# Patient Record
Sex: MALE | Race: WHITE | Employment: OTHER | URBAN - NONMETROPOLITAN AREA
[De-identification: names, ages, dates, MRNs, and addresses within clinical notes are randomized per-mention and may not be internally consistent; named-entity substitution may affect disease eponyms.]

---

## 2017-02-05 PROBLEM — K92.2 LOWER GI BLEED: Status: ACTIVE | Noted: 2017-02-05

## 2017-02-05 PROBLEM — D63.8 ANEMIA, CHRONIC DISEASE: Status: ACTIVE | Noted: 2017-02-05

## 2017-02-05 PROBLEM — N50.89 SCROTAL EDEMA: Status: ACTIVE | Noted: 2017-02-05

## 2017-02-05 PROBLEM — R60.0 LOWER EXTREMITY EDEMA: Status: ACTIVE | Noted: 2017-02-05

## 2017-02-07 PROBLEM — N43.3 BILATERAL HYDROCELE: Status: ACTIVE | Noted: 2017-02-05

## 2017-02-07 PROBLEM — J96.11 CHRONIC RESPIRATORY FAILURE WITH HYPOXIA (HCC): Status: ACTIVE | Noted: 2017-02-07

## 2017-02-21 ENCOUNTER — OFFICE VISIT (OUTPATIENT)
Dept: UROLOGY | Age: 57
End: 2017-02-21

## 2017-02-21 VITALS
HEIGHT: 73 IN | BODY MASS INDEX: 35.78 KG/M2 | SYSTOLIC BLOOD PRESSURE: 130 MMHG | DIASTOLIC BLOOD PRESSURE: 62 MMHG | WEIGHT: 270 LBS

## 2017-02-21 DIAGNOSIS — N50.89 SCROTAL SWELLING: Primary | ICD-10-CM

## 2017-02-21 DIAGNOSIS — Z12.5 SPECIAL SCREENING FOR MALIGNANT NEOPLASM OF PROSTATE: ICD-10-CM

## 2017-02-21 DIAGNOSIS — Z80.42 FAMILY HISTORY OF PROSTATE CANCER: ICD-10-CM

## 2017-02-21 LAB
BILIRUBIN URINE: NEGATIVE
BLOOD URINE, POC: ABNORMAL
CHARACTER, URINE: CLEAR
COLOR, URINE: YELLOW
GLUCOSE URINE: NEGATIVE MG/DL
KETONES, URINE: NEGATIVE
LEUKOCYTE CLUMPS, URINE: ABNORMAL
NITRITE, URINE: NEGATIVE
PH, URINE: 5
PROTEIN, URINE: 100 MG/DL
SPECIFIC GRAVITY, URINE: 1.02 (ref 1–1.03)
UROBILINOGEN, URINE: 0.2 EU/DL

## 2017-02-21 PROCEDURE — G8417 CALC BMI ABV UP PARAM F/U: HCPCS | Performed by: UROLOGY

## 2017-02-21 PROCEDURE — 1111F DSCHRG MED/CURRENT MED MERGE: CPT | Performed by: UROLOGY

## 2017-02-21 PROCEDURE — 1036F TOBACCO NON-USER: CPT | Performed by: UROLOGY

## 2017-02-21 PROCEDURE — G8427 DOCREV CUR MEDS BY ELIG CLIN: HCPCS | Performed by: UROLOGY

## 2017-02-21 PROCEDURE — 81003 URINALYSIS AUTO W/O SCOPE: CPT | Performed by: UROLOGY

## 2017-02-21 PROCEDURE — G8484 FLU IMMUNIZE NO ADMIN: HCPCS | Performed by: UROLOGY

## 2017-02-21 PROCEDURE — 99214 OFFICE O/P EST MOD 30 MIN: CPT | Performed by: UROLOGY

## 2017-02-21 PROCEDURE — 3017F COLORECTAL CA SCREEN DOC REV: CPT | Performed by: UROLOGY

## 2017-02-21 PROCEDURE — G8598 ASA/ANTIPLAT THER USED: HCPCS | Performed by: UROLOGY

## 2017-02-21 ASSESSMENT — ENCOUNTER SYMPTOMS
BACK PAIN: 0
EYE PAIN: 0
CHEST TIGHTNESS: 0
EYE REDNESS: 0
COLOR CHANGE: 0
CONSTIPATION: 0
VOMITING: 0
SHORTNESS OF BREATH: 1
FACIAL SWELLING: 0

## 2017-03-03 PROBLEM — R07.89 ATYPICAL CHEST PAIN: Status: ACTIVE | Noted: 2017-03-03

## 2017-03-04 PROBLEM — I50.21 ACUTE SYSTOLIC CHF (CONGESTIVE HEART FAILURE) (HCC): Status: ACTIVE | Noted: 2017-03-04

## 2017-03-06 PROBLEM — I95.1 ORTHOSTATIC SYNCOPE: Status: ACTIVE | Noted: 2017-03-06

## 2017-03-06 PROBLEM — I95.9 HYPOTENSION (ARTERIAL): Status: ACTIVE | Noted: 2017-03-06

## 2017-03-31 PROBLEM — E83.39 HYPERPHOSPHATEMIA: Chronic | Status: ACTIVE | Noted: 2017-03-31

## 2017-04-11 ENCOUNTER — TELEPHONE (OUTPATIENT)
Dept: CARDIOLOGY | Age: 57
End: 2017-04-11

## 2017-05-22 ENCOUNTER — TELEPHONE (OUTPATIENT)
Dept: CARDIOLOGY | Age: 57
End: 2017-05-22

## 2017-06-05 ENCOUNTER — OFFICE VISIT (OUTPATIENT)
Dept: CARDIOLOGY | Age: 57
End: 2017-06-05

## 2017-06-05 VITALS
SYSTOLIC BLOOD PRESSURE: 148 MMHG | HEART RATE: 70 BPM | HEIGHT: 73 IN | WEIGHT: 225 LBS | BODY MASS INDEX: 29.82 KG/M2 | DIASTOLIC BLOOD PRESSURE: 71 MMHG

## 2017-06-05 DIAGNOSIS — I25.5 ISCHEMIC CARDIOMYOPATHY: ICD-10-CM

## 2017-06-05 DIAGNOSIS — I25.110 CORONARY ARTERY DISEASE INVOLVING NATIVE CORONARY ARTERY OF NATIVE HEART WITH UNSTABLE ANGINA PECTORIS (HCC): Primary | ICD-10-CM

## 2017-06-05 DIAGNOSIS — N18.6 ESRD (END STAGE RENAL DISEASE) ON DIALYSIS (HCC): ICD-10-CM

## 2017-06-05 DIAGNOSIS — I48.92 PAROXYSMAL ATRIAL FLUTTER (HCC): ICD-10-CM

## 2017-06-05 DIAGNOSIS — Z99.2 ESRD (END STAGE RENAL DISEASE) ON DIALYSIS (HCC): ICD-10-CM

## 2017-06-05 DIAGNOSIS — Z95.2 S/P AVR: ICD-10-CM

## 2017-06-05 DIAGNOSIS — Z95.1 S/P CABG (CORONARY ARTERY BYPASS GRAFT): ICD-10-CM

## 2017-06-05 PROCEDURE — 93000 ELECTROCARDIOGRAM COMPLETE: CPT | Performed by: PHYSICIAN ASSISTANT

## 2017-06-05 PROCEDURE — G8417 CALC BMI ABV UP PARAM F/U: HCPCS | Performed by: PHYSICIAN ASSISTANT

## 2017-06-05 PROCEDURE — 3017F COLORECTAL CA SCREEN DOC REV: CPT | Performed by: PHYSICIAN ASSISTANT

## 2017-06-05 PROCEDURE — 99213 OFFICE O/P EST LOW 20 MIN: CPT | Performed by: PHYSICIAN ASSISTANT

## 2017-06-05 PROCEDURE — G8427 DOCREV CUR MEDS BY ELIG CLIN: HCPCS | Performed by: PHYSICIAN ASSISTANT

## 2017-06-05 PROCEDURE — 1036F TOBACCO NON-USER: CPT | Performed by: PHYSICIAN ASSISTANT

## 2017-06-05 PROCEDURE — G8598 ASA/ANTIPLAT THER USED: HCPCS | Performed by: PHYSICIAN ASSISTANT

## 2017-07-11 ENCOUNTER — OFFICE VISIT (OUTPATIENT)
Dept: CARDIOLOGY | Age: 57
End: 2017-07-11

## 2017-07-11 VITALS
HEART RATE: 121 BPM | BODY MASS INDEX: 30.75 KG/M2 | SYSTOLIC BLOOD PRESSURE: 110 MMHG | DIASTOLIC BLOOD PRESSURE: 50 MMHG | HEIGHT: 73 IN | WEIGHT: 232 LBS

## 2017-07-11 DIAGNOSIS — R00.2 HEART PALPITATIONS: ICD-10-CM

## 2017-07-11 DIAGNOSIS — I48.3 TYPICAL ATRIAL FLUTTER (HCC): Primary | ICD-10-CM

## 2017-07-11 PROCEDURE — G8598 ASA/ANTIPLAT THER USED: HCPCS | Performed by: INTERNAL MEDICINE

## 2017-07-11 PROCEDURE — 99205 OFFICE O/P NEW HI 60 MIN: CPT | Performed by: INTERNAL MEDICINE

## 2017-07-11 PROCEDURE — 3017F COLORECTAL CA SCREEN DOC REV: CPT | Performed by: INTERNAL MEDICINE

## 2017-07-11 PROCEDURE — G8427 DOCREV CUR MEDS BY ELIG CLIN: HCPCS | Performed by: INTERNAL MEDICINE

## 2017-07-11 PROCEDURE — 1036F TOBACCO NON-USER: CPT | Performed by: INTERNAL MEDICINE

## 2017-07-11 PROCEDURE — 93000 ELECTROCARDIOGRAM COMPLETE: CPT | Performed by: INTERNAL MEDICINE

## 2017-07-11 PROCEDURE — G8417 CALC BMI ABV UP PARAM F/U: HCPCS | Performed by: INTERNAL MEDICINE

## 2017-07-11 RX ORDER — GLYCERIN/MALTODEXTRIN
1 LIQUID (ML) ORAL
Status: ON HOLD | COMMUNITY
Start: 2017-06-05 | End: 2018-07-07 | Stop reason: CLARIF

## 2017-07-11 ASSESSMENT — ENCOUNTER SYMPTOMS
NAUSEA: 0
WHEEZING: 0
LEFT EYE: 0
SORE THROAT: 0
BLURRED VISION: 0
CONSTIPATION: 0
DOUBLE VISION: 0
DIARRHEA: 0
SHORTNESS OF BREATH: 0
RIGHT EYE: 0
VOMITING: 0
ABDOMINAL PAIN: 0
COUGH: 0
BACK PAIN: 0

## 2017-07-12 ENCOUNTER — TELEPHONE (OUTPATIENT)
Dept: CARDIOLOGY | Age: 57
End: 2017-07-12

## 2017-07-18 ENCOUNTER — TELEPHONE (OUTPATIENT)
Dept: CARDIOLOGY CLINIC | Age: 57
End: 2017-07-18

## 2017-07-18 ENCOUNTER — HOSPITAL ENCOUNTER (OUTPATIENT)
Dept: INPATIENT UNIT | Age: 57
Discharge: HOME | End: 2017-07-18
Payer: MEDICARE

## 2017-07-18 VITALS
HEIGHT: 73 IN | OXYGEN SATURATION: 97 % | BODY MASS INDEX: 31.28 KG/M2 | SYSTOLIC BLOOD PRESSURE: 160 MMHG | TEMPERATURE: 97.9 F | WEIGHT: 236 LBS | DIASTOLIC BLOOD PRESSURE: 70 MMHG | HEART RATE: 88 BPM | RESPIRATION RATE: 16 BRPM

## 2017-07-18 LAB
ANION GAP SERPL CALCULATED.3IONS-SCNC: 20 MEQ/L (ref 8–16)
BUN BLDV-MCNC: 49 MG/DL (ref 7–22)
CALCIUM SERPL-MCNC: 9 MG/DL (ref 8.5–10.5)
CHLORIDE BLD-SCNC: 94 MEQ/L (ref 98–111)
CO2: 26 MEQ/L (ref 23–33)
CREAT SERPL-MCNC: 7.7 MG/DL (ref 0.4–1.2)
GFR SERPL CREATININE-BSD FRML MDRD: 7 ML/MIN/1.73M2
GLUCOSE BLD-MCNC: 294 MG/DL (ref 70–108)
HCT VFR BLD CALC: 33.8 % (ref 42–52)
HEMOGLOBIN: 10.9 GM/DL (ref 14–18)
INR BLD: 1.32 (ref 0.85–1.13)
LV EF: 55 %
LVEF MODALITY: NORMAL
MCH RBC QN AUTO: 30.3 PG (ref 27–31)
MCHC RBC AUTO-ENTMCNC: 32.4 GM/DL (ref 33–37)
MCV RBC AUTO: 93.6 FL (ref 80–94)
PDW BLD-RTO: 17.1 % (ref 11.5–14.5)
PLATELET # BLD: 205 THOU/MM3 (ref 130–400)
PMV BLD AUTO: 8 MCM (ref 7.4–10.4)
POTASSIUM SERPL-SCNC: 4.1 MEQ/L (ref 3.5–5.2)
RBC # BLD: 3.61 MILL/MM3 (ref 4.7–6.1)
SODIUM BLD-SCNC: 140 MEQ/L (ref 135–145)
WBC # BLD: 11.8 THOU/MM3 (ref 4.8–10.8)

## 2017-07-18 PROCEDURE — 85027 COMPLETE CBC AUTOMATED: CPT

## 2017-07-18 PROCEDURE — 85610 PROTHROMBIN TIME: CPT

## 2017-07-18 PROCEDURE — 93312 ECHO TRANSESOPHAGEAL: CPT

## 2017-07-18 PROCEDURE — 6370000000 HC RX 637 (ALT 250 FOR IP)

## 2017-07-18 PROCEDURE — 6360000002 HC RX W HCPCS: Performed by: NUCLEAR MEDICINE

## 2017-07-18 PROCEDURE — 36415 COLL VENOUS BLD VENIPUNCTURE: CPT

## 2017-07-18 PROCEDURE — 80048 BASIC METABOLIC PNL TOTAL CA: CPT

## 2017-07-18 PROCEDURE — 93325 DOPPLER ECHO COLOR FLOW MAPG: CPT

## 2017-07-18 PROCEDURE — 93320 DOPPLER ECHO COMPLETE: CPT

## 2017-07-18 PROCEDURE — 2580000003 HC RX 258: Performed by: NUCLEAR MEDICINE

## 2017-07-18 RX ORDER — NALOXONE HYDROCHLORIDE 0.4 MG/ML
INJECTION, SOLUTION INTRAMUSCULAR; INTRAVENOUS; SUBCUTANEOUS
Status: DISCONTINUED
Start: 2017-07-18 | End: 2017-07-18 | Stop reason: WASHOUT

## 2017-07-18 RX ORDER — FLUMAZENIL 0.1 MG/ML
INJECTION, SOLUTION INTRAVENOUS
Status: DISCONTINUED
Start: 2017-07-18 | End: 2017-07-18 | Stop reason: WASHOUT

## 2017-07-18 RX ORDER — MIDAZOLAM HYDROCHLORIDE 1 MG/ML
10 INJECTION INTRAMUSCULAR; INTRAVENOUS ONCE
Status: COMPLETED | OUTPATIENT
Start: 2017-07-18 | End: 2017-07-18

## 2017-07-18 RX ORDER — SODIUM CHLORIDE 0.9 % (FLUSH) 0.9 %
10 SYRINGE (ML) INJECTION EVERY 12 HOURS SCHEDULED
Status: DISCONTINUED | OUTPATIENT
Start: 2017-07-18 | End: 2017-07-19 | Stop reason: HOSPADM

## 2017-07-18 RX ORDER — AMOXICILLIN 500 MG/1
500 CAPSULE ORAL 3 TIMES DAILY
Status: ON HOLD | COMMUNITY
End: 2017-08-16 | Stop reason: ALTCHOICE

## 2017-07-18 RX ORDER — SODIUM CHLORIDE 0.9 % (FLUSH) 0.9 %
10 SYRINGE (ML) INJECTION PRN
Status: DISCONTINUED | OUTPATIENT
Start: 2017-07-18 | End: 2017-07-19 | Stop reason: HOSPADM

## 2017-07-18 RX ORDER — SODIUM CHLORIDE 9 MG/ML
INJECTION, SOLUTION INTRAVENOUS CONTINUOUS
Status: DISCONTINUED | OUTPATIENT
Start: 2017-07-18 | End: 2017-07-19 | Stop reason: HOSPADM

## 2017-07-18 RX ORDER — FENTANYL CITRATE 50 UG/ML
100 INJECTION, SOLUTION INTRAMUSCULAR; INTRAVENOUS ONCE
Status: COMPLETED | OUTPATIENT
Start: 2017-07-18 | End: 2017-07-18

## 2017-07-18 RX ADMIN — FENTANYL CITRATE 50 MCG: 50 INJECTION INTRAMUSCULAR; INTRAVENOUS at 08:21

## 2017-07-18 RX ADMIN — SODIUM CHLORIDE: 9 INJECTION, SOLUTION INTRAVENOUS at 07:22

## 2017-07-18 RX ADMIN — MIDAZOLAM 3 MG: 1 INJECTION INTRAMUSCULAR; INTRAVENOUS at 08:20

## 2017-07-21 ENCOUNTER — HOSPITAL ENCOUNTER (OUTPATIENT)
Dept: INPATIENT UNIT | Age: 57
Discharge: HOME | End: 2017-07-22
Attending: INTERNAL MEDICINE | Admitting: INTERNAL MEDICINE
Payer: MEDICARE

## 2017-07-21 ENCOUNTER — ANESTHESIA EVENT (OUTPATIENT)
Dept: CARDIAC CATH/INVASIVE PROCEDURES | Age: 57
End: 2017-07-21
Payer: MEDICARE

## 2017-07-21 ENCOUNTER — ANESTHESIA (OUTPATIENT)
Dept: CARDIAC CATH/INVASIVE PROCEDURES | Age: 57
End: 2017-07-21
Payer: MEDICARE

## 2017-07-21 ENCOUNTER — APPOINTMENT (OUTPATIENT)
Dept: CARDIAC CATH/INVASIVE PROCEDURES | Age: 57
End: 2017-07-21
Attending: INTERNAL MEDICINE
Payer: MEDICARE

## 2017-07-21 VITALS
DIASTOLIC BLOOD PRESSURE: 59 MMHG | OXYGEN SATURATION: 96 % | SYSTOLIC BLOOD PRESSURE: 133 MMHG | RESPIRATION RATE: 2 BRPM | TEMPERATURE: 98.4 F

## 2017-07-21 PROBLEM — I48.92 ATRIAL FLUTTER (HCC): Status: ACTIVE | Noted: 2017-07-21

## 2017-07-21 PROBLEM — I48.3 TYPICAL ATRIAL FLUTTER (HCC): Status: ACTIVE | Noted: 2017-07-21

## 2017-07-21 LAB
GLUCOSE BLD-MCNC: 182 MG/DL (ref 70–108)
GLUCOSE BLD-MCNC: 207 MG/DL (ref 70–108)

## 2017-07-21 PROCEDURE — 7100000000 HC PACU RECOVERY - FIRST 15 MIN

## 2017-07-21 PROCEDURE — 3700000000 HC ANESTHESIA ATTENDED CARE

## 2017-07-21 PROCEDURE — A6258 TRANSPARENT FILM >16<=48 IN: HCPCS

## 2017-07-21 PROCEDURE — 93613 INTRACARDIAC EPHYS 3D MAPG: CPT

## 2017-07-21 PROCEDURE — 93653 COMPRE EP EVAL TX SVT: CPT | Performed by: INTERNAL MEDICINE

## 2017-07-21 PROCEDURE — 2580000003 HC RX 258: Performed by: NURSE ANESTHETIST, CERTIFIED REGISTERED

## 2017-07-21 PROCEDURE — 6370000000 HC RX 637 (ALT 250 FOR IP): Performed by: INTERNAL MEDICINE

## 2017-07-21 PROCEDURE — C1731 CATH, EP, 20 OR MORE ELEC: HCPCS

## 2017-07-21 PROCEDURE — 2580000003 HC RX 258: Performed by: INTERNAL MEDICINE

## 2017-07-21 PROCEDURE — 6360000002 HC RX W HCPCS

## 2017-07-21 PROCEDURE — 2500000003 HC RX 250 WO HCPCS

## 2017-07-21 PROCEDURE — 93621 COMP EP EVL L PAC&REC C SINS: CPT

## 2017-07-21 PROCEDURE — C1730 CATH, EP, 19 OR FEW ELECT: HCPCS

## 2017-07-21 PROCEDURE — C2630 CATH, EP, COOL-TIP: HCPCS

## 2017-07-21 PROCEDURE — 6360000002 HC RX W HCPCS: Performed by: NURSE ANESTHETIST, CERTIFIED REGISTERED

## 2017-07-21 PROCEDURE — 93613 INTRACARDIAC EPHYS 3D MAPG: CPT | Performed by: INTERNAL MEDICINE

## 2017-07-21 PROCEDURE — 2500000003 HC RX 250 WO HCPCS: Performed by: INTERNAL MEDICINE

## 2017-07-21 PROCEDURE — 93621 COMP EP EVL L PAC&REC C SINS: CPT | Performed by: INTERNAL MEDICINE

## 2017-07-21 PROCEDURE — 93653 COMPRE EP EVAL TX SVT: CPT

## 2017-07-21 PROCEDURE — 7100000001 HC PACU RECOVERY - ADDTL 15 MIN

## 2017-07-21 PROCEDURE — C1894 INTRO/SHEATH, NON-LASER: HCPCS

## 2017-07-21 PROCEDURE — 82948 REAGENT STRIP/BLOOD GLUCOSE: CPT

## 2017-07-21 PROCEDURE — 2720000010 HC SURG SUPPLY STERILE

## 2017-07-21 PROCEDURE — 3700000001 HC ADD 15 MINUTES (ANESTHESIA)

## 2017-07-21 PROCEDURE — 2500000003 HC RX 250 WO HCPCS: Performed by: NURSE ANESTHETIST, CERTIFIED REGISTERED

## 2017-07-21 RX ORDER — SODIUM CHLORIDE 9 MG/ML
INJECTION, SOLUTION INTRAVENOUS CONTINUOUS PRN
Status: DISCONTINUED | OUTPATIENT
Start: 2017-07-21 | End: 2017-07-23 | Stop reason: SDUPTHER

## 2017-07-21 RX ORDER — SODIUM CHLORIDE, SODIUM LACTATE, CALCIUM CHLORIDE, MAGNESIUM CHLORIDE AND DEXTROSE 2.5; 538; 448; 18.3; 5.08 G/100ML; MG/100ML; MG/100ML; MG/100ML; MG/100ML
2000 INJECTION, SOLUTION INTRAPERITONEAL EVERY 6 HOURS
Status: DISCONTINUED | OUTPATIENT
Start: 2017-07-21 | End: 2017-07-22 | Stop reason: HOSPADM

## 2017-07-21 RX ORDER — GLYCOPYRROLATE 0.2 MG/ML
INJECTION INTRAMUSCULAR; INTRAVENOUS PRN
Status: DISCONTINUED | OUTPATIENT
Start: 2017-07-21 | End: 2017-07-23 | Stop reason: SDUPTHER

## 2017-07-21 RX ORDER — PANTOPRAZOLE SODIUM 40 MG/1
40 TABLET, DELAYED RELEASE ORAL 2 TIMES DAILY
Status: DISCONTINUED | OUTPATIENT
Start: 2017-07-21 | End: 2017-07-22 | Stop reason: HOSPADM

## 2017-07-21 RX ORDER — LIDOCAINE HYDROCHLORIDE 20 MG/ML
INJECTION, SOLUTION INFILTRATION; PERINEURAL PRN
Status: DISCONTINUED | OUTPATIENT
Start: 2017-07-21 | End: 2017-07-23 | Stop reason: SDUPTHER

## 2017-07-21 RX ORDER — PREGABALIN 50 MG/1
50 CAPSULE ORAL 2 TIMES DAILY
Status: DISCONTINUED | OUTPATIENT
Start: 2017-07-21 | End: 2017-07-22 | Stop reason: HOSPADM

## 2017-07-21 RX ORDER — SODIUM CHLORIDE 0.9 % (FLUSH) 0.9 %
10 SYRINGE (ML) INJECTION EVERY 12 HOURS SCHEDULED
Status: DISCONTINUED | OUTPATIENT
Start: 2017-07-21 | End: 2017-07-22 | Stop reason: HOSPADM

## 2017-07-21 RX ORDER — ASPIRIN 81 MG/1
81 TABLET, CHEWABLE ORAL DAILY
Status: DISCONTINUED | OUTPATIENT
Start: 2017-07-22 | End: 2017-07-22 | Stop reason: HOSPADM

## 2017-07-21 RX ORDER — ACETAMINOPHEN 325 MG/1
650 TABLET ORAL EVERY 4 HOURS PRN
Status: DISCONTINUED | OUTPATIENT
Start: 2017-07-21 | End: 2017-07-22 | Stop reason: HOSPADM

## 2017-07-21 RX ORDER — MEPERIDINE HYDROCHLORIDE 25 MG/ML
25 INJECTION INTRAMUSCULAR; INTRAVENOUS; SUBCUTANEOUS
Status: DISCONTINUED | OUTPATIENT
Start: 2017-07-21 | End: 2017-07-21

## 2017-07-21 RX ORDER — DIPHENHYDRAMINE HYDROCHLORIDE 50 MG/ML
12.5 INJECTION INTRAMUSCULAR; INTRAVENOUS
Status: DISCONTINUED | OUTPATIENT
Start: 2017-07-21 | End: 2017-07-21

## 2017-07-21 RX ORDER — LABETALOL HYDROCHLORIDE 5 MG/ML
5 INJECTION, SOLUTION INTRAVENOUS EVERY 10 MIN PRN
Status: DISCONTINUED | OUTPATIENT
Start: 2017-07-21 | End: 2017-07-21

## 2017-07-21 RX ORDER — NICOTINE POLACRILEX 4 MG
15 LOZENGE BUCCAL PRN
Status: DISCONTINUED | OUTPATIENT
Start: 2017-07-21 | End: 2017-07-22 | Stop reason: HOSPADM

## 2017-07-21 RX ORDER — DEXAMETHASONE SODIUM PHOSPHATE 4 MG/ML
INJECTION, SOLUTION INTRA-ARTICULAR; INTRALESIONAL; INTRAMUSCULAR; INTRAVENOUS; SOFT TISSUE PRN
Status: DISCONTINUED | OUTPATIENT
Start: 2017-07-21 | End: 2017-07-23 | Stop reason: SDUPTHER

## 2017-07-21 RX ORDER — FENTANYL CITRATE 50 UG/ML
50 INJECTION, SOLUTION INTRAMUSCULAR; INTRAVENOUS EVERY 5 MIN PRN
Status: DISCONTINUED | OUTPATIENT
Start: 2017-07-21 | End: 2017-07-21

## 2017-07-21 RX ORDER — ROCURONIUM BROMIDE 10 MG/ML
INJECTION, SOLUTION INTRAVENOUS PRN
Status: DISCONTINUED | OUTPATIENT
Start: 2017-07-21 | End: 2017-07-23 | Stop reason: SDUPTHER

## 2017-07-21 RX ORDER — NEOSTIGMINE METHYLSULFATE 1 MG/ML
INJECTION, SOLUTION INTRAVENOUS PRN
Status: DISCONTINUED | OUTPATIENT
Start: 2017-07-21 | End: 2017-07-23 | Stop reason: SDUPTHER

## 2017-07-21 RX ORDER — PROPOFOL 10 MG/ML
INJECTION, EMULSION INTRAVENOUS PRN
Status: DISCONTINUED | OUTPATIENT
Start: 2017-07-21 | End: 2017-07-23 | Stop reason: SDUPTHER

## 2017-07-21 RX ORDER — DEXTROSE MONOHYDRATE 25 G/50ML
12.5 INJECTION, SOLUTION INTRAVENOUS PRN
Status: DISCONTINUED | OUTPATIENT
Start: 2017-07-21 | End: 2017-07-22 | Stop reason: HOSPADM

## 2017-07-21 RX ORDER — ONDANSETRON 2 MG/ML
INJECTION INTRAMUSCULAR; INTRAVENOUS PRN
Status: DISCONTINUED | OUTPATIENT
Start: 2017-07-21 | End: 2017-07-23 | Stop reason: SDUPTHER

## 2017-07-21 RX ORDER — FENTANYL CITRATE 50 UG/ML
INJECTION, SOLUTION INTRAMUSCULAR; INTRAVENOUS PRN
Status: DISCONTINUED | OUTPATIENT
Start: 2017-07-21 | End: 2017-07-23 | Stop reason: SDUPTHER

## 2017-07-21 RX ORDER — SODIUM CHLORIDE 0.9 % (FLUSH) 0.9 %
10 SYRINGE (ML) INJECTION PRN
Status: DISCONTINUED | OUTPATIENT
Start: 2017-07-21 | End: 2017-07-22 | Stop reason: HOSPADM

## 2017-07-21 RX ORDER — PROMETHAZINE HYDROCHLORIDE 25 MG/ML
12.5 INJECTION, SOLUTION INTRAMUSCULAR; INTRAVENOUS
Status: DISCONTINUED | OUTPATIENT
Start: 2017-07-21 | End: 2017-07-21

## 2017-07-21 RX ORDER — SODIUM CHLORIDE 9 MG/ML
INJECTION, SOLUTION INTRAVENOUS CONTINUOUS
Status: DISCONTINUED | OUTPATIENT
Start: 2017-07-21 | End: 2017-07-21

## 2017-07-21 RX ORDER — FENTANYL CITRATE 50 UG/ML
25 INJECTION, SOLUTION INTRAMUSCULAR; INTRAVENOUS EVERY 5 MIN PRN
Status: DISCONTINUED | OUTPATIENT
Start: 2017-07-21 | End: 2017-07-21

## 2017-07-21 RX ORDER — MIDAZOLAM HYDROCHLORIDE 1 MG/ML
INJECTION INTRAMUSCULAR; INTRAVENOUS PRN
Status: DISCONTINUED | OUTPATIENT
Start: 2017-07-21 | End: 2017-07-23 | Stop reason: SDUPTHER

## 2017-07-21 RX ORDER — SODIUM CHLORIDE, SODIUM LACTATE, CALCIUM CHLORIDE, MAGNESIUM CHLORIDE AND DEXTROSE 2.5; 538; 448; 18.3; 5.08 G/100ML; MG/100ML; MG/100ML; MG/100ML; MG/100ML
INJECTION, SOLUTION INTRAPERITONEAL EVERY 4 HOURS
Status: DISCONTINUED | OUTPATIENT
Start: 2017-07-21 | End: 2017-07-21

## 2017-07-21 RX ORDER — FLUOXETINE HYDROCHLORIDE 20 MG/1
20 CAPSULE ORAL DAILY
Status: DISCONTINUED | OUTPATIENT
Start: 2017-07-22 | End: 2017-07-22 | Stop reason: HOSPADM

## 2017-07-21 RX ORDER — ALBUTEROL SULFATE 90 UG/1
2 AEROSOL, METERED RESPIRATORY (INHALATION) EVERY 6 HOURS PRN
Status: DISCONTINUED | OUTPATIENT
Start: 2017-07-21 | End: 2017-07-22 | Stop reason: HOSPADM

## 2017-07-21 RX ORDER — CARVEDILOL 3.12 MG/1
3.12 TABLET ORAL 2 TIMES DAILY
Status: DISCONTINUED | OUTPATIENT
Start: 2017-07-21 | End: 2017-07-22 | Stop reason: HOSPADM

## 2017-07-21 RX ORDER — DEXTROSE MONOHYDRATE 50 MG/ML
100 INJECTION, SOLUTION INTRAVENOUS PRN
Status: DISCONTINUED | OUTPATIENT
Start: 2017-07-21 | End: 2017-07-22 | Stop reason: HOSPADM

## 2017-07-21 RX ADMIN — SODIUM CHLORIDE: 9 INJECTION, SOLUTION INTRAVENOUS at 13:11

## 2017-07-21 RX ADMIN — PROPOFOL 140 MG: 10 INJECTION, EMULSION INTRAVENOUS at 13:20

## 2017-07-21 RX ADMIN — INSULIN LISPRO 2 UNITS: 100 INJECTION, SOLUTION INTRAVENOUS; SUBCUTANEOUS at 21:26

## 2017-07-21 RX ADMIN — PHENYLEPHRINE HYDROCHLORIDE 100 MCG: 10 INJECTION INTRAMUSCULAR; INTRAVENOUS; SUBCUTANEOUS at 14:00

## 2017-07-21 RX ADMIN — FENTANYL CITRATE 50 MCG: 50 INJECTION INTRAMUSCULAR; INTRAVENOUS at 13:20

## 2017-07-21 RX ADMIN — FENTANYL CITRATE 25 MCG: 50 INJECTION INTRAMUSCULAR; INTRAVENOUS at 15:04

## 2017-07-21 RX ADMIN — Medication 40 MG: at 13:20

## 2017-07-21 RX ADMIN — Medication 10 ML: at 21:26

## 2017-07-21 RX ADMIN — SODIUM CHLORIDE: 9 INJECTION, SOLUTION INTRAVENOUS at 12:08

## 2017-07-21 RX ADMIN — MIDAZOLAM HYDROCHLORIDE 2 MG: 1 INJECTION INTRAMUSCULAR; INTRAVENOUS at 13:17

## 2017-07-21 RX ADMIN — GLYCOPYRROLATE 0.4 MG: 0.2 INJECTION, SOLUTION INTRAMUSCULAR; INTRAVENOUS at 14:57

## 2017-07-21 RX ADMIN — SODIUM CHLORIDE, SODIUM LACTATE, CALCIUM CHLORIDE, MAGNESIUM CHLORIDE AND DEXTROSE 2000 ML: 2.5; 538; 448; 18.3; 5.08 INJECTION, SOLUTION INTRAPERITONEAL at 21:59

## 2017-07-21 RX ADMIN — PHENYLEPHRINE HYDROCHLORIDE 100 MCG: 10 INJECTION INTRAMUSCULAR; INTRAVENOUS; SUBCUTANEOUS at 14:10

## 2017-07-21 RX ADMIN — PHENYLEPHRINE HYDROCHLORIDE 100 MCG: 10 INJECTION INTRAMUSCULAR; INTRAVENOUS; SUBCUTANEOUS at 13:40

## 2017-07-21 RX ADMIN — MICONAZOLE NITRATE: 2 POWDER TOPICAL at 21:26

## 2017-07-21 RX ADMIN — APIXABAN 5 MG: 5 TABLET, FILM COATED ORAL at 21:26

## 2017-07-21 RX ADMIN — SODIUM CHLORIDE: 9 INJECTION, SOLUTION INTRAVENOUS at 18:19

## 2017-07-21 RX ADMIN — ONDANSETRON 4 MG: 2 INJECTION INTRAMUSCULAR; INTRAVENOUS at 14:58

## 2017-07-21 RX ADMIN — FENTANYL CITRATE 25 MCG: 50 INJECTION INTRAMUSCULAR; INTRAVENOUS at 15:20

## 2017-07-21 RX ADMIN — PHENYLEPHRINE HYDROCHLORIDE 100 MCG: 10 INJECTION INTRAMUSCULAR; INTRAVENOUS; SUBCUTANEOUS at 14:35

## 2017-07-21 RX ADMIN — NEOSTIGMINE METHYLSULFATE 3 MG: 1 INJECTION, SOLUTION INTRAVENOUS at 14:57

## 2017-07-21 RX ADMIN — CARVEDILOL 3.12 MG: 3.12 TABLET, FILM COATED ORAL at 21:26

## 2017-07-21 RX ADMIN — PANTOPRAZOLE SODIUM 40 MG: 40 TABLET, DELAYED RELEASE ORAL at 21:26

## 2017-07-21 RX ADMIN — LIDOCAINE HYDROCHLORIDE 100 MG: 20 INJECTION, SOLUTION INFILTRATION; PERINEURAL at 13:20

## 2017-07-21 RX ADMIN — DEXAMETHASONE SODIUM PHOSPHATE 4 MG: 4 INJECTION, SOLUTION INTRAMUSCULAR; INTRAVENOUS at 13:36

## 2017-07-21 RX ADMIN — PREGABALIN 50 MG: 50 CAPSULE ORAL at 21:26

## 2017-07-21 RX ADMIN — Medication 2 UNITS: at 19:13

## 2017-07-21 ASSESSMENT — PULMONARY FUNCTION TESTS
PIF_VALUE: 16
PIF_VALUE: 1
PIF_VALUE: 22
PIF_VALUE: 16
PIF_VALUE: 19
PIF_VALUE: 19
PIF_VALUE: 20
PIF_VALUE: 19
PIF_VALUE: 0
PIF_VALUE: 19
PIF_VALUE: 19
PIF_VALUE: 13
PIF_VALUE: 19
PIF_VALUE: 19
PIF_VALUE: 13
PIF_VALUE: 19
PIF_VALUE: 5
PIF_VALUE: 19
PIF_VALUE: 19
PIF_VALUE: 20
PIF_VALUE: 19
PIF_VALUE: 0
PIF_VALUE: 20
PIF_VALUE: 19
PIF_VALUE: 19
PIF_VALUE: 20
PIF_VALUE: 19
PIF_VALUE: 18
PIF_VALUE: 13
PIF_VALUE: 19
PIF_VALUE: 19
PIF_VALUE: 21
PIF_VALUE: 19
PIF_VALUE: 19
PIF_VALUE: 18
PIF_VALUE: 20
PIF_VALUE: 13
PIF_VALUE: 19
PIF_VALUE: 19
PIF_VALUE: 20
PIF_VALUE: 0
PIF_VALUE: 19
PIF_VALUE: 20
PIF_VALUE: 3
PIF_VALUE: 19
PIF_VALUE: 21
PIF_VALUE: 22
PIF_VALUE: 19
PIF_VALUE: 15
PIF_VALUE: 13
PIF_VALUE: 20
PIF_VALUE: 0
PIF_VALUE: 24
PIF_VALUE: 20
PIF_VALUE: 19
PIF_VALUE: 2
PIF_VALUE: 2
PIF_VALUE: 19
PIF_VALUE: 23
PIF_VALUE: 19
PIF_VALUE: 18
PIF_VALUE: 19
PIF_VALUE: 17
PIF_VALUE: 19
PIF_VALUE: 20
PIF_VALUE: 19
PIF_VALUE: 17
PIF_VALUE: 19
PIF_VALUE: 1
PIF_VALUE: 1
PIF_VALUE: 13
PIF_VALUE: 20
PIF_VALUE: 19
PIF_VALUE: 20
PIF_VALUE: 19
PIF_VALUE: 19
PIF_VALUE: 20
PIF_VALUE: 0
PIF_VALUE: 1
PIF_VALUE: 20
PIF_VALUE: 20
PIF_VALUE: 19
PIF_VALUE: 19
PIF_VALUE: 20
PIF_VALUE: 20
PIF_VALUE: 21
PIF_VALUE: 17
PIF_VALUE: 20
PIF_VALUE: 19
PIF_VALUE: 14
PIF_VALUE: 21
PIF_VALUE: 19
PIF_VALUE: 17
PIF_VALUE: 18
PIF_VALUE: 19
PIF_VALUE: 15
PIF_VALUE: 19
PIF_VALUE: 19
PIF_VALUE: 1
PIF_VALUE: 19
PIF_VALUE: 20
PIF_VALUE: 19
PIF_VALUE: 19
PIF_VALUE: 20

## 2017-07-21 ASSESSMENT — PAIN SCALES - GENERAL: PAINLEVEL_OUTOF10: 0

## 2017-07-22 VITALS
HEART RATE: 73 BPM | WEIGHT: 236 LBS | HEIGHT: 73 IN | TEMPERATURE: 98 F | SYSTOLIC BLOOD PRESSURE: 169 MMHG | DIASTOLIC BLOOD PRESSURE: 78 MMHG | BODY MASS INDEX: 31.28 KG/M2 | OXYGEN SATURATION: 95 % | RESPIRATION RATE: 18 BRPM

## 2017-07-22 LAB — GLUCOSE BLD-MCNC: 203 MG/DL (ref 70–108)

## 2017-07-22 PROCEDURE — 6370000000 HC RX 637 (ALT 250 FOR IP): Performed by: INTERNAL MEDICINE

## 2017-07-22 PROCEDURE — 82948 REAGENT STRIP/BLOOD GLUCOSE: CPT

## 2017-07-22 PROCEDURE — 2580000003 HC RX 258: Performed by: INTERNAL MEDICINE

## 2017-07-22 PROCEDURE — 93005 ELECTROCARDIOGRAM TRACING: CPT

## 2017-07-22 RX ADMIN — APIXABAN 5 MG: 5 TABLET, FILM COATED ORAL at 09:06

## 2017-07-22 RX ADMIN — Medication 4 UNITS: at 09:06

## 2017-07-22 RX ADMIN — PANTOPRAZOLE SODIUM 40 MG: 40 TABLET, DELAYED RELEASE ORAL at 09:06

## 2017-07-22 RX ADMIN — ASPIRIN 81 MG: 81 TABLET, CHEWABLE ORAL at 09:06

## 2017-07-22 RX ADMIN — PREGABALIN 50 MG: 50 CAPSULE ORAL at 09:06

## 2017-07-22 RX ADMIN — FLUOXETINE 20 MG: 20 CAPSULE ORAL at 09:06

## 2017-07-22 RX ADMIN — CARVEDILOL 3.12 MG: 3.12 TABLET, FILM COATED ORAL at 09:06

## 2017-07-22 RX ADMIN — SODIUM CHLORIDE, SODIUM LACTATE, CALCIUM CHLORIDE, MAGNESIUM CHLORIDE AND DEXTROSE 2000 ML: 2.5; 538; 448; 18.3; 5.08 INJECTION, SOLUTION INTRAPERITONEAL at 04:30

## 2017-07-22 ASSESSMENT — PAIN SCALES - GENERAL: PAINLEVEL_OUTOF10: 0

## 2017-07-28 LAB
EKG ATRIAL RATE: 73 BPM
EKG P AXIS: 58 DEGREES
EKG P-R INTERVAL: 188 MS
EKG Q-T INTERVAL: 480 MS
EKG QRS DURATION: 144 MS
EKG QTC CALCULATION (BAZETT): 528 MS
EKG R AXIS: 92 DEGREES
EKG T AXIS: -10 DEGREES
EKG VENTRICULAR RATE: 73 BPM

## 2017-08-10 ENCOUNTER — TELEPHONE (OUTPATIENT)
Dept: CARDIOLOGY CLINIC | Age: 57
End: 2017-08-10

## 2017-08-16 ENCOUNTER — HOSPITAL ENCOUNTER (INPATIENT)
Age: 57
LOS: 7 days | Discharge: HOME | DRG: 813 | End: 2017-08-23
Attending: FAMILY MEDICINE | Admitting: INTERNAL MEDICINE
Payer: MEDICARE

## 2017-08-16 ENCOUNTER — APPOINTMENT (OUTPATIENT)
Dept: GENERAL RADIOLOGY | Age: 57
DRG: 813 | End: 2017-08-16
Payer: MEDICARE

## 2017-08-16 DIAGNOSIS — K92.1 GASTROINTESTINAL HEMORRHAGE WITH MELENA: ICD-10-CM

## 2017-08-16 DIAGNOSIS — D62 ACUTE BLOOD LOSS ANEMIA: Primary | ICD-10-CM

## 2017-08-16 PROBLEM — Z95.2 S/P AVR (AORTIC VALVE REPLACEMENT): Status: ACTIVE | Noted: 2017-08-16

## 2017-08-16 PROBLEM — I25.810 CORONARY ARTERY DISEASE INVOLVING CORONARY BYPASS GRAFT OF NATIVE HEART WITHOUT ANGINA PECTORIS: Chronic | Status: ACTIVE | Noted: 2017-08-16

## 2017-08-16 LAB
ABO CHECK: NORMAL
ANION GAP SERPL CALCULATED.3IONS-SCNC: 18 MEQ/L (ref 8–16)
ANISOCYTOSIS: ABNORMAL
BANDED NEUTROPHILS ABSOLUTE COUNT: 0.1 THOU/MM3
BANDS PRESENT: 1 %
BASOPHILIA: ABNORMAL
BASOPHILS # BLD: 0 %
BASOPHILS ABSOLUTE: 0 THOU/MM3 (ref 0–0.1)
BUN BLDV-MCNC: 63 MG/DL (ref 7–22)
CALCIUM SERPL-MCNC: 8.7 MG/DL (ref 8.5–10.5)
CHLORIDE BLD-SCNC: 94 MEQ/L (ref 98–111)
CO2: 26 MEQ/L (ref 23–33)
CREAT SERPL-MCNC: 8.3 MG/DL (ref 0.4–1.2)
DIFFERENTIAL TYPE: ABNORMAL
DIFFERENTIAL, MANUAL: NORMAL
EOSINOPHIL # BLD: 2 %
EOSINOPHILS ABSOLUTE: 0.2 THOU/MM3 (ref 0–0.4)
FERRITIN: 1025 NG/ML (ref 22–322)
GEL INDIRECT COOMBS: NORMAL
GFR SERPL CREATININE-BSD FRML MDRD: 7 ML/MIN/1.73M2
GLUCOSE BLD-MCNC: 164 MG/DL (ref 70–108)
GLUCOSE BLD-MCNC: 176 MG/DL (ref 70–108)
GLUCOSE BLD-MCNC: 237 MG/DL (ref 70–108)
HCT VFR BLD CALC: 16 % (ref 42–52)
HCT VFR BLD CALC: 16.8 % (ref 42–52)
HEMOCCULT STL QL: POSITIVE
HEMOGLOBIN: 5.7 GM/DL (ref 14–18)
HEMOGLOBIN: 5.8 GM/DL (ref 14–18)
IRON: 76 UG/DL (ref 65–195)
LACTIC ACID: 1.8 MMOL/L (ref 0.5–2.2)
LYMPHOCYTES # BLD: 13 %
LYMPHOCYTES ABSOLUTE: 1.2 THOU/MM3 (ref 1–4.8)
MCH RBC QN AUTO: 32.9 PG (ref 27–31)
MCHC RBC AUTO-ENTMCNC: 34.7 GM/DL (ref 33–37)
MCV RBC AUTO: 94.9 FL (ref 80–94)
METAMYELOCYTES: 1 %
MONOCYTES # BLD: 5 %
MONOCYTES ABSOLUTE: 0.5 THOU/MM3 (ref 0.4–1.3)
MYELOCYTES: 1 %
NUCLEATED RED BLOOD CELLS: 0 /100 WBC
OSMOLALITY CALCULATION: 301.3 MOSMOL/KG (ref 275–300)
PATHOLOGIST REVIEW: ABNORMAL
PDW BLD-RTO: 18.5 % (ref 11.5–14.5)
PLATELET # BLD: 224 THOU/MM3 (ref 130–400)
PLATELET ESTIMATE: ADEQUATE
PMV BLD AUTO: 7.3 MCM (ref 7.4–10.4)
POIKILOCYTES: ABNORMAL
POTASSIUM SERPL-SCNC: 4.1 MEQ/L (ref 3.5–5.2)
PRO-BNP: ABNORMAL PG/ML (ref 0–900)
RBC # BLD: 1.77 MILL/MM3 (ref 4.7–6.1)
RBC # BLD: ABNORMAL 10*6/UL
RH FACTOR: NORMAL
SEG NEUTROPHILS: 77 %
SEGMENTED NEUTROPHILS ABSOLUTE COUNT: 7 THOU/MM3 (ref 1.8–7.7)
SODIUM BLD-SCNC: 138 MEQ/L (ref 135–145)
TROPONIN T: 0.16 NG/ML
WBC # BLD: 9.1 THOU/MM3 (ref 4.8–10.8)

## 2017-08-16 PROCEDURE — 6360000002 HC RX W HCPCS: Performed by: INTERNAL MEDICINE

## 2017-08-16 PROCEDURE — 36430 TRANSFUSION BLD/BLD COMPNT: CPT

## 2017-08-16 PROCEDURE — 93005 ELECTROCARDIOGRAM TRACING: CPT

## 2017-08-16 PROCEDURE — 80048 BASIC METABOLIC PNL TOTAL CA: CPT

## 2017-08-16 PROCEDURE — 0HBQXZZ EXCISION OF FINGER NAIL, EXTERNAL APPROACH: ICD-10-PCS | Performed by: ORTHOPAEDIC SURGERY

## 2017-08-16 PROCEDURE — C9113 INJ PANTOPRAZOLE SODIUM, VIA: HCPCS | Performed by: NURSE PRACTITIONER

## 2017-08-16 PROCEDURE — 6370000000 HC RX 637 (ALT 250 FOR IP): Performed by: NURSE PRACTITIONER

## 2017-08-16 PROCEDURE — 36415 COLL VENOUS BLD VENIPUNCTURE: CPT

## 2017-08-16 PROCEDURE — 85018 HEMOGLOBIN: CPT

## 2017-08-16 PROCEDURE — 86922 COMPATIBILITY TEST ANTIGLOB: CPT

## 2017-08-16 PROCEDURE — 82728 ASSAY OF FERRITIN: CPT

## 2017-08-16 PROCEDURE — 99222 1ST HOSP IP/OBS MODERATE 55: CPT | Performed by: INTERNAL MEDICINE

## 2017-08-16 PROCEDURE — 86900 BLOOD TYPING SEROLOGIC ABO: CPT

## 2017-08-16 PROCEDURE — 83540 ASSAY OF IRON: CPT

## 2017-08-16 PROCEDURE — 2580000003 HC RX 258: Performed by: NURSE PRACTITIONER

## 2017-08-16 PROCEDURE — 71010 XR CHEST PORTABLE: CPT

## 2017-08-16 PROCEDURE — 86901 BLOOD TYPING SEROLOGIC RH(D): CPT

## 2017-08-16 PROCEDURE — 83605 ASSAY OF LACTIC ACID: CPT

## 2017-08-16 PROCEDURE — 99223 1ST HOSP IP/OBS HIGH 75: CPT | Performed by: NURSE PRACTITIONER

## 2017-08-16 PROCEDURE — 82948 REAGENT STRIP/BLOOD GLUCOSE: CPT

## 2017-08-16 PROCEDURE — 86885 COOMBS TEST INDIRECT QUAL: CPT

## 2017-08-16 PROCEDURE — 84484 ASSAY OF TROPONIN QUANT: CPT

## 2017-08-16 PROCEDURE — 87081 CULTURE SCREEN ONLY: CPT

## 2017-08-16 PROCEDURE — P9016 RBC LEUKOCYTES REDUCED: HCPCS

## 2017-08-16 PROCEDURE — 82272 OCCULT BLD FECES 1-3 TESTS: CPT

## 2017-08-16 PROCEDURE — 2580000003 HC RX 258: Performed by: INTERNAL MEDICINE

## 2017-08-16 PROCEDURE — 6360000002 HC RX W HCPCS: Performed by: NURSE PRACTITIONER

## 2017-08-16 PROCEDURE — 2580000003 HC RX 258: Performed by: FAMILY MEDICINE

## 2017-08-16 PROCEDURE — 73130 X-RAY EXAM OF HAND: CPT

## 2017-08-16 PROCEDURE — 83880 ASSAY OF NATRIURETIC PEPTIDE: CPT

## 2017-08-16 PROCEDURE — 85014 HEMATOCRIT: CPT

## 2017-08-16 PROCEDURE — 85025 COMPLETE CBC W/AUTO DIFF WBC: CPT

## 2017-08-16 PROCEDURE — 99285 EMERGENCY DEPT VISIT HI MDM: CPT

## 2017-08-16 PROCEDURE — 2140000000 HC CCU INTERMEDIATE R&B

## 2017-08-16 RX ORDER — INSULIN GLARGINE 100 [IU]/ML
45 INJECTION, SOLUTION SUBCUTANEOUS
Status: DISCONTINUED | OUTPATIENT
Start: 2017-08-17 | End: 2017-08-18

## 2017-08-16 RX ORDER — FLUOXETINE HYDROCHLORIDE 20 MG/1
20 CAPSULE ORAL DAILY
Status: DISCONTINUED | OUTPATIENT
Start: 2017-08-17 | End: 2017-08-23 | Stop reason: HOSPADM

## 2017-08-16 RX ORDER — NICOTINE POLACRILEX 4 MG
15 LOZENGE BUCCAL PRN
Status: DISCONTINUED | OUTPATIENT
Start: 2017-08-16 | End: 2017-08-23 | Stop reason: HOSPADM

## 2017-08-16 RX ORDER — CIPROFLOXACIN 500 MG/1
500 TABLET, FILM COATED ORAL 2 TIMES DAILY
Status: ON HOLD | COMMUNITY
Start: 2017-08-11 | End: 2017-08-22 | Stop reason: HOSPADM

## 2017-08-16 RX ORDER — PREGABALIN 50 MG/1
50 CAPSULE ORAL 2 TIMES DAILY
Status: DISCONTINUED | OUTPATIENT
Start: 2017-08-16 | End: 2017-08-23 | Stop reason: HOSPADM

## 2017-08-16 RX ORDER — SODIUM CHLORIDE 9 MG/ML
INJECTION, SOLUTION INTRAVENOUS CONTINUOUS
Status: DISCONTINUED | OUTPATIENT
Start: 2017-08-16 | End: 2017-08-17

## 2017-08-16 RX ORDER — CARVEDILOL 3.12 MG/1
3.12 TABLET ORAL 2 TIMES DAILY
Status: DISCONTINUED | OUTPATIENT
Start: 2017-08-16 | End: 2017-08-23 | Stop reason: HOSPADM

## 2017-08-16 RX ORDER — SODIUM CHLORIDE, SODIUM LACTATE, CALCIUM CHLORIDE, MAGNESIUM CHLORIDE AND DEXTROSE 2.5; 538; 448; 18.3; 5.08 G/100ML; MG/100ML; MG/100ML; MG/100ML; MG/100ML
INJECTION, SOLUTION INTRAPERITONEAL EVERY 6 HOURS
Status: DISCONTINUED | OUTPATIENT
Start: 2017-08-17 | End: 2017-08-23 | Stop reason: HOSPADM

## 2017-08-16 RX ORDER — SODIUM CHLORIDE 0.9 % (FLUSH) 0.9 %
10 SYRINGE (ML) INJECTION EVERY 12 HOURS SCHEDULED
Status: DISCONTINUED | OUTPATIENT
Start: 2017-08-16 | End: 2017-08-23 | Stop reason: HOSPADM

## 2017-08-16 RX ORDER — ONDANSETRON 2 MG/ML
4 INJECTION INTRAMUSCULAR; INTRAVENOUS EVERY 6 HOURS PRN
Status: DISCONTINUED | OUTPATIENT
Start: 2017-08-16 | End: 2017-08-23 | Stop reason: HOSPADM

## 2017-08-16 RX ORDER — OXYCODONE HYDROCHLORIDE AND ACETAMINOPHEN 5; 325 MG/1; MG/1
1-2 TABLET ORAL EVERY 4 HOURS PRN
Status: ON HOLD | COMMUNITY
End: 2017-08-18 | Stop reason: HOSPADM

## 2017-08-16 RX ORDER — 0.9 % SODIUM CHLORIDE 0.9 %
250 INTRAVENOUS SOLUTION INTRAVENOUS ONCE
Status: COMPLETED | OUTPATIENT
Start: 2017-08-16 | End: 2017-08-17

## 2017-08-16 RX ORDER — CIPROFLOXACIN 500 MG/1
500 TABLET, FILM COATED ORAL 2 TIMES DAILY
Status: DISCONTINUED | OUTPATIENT
Start: 2017-08-16 | End: 2017-08-16

## 2017-08-16 RX ORDER — DEXTROSE MONOHYDRATE 50 MG/ML
100 INJECTION, SOLUTION INTRAVENOUS PRN
Status: DISCONTINUED | OUTPATIENT
Start: 2017-08-16 | End: 2017-08-23 | Stop reason: HOSPADM

## 2017-08-16 RX ORDER — SODIUM CHLORIDE 0.9 % (FLUSH) 0.9 %
10 SYRINGE (ML) INJECTION PRN
Status: DISCONTINUED | OUTPATIENT
Start: 2017-08-16 | End: 2017-08-23 | Stop reason: HOSPADM

## 2017-08-16 RX ORDER — DEXTROSE MONOHYDRATE 25 G/50ML
12.5 INJECTION, SOLUTION INTRAVENOUS PRN
Status: DISCONTINUED | OUTPATIENT
Start: 2017-08-16 | End: 2017-08-23 | Stop reason: HOSPADM

## 2017-08-16 RX ORDER — ACETAMINOPHEN 325 MG/1
650 TABLET ORAL EVERY 4 HOURS PRN
Status: DISCONTINUED | OUTPATIENT
Start: 2017-08-16 | End: 2017-08-23 | Stop reason: HOSPADM

## 2017-08-16 RX ORDER — OXYCODONE HYDROCHLORIDE AND ACETAMINOPHEN 5; 325 MG/1; MG/1
1 TABLET ORAL EVERY 4 HOURS PRN
Status: DISCONTINUED | OUTPATIENT
Start: 2017-08-16 | End: 2017-08-23 | Stop reason: HOSPADM

## 2017-08-16 RX ORDER — ALBUTEROL SULFATE 90 UG/1
2 AEROSOL, METERED RESPIRATORY (INHALATION) EVERY 6 HOURS PRN
Status: DISCONTINUED | OUTPATIENT
Start: 2017-08-16 | End: 2017-08-23 | Stop reason: HOSPADM

## 2017-08-16 RX ADMIN — SODIUM CHLORIDE: 9 INJECTION, SOLUTION INTRAVENOUS at 17:23

## 2017-08-16 RX ADMIN — PREGABALIN 50 MG: 50 CAPSULE ORAL at 21:47

## 2017-08-16 RX ADMIN — SODIUM CHLORIDE, SODIUM LACTATE, CALCIUM CHLORIDE, MAGNESIUM CHLORIDE AND DEXTROSE: 2.5; 538; 448; 18.3; 5.08 INJECTION, SOLUTION INTRAPERITONEAL at 23:11

## 2017-08-16 RX ADMIN — SODIUM CHLORIDE 8 MG/HR: 9 INJECTION, SOLUTION INTRAVENOUS at 17:55

## 2017-08-16 RX ADMIN — SODIUM CHLORIDE 250 ML: 9 INJECTION, SOLUTION INTRAVENOUS at 14:59

## 2017-08-16 RX ADMIN — OXYCODONE HYDROCHLORIDE AND ACETAMINOPHEN 1 TABLET: 5; 325 TABLET ORAL at 21:47

## 2017-08-16 RX ADMIN — CARVEDILOL 3.12 MG: 3.12 TABLET, FILM COATED ORAL at 21:47

## 2017-08-16 RX ADMIN — DARBEPOETIN ALFA 60 MCG: 60 INJECTION, SOLUTION INTRAVENOUS; SUBCUTANEOUS at 23:39

## 2017-08-16 RX ADMIN — SODIUM CHLORIDE 80 MG: 9 INJECTION, SOLUTION INTRAVENOUS at 17:24

## 2017-08-16 ASSESSMENT — PAIN SCALES - GENERAL
PAINLEVEL_OUTOF10: 0
PAINLEVEL_OUTOF10: 6

## 2017-08-16 ASSESSMENT — ENCOUNTER SYMPTOMS
NAUSEA: 0
BACK PAIN: 0
SORE THROAT: 0
RHINORRHEA: 0
WHEEZING: 0
ABDOMINAL PAIN: 0
EYE REDNESS: 0
BLOOD IN STOOL: 0
EYE DISCHARGE: 0
COUGH: 0
BLOOD IN STOOL: 1
DIARRHEA: 0
SHORTNESS OF BREATH: 1
VOMITING: 0

## 2017-08-16 ASSESSMENT — PAIN DESCRIPTION - PAIN TYPE: TYPE: ACUTE PAIN

## 2017-08-16 ASSESSMENT — PAIN DESCRIPTION - ORIENTATION: ORIENTATION: RIGHT

## 2017-08-16 ASSESSMENT — PAIN DESCRIPTION - LOCATION: LOCATION: FINGER (COMMENT WHICH ONE)

## 2017-08-17 ENCOUNTER — ANESTHESIA EVENT (OUTPATIENT)
Dept: ENDOSCOPY | Age: 57
DRG: 813 | End: 2017-08-17
Payer: MEDICARE

## 2017-08-17 ENCOUNTER — ANESTHESIA (OUTPATIENT)
Dept: ENDOSCOPY | Age: 57
DRG: 813 | End: 2017-08-17
Payer: MEDICARE

## 2017-08-17 VITALS — DIASTOLIC BLOOD PRESSURE: 62 MMHG | SYSTOLIC BLOOD PRESSURE: 130 MMHG | OXYGEN SATURATION: 100 %

## 2017-08-17 LAB
ANION GAP SERPL CALCULATED.3IONS-SCNC: 17 MEQ/L (ref 8–16)
BUN BLDV-MCNC: 66 MG/DL (ref 7–22)
CALCIUM SERPL-MCNC: 8.7 MG/DL (ref 8.5–10.5)
CHLORIDE BLD-SCNC: 97 MEQ/L (ref 98–111)
CO2: 24 MEQ/L (ref 23–33)
CREAT SERPL-MCNC: 8.2 MG/DL (ref 0.4–1.2)
EKG ATRIAL RATE: 79 BPM
EKG P AXIS: 29 DEGREES
EKG P-R INTERVAL: 200 MS
EKG Q-T INTERVAL: 448 MS
EKG QRS DURATION: 146 MS
EKG QTC CALCULATION (BAZETT): 513 MS
EKG R AXIS: 94 DEGREES
EKG T AXIS: 19 DEGREES
EKG VENTRICULAR RATE: 79 BPM
GFR SERPL CREATININE-BSD FRML MDRD: 7 ML/MIN/1.73M2
GLUCOSE BLD-MCNC: 157 MG/DL (ref 70–108)
GLUCOSE BLD-MCNC: 174 MG/DL (ref 70–108)
GLUCOSE BLD-MCNC: 187 MG/DL (ref 70–108)
GLUCOSE BLD-MCNC: 192 MG/DL (ref 70–108)
GLUCOSE BLD-MCNC: 207 MG/DL (ref 70–108)
HCT VFR BLD CALC: 20.8 % (ref 42–52)
HCT VFR BLD CALC: 20.9 % (ref 42–52)
HEMOGLOBIN: 7 GM/DL (ref 14–18)
HEMOGLOBIN: 7.3 GM/DL (ref 14–18)
IRON SATURATION: 31 % (ref 20–50)
IRON: 55 UG/DL (ref 65–195)
POTASSIUM SERPL-SCNC: 4.2 MEQ/L (ref 3.5–5.2)
SODIUM BLD-SCNC: 138 MEQ/L (ref 135–145)
TOTAL IRON BINDING CAPACITY: 176 UG/DL (ref 171–450)

## 2017-08-17 PROCEDURE — 83550 IRON BINDING TEST: CPT

## 2017-08-17 PROCEDURE — 6370000000 HC RX 637 (ALT 250 FOR IP): Performed by: INTERNAL MEDICINE

## 2017-08-17 PROCEDURE — 6360000002 HC RX W HCPCS: Performed by: INTERNAL MEDICINE

## 2017-08-17 PROCEDURE — 99233 SBSQ HOSP IP/OBS HIGH 50: CPT | Performed by: INTERNAL MEDICINE

## 2017-08-17 PROCEDURE — 2580000003 HC RX 258: Performed by: INTERNAL MEDICINE

## 2017-08-17 PROCEDURE — 36415 COLL VENOUS BLD VENIPUNCTURE: CPT

## 2017-08-17 PROCEDURE — 3609012400 HC EGD TRANSORAL BIOPSY SINGLE/MULTIPLE: Performed by: INTERNAL MEDICINE

## 2017-08-17 PROCEDURE — 85014 HEMATOCRIT: CPT

## 2017-08-17 PROCEDURE — 0DB68ZX EXCISION OF STOMACH, VIA NATURAL OR ARTIFICIAL OPENING ENDOSCOPIC, DIAGNOSTIC: ICD-10-PCS | Performed by: INTERNAL MEDICINE

## 2017-08-17 PROCEDURE — C9113 INJ PANTOPRAZOLE SODIUM, VIA: HCPCS | Performed by: NURSE PRACTITIONER

## 2017-08-17 PROCEDURE — 2500000003 HC RX 250 WO HCPCS: Performed by: SPECIALIST

## 2017-08-17 PROCEDURE — 6360000002 HC RX W HCPCS: Performed by: NURSE PRACTITIONER

## 2017-08-17 PROCEDURE — 3700000000 HC ANESTHESIA ATTENDED CARE: Performed by: INTERNAL MEDICINE

## 2017-08-17 PROCEDURE — 90945 DIALYSIS ONE EVALUATION: CPT

## 2017-08-17 PROCEDURE — 2580000003 HC RX 258: Performed by: NURSE PRACTITIONER

## 2017-08-17 PROCEDURE — 6360000002 HC RX W HCPCS: Performed by: SPECIALIST

## 2017-08-17 PROCEDURE — 90945 DIALYSIS ONE EVALUATION: CPT | Performed by: INTERNAL MEDICINE

## 2017-08-17 PROCEDURE — 82948 REAGENT STRIP/BLOOD GLUCOSE: CPT

## 2017-08-17 PROCEDURE — 6360000002 HC RX W HCPCS

## 2017-08-17 PROCEDURE — 6370000000 HC RX 637 (ALT 250 FOR IP): Performed by: NURSE PRACTITIONER

## 2017-08-17 PROCEDURE — 88305 TISSUE EXAM BY PATHOLOGIST: CPT

## 2017-08-17 PROCEDURE — 3700000001 HC ADD 15 MINUTES (ANESTHESIA): Performed by: INTERNAL MEDICINE

## 2017-08-17 PROCEDURE — 2580000003 HC RX 258: Performed by: SPECIALIST

## 2017-08-17 PROCEDURE — 83540 ASSAY OF IRON: CPT

## 2017-08-17 PROCEDURE — 7100000000 HC PACU RECOVERY - FIRST 15 MIN: Performed by: INTERNAL MEDICINE

## 2017-08-17 PROCEDURE — 85018 HEMOGLOBIN: CPT

## 2017-08-17 PROCEDURE — 6370000000 HC RX 637 (ALT 250 FOR IP)

## 2017-08-17 PROCEDURE — 2140000000 HC CCU INTERMEDIATE R&B

## 2017-08-17 PROCEDURE — A6446 CONFORM BAND S W>=3" <5"/YD: HCPCS

## 2017-08-17 PROCEDURE — 2500000003 HC RX 250 WO HCPCS

## 2017-08-17 PROCEDURE — 80048 BASIC METABOLIC PNL TOTAL CA: CPT

## 2017-08-17 PROCEDURE — 6370000000 HC RX 637 (ALT 250 FOR IP): Performed by: SPECIALIST

## 2017-08-17 RX ORDER — FLUCONAZOLE 200 MG/1
200 TABLET ORAL ONCE
Status: COMPLETED | OUTPATIENT
Start: 2017-08-17 | End: 2017-08-17

## 2017-08-17 RX ORDER — PANTOPRAZOLE SODIUM 40 MG/1
40 TABLET, DELAYED RELEASE ORAL
Status: DISCONTINUED | OUTPATIENT
Start: 2017-08-18 | End: 2017-08-23 | Stop reason: HOSPADM

## 2017-08-17 RX ORDER — LIDOCAINE HYDROCHLORIDE 20 MG/ML
INJECTION, SOLUTION EPIDURAL; INFILTRATION; INTRACAUDAL; PERINEURAL PRN
Status: DISCONTINUED | OUTPATIENT
Start: 2017-08-17 | End: 2017-08-17 | Stop reason: SDUPTHER

## 2017-08-17 RX ORDER — CALCIUM CARBONATE 200(500)MG
5 TABLET,CHEWABLE ORAL
Status: DISCONTINUED | OUTPATIENT
Start: 2017-08-17 | End: 2017-08-23 | Stop reason: HOSPADM

## 2017-08-17 RX ORDER — GLYCOPYRROLATE 0.2 MG/ML
INJECTION INTRAMUSCULAR; INTRAVENOUS PRN
Status: DISCONTINUED | OUTPATIENT
Start: 2017-08-17 | End: 2017-08-17 | Stop reason: SDUPTHER

## 2017-08-17 RX ORDER — SODIUM CHLORIDE 9 MG/ML
INJECTION, SOLUTION INTRAVENOUS CONTINUOUS PRN
Status: DISCONTINUED | OUTPATIENT
Start: 2017-08-17 | End: 2017-08-17 | Stop reason: SDUPTHER

## 2017-08-17 RX ORDER — PROPOFOL 10 MG/ML
INJECTION, EMULSION INTRAVENOUS PRN
Status: DISCONTINUED | OUTPATIENT
Start: 2017-08-17 | End: 2017-08-17 | Stop reason: SDUPTHER

## 2017-08-17 RX ADMIN — FLUOXETINE 20 MG: 20 CAPSULE ORAL at 10:39

## 2017-08-17 RX ADMIN — SODIUM CHLORIDE 8 MG/HR: 9 INJECTION, SOLUTION INTRAVENOUS at 01:46

## 2017-08-17 RX ADMIN — INSULIN LISPRO 3 UNITS: 100 INJECTION, SOLUTION INTRAVENOUS; SUBCUTANEOUS at 12:42

## 2017-08-17 RX ADMIN — SODIUM CHLORIDE, SODIUM LACTATE, CALCIUM CHLORIDE, MAGNESIUM CHLORIDE AND DEXTROSE: 2.5; 538; 448; 18.3; 5.08 INJECTION, SOLUTION INTRAPERITONEAL at 18:11

## 2017-08-17 RX ADMIN — SODIUM CHLORIDE: 9 INJECTION, SOLUTION INTRAVENOUS at 09:27

## 2017-08-17 RX ADMIN — Medication 10 ML: at 23:48

## 2017-08-17 RX ADMIN — PREGABALIN 50 MG: 50 CAPSULE ORAL at 10:48

## 2017-08-17 RX ADMIN — PREGABALIN 50 MG: 50 CAPSULE ORAL at 20:28

## 2017-08-17 RX ADMIN — SODIUM CHLORIDE, SODIUM LACTATE, CALCIUM CHLORIDE, MAGNESIUM CHLORIDE AND DEXTROSE: 2.5; 538; 448; 18.3; 5.08 INJECTION, SOLUTION INTRAPERITONEAL at 12:02

## 2017-08-17 RX ADMIN — Medication 4 UNITS: at 17:41

## 2017-08-17 RX ADMIN — GLYCOPYRROLATE 0.1 MG: 0.2 INJECTION, SOLUTION INTRAMUSCULAR; INTRAVENOUS at 09:29

## 2017-08-17 RX ADMIN — Medication 2 UNITS: at 12:36

## 2017-08-17 RX ADMIN — CARVEDILOL 3.12 MG: 3.12 TABLET, FILM COATED ORAL at 10:39

## 2017-08-17 RX ADMIN — SODIUM CHLORIDE, SODIUM LACTATE, CALCIUM CHLORIDE, MAGNESIUM CHLORIDE AND DEXTROSE: 2.5; 538; 448; 18.3; 5.08 INJECTION, SOLUTION INTRAPERITONEAL at 23:49

## 2017-08-17 RX ADMIN — OXYCODONE HYDROCHLORIDE AND ACETAMINOPHEN 1 TABLET: 5; 325 TABLET ORAL at 23:47

## 2017-08-17 RX ADMIN — PIPERACILLIN SODIUM,TAZOBACTAM SODIUM 2.25 G: 2; .25 INJECTION, POWDER, FOR SOLUTION INTRAVENOUS at 15:26

## 2017-08-17 RX ADMIN — LIDOCAINE HYDROCHLORIDE 35 ML: 20 SOLUTION ORAL; TOPICAL at 09:28

## 2017-08-17 RX ADMIN — OXYCODONE HYDROCHLORIDE AND ACETAMINOPHEN 1 TABLET: 5; 325 TABLET ORAL at 18:10

## 2017-08-17 RX ADMIN — PROPOFOL 60 MG: 10 INJECTION, EMULSION INTRAVENOUS at 09:30

## 2017-08-17 RX ADMIN — OXYCODONE HYDROCHLORIDE AND ACETAMINOPHEN 1 TABLET: 5; 325 TABLET ORAL at 05:16

## 2017-08-17 RX ADMIN — CARVEDILOL 3.12 MG: 3.12 TABLET, FILM COATED ORAL at 20:26

## 2017-08-17 RX ADMIN — FLUCONAZOLE 200 MG: 200 TABLET ORAL at 12:02

## 2017-08-17 RX ADMIN — PROPOFOL 40 MG: 10 INJECTION, EMULSION INTRAVENOUS at 09:35

## 2017-08-17 RX ADMIN — LIDOCAINE HYDROCHLORIDE 100 MG: 20 INJECTION, SOLUTION EPIDURAL; INFILTRATION; INTRACAUDAL; PERINEURAL at 09:30

## 2017-08-17 RX ADMIN — SODIUM CHLORIDE: 9 INJECTION, SOLUTION INTRAVENOUS at 15:26

## 2017-08-17 RX ADMIN — Medication 10 ML: at 10:39

## 2017-08-17 RX ADMIN — ANTACID TABLETS 2500 MG: 500 TABLET, CHEWABLE ORAL at 17:41

## 2017-08-17 RX ADMIN — INSULIN LISPRO 1 UNITS: 100 INJECTION, SOLUTION INTRAVENOUS; SUBCUTANEOUS at 20:26

## 2017-08-17 ASSESSMENT — ENCOUNTER SYMPTOMS
BLOOD IN STOOL: 1
SHORTNESS OF BREATH: 1
BACK PAIN: 0
VOMITING: 0
ABDOMINAL PAIN: 0
SHORTNESS OF BREATH: 1
NAUSEA: 0
COUGH: 0

## 2017-08-17 ASSESSMENT — PAIN DESCRIPTION - LOCATION
LOCATION: FINGER (COMMENT WHICH ONE)
LOCATION: FINGER (COMMENT WHICH ONE)

## 2017-08-17 ASSESSMENT — PAIN SCALES - GENERAL
PAINLEVEL_OUTOF10: 0
PAINLEVEL_OUTOF10: 0
PAINLEVEL_OUTOF10: 7
PAINLEVEL_OUTOF10: 7
PAINLEVEL_OUTOF10: 0
PAINLEVEL_OUTOF10: 7
PAINLEVEL_OUTOF10: 4

## 2017-08-17 ASSESSMENT — PAIN DESCRIPTION - PAIN TYPE: TYPE: ACUTE PAIN

## 2017-08-18 ENCOUNTER — ANESTHESIA (OUTPATIENT)
Dept: OPERATING ROOM | Age: 57
DRG: 813 | End: 2017-08-18
Payer: MEDICARE

## 2017-08-18 ENCOUNTER — ANESTHESIA EVENT (OUTPATIENT)
Dept: OPERATING ROOM | Age: 57
DRG: 813 | End: 2017-08-18
Payer: MEDICARE

## 2017-08-18 VITALS — DIASTOLIC BLOOD PRESSURE: 68 MMHG | SYSTOLIC BLOOD PRESSURE: 140 MMHG | OXYGEN SATURATION: 99 %

## 2017-08-18 LAB
ALBUMIN SERPL-MCNC: 2.5 G/DL (ref 3.5–5.1)
ALP BLD-CCNC: 91 U/L (ref 38–126)
ALT SERPL-CCNC: 8 U/L (ref 11–66)
ANION GAP SERPL CALCULATED.3IONS-SCNC: 17 MEQ/L (ref 8–16)
AST SERPL-CCNC: 15 U/L (ref 5–40)
BILIRUB SERPL-MCNC: 0.2 MG/DL (ref 0.3–1.2)
BUN BLDV-MCNC: 71 MG/DL (ref 7–22)
CALCIUM SERPL-MCNC: 8.7 MG/DL (ref 8.5–10.5)
CHLORIDE BLD-SCNC: 96 MEQ/L (ref 98–111)
CO2: 24 MEQ/L (ref 23–33)
CREAT SERPL-MCNC: 8.7 MG/DL (ref 0.4–1.2)
GFR SERPL CREATININE-BSD FRML MDRD: 6 ML/MIN/1.73M2
GLUCOSE BLD-MCNC: 198 MG/DL (ref 70–108)
GLUCOSE BLD-MCNC: 198 MG/DL (ref 70–108)
GLUCOSE BLD-MCNC: 209 MG/DL (ref 70–108)
GLUCOSE BLD-MCNC: 222 MG/DL (ref 70–108)
GLUCOSE BLD-MCNC: 240 MG/DL (ref 70–108)
HCT VFR BLD CALC: 19.5 % (ref 42–52)
HEMOGLOBIN: 6.4 GM/DL (ref 14–18)
MCH RBC QN AUTO: 31.6 PG (ref 27–31)
MCHC RBC AUTO-ENTMCNC: 32.7 GM/DL (ref 33–37)
MCV RBC AUTO: 96.8 FL (ref 80–94)
MRSA SCREEN: NORMAL
MRSA SCREEN: NORMAL
PDW BLD-RTO: 16.8 % (ref 11.5–14.5)
PLATELET # BLD: 206 THOU/MM3 (ref 130–400)
PMV BLD AUTO: 7.7 MCM (ref 7.4–10.4)
POTASSIUM SERPL-SCNC: 4.1 MEQ/L (ref 3.5–5.2)
RBC # BLD: 2.02 MILL/MM3 (ref 4.7–6.1)
SODIUM BLD-SCNC: 137 MEQ/L (ref 135–145)
TOTAL PROTEIN: 5.9 G/DL (ref 6.1–8)
WBC # BLD: 8.2 THOU/MM3 (ref 4.8–10.8)

## 2017-08-18 PROCEDURE — 6360000002 HC RX W HCPCS: Performed by: INTERNAL MEDICINE

## 2017-08-18 PROCEDURE — 2580000003 HC RX 258: Performed by: NURSE PRACTITIONER

## 2017-08-18 PROCEDURE — 87075 CULTR BACTERIA EXCEPT BLOOD: CPT

## 2017-08-18 PROCEDURE — 80053 COMPREHEN METABOLIC PANEL: CPT

## 2017-08-18 PROCEDURE — 87077 CULTURE AEROBIC IDENTIFY: CPT

## 2017-08-18 PROCEDURE — 90945 DIALYSIS ONE EVALUATION: CPT

## 2017-08-18 PROCEDURE — 6360000002 HC RX W HCPCS: Performed by: NURSE ANESTHETIST, CERTIFIED REGISTERED

## 2017-08-18 PROCEDURE — 1200000003 HC TELEMETRY R&B

## 2017-08-18 PROCEDURE — 6370000000 HC RX 637 (ALT 250 FOR IP): Performed by: NURSE PRACTITIONER

## 2017-08-18 PROCEDURE — 82948 REAGENT STRIP/BLOOD GLUCOSE: CPT

## 2017-08-18 PROCEDURE — 6370000000 HC RX 637 (ALT 250 FOR IP): Performed by: INTERNAL MEDICINE

## 2017-08-18 PROCEDURE — A6446 CONFORM BAND S W>=3" <5"/YD: HCPCS | Performed by: ORTHOPAEDIC SURGERY

## 2017-08-18 PROCEDURE — 87106 FUNGI IDENTIFICATION YEAST: CPT

## 2017-08-18 PROCEDURE — 87070 CULTURE OTHR SPECIMN AEROBIC: CPT

## 2017-08-18 PROCEDURE — 99232 SBSQ HOSP IP/OBS MODERATE 35: CPT | Performed by: NURSE PRACTITIONER

## 2017-08-18 PROCEDURE — 2500000003 HC RX 250 WO HCPCS: Performed by: ORTHOPAEDIC SURGERY

## 2017-08-18 PROCEDURE — A6223 GAUZE >16<=48 NO W/SAL W/O B: HCPCS | Performed by: ORTHOPAEDIC SURGERY

## 2017-08-18 PROCEDURE — 3600000012 HC SURGERY LEVEL 2 ADDTL 15MIN: Performed by: ORTHOPAEDIC SURGERY

## 2017-08-18 PROCEDURE — A6447 CONFORM BAND S W >=5"/YD: HCPCS | Performed by: ORTHOPAEDIC SURGERY

## 2017-08-18 PROCEDURE — 2580000003 HC RX 258: Performed by: INTERNAL MEDICINE

## 2017-08-18 PROCEDURE — 85027 COMPLETE CBC AUTOMATED: CPT

## 2017-08-18 PROCEDURE — 87186 SC STD MICRODIL/AGAR DIL: CPT

## 2017-08-18 PROCEDURE — 2500000003 HC RX 250 WO HCPCS: Performed by: NURSE ANESTHETIST, CERTIFIED REGISTERED

## 2017-08-18 PROCEDURE — 36415 COLL VENOUS BLD VENIPUNCTURE: CPT

## 2017-08-18 PROCEDURE — 87102 FUNGUS ISOLATION CULTURE: CPT

## 2017-08-18 PROCEDURE — 2580000003 HC RX 258: Performed by: NURSE ANESTHETIST, CERTIFIED REGISTERED

## 2017-08-18 PROCEDURE — 3600000002 HC SURGERY LEVEL 2 BASE: Performed by: ORTHOPAEDIC SURGERY

## 2017-08-18 PROCEDURE — 36430 TRANSFUSION BLD/BLD COMPNT: CPT

## 2017-08-18 PROCEDURE — 3700000001 HC ADD 15 MINUTES (ANESTHESIA): Performed by: ORTHOPAEDIC SURGERY

## 2017-08-18 PROCEDURE — 87147 CULTURE TYPE IMMUNOLOGIC: CPT

## 2017-08-18 PROCEDURE — 99233 SBSQ HOSP IP/OBS HIGH 50: CPT | Performed by: INTERNAL MEDICINE

## 2017-08-18 PROCEDURE — 3700000000 HC ANESTHESIA ATTENDED CARE: Performed by: ORTHOPAEDIC SURGERY

## 2017-08-18 PROCEDURE — 87205 SMEAR GRAM STAIN: CPT

## 2017-08-18 RX ORDER — BUPIVACAINE HYDROCHLORIDE AND EPINEPHRINE 5; 5 MG/ML; UG/ML
INJECTION, SOLUTION EPIDURAL; INTRACAUDAL; PERINEURAL PRN
Status: DISCONTINUED | OUTPATIENT
Start: 2017-08-18 | End: 2017-08-18 | Stop reason: HOSPADM

## 2017-08-18 RX ORDER — KETAMINE HYDROCHLORIDE 50 MG/ML
INJECTION, SOLUTION, CONCENTRATE INTRAMUSCULAR; INTRAVENOUS PRN
Status: DISCONTINUED | OUTPATIENT
Start: 2017-08-18 | End: 2017-08-18 | Stop reason: SDUPTHER

## 2017-08-18 RX ORDER — SODIUM CHLORIDE 9 MG/ML
INJECTION, SOLUTION INTRAVENOUS CONTINUOUS PRN
Status: DISCONTINUED | OUTPATIENT
Start: 2017-08-18 | End: 2017-08-18 | Stop reason: SDUPTHER

## 2017-08-18 RX ORDER — GENTAMICIN SULFATE 1 MG/G
CREAM TOPICAL 3 TIMES DAILY
Status: DISCONTINUED | OUTPATIENT
Start: 2017-08-18 | End: 2017-08-23 | Stop reason: HOSPADM

## 2017-08-18 RX ORDER — PROPOFOL 10 MG/ML
INJECTION, EMULSION INTRAVENOUS PRN
Status: DISCONTINUED | OUTPATIENT
Start: 2017-08-18 | End: 2017-08-18 | Stop reason: SDUPTHER

## 2017-08-18 RX ORDER — OXYCODONE HYDROCHLORIDE AND ACETAMINOPHEN 5; 325 MG/1; MG/1
1-2 TABLET ORAL EVERY 4 HOURS PRN
Qty: 50 TABLET | Refills: 0 | Status: SHIPPED | OUTPATIENT
Start: 2017-08-18 | End: 2017-08-25

## 2017-08-18 RX ORDER — HYDROCODONE BITARTRATE AND ACETAMINOPHEN 5; 325 MG/1; MG/1
1-2 TABLET ORAL EVERY 4 HOURS PRN
Qty: 50 TABLET | Refills: 0 | Status: SHIPPED | OUTPATIENT
Start: 2017-08-18 | End: 2017-08-18 | Stop reason: HOSPADM

## 2017-08-18 RX ORDER — MIDAZOLAM HYDROCHLORIDE 1 MG/ML
INJECTION INTRAMUSCULAR; INTRAVENOUS PRN
Status: DISCONTINUED | OUTPATIENT
Start: 2017-08-18 | End: 2017-08-18 | Stop reason: SDUPTHER

## 2017-08-18 RX ORDER — DEXTROSE AND SODIUM CHLORIDE 5; .45 G/100ML; G/100ML
INJECTION, SOLUTION INTRAVENOUS CONTINUOUS
Status: DISCONTINUED | OUTPATIENT
Start: 2017-08-18 | End: 2017-08-19

## 2017-08-18 RX ORDER — INSULIN GLARGINE 100 [IU]/ML
20 INJECTION, SOLUTION SUBCUTANEOUS ONCE
Status: COMPLETED | OUTPATIENT
Start: 2017-08-18 | End: 2017-08-18

## 2017-08-18 RX ORDER — CEFAZOLIN SODIUM 1 G/3ML
INJECTION, POWDER, FOR SOLUTION INTRAMUSCULAR; INTRAVENOUS PRN
Status: DISCONTINUED | OUTPATIENT
Start: 2017-08-18 | End: 2017-08-18 | Stop reason: SDUPTHER

## 2017-08-18 RX ORDER — 0.9 % SODIUM CHLORIDE 0.9 %
250 INTRAVENOUS SOLUTION INTRAVENOUS ONCE
Status: COMPLETED | OUTPATIENT
Start: 2017-08-18 | End: 2017-08-18

## 2017-08-18 RX ORDER — INSULIN GLARGINE 100 [IU]/ML
45 INJECTION, SOLUTION SUBCUTANEOUS
Status: DISCONTINUED | OUTPATIENT
Start: 2017-08-19 | End: 2017-08-23 | Stop reason: HOSPADM

## 2017-08-18 RX ADMIN — ANTACID TABLETS 2500 MG: 500 TABLET, CHEWABLE ORAL at 14:19

## 2017-08-18 RX ADMIN — Medication 2 UNITS: at 14:49

## 2017-08-18 RX ADMIN — ERYTHROPOIETIN 10000 UNITS: 10000 INJECTION, SOLUTION INTRAVENOUS; SUBCUTANEOUS at 08:55

## 2017-08-18 RX ADMIN — KETAMINE HYDROCHLORIDE 10 MG: 50 INJECTION, SOLUTION INTRAMUSCULAR; INTRAVENOUS at 12:39

## 2017-08-18 RX ADMIN — Medication 4 UNITS: at 16:48

## 2017-08-18 RX ADMIN — PREGABALIN 50 MG: 50 CAPSULE ORAL at 08:53

## 2017-08-18 RX ADMIN — CEFAZOLIN SODIUM 2000 MG: 1 INJECTION, POWDER, FOR SOLUTION INTRAMUSCULAR; INTRAVENOUS at 12:48

## 2017-08-18 RX ADMIN — INSULIN GLARGINE 20 UNITS: 100 INJECTION, SOLUTION SUBCUTANEOUS at 09:49

## 2017-08-18 RX ADMIN — Medication 10 ML: at 21:03

## 2017-08-18 RX ADMIN — MIDAZOLAM HYDROCHLORIDE 1 MG: 1 INJECTION INTRAMUSCULAR; INTRAVENOUS at 12:34

## 2017-08-18 RX ADMIN — PREGABALIN 50 MG: 50 CAPSULE ORAL at 20:59

## 2017-08-18 RX ADMIN — Medication 10 ML: at 08:57

## 2017-08-18 RX ADMIN — SODIUM CHLORIDE 250 ML: 9 INJECTION, SOLUTION INTRAVENOUS at 08:58

## 2017-08-18 RX ADMIN — PROPOFOL 20 MG: 10 INJECTION, EMULSION INTRAVENOUS at 12:39

## 2017-08-18 RX ADMIN — DEXTROSE AND SODIUM CHLORIDE: 5; 450 INJECTION, SOLUTION INTRAVENOUS at 09:02

## 2017-08-18 RX ADMIN — INSULIN LISPRO 2 UNITS: 100 INJECTION, SOLUTION INTRAVENOUS; SUBCUTANEOUS at 21:00

## 2017-08-18 RX ADMIN — PIPERACILLIN SODIUM,TAZOBACTAM SODIUM 2.25 G: 2; .25 INJECTION, POWDER, FOR SOLUTION INTRAVENOUS at 01:18

## 2017-08-18 RX ADMIN — SODIUM CHLORIDE: 9 INJECTION, SOLUTION INTRAVENOUS at 12:32

## 2017-08-18 RX ADMIN — GENTAMICIN SULFATE: 1 CREAM TOPICAL at 11:31

## 2017-08-18 RX ADMIN — SODIUM CHLORIDE, SODIUM LACTATE, CALCIUM CHLORIDE, MAGNESIUM CHLORIDE AND DEXTROSE: 2.5; 538; 448; 18.3; 5.08 INJECTION, SOLUTION INTRAPERITONEAL at 05:44

## 2017-08-18 RX ADMIN — OXYCODONE HYDROCHLORIDE AND ACETAMINOPHEN 1 TABLET: 5; 325 TABLET ORAL at 08:52

## 2017-08-18 RX ADMIN — OXYCODONE HYDROCHLORIDE AND ACETAMINOPHEN 1 TABLET: 5; 325 TABLET ORAL at 21:00

## 2017-08-18 RX ADMIN — ANTACID TABLETS 2500 MG: 500 TABLET, CHEWABLE ORAL at 16:47

## 2017-08-18 RX ADMIN — INSULIN LISPRO 3 UNITS: 100 INJECTION, SOLUTION INTRAVENOUS; SUBCUTANEOUS at 16:49

## 2017-08-18 RX ADMIN — PROPOFOL 20 MG: 10 INJECTION, EMULSION INTRAVENOUS at 12:40

## 2017-08-18 RX ADMIN — CARVEDILOL 3.12 MG: 3.12 TABLET, FILM COATED ORAL at 21:00

## 2017-08-18 RX ADMIN — PIPERACILLIN SODIUM,TAZOBACTAM SODIUM 2.25 G: 2; .25 INJECTION, POWDER, FOR SOLUTION INTRAVENOUS at 14:49

## 2017-08-18 RX ADMIN — SODIUM CHLORIDE, SODIUM LACTATE, CALCIUM CHLORIDE, MAGNESIUM CHLORIDE AND DEXTROSE: 2.5; 538; 448; 18.3; 5.08 INJECTION, SOLUTION INTRAPERITONEAL at 23:55

## 2017-08-18 RX ADMIN — Medication 10 ML: at 09:02

## 2017-08-18 RX ADMIN — PANTOPRAZOLE SODIUM 40 MG: 40 TABLET, DELAYED RELEASE ORAL at 05:46

## 2017-08-18 RX ADMIN — FLUOXETINE 20 MG: 20 CAPSULE ORAL at 08:53

## 2017-08-18 RX ADMIN — SODIUM CHLORIDE, SODIUM LACTATE, CALCIUM CHLORIDE, MAGNESIUM CHLORIDE AND DEXTROSE: 2.5; 538; 448; 18.3; 5.08 INJECTION, SOLUTION INTRAPERITONEAL at 13:30

## 2017-08-18 RX ADMIN — SODIUM CHLORIDE, SODIUM LACTATE, CALCIUM CHLORIDE, MAGNESIUM CHLORIDE AND DEXTROSE: 2.5; 538; 448; 18.3; 5.08 INJECTION, SOLUTION INTRAPERITONEAL at 18:31

## 2017-08-18 RX ADMIN — CARVEDILOL 3.12 MG: 3.12 TABLET, FILM COATED ORAL at 08:53

## 2017-08-18 ASSESSMENT — PULMONARY FUNCTION TESTS
PIF_VALUE: 0
PIF_VALUE: 1
PIF_VALUE: 0
PIF_VALUE: 0
PIF_VALUE: 1
PIF_VALUE: 0
PIF_VALUE: 1

## 2017-08-18 ASSESSMENT — PAIN SCALES - GENERAL
PAINLEVEL_OUTOF10: 8
PAINLEVEL_OUTOF10: 2
PAINLEVEL_OUTOF10: 8

## 2017-08-18 ASSESSMENT — ENCOUNTER SYMPTOMS: SHORTNESS OF BREATH: 1

## 2017-08-19 LAB
ABO CHECK: NORMAL
ANION GAP SERPL CALCULATED.3IONS-SCNC: 17 MEQ/L (ref 8–16)
BUN BLDV-MCNC: 66 MG/DL (ref 7–22)
CALCIUM SERPL-MCNC: 8.7 MG/DL (ref 8.5–10.5)
CHLORIDE BLD-SCNC: 95 MEQ/L (ref 98–111)
CO2: 24 MEQ/L (ref 23–33)
CREAT SERPL-MCNC: 8.9 MG/DL (ref 0.4–1.2)
GFR SERPL CREATININE-BSD FRML MDRD: 6 ML/MIN/1.73M2
GLUCOSE BLD-MCNC: 157 MG/DL (ref 70–108)
GLUCOSE BLD-MCNC: 171 MG/DL (ref 70–108)
GLUCOSE BLD-MCNC: 186 MG/DL (ref 70–108)
GLUCOSE BLD-MCNC: 277 MG/DL (ref 70–108)
HCT VFR BLD CALC: 20.4 % (ref 42–52)
HEMOGLOBIN: 6.8 GM/DL (ref 14–18)
POTASSIUM SERPL-SCNC: 4.2 MEQ/L (ref 3.5–5.2)
RH FACTOR: NORMAL
SELECTED GEL ANTIBODY SCREEN: NORMAL
SODIUM BLD-SCNC: 136 MEQ/L (ref 135–145)

## 2017-08-19 PROCEDURE — 86885 COOMBS TEST INDIRECT QUAL: CPT

## 2017-08-19 PROCEDURE — 2580000003 HC RX 258: Performed by: INTERNAL MEDICINE

## 2017-08-19 PROCEDURE — 6370000000 HC RX 637 (ALT 250 FOR IP): Performed by: NURSE PRACTITIONER

## 2017-08-19 PROCEDURE — 36415 COLL VENOUS BLD VENIPUNCTURE: CPT

## 2017-08-19 PROCEDURE — 6370000000 HC RX 637 (ALT 250 FOR IP): Performed by: INTERNAL MEDICINE

## 2017-08-19 PROCEDURE — 1200000003 HC TELEMETRY R&B

## 2017-08-19 PROCEDURE — 90945 DIALYSIS ONE EVALUATION: CPT | Performed by: INTERNAL MEDICINE

## 2017-08-19 PROCEDURE — 85018 HEMOGLOBIN: CPT

## 2017-08-19 PROCEDURE — 86922 COMPATIBILITY TEST ANTIGLOB: CPT

## 2017-08-19 PROCEDURE — 85014 HEMATOCRIT: CPT

## 2017-08-19 PROCEDURE — 36430 TRANSFUSION BLD/BLD COMPNT: CPT

## 2017-08-19 PROCEDURE — 86901 BLOOD TYPING SEROLOGIC RH(D): CPT

## 2017-08-19 PROCEDURE — 80048 BASIC METABOLIC PNL TOTAL CA: CPT

## 2017-08-19 PROCEDURE — 86900 BLOOD TYPING SEROLOGIC ABO: CPT

## 2017-08-19 PROCEDURE — 99232 SBSQ HOSP IP/OBS MODERATE 35: CPT | Performed by: INTERNAL MEDICINE

## 2017-08-19 PROCEDURE — 6360000002 HC RX W HCPCS: Performed by: INTERNAL MEDICINE

## 2017-08-19 PROCEDURE — 2580000003 HC RX 258: Performed by: NURSE PRACTITIONER

## 2017-08-19 PROCEDURE — P9016 RBC LEUKOCYTES REDUCED: HCPCS

## 2017-08-19 PROCEDURE — 86905 BLOOD TYPING RBC ANTIGENS: CPT

## 2017-08-19 PROCEDURE — 82948 REAGENT STRIP/BLOOD GLUCOSE: CPT

## 2017-08-19 RX ORDER — 0.9 % SODIUM CHLORIDE 0.9 %
250 INTRAVENOUS SOLUTION INTRAVENOUS ONCE
Status: COMPLETED | OUTPATIENT
Start: 2017-08-19 | End: 2017-08-20

## 2017-08-19 RX ADMIN — SODIUM CHLORIDE, SODIUM LACTATE, CALCIUM CHLORIDE, MAGNESIUM CHLORIDE AND DEXTROSE: 2.5; 538; 448; 18.3; 5.08 INJECTION, SOLUTION INTRAPERITONEAL at 23:51

## 2017-08-19 RX ADMIN — OXYCODONE HYDROCHLORIDE AND ACETAMINOPHEN 1 TABLET: 5; 325 TABLET ORAL at 10:04

## 2017-08-19 RX ADMIN — Medication 6 UNITS: at 17:25

## 2017-08-19 RX ADMIN — PIPERACILLIN SODIUM,TAZOBACTAM SODIUM 2.25 G: 2; .25 INJECTION, POWDER, FOR SOLUTION INTRAVENOUS at 14:56

## 2017-08-19 RX ADMIN — INSULIN GLARGINE 45 UNITS: 100 INJECTION, SOLUTION SUBCUTANEOUS at 08:30

## 2017-08-19 RX ADMIN — PREGABALIN 50 MG: 50 CAPSULE ORAL at 08:27

## 2017-08-19 RX ADMIN — OXYCODONE HYDROCHLORIDE AND ACETAMINOPHEN 1 TABLET: 5; 325 TABLET ORAL at 18:02

## 2017-08-19 RX ADMIN — CARVEDILOL 3.12 MG: 3.12 TABLET, FILM COATED ORAL at 08:23

## 2017-08-19 RX ADMIN — VANCOMYCIN HYDROCHLORIDE 1750 MG: 1 INJECTION, POWDER, LYOPHILIZED, FOR SOLUTION INTRAVENOUS at 15:38

## 2017-08-19 RX ADMIN — Medication 10 ML: at 21:28

## 2017-08-19 RX ADMIN — PREGABALIN 50 MG: 50 CAPSULE ORAL at 21:28

## 2017-08-19 RX ADMIN — PANTOPRAZOLE SODIUM 40 MG: 40 TABLET, DELAYED RELEASE ORAL at 08:24

## 2017-08-19 RX ADMIN — SODIUM CHLORIDE, SODIUM LACTATE, CALCIUM CHLORIDE, MAGNESIUM CHLORIDE AND DEXTROSE: 2.5; 538; 448; 18.3; 5.08 INJECTION, SOLUTION INTRAPERITONEAL at 12:55

## 2017-08-19 RX ADMIN — PIPERACILLIN SODIUM,TAZOBACTAM SODIUM 2.25 G: 2; .25 INJECTION, POWDER, FOR SOLUTION INTRAVENOUS at 02:24

## 2017-08-19 RX ADMIN — Medication 10 ML: at 08:24

## 2017-08-19 RX ADMIN — ANTACID TABLETS 2500 MG: 500 TABLET, CHEWABLE ORAL at 17:19

## 2017-08-19 RX ADMIN — SODIUM CHLORIDE, SODIUM LACTATE, CALCIUM CHLORIDE, MAGNESIUM CHLORIDE AND DEXTROSE: 2.5; 538; 448; 18.3; 5.08 INJECTION, SOLUTION INTRAPERITONEAL at 17:51

## 2017-08-19 RX ADMIN — SODIUM CHLORIDE, SODIUM LACTATE, CALCIUM CHLORIDE, MAGNESIUM CHLORIDE AND DEXTROSE: 2.5; 538; 448; 18.3; 5.08 INJECTION, SOLUTION INTRAPERITONEAL at 05:43

## 2017-08-19 RX ADMIN — CARVEDILOL 3.12 MG: 3.12 TABLET, FILM COATED ORAL at 21:28

## 2017-08-19 RX ADMIN — SODIUM CHLORIDE 250 ML: 9 INJECTION, SOLUTION INTRAVENOUS at 17:19

## 2017-08-19 RX ADMIN — INSULIN LISPRO 3 UNITS: 100 INJECTION, SOLUTION INTRAVENOUS; SUBCUTANEOUS at 17:27

## 2017-08-19 RX ADMIN — Medication 2 UNITS: at 12:59

## 2017-08-19 RX ADMIN — FLUOXETINE 20 MG: 20 CAPSULE ORAL at 08:24

## 2017-08-19 ASSESSMENT — PAIN DESCRIPTION - PAIN TYPE: TYPE: ACUTE PAIN

## 2017-08-19 ASSESSMENT — PAIN SCALES - GENERAL
PAINLEVEL_OUTOF10: 5
PAINLEVEL_OUTOF10: 2
PAINLEVEL_OUTOF10: 4
PAINLEVEL_OUTOF10: 7

## 2017-08-19 ASSESSMENT — PAIN DESCRIPTION - LOCATION: LOCATION: FINGER (COMMENT WHICH ONE);BACK

## 2017-08-20 LAB
GLUCOSE BLD-MCNC: 188 MG/DL (ref 70–108)
GLUCOSE BLD-MCNC: 197 MG/DL (ref 70–108)
GLUCOSE BLD-MCNC: 229 MG/DL (ref 70–108)
HCT VFR BLD CALC: 22.5 % (ref 42–52)
HCT VFR BLD CALC: 23 % (ref 42–52)
HEMOGLOBIN: 7.6 GM/DL (ref 14–18)
HEMOGLOBIN: 7.7 GM/DL (ref 14–18)
ORGANISM: ABNORMAL
VRE CULTURE: ABNORMAL

## 2017-08-20 PROCEDURE — 2580000003 HC RX 258: Performed by: INTERNAL MEDICINE

## 2017-08-20 PROCEDURE — 1200000003 HC TELEMETRY R&B

## 2017-08-20 PROCEDURE — 85018 HEMOGLOBIN: CPT

## 2017-08-20 PROCEDURE — 36415 COLL VENOUS BLD VENIPUNCTURE: CPT

## 2017-08-20 PROCEDURE — 85014 HEMATOCRIT: CPT

## 2017-08-20 PROCEDURE — 6370000000 HC RX 637 (ALT 250 FOR IP): Performed by: NURSE PRACTITIONER

## 2017-08-20 PROCEDURE — 82948 REAGENT STRIP/BLOOD GLUCOSE: CPT

## 2017-08-20 PROCEDURE — 6370000000 HC RX 637 (ALT 250 FOR IP): Performed by: INTERNAL MEDICINE

## 2017-08-20 PROCEDURE — 90945 DIALYSIS ONE EVALUATION: CPT | Performed by: INTERNAL MEDICINE

## 2017-08-20 PROCEDURE — 6360000002 HC RX W HCPCS: Performed by: INTERNAL MEDICINE

## 2017-08-20 PROCEDURE — 99232 SBSQ HOSP IP/OBS MODERATE 35: CPT | Performed by: INTERNAL MEDICINE

## 2017-08-20 PROCEDURE — 2580000003 HC RX 258: Performed by: NURSE PRACTITIONER

## 2017-08-20 RX ADMIN — INSULIN GLARGINE 45 UNITS: 100 INJECTION, SOLUTION SUBCUTANEOUS at 08:30

## 2017-08-20 RX ADMIN — ANTACID TABLETS 2500 MG: 500 TABLET, CHEWABLE ORAL at 08:30

## 2017-08-20 RX ADMIN — SODIUM CHLORIDE, SODIUM LACTATE, CALCIUM CHLORIDE, MAGNESIUM CHLORIDE AND DEXTROSE: 2.5; 538; 448; 18.3; 5.08 INJECTION, SOLUTION INTRAPERITONEAL at 17:38

## 2017-08-20 RX ADMIN — OXYCODONE HYDROCHLORIDE AND ACETAMINOPHEN 1 TABLET: 5; 325 TABLET ORAL at 08:30

## 2017-08-20 RX ADMIN — Medication 10 ML: at 08:30

## 2017-08-20 RX ADMIN — Medication 2 UNITS: at 12:30

## 2017-08-20 RX ADMIN — PREGABALIN 50 MG: 50 CAPSULE ORAL at 20:18

## 2017-08-20 RX ADMIN — ANTACID TABLETS 2500 MG: 500 TABLET, CHEWABLE ORAL at 12:29

## 2017-08-20 RX ADMIN — OXYCODONE HYDROCHLORIDE AND ACETAMINOPHEN 1 TABLET: 5; 325 TABLET ORAL at 13:05

## 2017-08-20 RX ADMIN — SODIUM CHLORIDE, SODIUM LACTATE, CALCIUM CHLORIDE, MAGNESIUM CHLORIDE AND DEXTROSE: 2.5; 538; 448; 18.3; 5.08 INJECTION, SOLUTION INTRAPERITONEAL at 12:31

## 2017-08-20 RX ADMIN — OXYCODONE HYDROCHLORIDE AND ACETAMINOPHEN 1 TABLET: 5; 325 TABLET ORAL at 20:17

## 2017-08-20 RX ADMIN — SODIUM CHLORIDE, SODIUM LACTATE, CALCIUM CHLORIDE, MAGNESIUM CHLORIDE AND DEXTROSE: 2.5; 538; 448; 18.3; 5.08 INJECTION, SOLUTION INTRAPERITONEAL at 06:21

## 2017-08-20 RX ADMIN — Medication 10 ML: at 20:18

## 2017-08-20 RX ADMIN — INSULIN LISPRO 2 UNITS: 100 INJECTION, SOLUTION INTRAVENOUS; SUBCUTANEOUS at 20:18

## 2017-08-20 RX ADMIN — FLUOXETINE 20 MG: 20 CAPSULE ORAL at 08:29

## 2017-08-20 RX ADMIN — Medication 2 UNITS: at 08:31

## 2017-08-20 RX ADMIN — OXYCODONE HYDROCHLORIDE AND ACETAMINOPHEN 1 TABLET: 5; 325 TABLET ORAL at 03:00

## 2017-08-20 RX ADMIN — CARVEDILOL 3.12 MG: 3.12 TABLET, FILM COATED ORAL at 08:29

## 2017-08-20 RX ADMIN — ANTACID TABLETS 2500 MG: 500 TABLET, CHEWABLE ORAL at 17:12

## 2017-08-20 RX ADMIN — PANTOPRAZOLE SODIUM 40 MG: 40 TABLET, DELAYED RELEASE ORAL at 03:01

## 2017-08-20 RX ADMIN — INSULIN LISPRO 3 UNITS: 100 INJECTION, SOLUTION INTRAVENOUS; SUBCUTANEOUS at 17:44

## 2017-08-20 RX ADMIN — PIPERACILLIN SODIUM,TAZOBACTAM SODIUM 2.25 G: 2; .25 INJECTION, POWDER, FOR SOLUTION INTRAVENOUS at 02:06

## 2017-08-20 RX ADMIN — INSULIN LISPRO 3 UNITS: 100 INJECTION, SOLUTION INTRAVENOUS; SUBCUTANEOUS at 08:32

## 2017-08-20 RX ADMIN — CARVEDILOL 3.12 MG: 3.12 TABLET, FILM COATED ORAL at 20:17

## 2017-08-20 RX ADMIN — PREGABALIN 50 MG: 50 CAPSULE ORAL at 08:30

## 2017-08-20 ASSESSMENT — PAIN SCALES - GENERAL
PAINLEVEL_OUTOF10: 5
PAINLEVEL_OUTOF10: 8
PAINLEVEL_OUTOF10: 8
PAINLEVEL_OUTOF10: 2
PAINLEVEL_OUTOF10: 4
PAINLEVEL_OUTOF10: 5
PAINLEVEL_OUTOF10: 4
PAINLEVEL_OUTOF10: 7

## 2017-08-20 ASSESSMENT — PAIN DESCRIPTION - LOCATION: LOCATION: FINGER (COMMENT WHICH ONE)

## 2017-08-20 ASSESSMENT — PAIN DESCRIPTION - PAIN TYPE: TYPE: ACUTE PAIN

## 2017-08-21 LAB
ANION GAP SERPL CALCULATED.3IONS-SCNC: 15 MEQ/L (ref 8–16)
BUN BLDV-MCNC: 68 MG/DL (ref 7–22)
CALCIUM SERPL-MCNC: 8.9 MG/DL (ref 8.5–10.5)
CHLORIDE BLD-SCNC: 95 MEQ/L (ref 98–111)
CO2: 24 MEQ/L (ref 23–33)
CREAT SERPL-MCNC: 8.8 MG/DL (ref 0.4–1.2)
GFR SERPL CREATININE-BSD FRML MDRD: 6 ML/MIN/1.73M2
GLUCOSE BLD-MCNC: 124 MG/DL (ref 70–108)
GLUCOSE BLD-MCNC: 175 MG/DL (ref 70–108)
GLUCOSE BLD-MCNC: 179 MG/DL (ref 70–108)
GLUCOSE BLD-MCNC: 183 MG/DL (ref 70–108)
GLUCOSE BLD-MCNC: 204 MG/DL (ref 70–108)
HCT VFR BLD CALC: 22 % (ref 42–52)
HEMOGLOBIN: 7.2 GM/DL (ref 14–18)
MCH RBC QN AUTO: 31.8 PG (ref 27–31)
MCHC RBC AUTO-ENTMCNC: 32.6 GM/DL (ref 33–37)
MCV RBC AUTO: 97.4 FL (ref 80–94)
PDW BLD-RTO: 18.7 % (ref 11.5–14.5)
PLATELET # BLD: 204 THOU/MM3 (ref 130–400)
PMV BLD AUTO: 7.6 MCM (ref 7.4–10.4)
POTASSIUM SERPL-SCNC: 4.2 MEQ/L (ref 3.5–5.2)
RBC # BLD: 2.26 MILL/MM3 (ref 4.7–6.1)
SODIUM BLD-SCNC: 134 MEQ/L (ref 135–145)
VANCOMYCIN RANDOM: 11.7 UG/ML (ref 0.1–39.9)
WBC # BLD: 11.3 THOU/MM3 (ref 4.8–10.8)

## 2017-08-21 PROCEDURE — 99232 SBSQ HOSP IP/OBS MODERATE 35: CPT | Performed by: INTERNAL MEDICINE

## 2017-08-21 PROCEDURE — 90945 DIALYSIS ONE EVALUATION: CPT

## 2017-08-21 PROCEDURE — 6370000000 HC RX 637 (ALT 250 FOR IP): Performed by: INTERNAL MEDICINE

## 2017-08-21 PROCEDURE — 2580000003 HC RX 258: Performed by: INTERNAL MEDICINE

## 2017-08-21 PROCEDURE — 2580000003 HC RX 258: Performed by: NURSE PRACTITIONER

## 2017-08-21 PROCEDURE — 85027 COMPLETE CBC AUTOMATED: CPT

## 2017-08-21 PROCEDURE — 80202 ASSAY OF VANCOMYCIN: CPT

## 2017-08-21 PROCEDURE — 6360000002 HC RX W HCPCS: Performed by: INTERNAL MEDICINE

## 2017-08-21 PROCEDURE — 1200000003 HC TELEMETRY R&B

## 2017-08-21 PROCEDURE — 90945 DIALYSIS ONE EVALUATION: CPT | Performed by: INTERNAL MEDICINE

## 2017-08-21 PROCEDURE — 6370000000 HC RX 637 (ALT 250 FOR IP): Performed by: NURSE PRACTITIONER

## 2017-08-21 PROCEDURE — 2580000003 HC RX 258

## 2017-08-21 PROCEDURE — 6360000002 HC RX W HCPCS

## 2017-08-21 PROCEDURE — 82948 REAGENT STRIP/BLOOD GLUCOSE: CPT

## 2017-08-21 PROCEDURE — 80048 BASIC METABOLIC PNL TOTAL CA: CPT

## 2017-08-21 PROCEDURE — 36415 COLL VENOUS BLD VENIPUNCTURE: CPT

## 2017-08-21 RX ADMIN — PREGABALIN 50 MG: 50 CAPSULE ORAL at 20:14

## 2017-08-21 RX ADMIN — INSULIN LISPRO 3 UNITS: 100 INJECTION, SOLUTION INTRAVENOUS; SUBCUTANEOUS at 09:12

## 2017-08-21 RX ADMIN — Medication 2 UNITS: at 09:06

## 2017-08-21 RX ADMIN — OXYCODONE HYDROCHLORIDE AND ACETAMINOPHEN 1 TABLET: 5; 325 TABLET ORAL at 09:07

## 2017-08-21 RX ADMIN — OXYCODONE HYDROCHLORIDE AND ACETAMINOPHEN 1 TABLET: 5; 325 TABLET ORAL at 19:31

## 2017-08-21 RX ADMIN — PANTOPRAZOLE SODIUM 40 MG: 40 TABLET, DELAYED RELEASE ORAL at 05:00

## 2017-08-21 RX ADMIN — OXYCODONE HYDROCHLORIDE AND ACETAMINOPHEN 1 TABLET: 5; 325 TABLET ORAL at 14:54

## 2017-08-21 RX ADMIN — CARVEDILOL 3.12 MG: 3.12 TABLET, FILM COATED ORAL at 09:07

## 2017-08-21 RX ADMIN — ANTACID TABLETS 2500 MG: 500 TABLET, CHEWABLE ORAL at 11:45

## 2017-08-21 RX ADMIN — INSULIN LISPRO 3 UNITS: 100 INJECTION, SOLUTION INTRAVENOUS; SUBCUTANEOUS at 16:51

## 2017-08-21 RX ADMIN — FLUOXETINE 20 MG: 20 CAPSULE ORAL at 09:07

## 2017-08-21 RX ADMIN — SODIUM CHLORIDE, SODIUM LACTATE, CALCIUM CHLORIDE, MAGNESIUM CHLORIDE AND DEXTROSE: 2.5; 538; 448; 18.3; 5.08 INJECTION, SOLUTION INTRAPERITONEAL at 18:40

## 2017-08-21 RX ADMIN — VANCOMYCIN HYDROCHLORIDE 1000 MG: 1 INJECTION, POWDER, LYOPHILIZED, FOR SOLUTION INTRAVENOUS at 19:31

## 2017-08-21 RX ADMIN — ANTACID TABLETS 2500 MG: 500 TABLET, CHEWABLE ORAL at 16:49

## 2017-08-21 RX ADMIN — INSULIN LISPRO 1 UNITS: 100 INJECTION, SOLUTION INTRAVENOUS; SUBCUTANEOUS at 20:13

## 2017-08-21 RX ADMIN — PREGABALIN 50 MG: 50 CAPSULE ORAL at 09:07

## 2017-08-21 RX ADMIN — INSULIN GLARGINE 45 UNITS: 100 INJECTION, SOLUTION SUBCUTANEOUS at 09:06

## 2017-08-21 RX ADMIN — OXYCODONE HYDROCHLORIDE AND ACETAMINOPHEN 1 TABLET: 5; 325 TABLET ORAL at 04:38

## 2017-08-21 RX ADMIN — Medication 2 UNITS: at 16:51

## 2017-08-21 RX ADMIN — SODIUM CHLORIDE, SODIUM LACTATE, CALCIUM CHLORIDE, MAGNESIUM CHLORIDE AND DEXTROSE: 2.5; 538; 448; 18.3; 5.08 INJECTION, SOLUTION INTRAPERITONEAL at 11:46

## 2017-08-21 RX ADMIN — ERYTHROPOIETIN 10000 UNITS: 10000 INJECTION, SOLUTION INTRAVENOUS; SUBCUTANEOUS at 09:07

## 2017-08-21 RX ADMIN — Medication 10 ML: at 20:13

## 2017-08-21 RX ADMIN — SODIUM CHLORIDE, SODIUM LACTATE, CALCIUM CHLORIDE, MAGNESIUM CHLORIDE AND DEXTROSE: 2.5; 538; 448; 18.3; 5.08 INJECTION, SOLUTION INTRAPERITONEAL at 00:32

## 2017-08-21 RX ADMIN — SODIUM CHLORIDE, SODIUM LACTATE, CALCIUM CHLORIDE, MAGNESIUM CHLORIDE AND DEXTROSE: 2.5; 538; 448; 18.3; 5.08 INJECTION, SOLUTION INTRAPERITONEAL at 06:00

## 2017-08-21 RX ADMIN — OXYCODONE HYDROCHLORIDE AND ACETAMINOPHEN 1 TABLET: 5; 325 TABLET ORAL at 00:31

## 2017-08-21 ASSESSMENT — PAIN SCALES - GENERAL
PAINLEVEL_OUTOF10: 6
PAINLEVEL_OUTOF10: 6
PAINLEVEL_OUTOF10: 2
PAINLEVEL_OUTOF10: 2
PAINLEVEL_OUTOF10: 5
PAINLEVEL_OUTOF10: 4
PAINLEVEL_OUTOF10: 9
PAINLEVEL_OUTOF10: 8
PAINLEVEL_OUTOF10: 5
PAINLEVEL_OUTOF10: 4
PAINLEVEL_OUTOF10: 6

## 2017-08-21 ASSESSMENT — PAIN DESCRIPTION - LOCATION
LOCATION: FINGER (COMMENT WHICH ONE)
LOCATION: FINGER (COMMENT WHICH ONE)

## 2017-08-21 ASSESSMENT — PAIN DESCRIPTION - PAIN TYPE
TYPE: ACUTE PAIN
TYPE: ACUTE PAIN

## 2017-08-22 LAB
ABO CHECK: NORMAL
ANION GAP SERPL CALCULATED.3IONS-SCNC: 15 MEQ/L (ref 8–16)
BUN BLDV-MCNC: 67 MG/DL (ref 7–22)
CALCIUM SERPL-MCNC: 8.8 MG/DL (ref 8.5–10.5)
CHLORIDE BLD-SCNC: 95 MEQ/L (ref 98–111)
CO2: 24 MEQ/L (ref 23–33)
CREAT SERPL-MCNC: 9.1 MG/DL (ref 0.4–1.2)
FOLATE: 11.7 NG/ML (ref 4.8–24.2)
GEL INDIRECT COOMBS: NORMAL
GFR SERPL CREATININE-BSD FRML MDRD: 6 ML/MIN/1.73M2
GLUCOSE BLD-MCNC: 157 MG/DL (ref 70–108)
GLUCOSE BLD-MCNC: 160 MG/DL (ref 70–108)
GLUCOSE BLD-MCNC: 161 MG/DL (ref 70–108)
GLUCOSE BLD-MCNC: 173 MG/DL (ref 70–108)
GLUCOSE BLD-MCNC: 185 MG/DL (ref 70–108)
HCT VFR BLD CALC: 21.8 % (ref 42–52)
HEMOGLOBIN: 7.1 GM/DL (ref 14–18)
MCH RBC QN AUTO: 31.9 PG (ref 27–31)
MCHC RBC AUTO-ENTMCNC: 32.8 GM/DL (ref 33–37)
MCV RBC AUTO: 97.4 FL (ref 80–94)
PDW BLD-RTO: 19.8 % (ref 11.5–14.5)
PLATELET # BLD: 181 THOU/MM3 (ref 130–400)
PMV BLD AUTO: 7.9 MCM (ref 7.4–10.4)
POTASSIUM SERPL-SCNC: 4.2 MEQ/L (ref 3.5–5.2)
RBC # BLD: 2.23 MILL/MM3 (ref 4.7–6.1)
RETICULOCYTE ABSOLUTE COUNT: 10.1 % (ref 0.5–2)
RH FACTOR: NORMAL
SODIUM BLD-SCNC: 134 MEQ/L (ref 135–145)
VITAMIN B-12: 525 PG/ML (ref 211–911)
WBC # BLD: 8.8 THOU/MM3 (ref 4.8–10.8)

## 2017-08-22 PROCEDURE — 82746 ASSAY OF FOLIC ACID SERUM: CPT

## 2017-08-22 PROCEDURE — 86900 BLOOD TYPING SEROLOGIC ABO: CPT

## 2017-08-22 PROCEDURE — 86885 COOMBS TEST INDIRECT QUAL: CPT

## 2017-08-22 PROCEDURE — 6370000000 HC RX 637 (ALT 250 FOR IP): Performed by: NURSE PRACTITIONER

## 2017-08-22 PROCEDURE — 86922 COMPATIBILITY TEST ANTIGLOB: CPT

## 2017-08-22 PROCEDURE — P9016 RBC LEUKOCYTES REDUCED: HCPCS

## 2017-08-22 PROCEDURE — 6370000000 HC RX 637 (ALT 250 FOR IP): Performed by: INTERNAL MEDICINE

## 2017-08-22 PROCEDURE — 82948 REAGENT STRIP/BLOOD GLUCOSE: CPT

## 2017-08-22 PROCEDURE — 82607 VITAMIN B-12: CPT

## 2017-08-22 PROCEDURE — 86901 BLOOD TYPING SEROLOGIC RH(D): CPT

## 2017-08-22 PROCEDURE — 80048 BASIC METABOLIC PNL TOTAL CA: CPT

## 2017-08-22 PROCEDURE — 85027 COMPLETE CBC AUTOMATED: CPT

## 2017-08-22 PROCEDURE — 85045 AUTOMATED RETICULOCYTE COUNT: CPT

## 2017-08-22 PROCEDURE — 2580000003 HC RX 258: Performed by: NURSE PRACTITIONER

## 2017-08-22 PROCEDURE — 1200000003 HC TELEMETRY R&B

## 2017-08-22 PROCEDURE — 99232 SBSQ HOSP IP/OBS MODERATE 35: CPT | Performed by: INTERNAL MEDICINE

## 2017-08-22 PROCEDURE — 90945 DIALYSIS ONE EVALUATION: CPT | Performed by: INTERNAL MEDICINE

## 2017-08-22 PROCEDURE — 2580000003 HC RX 258: Performed by: INTERNAL MEDICINE

## 2017-08-22 PROCEDURE — 36430 TRANSFUSION BLD/BLD COMPNT: CPT

## 2017-08-22 PROCEDURE — 36415 COLL VENOUS BLD VENIPUNCTURE: CPT

## 2017-08-22 RX ORDER — DOXYCYCLINE HYCLATE 100 MG
100 TABLET ORAL 2 TIMES DAILY
Qty: 20 TABLET | Refills: 0 | Status: SHIPPED | OUTPATIENT
Start: 2017-08-22 | End: 2017-09-01

## 2017-08-22 RX ORDER — FERROUS SULFATE 325(65) MG
325 TABLET ORAL 2 TIMES DAILY WITH MEALS
Status: DISCONTINUED | OUTPATIENT
Start: 2017-08-22 | End: 2017-08-23 | Stop reason: HOSPADM

## 2017-08-22 RX ORDER — 0.9 % SODIUM CHLORIDE 0.9 %
250 INTRAVENOUS SOLUTION INTRAVENOUS ONCE
Status: COMPLETED | OUTPATIENT
Start: 2017-08-22 | End: 2017-08-23

## 2017-08-22 RX ADMIN — INSULIN LISPRO 1 UNITS: 100 INJECTION, SOLUTION INTRAVENOUS; SUBCUTANEOUS at 20:14

## 2017-08-22 RX ADMIN — FLUOXETINE 20 MG: 20 CAPSULE ORAL at 09:48

## 2017-08-22 RX ADMIN — GENTAMICIN SULFATE: 1 CREAM TOPICAL at 09:59

## 2017-08-22 RX ADMIN — OXYCODONE HYDROCHLORIDE AND ACETAMINOPHEN 1 TABLET: 5; 325 TABLET ORAL at 05:56

## 2017-08-22 RX ADMIN — INSULIN LISPRO 3 UNITS: 100 INJECTION, SOLUTION INTRAVENOUS; SUBCUTANEOUS at 18:43

## 2017-08-22 RX ADMIN — OXYCODONE HYDROCHLORIDE AND ACETAMINOPHEN 1 TABLET: 5; 325 TABLET ORAL at 13:24

## 2017-08-22 RX ADMIN — SODIUM CHLORIDE, SODIUM LACTATE, CALCIUM CHLORIDE, MAGNESIUM CHLORIDE AND DEXTROSE: 2.5; 538; 448; 18.3; 5.08 INJECTION, SOLUTION INTRAPERITONEAL at 12:43

## 2017-08-22 RX ADMIN — CARVEDILOL 3.12 MG: 3.12 TABLET, FILM COATED ORAL at 00:06

## 2017-08-22 RX ADMIN — CARVEDILOL 3.12 MG: 3.12 TABLET, FILM COATED ORAL at 09:48

## 2017-08-22 RX ADMIN — Medication 325 MG: at 18:40

## 2017-08-22 RX ADMIN — INSULIN LISPRO 3 UNITS: 100 INJECTION, SOLUTION INTRAVENOUS; SUBCUTANEOUS at 09:52

## 2017-08-22 RX ADMIN — GENTAMICIN SULFATE: 1 CREAM TOPICAL at 18:45

## 2017-08-22 RX ADMIN — Medication 2 UNITS: at 12:47

## 2017-08-22 RX ADMIN — Medication 2 UNITS: at 09:49

## 2017-08-22 RX ADMIN — Medication 10 ML: at 10:01

## 2017-08-22 RX ADMIN — PREGABALIN 50 MG: 50 CAPSULE ORAL at 20:08

## 2017-08-22 RX ADMIN — ANTACID TABLETS 2500 MG: 500 TABLET, CHEWABLE ORAL at 12:46

## 2017-08-22 RX ADMIN — SODIUM CHLORIDE, SODIUM LACTATE, CALCIUM CHLORIDE, MAGNESIUM CHLORIDE AND DEXTROSE: 2.5; 538; 448; 18.3; 5.08 INJECTION, SOLUTION INTRAPERITONEAL at 05:48

## 2017-08-22 RX ADMIN — CARVEDILOL 3.12 MG: 3.12 TABLET, FILM COATED ORAL at 20:10

## 2017-08-22 RX ADMIN — INSULIN GLARGINE 45 UNITS: 100 INJECTION, SOLUTION SUBCUTANEOUS at 09:49

## 2017-08-22 RX ADMIN — PREGABALIN 50 MG: 50 CAPSULE ORAL at 09:59

## 2017-08-22 RX ADMIN — SODIUM CHLORIDE 250 ML: 9 INJECTION, SOLUTION INTRAVENOUS at 18:30

## 2017-08-22 RX ADMIN — Medication 2 UNITS: at 18:42

## 2017-08-22 RX ADMIN — PANTOPRAZOLE SODIUM 40 MG: 40 TABLET, DELAYED RELEASE ORAL at 05:56

## 2017-08-22 RX ADMIN — OXYCODONE HYDROCHLORIDE AND ACETAMINOPHEN 1 TABLET: 5; 325 TABLET ORAL at 20:09

## 2017-08-22 RX ADMIN — SODIUM CHLORIDE, SODIUM LACTATE, CALCIUM CHLORIDE, MAGNESIUM CHLORIDE AND DEXTROSE: 2.5; 538; 448; 18.3; 5.08 INJECTION, SOLUTION INTRAPERITONEAL at 00:06

## 2017-08-22 RX ADMIN — SODIUM CHLORIDE, SODIUM LACTATE, CALCIUM CHLORIDE, MAGNESIUM CHLORIDE AND DEXTROSE: 2.5; 538; 448; 18.3; 5.08 INJECTION, SOLUTION INTRAPERITONEAL at 18:39

## 2017-08-22 ASSESSMENT — PAIN DESCRIPTION - LOCATION
LOCATION: FINGER (COMMENT WHICH ONE)
LOCATION: FINGER (COMMENT WHICH ONE)

## 2017-08-22 ASSESSMENT — PAIN SCALES - GENERAL
PAINLEVEL_OUTOF10: 7
PAINLEVEL_OUTOF10: 6
PAINLEVEL_OUTOF10: 5
PAINLEVEL_OUTOF10: 7
PAINLEVEL_OUTOF10: 6

## 2017-08-22 ASSESSMENT — PAIN DESCRIPTION - PAIN TYPE
TYPE: ACUTE PAIN
TYPE: ACUTE PAIN

## 2017-08-23 VITALS
DIASTOLIC BLOOD PRESSURE: 72 MMHG | BODY MASS INDEX: 31.68 KG/M2 | HEIGHT: 73 IN | OXYGEN SATURATION: 96 % | TEMPERATURE: 97.9 F | WEIGHT: 239 LBS | SYSTOLIC BLOOD PRESSURE: 138 MMHG | HEART RATE: 71 BPM | RESPIRATION RATE: 18 BRPM

## 2017-08-23 LAB
AEROBIC CULTURE: ABNORMAL
ANAEROBIC CULTURE: ABNORMAL
ANION GAP SERPL CALCULATED.3IONS-SCNC: 14 MEQ/L (ref 8–16)
BUN BLDV-MCNC: 68 MG/DL (ref 7–22)
CALCIUM SERPL-MCNC: 8.9 MG/DL (ref 8.5–10.5)
CHLORIDE BLD-SCNC: 95 MEQ/L (ref 98–111)
CO2: 24 MEQ/L (ref 23–33)
CREAT SERPL-MCNC: 9 MG/DL (ref 0.4–1.2)
GFR SERPL CREATININE-BSD FRML MDRD: 6 ML/MIN/1.73M2
GLUCOSE BLD-MCNC: 154 MG/DL (ref 70–108)
GLUCOSE BLD-MCNC: 177 MG/DL (ref 70–108)
GLUCOSE BLD-MCNC: 184 MG/DL (ref 70–108)
GRAM STAIN RESULT: ABNORMAL
HCT VFR BLD CALC: 22 % (ref 42–52)
HCT VFR BLD CALC: 23.2 % (ref 42–52)
HEMOGLOBIN: 7.4 GM/DL (ref 14–18)
HEMOGLOBIN: 7.7 GM/DL (ref 14–18)
MCH RBC QN AUTO: 31.8 PG (ref 27–31)
MCHC RBC AUTO-ENTMCNC: 33.5 GM/DL (ref 33–37)
MCV RBC AUTO: 95 FL (ref 80–94)
ORGANISM: ABNORMAL
PDW BLD-RTO: 19.3 % (ref 11.5–14.5)
PLATELET # BLD: 173 THOU/MM3 (ref 130–400)
PMV BLD AUTO: 7.3 MCM (ref 7.4–10.4)
POTASSIUM SERPL-SCNC: 4.2 MEQ/L (ref 3.5–5.2)
RBC # BLD: 2.32 MILL/MM3 (ref 4.7–6.1)
SODIUM BLD-SCNC: 133 MEQ/L (ref 135–145)
WBC # BLD: 7.6 THOU/MM3 (ref 4.8–10.8)

## 2017-08-23 PROCEDURE — 2580000003 HC RX 258: Performed by: INTERNAL MEDICINE

## 2017-08-23 PROCEDURE — 36415 COLL VENOUS BLD VENIPUNCTURE: CPT

## 2017-08-23 PROCEDURE — 85027 COMPLETE CBC AUTOMATED: CPT

## 2017-08-23 PROCEDURE — 80048 BASIC METABOLIC PNL TOTAL CA: CPT

## 2017-08-23 PROCEDURE — 6370000000 HC RX 637 (ALT 250 FOR IP): Performed by: INTERNAL MEDICINE

## 2017-08-23 PROCEDURE — 99232 SBSQ HOSP IP/OBS MODERATE 35: CPT | Performed by: INTERNAL MEDICINE

## 2017-08-23 PROCEDURE — 6370000000 HC RX 637 (ALT 250 FOR IP): Performed by: NURSE PRACTITIONER

## 2017-08-23 PROCEDURE — 99239 HOSP IP/OBS DSCHRG MGMT >30: CPT | Performed by: INTERNAL MEDICINE

## 2017-08-23 PROCEDURE — 2580000003 HC RX 258: Performed by: NURSE PRACTITIONER

## 2017-08-23 PROCEDURE — 6360000002 HC RX W HCPCS: Performed by: INTERNAL MEDICINE

## 2017-08-23 PROCEDURE — 82948 REAGENT STRIP/BLOOD GLUCOSE: CPT

## 2017-08-23 PROCEDURE — 85018 HEMOGLOBIN: CPT

## 2017-08-23 PROCEDURE — 85014 HEMATOCRIT: CPT

## 2017-08-23 RX ORDER — FERROUS SULFATE 325(65) MG
325 TABLET ORAL 2 TIMES DAILY WITH MEALS
Qty: 60 TABLET | Refills: 0 | Status: ON HOLD | OUTPATIENT
Start: 2017-08-23 | End: 2017-10-30 | Stop reason: ALTCHOICE

## 2017-08-23 RX ADMIN — GENTAMICIN SULFATE: 1 CREAM TOPICAL at 10:26

## 2017-08-23 RX ADMIN — Medication 10 ML: at 10:24

## 2017-08-23 RX ADMIN — ANTACID TABLETS 2500 MG: 500 TABLET, CHEWABLE ORAL at 11:57

## 2017-08-23 RX ADMIN — OXYCODONE HYDROCHLORIDE AND ACETAMINOPHEN 1 TABLET: 5; 325 TABLET ORAL at 04:21

## 2017-08-23 RX ADMIN — Medication 2 UNITS: at 11:57

## 2017-08-23 RX ADMIN — INSULIN GLARGINE 45 UNITS: 100 INJECTION, SOLUTION SUBCUTANEOUS at 10:24

## 2017-08-23 RX ADMIN — Medication 325 MG: at 10:25

## 2017-08-23 RX ADMIN — SODIUM CHLORIDE, SODIUM LACTATE, CALCIUM CHLORIDE, MAGNESIUM CHLORIDE AND DEXTROSE: 2.5; 538; 448; 18.3; 5.08 INJECTION, SOLUTION INTRAPERITONEAL at 11:53

## 2017-08-23 RX ADMIN — PANTOPRAZOLE SODIUM 40 MG: 40 TABLET, DELAYED RELEASE ORAL at 06:14

## 2017-08-23 RX ADMIN — DARBEPOETIN ALFA 100 MCG: 100 INJECTION, SOLUTION INTRAVENOUS; SUBCUTANEOUS at 10:35

## 2017-08-23 RX ADMIN — PREGABALIN 50 MG: 50 CAPSULE ORAL at 10:25

## 2017-08-23 RX ADMIN — INSULIN LISPRO 3 UNITS: 100 INJECTION, SOLUTION INTRAVENOUS; SUBCUTANEOUS at 10:28

## 2017-08-23 RX ADMIN — SODIUM CHLORIDE, SODIUM LACTATE, CALCIUM CHLORIDE, MAGNESIUM CHLORIDE AND DEXTROSE: 2.5; 538; 448; 18.3; 5.08 INJECTION, SOLUTION INTRAPERITONEAL at 00:27

## 2017-08-23 RX ADMIN — SODIUM CHLORIDE, SODIUM LACTATE, CALCIUM CHLORIDE, MAGNESIUM CHLORIDE AND DEXTROSE: 2.5; 538; 448; 18.3; 5.08 INJECTION, SOLUTION INTRAPERITONEAL at 05:49

## 2017-08-23 RX ADMIN — ANTACID TABLETS 2500 MG: 500 TABLET, CHEWABLE ORAL at 10:25

## 2017-08-23 RX ADMIN — OXYCODONE HYDROCHLORIDE AND ACETAMINOPHEN 1 TABLET: 5; 325 TABLET ORAL at 10:35

## 2017-08-23 RX ADMIN — FLUOXETINE 20 MG: 20 CAPSULE ORAL at 10:25

## 2017-08-23 RX ADMIN — CARVEDILOL 3.12 MG: 3.12 TABLET, FILM COATED ORAL at 10:25

## 2017-08-23 RX ADMIN — Medication 2 UNITS: at 10:24

## 2017-08-23 ASSESSMENT — PAIN DESCRIPTION - PAIN TYPE: TYPE: ACUTE PAIN

## 2017-08-23 ASSESSMENT — PAIN SCALES - GENERAL
PAINLEVEL_OUTOF10: 4
PAINLEVEL_OUTOF10: 4
PAINLEVEL_OUTOF10: 0
PAINLEVEL_OUTOF10: 7
PAINLEVEL_OUTOF10: 4

## 2017-08-23 ASSESSMENT — PAIN DESCRIPTION - LOCATION: LOCATION: FINGER (COMMENT WHICH ONE)

## 2017-08-24 ENCOUNTER — OFFICE VISIT (OUTPATIENT)
Dept: CARDIOLOGY CLINIC | Age: 57
End: 2017-08-24
Payer: MEDICARE

## 2017-08-24 VITALS
WEIGHT: 239 LBS | HEIGHT: 73 IN | DIASTOLIC BLOOD PRESSURE: 48 MMHG | SYSTOLIC BLOOD PRESSURE: 144 MMHG | BODY MASS INDEX: 31.68 KG/M2

## 2017-08-24 DIAGNOSIS — I48.3 TYPICAL ATRIAL FLUTTER (HCC): Primary | ICD-10-CM

## 2017-08-24 DIAGNOSIS — Z98.890 S/P ABLATION OF ATRIAL FLUTTER: ICD-10-CM

## 2017-08-24 DIAGNOSIS — Z86.79 S/P ABLATION OF ATRIAL FLUTTER: ICD-10-CM

## 2017-08-24 PROCEDURE — G8598 ASA/ANTIPLAT THER USED: HCPCS | Performed by: INTERNAL MEDICINE

## 2017-08-24 PROCEDURE — 1036F TOBACCO NON-USER: CPT | Performed by: INTERNAL MEDICINE

## 2017-08-24 PROCEDURE — G8427 DOCREV CUR MEDS BY ELIG CLIN: HCPCS | Performed by: INTERNAL MEDICINE

## 2017-08-24 PROCEDURE — 99213 OFFICE O/P EST LOW 20 MIN: CPT | Performed by: INTERNAL MEDICINE

## 2017-08-24 PROCEDURE — G8417 CALC BMI ABV UP PARAM F/U: HCPCS | Performed by: INTERNAL MEDICINE

## 2017-08-24 PROCEDURE — 1111F DSCHRG MED/CURRENT MED MERGE: CPT | Performed by: INTERNAL MEDICINE

## 2017-08-24 PROCEDURE — 3017F COLORECTAL CA SCREEN DOC REV: CPT | Performed by: INTERNAL MEDICINE

## 2017-08-24 PROCEDURE — 93000 ELECTROCARDIOGRAM COMPLETE: CPT | Performed by: INTERNAL MEDICINE

## 2017-08-24 RX ORDER — ASPIRIN 81 MG/1
81 TABLET, CHEWABLE ORAL DAILY
Status: ON HOLD | COMMUNITY
End: 2017-11-29

## 2017-08-24 ASSESSMENT — ENCOUNTER SYMPTOMS
LEFT EYE: 0
SORE THROAT: 0
DIARRHEA: 0
VOMITING: 0
BACK PAIN: 0
WHEEZING: 0
SHORTNESS OF BREATH: 0
ABDOMINAL PAIN: 0
DOUBLE VISION: 0
CONSTIPATION: 0
RIGHT EYE: 0
BLURRED VISION: 0
NAUSEA: 0
COUGH: 0

## 2017-08-25 ENCOUNTER — OFFICE VISIT (OUTPATIENT)
Dept: CARDIOLOGY CLINIC | Age: 57
End: 2017-08-25
Payer: MEDICARE

## 2017-08-25 VITALS
HEART RATE: 80 BPM | HEIGHT: 73 IN | SYSTOLIC BLOOD PRESSURE: 122 MMHG | WEIGHT: 239 LBS | BODY MASS INDEX: 31.68 KG/M2 | DIASTOLIC BLOOD PRESSURE: 66 MMHG

## 2017-08-25 DIAGNOSIS — I10 ESSENTIAL HYPERTENSION: Primary | ICD-10-CM

## 2017-08-25 DIAGNOSIS — I48.4 ATYPICAL ATRIAL FLUTTER (HCC): ICD-10-CM

## 2017-08-25 DIAGNOSIS — Z95.2 S/P AVR (AORTIC VALVE REPLACEMENT): ICD-10-CM

## 2017-08-25 DIAGNOSIS — I73.9 CLAUDICATION (HCC): ICD-10-CM

## 2017-08-25 DIAGNOSIS — M79.605 PAIN IN BOTH LOWER EXTREMITIES: ICD-10-CM

## 2017-08-25 DIAGNOSIS — M79.604 PAIN IN BOTH LOWER EXTREMITIES: ICD-10-CM

## 2017-08-25 PROCEDURE — 1111F DSCHRG MED/CURRENT MED MERGE: CPT | Performed by: NUCLEAR MEDICINE

## 2017-08-25 PROCEDURE — 1036F TOBACCO NON-USER: CPT | Performed by: NUCLEAR MEDICINE

## 2017-08-25 PROCEDURE — G8598 ASA/ANTIPLAT THER USED: HCPCS | Performed by: NUCLEAR MEDICINE

## 2017-08-25 PROCEDURE — 3017F COLORECTAL CA SCREEN DOC REV: CPT | Performed by: NUCLEAR MEDICINE

## 2017-08-25 PROCEDURE — 99213 OFFICE O/P EST LOW 20 MIN: CPT | Performed by: NUCLEAR MEDICINE

## 2017-08-25 PROCEDURE — G8427 DOCREV CUR MEDS BY ELIG CLIN: HCPCS | Performed by: NUCLEAR MEDICINE

## 2017-08-25 PROCEDURE — G8417 CALC BMI ABV UP PARAM F/U: HCPCS | Performed by: NUCLEAR MEDICINE

## 2017-08-28 LAB
FUNGUS SMEAR: ABNORMAL
ORGANISM: ABNORMAL

## 2017-08-30 ENCOUNTER — TELEPHONE (OUTPATIENT)
Dept: CARDIOLOGY CLINIC | Age: 57
End: 2017-08-30

## 2017-09-05 ENCOUNTER — HOSPITAL ENCOUNTER (OUTPATIENT)
Dept: INTERVENTIONAL RADIOLOGY/VASCULAR | Age: 57
Discharge: HOME | DRG: 377 | End: 2017-09-05
Payer: MEDICARE

## 2017-09-05 DIAGNOSIS — M79.605 PAIN IN BOTH LOWER EXTREMITIES: ICD-10-CM

## 2017-09-05 DIAGNOSIS — I73.9 CLAUDICATION (HCC): ICD-10-CM

## 2017-09-05 DIAGNOSIS — M79.604 PAIN IN BOTH LOWER EXTREMITIES: ICD-10-CM

## 2017-09-05 PROCEDURE — 93922 UPR/L XTREMITY ART 2 LEVELS: CPT

## 2017-09-06 ENCOUNTER — APPOINTMENT (OUTPATIENT)
Dept: GENERAL RADIOLOGY | Age: 57
DRG: 377 | End: 2017-09-06
Payer: MEDICARE

## 2017-09-06 ENCOUNTER — TELEPHONE (OUTPATIENT)
Dept: CARDIOLOGY CLINIC | Age: 57
End: 2017-09-06

## 2017-09-06 ENCOUNTER — HOSPITAL ENCOUNTER (INPATIENT)
Age: 57
LOS: 5 days | Discharge: HOME | DRG: 377 | End: 2017-09-11
Attending: FAMILY MEDICINE | Admitting: INTERNAL MEDICINE
Payer: MEDICARE

## 2017-09-06 DIAGNOSIS — Z99.2 ESRD (END STAGE RENAL DISEASE) ON DIALYSIS (HCC): ICD-10-CM

## 2017-09-06 DIAGNOSIS — K92.1 GASTROINTESTINAL HEMORRHAGE WITH MELENA: Primary | ICD-10-CM

## 2017-09-06 DIAGNOSIS — N18.6 ESRD (END STAGE RENAL DISEASE) ON DIALYSIS (HCC): ICD-10-CM

## 2017-09-06 DIAGNOSIS — D50.0 IRON DEFICIENCY ANEMIA DUE TO CHRONIC BLOOD LOSS: ICD-10-CM

## 2017-09-06 DIAGNOSIS — L08.9 INFECTED FINGER: ICD-10-CM

## 2017-09-06 PROBLEM — K92.2 LGI BLEED: Status: ACTIVE | Noted: 2017-09-06

## 2017-09-06 PROBLEM — K92.2 LOWER GI BLEED: Status: RESOLVED | Noted: 2017-02-05 | Resolved: 2017-09-06

## 2017-09-06 PROBLEM — I50.21 ACUTE SYSTOLIC CHF (CONGESTIVE HEART FAILURE) (HCC): Status: RESOLVED | Noted: 2017-03-04 | Resolved: 2017-09-06

## 2017-09-06 PROBLEM — J96.11 CHRONIC RESPIRATORY FAILURE WITH HYPOXIA (HCC): Status: RESOLVED | Noted: 2017-02-07 | Resolved: 2017-09-06

## 2017-09-06 PROBLEM — Z95.2 S/P AVR (AORTIC VALVE REPLACEMENT): Status: RESOLVED | Noted: 2017-08-16 | Resolved: 2017-09-06

## 2017-09-06 PROBLEM — I95.1 ORTHOSTATIC SYNCOPE: Status: RESOLVED | Noted: 2017-03-06 | Resolved: 2017-09-06

## 2017-09-06 PROBLEM — D62 ACUTE BLOOD LOSS ANEMIA: Status: RESOLVED | Noted: 2017-08-16 | Resolved: 2017-09-06

## 2017-09-06 PROBLEM — I95.9 HYPOTENSION (ARTERIAL): Status: RESOLVED | Noted: 2017-03-06 | Resolved: 2017-09-06

## 2017-09-06 PROBLEM — R07.89 ATYPICAL CHEST PAIN: Status: RESOLVED | Noted: 2017-03-03 | Resolved: 2017-09-06

## 2017-09-06 LAB
ABO CHECK: NORMAL
ALBUMIN SERPL-MCNC: 2.9 G/DL (ref 3.5–5.1)
ALP BLD-CCNC: 86 U/L (ref 38–126)
ALT SERPL-CCNC: 6 U/L (ref 11–66)
ANION GAP SERPL CALCULATED.3IONS-SCNC: 16 MEQ/L (ref 8–16)
ANISOCYTOSIS: ABNORMAL
APTT: 29.3 SECONDS (ref 22–38)
AST SERPL-CCNC: 17 U/L (ref 5–40)
BASOPHILIA: ABNORMAL
BASOPHILIC STIPPLING: ABNORMAL
BASOPHILS # BLD: 0.2 %
BASOPHILS ABSOLUTE: 0 THOU/MM3 (ref 0–0.1)
BILIRUB SERPL-MCNC: < 0.2 MG/DL (ref 0.3–1.2)
BILIRUBIN DIRECT: < 0.2 MG/DL (ref 0–0.3)
BUN BLDV-MCNC: 77 MG/DL (ref 7–22)
CALCIUM SERPL-MCNC: 8.7 MG/DL (ref 8.5–10.5)
CHLORIDE BLD-SCNC: 96 MEQ/L (ref 98–111)
CO2: 26 MEQ/L (ref 23–33)
CREAT SERPL-MCNC: 8.6 MG/DL (ref 0.4–1.2)
EOSINOPHIL # BLD: 2.5 %
EOSINOPHILS ABSOLUTE: 0.2 THOU/MM3 (ref 0–0.4)
GEL INDIRECT COOMBS: NORMAL
GFR SERPL CREATININE-BSD FRML MDRD: 6 ML/MIN/1.73M2
GLUCOSE BLD-MCNC: 167 MG/DL (ref 70–108)
HCT VFR BLD CALC: 17.1 % (ref 42–52)
HEMOCCULT STL QL: POSITIVE
HEMOGLOBIN: 5.6 GM/DL (ref 14–18)
INR BLD: 1.07 (ref 0.85–1.13)
LACTIC ACID: 1.7 MMOL/L (ref 0.5–2.2)
LIPASE: 79.6 U/L (ref 5.6–51.3)
LYMPHOCYTES # BLD: 9.9 %
LYMPHOCYTES ABSOLUTE: 1 THOU/MM3 (ref 1–4.8)
MCH RBC QN AUTO: 32.3 PG (ref 27–31)
MCHC RBC AUTO-ENTMCNC: 32.7 GM/DL (ref 33–37)
MCV RBC AUTO: 98.9 FL (ref 80–94)
MONOCYTES # BLD: 8.8 %
MONOCYTES ABSOLUTE: 0.9 THOU/MM3 (ref 0.4–1.3)
NUCLEATED RED BLOOD CELLS: 0 /100 WBC
OSMOLALITY CALCULATION: 302.5 MOSMOL/KG (ref 275–300)
PDW BLD-RTO: 19.6 % (ref 11.5–14.5)
PLATELET # BLD: 231 THOU/MM3 (ref 130–400)
PLATELET ESTIMATE: ADEQUATE
PMV BLD AUTO: 7.2 MCM (ref 7.4–10.4)
POTASSIUM SERPL-SCNC: 4.7 MEQ/L (ref 3.5–5.2)
PRO-BNP: ABNORMAL PG/ML (ref 0–900)
PROCALCITONIN: 0.99 NG/ML (ref 0.01–0.09)
RBC # BLD: 1.73 MILL/MM3 (ref 4.7–6.1)
RBC # BLD: NORMAL 10*6/UL
RH FACTOR: NORMAL
SCAN OF BLOOD SMEAR: NORMAL
SEG NEUTROPHILS: 78.6 %
SEGMENTED NEUTROPHILS ABSOLUTE COUNT: 7.7 THOU/MM3 (ref 1.8–7.7)
SODIUM BLD-SCNC: 138 MEQ/L (ref 135–145)
TOTAL PROTEIN: 6.1 G/DL (ref 6.1–8)
TROPONIN T: 0.14 NG/ML
WBC # BLD: 9.8 THOU/MM3 (ref 4.8–10.8)

## 2017-09-06 PROCEDURE — 93005 ELECTROCARDIOGRAM TRACING: CPT

## 2017-09-06 PROCEDURE — 84484 ASSAY OF TROPONIN QUANT: CPT

## 2017-09-06 PROCEDURE — 82272 OCCULT BLD FECES 1-3 TESTS: CPT

## 2017-09-06 PROCEDURE — 2060000000 HC ICU INTERMEDIATE R&B

## 2017-09-06 PROCEDURE — 36430 TRANSFUSION BLD/BLD COMPNT: CPT

## 2017-09-06 PROCEDURE — 86922 COMPATIBILITY TEST ANTIGLOB: CPT

## 2017-09-06 PROCEDURE — 86885 COOMBS TEST INDIRECT QUAL: CPT

## 2017-09-06 PROCEDURE — 85730 THROMBOPLASTIN TIME PARTIAL: CPT

## 2017-09-06 PROCEDURE — 84145 PROCALCITONIN (PCT): CPT

## 2017-09-06 PROCEDURE — 86905 BLOOD TYPING RBC ANTIGENS: CPT

## 2017-09-06 PROCEDURE — 85025 COMPLETE CBC W/AUTO DIFF WBC: CPT

## 2017-09-06 PROCEDURE — 82248 BILIRUBIN DIRECT: CPT

## 2017-09-06 PROCEDURE — 83605 ASSAY OF LACTIC ACID: CPT

## 2017-09-06 PROCEDURE — 71020 XR CHEST STANDARD TWO VW: CPT

## 2017-09-06 PROCEDURE — 83690 ASSAY OF LIPASE: CPT

## 2017-09-06 PROCEDURE — 36415 COLL VENOUS BLD VENIPUNCTURE: CPT

## 2017-09-06 PROCEDURE — 80053 COMPREHEN METABOLIC PANEL: CPT

## 2017-09-06 PROCEDURE — 99285 EMERGENCY DEPT VISIT HI MDM: CPT

## 2017-09-06 PROCEDURE — 83880 ASSAY OF NATRIURETIC PEPTIDE: CPT

## 2017-09-06 PROCEDURE — 86901 BLOOD TYPING SEROLOGIC RH(D): CPT

## 2017-09-06 PROCEDURE — P9016 RBC LEUKOCYTES REDUCED: HCPCS

## 2017-09-06 PROCEDURE — 85610 PROTHROMBIN TIME: CPT

## 2017-09-06 PROCEDURE — 86900 BLOOD TYPING SEROLOGIC ABO: CPT

## 2017-09-06 RX ORDER — 0.9 % SODIUM CHLORIDE 0.9 %
250 INTRAVENOUS SOLUTION INTRAVENOUS ONCE
Status: DISCONTINUED | OUTPATIENT
Start: 2017-09-06 | End: 2017-09-11 | Stop reason: HOSPADM

## 2017-09-06 ASSESSMENT — ENCOUNTER SYMPTOMS
NAUSEA: 0
SORE THROAT: 0
ABDOMINAL PAIN: 0
SHORTNESS OF BREATH: 1
COLOR CHANGE: 0
WHEEZING: 0
BACK PAIN: 0
VOMITING: 0
COUGH: 0
DIARRHEA: 1
RHINORRHEA: 0

## 2017-09-07 PROBLEM — D50.0 CHRONIC BLOOD LOSS ANEMIA: Status: ACTIVE | Noted: 2017-09-07

## 2017-09-07 LAB
CLOSTRIDIUM DIFFICILE, PCR: POSITIVE
EKG ATRIAL RATE: 85 BPM
EKG P AXIS: 32 DEGREES
EKG P-R INTERVAL: 192 MS
EKG Q-T INTERVAL: 436 MS
EKG QRS DURATION: 140 MS
EKG QTC CALCULATION (BAZETT): 518 MS
EKG R AXIS: 145 DEGREES
EKG T AXIS: 44 DEGREES
EKG VENTRICULAR RATE: 85 BPM
GLUCOSE BLD-MCNC: 110 MG/DL (ref 70–108)
GLUCOSE BLD-MCNC: 114 MG/DL (ref 70–108)
GLUCOSE BLD-MCNC: 118 MG/DL (ref 70–108)
GLUCOSE BLD-MCNC: 170 MG/DL (ref 70–108)
HCT VFR BLD CALC: 18.8 % (ref 42–52)
HCT VFR BLD CALC: 19 % (ref 42–52)
HCT VFR BLD CALC: 22.5 % (ref 42–52)
HEMOGLOBIN: 6.1 GM/DL (ref 14–18)
HEMOGLOBIN: 6.1 GM/DL (ref 14–18)
HEMOGLOBIN: 7.3 GM/DL (ref 14–18)

## 2017-09-07 PROCEDURE — 87081 CULTURE SCREEN ONLY: CPT

## 2017-09-07 PROCEDURE — 99223 1ST HOSP IP/OBS HIGH 75: CPT | Performed by: NURSE PRACTITIONER

## 2017-09-07 PROCEDURE — 2580000003 HC RX 258: Performed by: INTERNAL MEDICINE

## 2017-09-07 PROCEDURE — 99233 SBSQ HOSP IP/OBS HIGH 50: CPT | Performed by: INTERNAL MEDICINE

## 2017-09-07 PROCEDURE — 36415 COLL VENOUS BLD VENIPUNCTURE: CPT

## 2017-09-07 PROCEDURE — 87493 C DIFF AMPLIFIED PROBE: CPT

## 2017-09-07 PROCEDURE — 6360000002 HC RX W HCPCS: Performed by: INTERNAL MEDICINE

## 2017-09-07 PROCEDURE — C9113 INJ PANTOPRAZOLE SODIUM, VIA: HCPCS | Performed by: NURSE PRACTITIONER

## 2017-09-07 PROCEDURE — 82948 REAGENT STRIP/BLOOD GLUCOSE: CPT

## 2017-09-07 PROCEDURE — 6360000002 HC RX W HCPCS: Performed by: NURSE PRACTITIONER

## 2017-09-07 PROCEDURE — 85018 HEMOGLOBIN: CPT

## 2017-09-07 PROCEDURE — 90945 DIALYSIS ONE EVALUATION: CPT | Performed by: INTERNAL MEDICINE

## 2017-09-07 PROCEDURE — 2500000003 HC RX 250 WO HCPCS: Performed by: INTERNAL MEDICINE

## 2017-09-07 PROCEDURE — 6370000000 HC RX 637 (ALT 250 FOR IP): Performed by: NURSE PRACTITIONER

## 2017-09-07 PROCEDURE — 85014 HEMATOCRIT: CPT

## 2017-09-07 PROCEDURE — 2060000000 HC ICU INTERMEDIATE R&B

## 2017-09-07 PROCEDURE — 99221 1ST HOSP IP/OBS SF/LOW 40: CPT | Performed by: INTERNAL MEDICINE

## 2017-09-07 PROCEDURE — 2580000003 HC RX 258: Performed by: NURSE PRACTITIONER

## 2017-09-07 PROCEDURE — 6370000000 HC RX 637 (ALT 250 FOR IP): Performed by: INTERNAL MEDICINE

## 2017-09-07 RX ORDER — FERROUS SULFATE 325(65) MG
325 TABLET ORAL 2 TIMES DAILY WITH MEALS
Status: DISCONTINUED | OUTPATIENT
Start: 2017-09-07 | End: 2017-09-07

## 2017-09-07 RX ORDER — PREGABALIN 50 MG/1
50 CAPSULE ORAL 2 TIMES DAILY
Status: DISCONTINUED | OUTPATIENT
Start: 2017-09-07 | End: 2017-09-11 | Stop reason: HOSPADM

## 2017-09-07 RX ORDER — CARVEDILOL 3.12 MG/1
3.12 TABLET ORAL 2 TIMES DAILY
Status: DISCONTINUED | OUTPATIENT
Start: 2017-09-07 | End: 2017-09-11 | Stop reason: HOSPADM

## 2017-09-07 RX ORDER — NICOTINE POLACRILEX 4 MG
15 LOZENGE BUCCAL PRN
Status: DISCONTINUED | OUTPATIENT
Start: 2017-09-07 | End: 2017-09-11 | Stop reason: HOSPADM

## 2017-09-07 RX ORDER — ACETAMINOPHEN 325 MG/1
650 TABLET ORAL EVERY 4 HOURS PRN
Status: DISCONTINUED | OUTPATIENT
Start: 2017-09-07 | End: 2017-09-11 | Stop reason: HOSPADM

## 2017-09-07 RX ORDER — DEXTROSE MONOHYDRATE 50 MG/ML
100 INJECTION, SOLUTION INTRAVENOUS PRN
Status: DISCONTINUED | OUTPATIENT
Start: 2017-09-07 | End: 2017-09-11 | Stop reason: HOSPADM

## 2017-09-07 RX ORDER — EZETIMIBE AND SIMVASTATIN 10; 40 MG/1; MG/1
1 TABLET ORAL NIGHTLY
Status: DISCONTINUED | OUTPATIENT
Start: 2017-09-07 | End: 2017-09-07 | Stop reason: SDUPTHER

## 2017-09-07 RX ORDER — OXYCODONE HYDROCHLORIDE AND ACETAMINOPHEN 5; 325 MG/1; MG/1
1 TABLET ORAL EVERY 8 HOURS PRN
Status: DISPENSED | OUTPATIENT
Start: 2017-09-07 | End: 2017-09-10

## 2017-09-07 RX ORDER — MULTIVITAMIN WITH FOLIC ACID 400 MCG
1 TABLET ORAL DAILY
Status: DISCONTINUED | OUTPATIENT
Start: 2017-09-07 | End: 2017-09-11 | Stop reason: HOSPADM

## 2017-09-07 RX ORDER — ALBUTEROL SULFATE 90 UG/1
2 AEROSOL, METERED RESPIRATORY (INHALATION) EVERY 6 HOURS PRN
Status: DISCONTINUED | OUTPATIENT
Start: 2017-09-07 | End: 2017-09-11 | Stop reason: HOSPADM

## 2017-09-07 RX ORDER — CALCITRIOL 0.25 UG/1
0.25 CAPSULE, LIQUID FILLED ORAL
Status: DISCONTINUED | OUTPATIENT
Start: 2017-09-08 | End: 2017-09-11 | Stop reason: HOSPADM

## 2017-09-07 RX ORDER — CALCIUM CARBONATE 200(500)MG
5 TABLET,CHEWABLE ORAL
Status: DISCONTINUED | OUTPATIENT
Start: 2017-09-07 | End: 2017-09-11 | Stop reason: HOSPADM

## 2017-09-07 RX ORDER — 0.9 % SODIUM CHLORIDE 0.9 %
250 INTRAVENOUS SOLUTION INTRAVENOUS ONCE
Status: COMPLETED | OUTPATIENT
Start: 2017-09-07 | End: 2017-09-07

## 2017-09-07 RX ORDER — 0.9 % SODIUM CHLORIDE 0.9 %
250 INTRAVENOUS SOLUTION INTRAVENOUS ONCE
Status: COMPLETED | OUTPATIENT
Start: 2017-09-07 | End: 2017-09-08

## 2017-09-07 RX ORDER — SODIUM CHLORIDE 0.9 % (FLUSH) 0.9 %
10 SYRINGE (ML) INJECTION EVERY 12 HOURS SCHEDULED
Status: DISCONTINUED | OUTPATIENT
Start: 2017-09-07 | End: 2017-09-11 | Stop reason: HOSPADM

## 2017-09-07 RX ORDER — BUMETANIDE 0.25 MG/ML
2 INJECTION, SOLUTION INTRAMUSCULAR; INTRAVENOUS ONCE
Status: COMPLETED | OUTPATIENT
Start: 2017-09-07 | End: 2017-09-07

## 2017-09-07 RX ORDER — INSULIN GLARGINE 100 [IU]/ML
45 INJECTION, SOLUTION SUBCUTANEOUS
Status: DISCONTINUED | OUTPATIENT
Start: 2017-09-07 | End: 2017-09-11 | Stop reason: HOSPADM

## 2017-09-07 RX ORDER — FLUOXETINE HYDROCHLORIDE 20 MG/1
20 CAPSULE ORAL DAILY
Status: DISCONTINUED | OUTPATIENT
Start: 2017-09-07 | End: 2017-09-11 | Stop reason: HOSPADM

## 2017-09-07 RX ORDER — EZETIMIBE 10 MG/1
10 TABLET ORAL NIGHTLY
Status: DISCONTINUED | OUTPATIENT
Start: 2017-09-07 | End: 2017-09-07 | Stop reason: SDUPTHER

## 2017-09-07 RX ORDER — ONDANSETRON 2 MG/ML
4 INJECTION INTRAMUSCULAR; INTRAVENOUS EVERY 6 HOURS PRN
Status: DISCONTINUED | OUTPATIENT
Start: 2017-09-07 | End: 2017-09-11 | Stop reason: HOSPADM

## 2017-09-07 RX ORDER — PANTOPRAZOLE SODIUM 40 MG/10ML
40 INJECTION, POWDER, LYOPHILIZED, FOR SOLUTION INTRAVENOUS 2 TIMES DAILY
Status: DISCONTINUED | OUTPATIENT
Start: 2017-09-07 | End: 2017-09-08

## 2017-09-07 RX ORDER — GLYCERIN/MALTODEXTRIN
1 LIQUID (ML) ORAL
Status: DISCONTINUED | OUTPATIENT
Start: 2017-09-07 | End: 2017-09-07 | Stop reason: RX

## 2017-09-07 RX ORDER — POLYETHYLENE GLYCOL 3350 17 G/17G
17 POWDER, FOR SOLUTION ORAL
Status: DISCONTINUED | OUTPATIENT
Start: 2017-09-07 | End: 2017-09-08

## 2017-09-07 RX ORDER — OXYCODONE HYDROCHLORIDE AND ACETAMINOPHEN 5; 325 MG/1; MG/1
2 TABLET ORAL EVERY 8 HOURS PRN
Status: DISPENSED | OUTPATIENT
Start: 2017-09-07 | End: 2017-09-10

## 2017-09-07 RX ORDER — SODIUM CHLORIDE 0.9 % (FLUSH) 0.9 %
10 SYRINGE (ML) INJECTION PRN
Status: DISCONTINUED | OUTPATIENT
Start: 2017-09-07 | End: 2017-09-11 | Stop reason: HOSPADM

## 2017-09-07 RX ORDER — LACTOBACILLUS RHAMNOSUS GG 10B CELL
1 CAPSULE ORAL 2 TIMES DAILY WITH MEALS
Status: DISCONTINUED | OUTPATIENT
Start: 2017-09-07 | End: 2017-09-11 | Stop reason: HOSPADM

## 2017-09-07 RX ORDER — DEXTROSE MONOHYDRATE 25 G/50ML
12.5 INJECTION, SOLUTION INTRAVENOUS PRN
Status: DISCONTINUED | OUTPATIENT
Start: 2017-09-07 | End: 2017-09-11 | Stop reason: HOSPADM

## 2017-09-07 RX ORDER — SODIUM CHLORIDE, SODIUM LACTATE, CALCIUM CHLORIDE, MAGNESIUM CHLORIDE AND DEXTROSE 2.5; 538; 448; 18.3; 5.08 G/100ML; MG/100ML; MG/100ML; MG/100ML; MG/100ML
INJECTION, SOLUTION INTRAPERITONEAL EVERY 6 HOURS
Status: DISCONTINUED | OUTPATIENT
Start: 2017-09-07 | End: 2017-09-11 | Stop reason: HOSPADM

## 2017-09-07 RX ORDER — SIMVASTATIN 40 MG
40 TABLET ORAL NIGHTLY
Status: DISCONTINUED | OUTPATIENT
Start: 2017-09-07 | End: 2017-09-11 | Stop reason: HOSPADM

## 2017-09-07 RX ADMIN — VANCOMYCIN HYDROCHLORIDE 1750 MG: 1 INJECTION, POWDER, LYOPHILIZED, FOR SOLUTION INTRAVENOUS at 05:34

## 2017-09-07 RX ADMIN — SODIUM CHLORIDE 250 ML: 9 INJECTION, SOLUTION INTRAVENOUS at 12:47

## 2017-09-07 RX ADMIN — SODIUM CHLORIDE, SODIUM LACTATE, CALCIUM CHLORIDE, MAGNESIUM CHLORIDE AND DEXTROSE 2000 ML: 2.5; 538; 448; 18.3; 5.08 INJECTION, SOLUTION INTRAPERITONEAL at 12:15

## 2017-09-07 RX ADMIN — ANTACID TABLETS 2500 MG: 500 TABLET, CHEWABLE ORAL at 08:21

## 2017-09-07 RX ADMIN — SODIUM CHLORIDE, SODIUM LACTATE, CALCIUM CHLORIDE, MAGNESIUM CHLORIDE AND DEXTROSE 2000 ML: 2.5; 538; 448; 18.3; 5.08 INJECTION, SOLUTION INTRAPERITONEAL at 01:08

## 2017-09-07 RX ADMIN — BISACODYL 10 MG: 5 TABLET, DELAYED RELEASE ORAL at 22:02

## 2017-09-07 RX ADMIN — INSULIN LISPRO 3 UNITS: 100 INJECTION, SOLUTION INTRAVENOUS; SUBCUTANEOUS at 08:22

## 2017-09-07 RX ADMIN — POLYETHYLENE GLYCOL 3350 17 G: 17 POWDER, FOR SOLUTION ORAL at 21:57

## 2017-09-07 RX ADMIN — ANTACID TABLETS 2500 MG: 500 TABLET, CHEWABLE ORAL at 12:11

## 2017-09-07 RX ADMIN — SODIUM CHLORIDE, SODIUM LACTATE, CALCIUM CHLORIDE, MAGNESIUM CHLORIDE AND DEXTROSE 2000 ML: 2.5; 538; 448; 18.3; 5.08 INJECTION, SOLUTION INTRAPERITONEAL at 18:06

## 2017-09-07 RX ADMIN — Medication 325 MG: at 08:22

## 2017-09-07 RX ADMIN — FLUOXETINE 20 MG: 20 CAPSULE ORAL at 09:55

## 2017-09-07 RX ADMIN — CARVEDILOL 3.12 MG: 3.12 TABLET, FILM COATED ORAL at 22:02

## 2017-09-07 RX ADMIN — POLYETHYLENE GLYCOL 3350 17 G: 17 POWDER, FOR SOLUTION ORAL at 21:56

## 2017-09-07 RX ADMIN — SIMVASTATIN 40 MG: 40 TABLET, FILM COATED ORAL at 21:47

## 2017-09-07 RX ADMIN — PANTOPRAZOLE SODIUM 40 MG: 40 INJECTION, POWDER, FOR SOLUTION INTRAVENOUS at 06:38

## 2017-09-07 RX ADMIN — SODIUM CHLORIDE 250 ML: 9 INJECTION, SOLUTION INTRAVENOUS at 02:02

## 2017-09-07 RX ADMIN — BUMETANIDE 2 MG: 0.25 INJECTION, SOLUTION INTRAMUSCULAR; INTRAVENOUS at 13:08

## 2017-09-07 RX ADMIN — THERA TABS 1 TABLET: TAB at 09:55

## 2017-09-07 RX ADMIN — OXYCODONE HYDROCHLORIDE AND ACETAMINOPHEN 2 TABLET: 5; 325 TABLET ORAL at 05:00

## 2017-09-07 RX ADMIN — ANTACID TABLETS 2500 MG: 500 TABLET, CHEWABLE ORAL at 16:49

## 2017-09-07 RX ADMIN — ACETAMINOPHEN 650 MG: 325 TABLET ORAL at 02:02

## 2017-09-07 RX ADMIN — OXYCODONE HYDROCHLORIDE AND ACETAMINOPHEN 2 TABLET: 5; 325 TABLET ORAL at 13:36

## 2017-09-07 RX ADMIN — Medication 10 ML: at 22:03

## 2017-09-07 RX ADMIN — PANTOPRAZOLE SODIUM 40 MG: 40 INJECTION, POWDER, FOR SOLUTION INTRAVENOUS at 16:49

## 2017-09-07 RX ADMIN — INSULIN GLARGINE 45 UNITS: 100 INJECTION, SOLUTION SUBCUTANEOUS at 08:22

## 2017-09-07 RX ADMIN — OXYCODONE HYDROCHLORIDE AND ACETAMINOPHEN 1 TABLET: 5; 325 TABLET ORAL at 23:23

## 2017-09-07 RX ADMIN — Medication 1 CAPSULE: at 21:47

## 2017-09-07 RX ADMIN — SODIUM CHLORIDE, SODIUM LACTATE, CALCIUM CHLORIDE, MAGNESIUM CHLORIDE AND DEXTROSE 2000 ML: 2.5; 538; 448; 18.3; 5.08 INJECTION, SOLUTION INTRAPERITONEAL at 06:34

## 2017-09-07 RX ADMIN — Medication 10 ML: at 09:55

## 2017-09-07 RX ADMIN — PREGABALIN 50 MG: 50 CAPSULE ORAL at 09:55

## 2017-09-07 RX ADMIN — POLYETHYLENE GLYCOL 3350 17 G: 17 POWDER, FOR SOLUTION ORAL at 23:19

## 2017-09-07 RX ADMIN — POLYETHYLENE GLYCOL 3350 17 G: 17 POWDER, FOR SOLUTION ORAL at 23:18

## 2017-09-07 RX ADMIN — PREGABALIN 50 MG: 50 CAPSULE ORAL at 21:55

## 2017-09-07 RX ADMIN — CARVEDILOL 3.12 MG: 3.12 TABLET, FILM COATED ORAL at 09:55

## 2017-09-07 ASSESSMENT — PAIN DESCRIPTION - LOCATION
LOCATION: BACK;FINGER (COMMENT WHICH ONE)
LOCATION: BACK
LOCATION: BACK
LOCATION: BACK;FINGER (COMMENT WHICH ONE)
LOCATION: BACK

## 2017-09-07 ASSESSMENT — PAIN DESCRIPTION - FREQUENCY: FREQUENCY: INTERMITTENT

## 2017-09-07 ASSESSMENT — PAIN SCALES - GENERAL
PAINLEVEL_OUTOF10: 7
PAINLEVEL_OUTOF10: 6
PAINLEVEL_OUTOF10: 7
PAINLEVEL_OUTOF10: 5
PAINLEVEL_OUTOF10: 5
PAINLEVEL_OUTOF10: 3
PAINLEVEL_OUTOF10: 6
PAINLEVEL_OUTOF10: 6
PAINLEVEL_OUTOF10: 0
PAINLEVEL_OUTOF10: 6

## 2017-09-07 ASSESSMENT — ENCOUNTER SYMPTOMS
COUGH: 0
BACK PAIN: 0
NAUSEA: 0
VOMITING: 0
SHORTNESS OF BREATH: 1
HEARTBURN: 0

## 2017-09-07 ASSESSMENT — PAIN DESCRIPTION - PAIN TYPE
TYPE: CHRONIC PAIN

## 2017-09-07 ASSESSMENT — PAIN DESCRIPTION - ORIENTATION
ORIENTATION: LOWER

## 2017-09-07 ASSESSMENT — PAIN DESCRIPTION - ONSET: ONSET: PROGRESSIVE

## 2017-09-08 ENCOUNTER — ANESTHESIA (OUTPATIENT)
Dept: ENDOSCOPY | Age: 57
DRG: 377 | End: 2017-09-08
Payer: MEDICARE

## 2017-09-08 ENCOUNTER — ANESTHESIA EVENT (OUTPATIENT)
Dept: ENDOSCOPY | Age: 57
DRG: 377 | End: 2017-09-08
Payer: MEDICARE

## 2017-09-08 VITALS — SYSTOLIC BLOOD PRESSURE: 101 MMHG | DIASTOLIC BLOOD PRESSURE: 48 MMHG | OXYGEN SATURATION: 100 %

## 2017-09-08 LAB
GLUCOSE BLD-MCNC: 108 MG/DL (ref 70–108)
GLUCOSE BLD-MCNC: 127 MG/DL (ref 70–108)
GLUCOSE BLD-MCNC: 145 MG/DL (ref 70–108)
GLUCOSE BLD-MCNC: 184 MG/DL (ref 70–108)
HCT VFR BLD CALC: 23.1 % (ref 42–52)
HEMOGLOBIN: 8 GM/DL (ref 14–18)

## 2017-09-08 PROCEDURE — 85014 HEMATOCRIT: CPT

## 2017-09-08 PROCEDURE — 3609014000 HC ENTEROSCOPY BIOPSY: Performed by: INTERNAL MEDICINE

## 2017-09-08 PROCEDURE — 6360000002 HC RX W HCPCS: Performed by: NURSE PRACTITIONER

## 2017-09-08 PROCEDURE — 0DJD8ZZ INSPECTION OF LOWER INTESTINAL TRACT, VIA NATURAL OR ARTIFICIAL OPENING ENDOSCOPIC: ICD-10-PCS | Performed by: INTERNAL MEDICINE

## 2017-09-08 PROCEDURE — 82948 REAGENT STRIP/BLOOD GLUCOSE: CPT

## 2017-09-08 PROCEDURE — 99232 SBSQ HOSP IP/OBS MODERATE 35: CPT | Performed by: INTERNAL MEDICINE

## 2017-09-08 PROCEDURE — 0DB68ZX EXCISION OF STOMACH, VIA NATURAL OR ARTIFICIAL OPENING ENDOSCOPIC, DIAGNOSTIC: ICD-10-PCS | Performed by: INTERNAL MEDICINE

## 2017-09-08 PROCEDURE — C9113 INJ PANTOPRAZOLE SODIUM, VIA: HCPCS | Performed by: NURSE PRACTITIONER

## 2017-09-08 PROCEDURE — 7100000000 HC PACU RECOVERY - FIRST 15 MIN: Performed by: INTERNAL MEDICINE

## 2017-09-08 PROCEDURE — 88305 TISSUE EXAM BY PATHOLOGIST: CPT

## 2017-09-08 PROCEDURE — 3700000000 HC ANESTHESIA ATTENDED CARE: Performed by: INTERNAL MEDICINE

## 2017-09-08 PROCEDURE — 3700000001 HC ADD 15 MINUTES (ANESTHESIA): Performed by: INTERNAL MEDICINE

## 2017-09-08 PROCEDURE — A4421 OSTOMY SUPPLY MISC: HCPCS

## 2017-09-08 PROCEDURE — 3609027000 HC COLONOSCOPY: Performed by: INTERNAL MEDICINE

## 2017-09-08 PROCEDURE — 2060000000 HC ICU INTERMEDIATE R&B

## 2017-09-08 PROCEDURE — 6360000002 HC RX W HCPCS: Performed by: NURSE ANESTHETIST, CERTIFIED REGISTERED

## 2017-09-08 PROCEDURE — 6370000000 HC RX 637 (ALT 250 FOR IP): Performed by: INTERNAL MEDICINE

## 2017-09-08 PROCEDURE — 2580000003 HC RX 258: Performed by: INTERNAL MEDICINE

## 2017-09-08 PROCEDURE — 6370000000 HC RX 637 (ALT 250 FOR IP): Performed by: NURSE PRACTITIONER

## 2017-09-08 PROCEDURE — 36415 COLL VENOUS BLD VENIPUNCTURE: CPT

## 2017-09-08 PROCEDURE — 85018 HEMOGLOBIN: CPT

## 2017-09-08 PROCEDURE — 2500000003 HC RX 250 WO HCPCS: Performed by: NURSE ANESTHETIST, CERTIFIED REGISTERED

## 2017-09-08 PROCEDURE — 2580000003 HC RX 258: Performed by: NURSE PRACTITIONER

## 2017-09-08 RX ORDER — LIDOCAINE HYDROCHLORIDE 20 MG/ML
INJECTION, SOLUTION INFILTRATION; PERINEURAL PRN
Status: DISCONTINUED | OUTPATIENT
Start: 2017-09-08 | End: 2017-09-08 | Stop reason: SDUPTHER

## 2017-09-08 RX ORDER — PANTOPRAZOLE SODIUM 40 MG/1
40 TABLET, DELAYED RELEASE ORAL
Status: DISCONTINUED | OUTPATIENT
Start: 2017-09-09 | End: 2017-09-11 | Stop reason: HOSPADM

## 2017-09-08 RX ORDER — PROPOFOL 10 MG/ML
INJECTION, EMULSION INTRAVENOUS PRN
Status: DISCONTINUED | OUTPATIENT
Start: 2017-09-08 | End: 2017-09-08 | Stop reason: SDUPTHER

## 2017-09-08 RX ADMIN — INSULIN GLARGINE 45 UNITS: 100 INJECTION, SOLUTION SUBCUTANEOUS at 09:57

## 2017-09-08 RX ADMIN — LIDOCAINE HYDROCHLORIDE 100 MG: 20 INJECTION, SOLUTION INFILTRATION; PERINEURAL at 14:25

## 2017-09-08 RX ADMIN — POLYETHYLENE GLYCOL 3350 17 G: 17 POWDER, FOR SOLUTION ORAL at 08:59

## 2017-09-08 RX ADMIN — OXYCODONE HYDROCHLORIDE AND ACETAMINOPHEN 1 TABLET: 5; 325 TABLET ORAL at 16:29

## 2017-09-08 RX ADMIN — CARVEDILOL 3.12 MG: 3.12 TABLET, FILM COATED ORAL at 20:01

## 2017-09-08 RX ADMIN — Medication 2 UNITS: at 20:02

## 2017-09-08 RX ADMIN — SODIUM CHLORIDE, SODIUM LACTATE, CALCIUM CHLORIDE, MAGNESIUM CHLORIDE AND DEXTROSE 2000 ML: 2.5; 538; 448; 18.3; 5.08 INJECTION, SOLUTION INTRAPERITONEAL at 13:06

## 2017-09-08 RX ADMIN — Medication 250 MG: at 12:11

## 2017-09-08 RX ADMIN — Medication 250 MG: at 00:58

## 2017-09-08 RX ADMIN — FLUOXETINE 20 MG: 20 CAPSULE ORAL at 09:56

## 2017-09-08 RX ADMIN — SODIUM CHLORIDE, SODIUM LACTATE, CALCIUM CHLORIDE, MAGNESIUM CHLORIDE AND DEXTROSE 2000 ML: 2.5; 538; 448; 18.3; 5.08 INJECTION, SOLUTION INTRAPERITONEAL at 17:24

## 2017-09-08 RX ADMIN — CALCITRIOL 0.25 MCG: 0.25 CAPSULE, LIQUID FILLED ORAL at 09:56

## 2017-09-08 RX ADMIN — POLYETHYLENE GLYCOL 3350 17 G: 17 POWDER, FOR SOLUTION ORAL at 09:59

## 2017-09-08 RX ADMIN — POLYETHYLENE GLYCOL 3350 17 G: 17 POWDER, FOR SOLUTION ORAL at 03:05

## 2017-09-08 RX ADMIN — PREGABALIN 50 MG: 50 CAPSULE ORAL at 09:58

## 2017-09-08 RX ADMIN — Medication 250 MG: at 17:24

## 2017-09-08 RX ADMIN — Medication 10 ML: at 09:57

## 2017-09-08 RX ADMIN — ANTACID TABLETS 2500 MG: 500 TABLET, CHEWABLE ORAL at 16:28

## 2017-09-08 RX ADMIN — POLYETHYLENE GLYCOL 3350 17 G: 17 POWDER, FOR SOLUTION ORAL at 04:07

## 2017-09-08 RX ADMIN — POLYETHYLENE GLYCOL 3350 17 G: 17 POWDER, FOR SOLUTION ORAL at 05:13

## 2017-09-08 RX ADMIN — CARVEDILOL 3.12 MG: 3.12 TABLET, FILM COATED ORAL at 09:56

## 2017-09-08 RX ADMIN — Medication 1 CAPSULE: at 09:56

## 2017-09-08 RX ADMIN — POLYETHYLENE GLYCOL 3350 17 G: 17 POWDER, FOR SOLUTION ORAL at 05:14

## 2017-09-08 RX ADMIN — PREGABALIN 50 MG: 50 CAPSULE ORAL at 20:06

## 2017-09-08 RX ADMIN — POLYETHYLENE GLYCOL 3350 17 G: 17 POWDER, FOR SOLUTION ORAL at 09:15

## 2017-09-08 RX ADMIN — POLYETHYLENE GLYCOL 3350 17 G: 17 POWDER, FOR SOLUTION ORAL at 00:47

## 2017-09-08 RX ADMIN — Medication 250 MG: at 07:33

## 2017-09-08 RX ADMIN — PANTOPRAZOLE SODIUM 40 MG: 40 INJECTION, POWDER, FOR SOLUTION INTRAVENOUS at 05:52

## 2017-09-08 RX ADMIN — OXYCODONE HYDROCHLORIDE AND ACETAMINOPHEN 2 TABLET: 5; 325 TABLET ORAL at 08:59

## 2017-09-08 RX ADMIN — SODIUM CHLORIDE, SODIUM LACTATE, CALCIUM CHLORIDE, MAGNESIUM CHLORIDE AND DEXTROSE: 2.5; 538; 448; 18.3; 5.08 INJECTION, SOLUTION INTRAPERITONEAL at 06:49

## 2017-09-08 RX ADMIN — SIMVASTATIN 40 MG: 40 TABLET, FILM COATED ORAL at 20:01

## 2017-09-08 RX ADMIN — POLYETHYLENE GLYCOL 3350 17 G: 17 POWDER, FOR SOLUTION ORAL at 00:48

## 2017-09-08 RX ADMIN — Medication 1 CAPSULE: at 17:24

## 2017-09-08 RX ADMIN — PROPOFOL 270 MG: 10 INJECTION, EMULSION INTRAVENOUS at 14:25

## 2017-09-08 RX ADMIN — Medication 10 ML: at 20:01

## 2017-09-08 RX ADMIN — THERA TABS 1 TABLET: TAB at 09:56

## 2017-09-08 RX ADMIN — SODIUM CHLORIDE, SODIUM LACTATE, CALCIUM CHLORIDE, MAGNESIUM CHLORIDE AND DEXTROSE 2000 ML: 2.5; 538; 448; 18.3; 5.08 INJECTION, SOLUTION INTRAPERITONEAL at 00:13

## 2017-09-08 RX ADMIN — POLYETHYLENE GLYCOL 3350 17 G: 17 POWDER, FOR SOLUTION ORAL at 03:06

## 2017-09-08 RX ADMIN — POLYETHYLENE GLYCOL 3350 17 G: 17 POWDER, FOR SOLUTION ORAL at 11:10

## 2017-09-08 RX ADMIN — POLYETHYLENE GLYCOL 3350 17 G: 17 POWDER, FOR SOLUTION ORAL at 04:08

## 2017-09-08 ASSESSMENT — PAIN SCALES - GENERAL
PAINLEVEL_OUTOF10: 4
PAINLEVEL_OUTOF10: 6
PAINLEVEL_OUTOF10: 7
PAINLEVEL_OUTOF10: 5
PAINLEVEL_OUTOF10: 6
PAINLEVEL_OUTOF10: 6

## 2017-09-08 ASSESSMENT — PAIN DESCRIPTION - LOCATION
LOCATION: BACK
LOCATION: BACK

## 2017-09-08 ASSESSMENT — PAIN DESCRIPTION - ORIENTATION: ORIENTATION: LOWER

## 2017-09-08 ASSESSMENT — PAIN DESCRIPTION - PAIN TYPE
TYPE: CHRONIC PAIN
TYPE: CHRONIC PAIN

## 2017-09-08 ASSESSMENT — ENCOUNTER SYMPTOMS: SHORTNESS OF BREATH: 1

## 2017-09-09 LAB
ANION GAP SERPL CALCULATED.3IONS-SCNC: 16 MEQ/L (ref 8–16)
BILIRUB SERPL-MCNC: 0.3 MG/DL (ref 0.3–1.2)
BUN BLDV-MCNC: 67 MG/DL (ref 7–22)
CALCIUM SERPL-MCNC: 8.7 MG/DL (ref 8.5–10.5)
CHLORIDE BLD-SCNC: 92 MEQ/L (ref 98–111)
CO2: 26 MEQ/L (ref 23–33)
CREAT SERPL-MCNC: 7.9 MG/DL (ref 0.4–1.2)
GFR SERPL CREATININE-BSD FRML MDRD: 7 ML/MIN/1.73M2
GLUCOSE BLD-MCNC: 106 MG/DL (ref 70–108)
GLUCOSE BLD-MCNC: 118 MG/DL (ref 70–108)
GLUCOSE BLD-MCNC: 123 MG/DL (ref 70–108)
GLUCOSE BLD-MCNC: 141 MG/DL (ref 70–108)
GLUCOSE BLD-MCNC: 152 MG/DL (ref 70–108)
GLUCOSE BLD-MCNC: 85 MG/DL (ref 70–108)
HCT VFR BLD CALC: 23 % (ref 42–52)
HEMOGLOBIN: 7.6 GM/DL (ref 14–18)
LD: 275 U/L (ref 100–190)
POTASSIUM SERPL-SCNC: 4.7 MEQ/L (ref 3.5–5.2)
SODIUM BLD-SCNC: 134 MEQ/L (ref 135–145)

## 2017-09-09 PROCEDURE — 85014 HEMATOCRIT: CPT

## 2017-09-09 PROCEDURE — 6370000000 HC RX 637 (ALT 250 FOR IP): Performed by: INTERNAL MEDICINE

## 2017-09-09 PROCEDURE — 83615 LACTATE (LD) (LDH) ENZYME: CPT

## 2017-09-09 PROCEDURE — 82948 REAGENT STRIP/BLOOD GLUCOSE: CPT

## 2017-09-09 PROCEDURE — 2580000003 HC RX 258: Performed by: INTERNAL MEDICINE

## 2017-09-09 PROCEDURE — 80048 BASIC METABOLIC PNL TOTAL CA: CPT

## 2017-09-09 PROCEDURE — 99232 SBSQ HOSP IP/OBS MODERATE 35: CPT | Performed by: INTERNAL MEDICINE

## 2017-09-09 PROCEDURE — 85018 HEMOGLOBIN: CPT

## 2017-09-09 PROCEDURE — 6370000000 HC RX 637 (ALT 250 FOR IP): Performed by: NURSE PRACTITIONER

## 2017-09-09 PROCEDURE — 83051 HEMOGLOBIN PLASMA: CPT

## 2017-09-09 PROCEDURE — 36415 COLL VENOUS BLD VENIPUNCTURE: CPT

## 2017-09-09 PROCEDURE — 2060000000 HC ICU INTERMEDIATE R&B

## 2017-09-09 PROCEDURE — 2580000003 HC RX 258: Performed by: NURSE PRACTITIONER

## 2017-09-09 PROCEDURE — 83010 ASSAY OF HAPTOGLOBIN QUANT: CPT

## 2017-09-09 PROCEDURE — 82247 BILIRUBIN TOTAL: CPT

## 2017-09-09 RX ORDER — PENTOXIFYLLINE 400 MG/1
400 TABLET, EXTENDED RELEASE ORAL 2 TIMES DAILY WITH MEALS
Status: DISCONTINUED | OUTPATIENT
Start: 2017-09-09 | End: 2017-09-11 | Stop reason: HOSPADM

## 2017-09-09 RX ADMIN — FLUOXETINE 20 MG: 20 CAPSULE ORAL at 09:40

## 2017-09-09 RX ADMIN — PENTOXIFYLLINE 400 MG: 400 TABLET, FILM COATED, EXTENDED RELEASE ORAL at 17:34

## 2017-09-09 RX ADMIN — Medication 250 MG: at 12:26

## 2017-09-09 RX ADMIN — INSULIN GLARGINE 45 UNITS: 100 INJECTION, SOLUTION SUBCUTANEOUS at 09:39

## 2017-09-09 RX ADMIN — ANTACID TABLETS 2500 MG: 500 TABLET, CHEWABLE ORAL at 12:25

## 2017-09-09 RX ADMIN — CARVEDILOL 3.12 MG: 3.12 TABLET, FILM COATED ORAL at 09:40

## 2017-09-09 RX ADMIN — Medication 1 CAPSULE: at 17:34

## 2017-09-09 RX ADMIN — Medication 10 ML: at 21:44

## 2017-09-09 RX ADMIN — PENTOXIFYLLINE 400 MG: 400 TABLET, FILM COATED, EXTENDED RELEASE ORAL at 11:13

## 2017-09-09 RX ADMIN — Medication 2 UNITS: at 21:42

## 2017-09-09 RX ADMIN — SODIUM CHLORIDE, SODIUM LACTATE, CALCIUM CHLORIDE, MAGNESIUM CHLORIDE AND DEXTROSE 2000 ML: 2.5; 538; 448; 18.3; 5.08 INJECTION, SOLUTION INTRAPERITONEAL at 00:20

## 2017-09-09 RX ADMIN — OXYCODONE HYDROCHLORIDE AND ACETAMINOPHEN 2 TABLET: 5; 325 TABLET ORAL at 09:42

## 2017-09-09 RX ADMIN — THERA TABS 1 TABLET: TAB at 09:40

## 2017-09-09 RX ADMIN — PREGABALIN 50 MG: 50 CAPSULE ORAL at 21:40

## 2017-09-09 RX ADMIN — Medication 250 MG: at 06:15

## 2017-09-09 RX ADMIN — INSULIN LISPRO 3 UNITS: 100 INJECTION, SOLUTION INTRAVENOUS; SUBCUTANEOUS at 09:38

## 2017-09-09 RX ADMIN — SODIUM CHLORIDE, SODIUM LACTATE, CALCIUM CHLORIDE, MAGNESIUM CHLORIDE AND DEXTROSE 2000 ML: 2.5; 538; 448; 18.3; 5.08 INJECTION, SOLUTION INTRAPERITONEAL at 12:26

## 2017-09-09 RX ADMIN — PANTOPRAZOLE SODIUM 40 MG: 40 TABLET, DELAYED RELEASE ORAL at 06:15

## 2017-09-09 RX ADMIN — Medication 10 ML: at 09:39

## 2017-09-09 RX ADMIN — OXYCODONE HYDROCHLORIDE AND ACETAMINOPHEN 1 TABLET: 5; 325 TABLET ORAL at 02:34

## 2017-09-09 RX ADMIN — Medication 1 CAPSULE: at 09:40

## 2017-09-09 RX ADMIN — SIMVASTATIN 40 MG: 40 TABLET, FILM COATED ORAL at 21:40

## 2017-09-09 RX ADMIN — SODIUM CHLORIDE, SODIUM LACTATE, CALCIUM CHLORIDE, MAGNESIUM CHLORIDE AND DEXTROSE 2000 ML: 2.5; 538; 448; 18.3; 5.08 INJECTION, SOLUTION INTRAPERITONEAL at 18:14

## 2017-09-09 RX ADMIN — ANTACID TABLETS 2500 MG: 500 TABLET, CHEWABLE ORAL at 17:34

## 2017-09-09 RX ADMIN — OXYCODONE HYDROCHLORIDE AND ACETAMINOPHEN 1 TABLET: 5; 325 TABLET ORAL at 21:40

## 2017-09-09 RX ADMIN — SODIUM CHLORIDE, SODIUM LACTATE, CALCIUM CHLORIDE, MAGNESIUM CHLORIDE AND DEXTROSE 2000 ML: 2.5; 538; 448; 18.3; 5.08 INJECTION, SOLUTION INTRAPERITONEAL at 06:09

## 2017-09-09 RX ADMIN — Medication 250 MG: at 01:12

## 2017-09-09 RX ADMIN — CARVEDILOL 3.12 MG: 3.12 TABLET, FILM COATED ORAL at 21:40

## 2017-09-09 RX ADMIN — Medication 250 MG: at 17:34

## 2017-09-09 RX ADMIN — PREGABALIN 50 MG: 50 CAPSULE ORAL at 09:39

## 2017-09-09 ASSESSMENT — PAIN DESCRIPTION - PAIN TYPE: TYPE: CHRONIC PAIN

## 2017-09-09 ASSESSMENT — PAIN SCALES - GENERAL
PAINLEVEL_OUTOF10: 5
PAINLEVEL_OUTOF10: 6
PAINLEVEL_OUTOF10: 7
PAINLEVEL_OUTOF10: 5
PAINLEVEL_OUTOF10: 7
PAINLEVEL_OUTOF10: 5
PAINLEVEL_OUTOF10: 5

## 2017-09-09 ASSESSMENT — PAIN DESCRIPTION - ONSET: ONSET: PROGRESSIVE

## 2017-09-09 ASSESSMENT — PAIN DESCRIPTION - LOCATION: LOCATION: BACK

## 2017-09-09 ASSESSMENT — PAIN DESCRIPTION - ORIENTATION: ORIENTATION: LOWER

## 2017-09-09 ASSESSMENT — PAIN DESCRIPTION - FREQUENCY: FREQUENCY: INTERMITTENT

## 2017-09-10 LAB
GLUCOSE BLD-MCNC: 107 MG/DL (ref 70–108)
GLUCOSE BLD-MCNC: 137 MG/DL (ref 70–108)
GLUCOSE BLD-MCNC: 142 MG/DL (ref 70–108)
GLUCOSE BLD-MCNC: 152 MG/DL (ref 70–108)
RETICULOCYTE ABSOLUTE COUNT: 7.5 % (ref 0.5–2)
VRE CULTURE: NORMAL

## 2017-09-10 PROCEDURE — 99232 SBSQ HOSP IP/OBS MODERATE 35: CPT | Performed by: INTERNAL MEDICINE

## 2017-09-10 PROCEDURE — 6370000000 HC RX 637 (ALT 250 FOR IP): Performed by: NURSE PRACTITIONER

## 2017-09-10 PROCEDURE — A6198 ALGINATE DRESSING > 48 SQ IN: HCPCS

## 2017-09-10 PROCEDURE — 2580000003 HC RX 258: Performed by: INTERNAL MEDICINE

## 2017-09-10 PROCEDURE — 6370000000 HC RX 637 (ALT 250 FOR IP): Performed by: INTERNAL MEDICINE

## 2017-09-10 PROCEDURE — 2580000003 HC RX 258: Performed by: NURSE PRACTITIONER

## 2017-09-10 PROCEDURE — 85045 AUTOMATED RETICULOCYTE COUNT: CPT

## 2017-09-10 PROCEDURE — 2060000000 HC ICU INTERMEDIATE R&B

## 2017-09-10 PROCEDURE — 82948 REAGENT STRIP/BLOOD GLUCOSE: CPT

## 2017-09-10 PROCEDURE — 36415 COLL VENOUS BLD VENIPUNCTURE: CPT

## 2017-09-10 RX ADMIN — Medication 1 CAPSULE: at 09:05

## 2017-09-10 RX ADMIN — SODIUM CHLORIDE, SODIUM LACTATE, CALCIUM CHLORIDE, MAGNESIUM CHLORIDE AND DEXTROSE 2000 ML: 2.5; 538; 448; 18.3; 5.08 INJECTION, SOLUTION INTRAPERITONEAL at 00:06

## 2017-09-10 RX ADMIN — SODIUM CHLORIDE, SODIUM LACTATE, CALCIUM CHLORIDE, MAGNESIUM CHLORIDE AND DEXTROSE 2000 ML: 2.5; 538; 448; 18.3; 5.08 INJECTION, SOLUTION INTRAPERITONEAL at 12:57

## 2017-09-10 RX ADMIN — FLUOXETINE 20 MG: 20 CAPSULE ORAL at 09:04

## 2017-09-10 RX ADMIN — Medication 10 ML: at 20:20

## 2017-09-10 RX ADMIN — Medication 250 MG: at 06:21

## 2017-09-10 RX ADMIN — PENTOXIFYLLINE 400 MG: 400 TABLET, FILM COATED, EXTENDED RELEASE ORAL at 09:04

## 2017-09-10 RX ADMIN — SODIUM CHLORIDE, SODIUM LACTATE, CALCIUM CHLORIDE, MAGNESIUM CHLORIDE AND DEXTROSE 2000 ML: 2.5; 538; 448; 18.3; 5.08 INJECTION, SOLUTION INTRAPERITONEAL at 18:20

## 2017-09-10 RX ADMIN — ANTACID TABLETS 2500 MG: 500 TABLET, CHEWABLE ORAL at 09:03

## 2017-09-10 RX ADMIN — Medication 250 MG: at 23:45

## 2017-09-10 RX ADMIN — Medication 1 CAPSULE: at 18:19

## 2017-09-10 RX ADMIN — Medication 250 MG: at 18:18

## 2017-09-10 RX ADMIN — INSULIN GLARGINE 45 UNITS: 100 INJECTION, SOLUTION SUBCUTANEOUS at 09:11

## 2017-09-10 RX ADMIN — Medication 250 MG: at 00:11

## 2017-09-10 RX ADMIN — CARVEDILOL 3.12 MG: 3.12 TABLET, FILM COATED ORAL at 20:19

## 2017-09-10 RX ADMIN — SODIUM CHLORIDE, SODIUM LACTATE, CALCIUM CHLORIDE, MAGNESIUM CHLORIDE AND DEXTROSE 2000 ML: 2.5; 538; 448; 18.3; 5.08 INJECTION, SOLUTION INTRAPERITONEAL at 07:05

## 2017-09-10 RX ADMIN — ANTACID TABLETS 2500 MG: 500 TABLET, CHEWABLE ORAL at 12:57

## 2017-09-10 RX ADMIN — Medication 250 MG: at 13:02

## 2017-09-10 RX ADMIN — ACETAMINOPHEN 650 MG: 325 TABLET ORAL at 09:05

## 2017-09-10 RX ADMIN — ACETAMINOPHEN 650 MG: 325 TABLET ORAL at 20:19

## 2017-09-10 RX ADMIN — THERA TABS 1 TABLET: TAB at 09:05

## 2017-09-10 RX ADMIN — PANTOPRAZOLE SODIUM 40 MG: 40 TABLET, DELAYED RELEASE ORAL at 06:20

## 2017-09-10 RX ADMIN — SODIUM CHLORIDE, SODIUM LACTATE, CALCIUM CHLORIDE, MAGNESIUM CHLORIDE AND DEXTROSE 2000 ML: 2.5; 538; 448; 18.3; 5.08 INJECTION, SOLUTION INTRAPERITONEAL at 23:46

## 2017-09-10 RX ADMIN — PREGABALIN 50 MG: 50 CAPSULE ORAL at 09:05

## 2017-09-10 RX ADMIN — PREGABALIN 50 MG: 50 CAPSULE ORAL at 20:19

## 2017-09-10 RX ADMIN — INSULIN LISPRO 3 UNITS: 100 INJECTION, SOLUTION INTRAVENOUS; SUBCUTANEOUS at 12:57

## 2017-09-10 RX ADMIN — ANTACID TABLETS 2500 MG: 500 TABLET, CHEWABLE ORAL at 18:18

## 2017-09-10 RX ADMIN — PENTOXIFYLLINE 400 MG: 400 TABLET, FILM COATED, EXTENDED RELEASE ORAL at 18:18

## 2017-09-10 RX ADMIN — SIMVASTATIN 40 MG: 40 TABLET, FILM COATED ORAL at 20:19

## 2017-09-10 RX ADMIN — Medication 10 ML: at 09:07

## 2017-09-10 RX ADMIN — CARVEDILOL 3.12 MG: 3.12 TABLET, FILM COATED ORAL at 09:04

## 2017-09-10 ASSESSMENT — PAIN SCALES - GENERAL
PAINLEVEL_OUTOF10: 7
PAINLEVEL_OUTOF10: 6
PAINLEVEL_OUTOF10: 5

## 2017-09-10 ASSESSMENT — PAIN DESCRIPTION - DESCRIPTORS: DESCRIPTORS: DULL;ACHING

## 2017-09-10 ASSESSMENT — PAIN DESCRIPTION - FREQUENCY: FREQUENCY: CONTINUOUS

## 2017-09-10 ASSESSMENT — PAIN DESCRIPTION - ONSET: ONSET: ON-GOING

## 2017-09-10 ASSESSMENT — PAIN DESCRIPTION - PAIN TYPE: TYPE: CHRONIC PAIN

## 2017-09-10 ASSESSMENT — PAIN DESCRIPTION - ORIENTATION: ORIENTATION: LOWER

## 2017-09-10 ASSESSMENT — PAIN DESCRIPTION - LOCATION: LOCATION: BACK

## 2017-09-11 VITALS
HEIGHT: 73 IN | HEART RATE: 74 BPM | OXYGEN SATURATION: 98 % | SYSTOLIC BLOOD PRESSURE: 104 MMHG | BODY MASS INDEX: 30.68 KG/M2 | WEIGHT: 231.5 LBS | DIASTOLIC BLOOD PRESSURE: 52 MMHG | RESPIRATION RATE: 17 BRPM | TEMPERATURE: 98.2 F

## 2017-09-11 LAB
ALBUMIN SERPL-MCNC: 2.5 G/DL (ref 3.5–5.1)
ALP BLD-CCNC: 76 U/L (ref 38–126)
ALT SERPL-CCNC: 6 U/L (ref 11–66)
ANION GAP SERPL CALCULATED.3IONS-SCNC: 19 MEQ/L (ref 8–16)
AST SERPL-CCNC: 16 U/L (ref 5–40)
BILIRUB SERPL-MCNC: 0.2 MG/DL (ref 0.3–1.2)
BUN BLDV-MCNC: 72 MG/DL (ref 7–22)
CALCIUM SERPL-MCNC: 8.8 MG/DL (ref 8.5–10.5)
CHLORIDE BLD-SCNC: 93 MEQ/L (ref 98–111)
CO2: 24 MEQ/L (ref 23–33)
CREAT SERPL-MCNC: 9.1 MG/DL (ref 0.4–1.2)
GFR SERPL CREATININE-BSD FRML MDRD: 6 ML/MIN/1.73M2
GLUCOSE BLD-MCNC: 115 MG/DL (ref 70–108)
GLUCOSE BLD-MCNC: 117 MG/DL (ref 70–108)
GLUCOSE BLD-MCNC: 162 MG/DL (ref 70–108)
HCT VFR BLD CALC: 22.7 % (ref 42–52)
HEMOGLOBIN: 7.5 GM/DL (ref 14–18)
MCH RBC QN AUTO: 31.2 PG (ref 27–31)
MCHC RBC AUTO-ENTMCNC: 33 GM/DL (ref 33–37)
MCV RBC AUTO: 94.7 FL (ref 80–94)
PDW BLD-RTO: 17.5 % (ref 11.5–14.5)
PLATELET # BLD: 201 THOU/MM3 (ref 130–400)
PMV BLD AUTO: 7.9 MCM (ref 7.4–10.4)
POTASSIUM SERPL-SCNC: 4.1 MEQ/L (ref 3.5–5.2)
RBC # BLD: 2.4 MILL/MM3 (ref 4.7–6.1)
SODIUM BLD-SCNC: 136 MEQ/L (ref 135–145)
TOTAL PROTEIN: 5.9 G/DL (ref 6.1–8)
WBC # BLD: 8.4 THOU/MM3 (ref 4.8–10.8)

## 2017-09-11 PROCEDURE — 85027 COMPLETE CBC AUTOMATED: CPT

## 2017-09-11 PROCEDURE — 6370000000 HC RX 637 (ALT 250 FOR IP): Performed by: INTERNAL MEDICINE

## 2017-09-11 PROCEDURE — 80053 COMPREHEN METABOLIC PANEL: CPT

## 2017-09-11 PROCEDURE — 99238 HOSP IP/OBS DSCHRG MGMT 30/<: CPT | Performed by: INTERNAL MEDICINE

## 2017-09-11 PROCEDURE — 2580000003 HC RX 258: Performed by: NURSE PRACTITIONER

## 2017-09-11 PROCEDURE — 2580000003 HC RX 258: Performed by: INTERNAL MEDICINE

## 2017-09-11 PROCEDURE — 90945 DIALYSIS ONE EVALUATION: CPT | Performed by: NURSE PRACTITIONER

## 2017-09-11 PROCEDURE — 6370000000 HC RX 637 (ALT 250 FOR IP): Performed by: NURSE PRACTITIONER

## 2017-09-11 PROCEDURE — 36415 COLL VENOUS BLD VENIPUNCTURE: CPT

## 2017-09-11 PROCEDURE — 82948 REAGENT STRIP/BLOOD GLUCOSE: CPT

## 2017-09-11 RX ORDER — ACETAMINOPHEN 325 MG/1
650 TABLET ORAL EVERY 6 HOURS PRN
Qty: 120 TABLET | Refills: 3 | Status: ON HOLD | OUTPATIENT
Start: 2017-09-11 | End: 2017-10-30 | Stop reason: HOSPADM

## 2017-09-11 RX ORDER — LACTOBACILLUS RHAMNOSUS GG 10B CELL
1 CAPSULE ORAL 2 TIMES DAILY WITH MEALS
Qty: 28 CAPSULE | Refills: 0 | Status: SHIPPED | OUTPATIENT
Start: 2017-09-11 | End: 2017-09-25

## 2017-09-11 RX ADMIN — Medication 250 MG: at 13:46

## 2017-09-11 RX ADMIN — Medication 250 MG: at 06:05

## 2017-09-11 RX ADMIN — CALCITRIOL 0.25 MCG: 0.25 CAPSULE, LIQUID FILLED ORAL at 08:51

## 2017-09-11 RX ADMIN — PENTOXIFYLLINE 400 MG: 400 TABLET, FILM COATED, EXTENDED RELEASE ORAL at 08:51

## 2017-09-11 RX ADMIN — SODIUM CHLORIDE, SODIUM LACTATE, CALCIUM CHLORIDE, MAGNESIUM CHLORIDE AND DEXTROSE 2000 ML: 2.5; 538; 448; 18.3; 5.08 INJECTION, SOLUTION INTRAPERITONEAL at 12:42

## 2017-09-11 RX ADMIN — INSULIN LISPRO 3 UNITS: 100 INJECTION, SOLUTION INTRAVENOUS; SUBCUTANEOUS at 08:51

## 2017-09-11 RX ADMIN — CARVEDILOL 3.12 MG: 3.12 TABLET, FILM COATED ORAL at 08:51

## 2017-09-11 RX ADMIN — Medication 10 ML: at 08:50

## 2017-09-11 RX ADMIN — ACETAMINOPHEN 650 MG: 325 TABLET ORAL at 09:38

## 2017-09-11 RX ADMIN — THERA TABS 1 TABLET: TAB at 08:51

## 2017-09-11 RX ADMIN — INSULIN GLARGINE 45 UNITS: 100 INJECTION, SOLUTION SUBCUTANEOUS at 07:38

## 2017-09-11 RX ADMIN — FLUOXETINE 20 MG: 20 CAPSULE ORAL at 08:51

## 2017-09-11 RX ADMIN — ACETAMINOPHEN 650 MG: 325 TABLET ORAL at 03:43

## 2017-09-11 RX ADMIN — ANTACID TABLETS 2500 MG: 500 TABLET, CHEWABLE ORAL at 08:51

## 2017-09-11 RX ADMIN — Medication 1 CAPSULE: at 08:51

## 2017-09-11 RX ADMIN — PANTOPRAZOLE SODIUM 40 MG: 40 TABLET, DELAYED RELEASE ORAL at 06:05

## 2017-09-11 RX ADMIN — ANTACID TABLETS 2500 MG: 500 TABLET, CHEWABLE ORAL at 13:46

## 2017-09-11 RX ADMIN — SODIUM CHLORIDE, SODIUM LACTATE, CALCIUM CHLORIDE, MAGNESIUM CHLORIDE AND DEXTROSE 2000 ML: 2.5; 538; 448; 18.3; 5.08 INJECTION, SOLUTION INTRAPERITONEAL at 06:02

## 2017-09-11 RX ADMIN — PREGABALIN 50 MG: 50 CAPSULE ORAL at 08:51

## 2017-09-11 ASSESSMENT — PAIN DESCRIPTION - ORIENTATION
ORIENTATION: LOWER

## 2017-09-11 ASSESSMENT — PAIN DESCRIPTION - PAIN TYPE
TYPE: CHRONIC PAIN

## 2017-09-11 ASSESSMENT — PAIN DESCRIPTION - LOCATION
LOCATION: BACK

## 2017-09-11 ASSESSMENT — PAIN SCALES - GENERAL
PAINLEVEL_OUTOF10: 6
PAINLEVEL_OUTOF10: 8
PAINLEVEL_OUTOF10: 7

## 2017-09-11 ASSESSMENT — PAIN DESCRIPTION - ONSET
ONSET: ON-GOING

## 2017-09-11 ASSESSMENT — PAIN DESCRIPTION - FREQUENCY
FREQUENCY: CONTINUOUS

## 2017-09-11 ASSESSMENT — PAIN DESCRIPTION - DESCRIPTORS
DESCRIPTORS: ACHING;DULL

## 2017-09-13 LAB
HAPTOGLOBIN: 148 MG/DL (ref 30–200)
HEMOGLOBIN PLASMA: NORMAL

## 2017-09-28 ENCOUNTER — ANESTHESIA EVENT (OUTPATIENT)
Dept: OPERATING ROOM | Age: 57
End: 2017-09-28
Payer: MEDICARE

## 2017-09-28 ENCOUNTER — ANESTHESIA (OUTPATIENT)
Dept: OPERATING ROOM | Age: 57
End: 2017-09-28
Payer: MEDICARE

## 2017-09-28 ENCOUNTER — HOSPITAL ENCOUNTER (OUTPATIENT)
Age: 57
Setting detail: OUTPATIENT SURGERY
Discharge: HOME | End: 2017-09-28
Attending: ORTHOPAEDIC SURGERY | Admitting: ORTHOPAEDIC SURGERY
Payer: MEDICARE

## 2017-09-28 VITALS
RESPIRATION RATE: 16 BRPM | HEIGHT: 73 IN | SYSTOLIC BLOOD PRESSURE: 159 MMHG | OXYGEN SATURATION: 97 % | DIASTOLIC BLOOD PRESSURE: 88 MMHG | BODY MASS INDEX: 31.09 KG/M2 | WEIGHT: 234.58 LBS | HEART RATE: 77 BPM | TEMPERATURE: 98.9 F

## 2017-09-28 VITALS
OXYGEN SATURATION: 99 % | DIASTOLIC BLOOD PRESSURE: 49 MMHG | RESPIRATION RATE: 12 BRPM | SYSTOLIC BLOOD PRESSURE: 104 MMHG | TEMPERATURE: 98.6 F

## 2017-09-28 PROBLEM — M86.9 OSTEOMYELITIS OF FINGER OF RIGHT HAND (HCC): Status: ACTIVE | Noted: 2017-09-28

## 2017-09-28 LAB
ANION GAP SERPL CALCULATED.3IONS-SCNC: 15 MEQ/L (ref 8–16)
BUN BLDV-MCNC: 41 MG/DL (ref 7–22)
CALCIUM SERPL-MCNC: 9.2 MG/DL (ref 8.5–10.5)
CHLORIDE BLD-SCNC: 101 MEQ/L (ref 98–111)
CO2: 24 MEQ/L (ref 23–33)
CREAT SERPL-MCNC: 7 MG/DL (ref 0.4–1.2)
GFR SERPL CREATININE-BSD FRML MDRD: 8 ML/MIN/1.73M2
GLUCOSE BLD-MCNC: 207 MG/DL (ref 70–108)
POTASSIUM SERPL-SCNC: 4.3 MEQ/L (ref 3.5–5.2)
SODIUM BLD-SCNC: 140 MEQ/L (ref 135–145)

## 2017-09-28 PROCEDURE — A6223 GAUZE >16<=48 NO W/SAL W/O B: HCPCS | Performed by: ORTHOPAEDIC SURGERY

## 2017-09-28 PROCEDURE — 7100000000 HC PACU RECOVERY - FIRST 15 MIN: Performed by: ORTHOPAEDIC SURGERY

## 2017-09-28 PROCEDURE — 3600000002 HC SURGERY LEVEL 2 BASE: Performed by: ORTHOPAEDIC SURGERY

## 2017-09-28 PROCEDURE — 3600000012 HC SURGERY LEVEL 2 ADDTL 15MIN: Performed by: ORTHOPAEDIC SURGERY

## 2017-09-28 PROCEDURE — 3700000001 HC ADD 15 MINUTES (ANESTHESIA): Performed by: ORTHOPAEDIC SURGERY

## 2017-09-28 PROCEDURE — 6360000002 HC RX W HCPCS: Performed by: NURSE ANESTHETIST, CERTIFIED REGISTERED

## 2017-09-28 PROCEDURE — 88305 TISSUE EXAM BY PATHOLOGIST: CPT

## 2017-09-28 PROCEDURE — 80048 BASIC METABOLIC PNL TOTAL CA: CPT

## 2017-09-28 PROCEDURE — 2580000003 HC RX 258: Performed by: ORTHOPAEDIC SURGERY

## 2017-09-28 PROCEDURE — A6447 CONFORM BAND S W >=5"/YD: HCPCS | Performed by: ORTHOPAEDIC SURGERY

## 2017-09-28 PROCEDURE — 3700000000 HC ANESTHESIA ATTENDED CARE: Performed by: ORTHOPAEDIC SURGERY

## 2017-09-28 PROCEDURE — 7100000011 HC PHASE II RECOVERY - ADDTL 15 MIN: Performed by: ORTHOPAEDIC SURGERY

## 2017-09-28 PROCEDURE — 7100000010 HC PHASE II RECOVERY - FIRST 15 MIN: Performed by: ORTHOPAEDIC SURGERY

## 2017-09-28 PROCEDURE — 88311 DECALCIFY TISSUE: CPT

## 2017-09-28 PROCEDURE — 36415 COLL VENOUS BLD VENIPUNCTURE: CPT

## 2017-09-28 PROCEDURE — 2500000003 HC RX 250 WO HCPCS: Performed by: ORTHOPAEDIC SURGERY

## 2017-09-28 PROCEDURE — 7100000001 HC PACU RECOVERY - ADDTL 15 MIN: Performed by: ORTHOPAEDIC SURGERY

## 2017-09-28 PROCEDURE — 2580000003 HC RX 258: Performed by: NURSE ANESTHETIST, CERTIFIED REGISTERED

## 2017-09-28 RX ORDER — CEFAZOLIN SODIUM 1 G/3ML
INJECTION, POWDER, FOR SOLUTION INTRAMUSCULAR; INTRAVENOUS PRN
Status: DISCONTINUED | OUTPATIENT
Start: 2017-09-28 | End: 2017-09-28 | Stop reason: SDUPTHER

## 2017-09-28 RX ORDER — HYDROCODONE BITARTRATE AND ACETAMINOPHEN 5; 325 MG/1; MG/1
1 TABLET ORAL EVERY 4 HOURS PRN
Status: DISCONTINUED | OUTPATIENT
Start: 2017-09-28 | End: 2017-09-28 | Stop reason: HOSPADM

## 2017-09-28 RX ORDER — HYDROCODONE BITARTRATE AND ACETAMINOPHEN 5; 325 MG/1; MG/1
2 TABLET ORAL EVERY 4 HOURS PRN
Status: DISCONTINUED | OUTPATIENT
Start: 2017-09-28 | End: 2017-09-28 | Stop reason: HOSPADM

## 2017-09-28 RX ORDER — MORPHINE SULFATE 2 MG/ML
2 INJECTION, SOLUTION INTRAMUSCULAR; INTRAVENOUS
Status: DISCONTINUED | OUTPATIENT
Start: 2017-09-28 | End: 2017-09-28 | Stop reason: HOSPADM

## 2017-09-28 RX ORDER — ONDANSETRON 2 MG/ML
4 INJECTION INTRAMUSCULAR; INTRAVENOUS EVERY 6 HOURS PRN
Status: DISCONTINUED | OUTPATIENT
Start: 2017-09-28 | End: 2017-09-28 | Stop reason: HOSPADM

## 2017-09-28 RX ORDER — BUPIVACAINE HYDROCHLORIDE 5 MG/ML
INJECTION, SOLUTION PERINEURAL PRN
Status: DISCONTINUED | OUTPATIENT
Start: 2017-09-28 | End: 2017-09-28 | Stop reason: HOSPADM

## 2017-09-28 RX ORDER — SODIUM CHLORIDE 9 MG/ML
INJECTION, SOLUTION INTRAVENOUS CONTINUOUS
Status: DISCONTINUED | OUTPATIENT
Start: 2017-09-28 | End: 2017-09-28 | Stop reason: HOSPADM

## 2017-09-28 RX ORDER — HYDROCODONE BITARTRATE AND ACETAMINOPHEN 5; 325 MG/1; MG/1
1-2 TABLET ORAL EVERY 4 HOURS PRN
Qty: 50 TABLET | Refills: 0 | Status: SHIPPED | OUTPATIENT
Start: 2017-09-28 | End: 2017-10-05

## 2017-09-28 RX ORDER — SODIUM CHLORIDE 9 MG/ML
INJECTION, SOLUTION INTRAVENOUS CONTINUOUS PRN
Status: DISCONTINUED | OUTPATIENT
Start: 2017-09-28 | End: 2017-09-28 | Stop reason: SDUPTHER

## 2017-09-28 RX ADMIN — SODIUM CHLORIDE: 9 INJECTION, SOLUTION INTRAVENOUS at 11:26

## 2017-09-28 RX ADMIN — PROPOFOL 100 MG: 10 INJECTION, EMULSION INTRAVENOUS at 11:30

## 2017-09-28 RX ADMIN — CEFAZOLIN 1000 MG: 1 INJECTION, POWDER, FOR SOLUTION INTRAMUSCULAR; INTRAVENOUS; PARENTERAL at 11:36

## 2017-09-28 RX ADMIN — SODIUM CHLORIDE: 9 INJECTION, SOLUTION INTRAVENOUS at 09:58

## 2017-09-28 RX ADMIN — PROPOFOL 50 MG: 10 INJECTION, EMULSION INTRAVENOUS at 11:38

## 2017-09-28 RX ADMIN — PROPOFOL 50 MG: 10 INJECTION, EMULSION INTRAVENOUS at 11:31

## 2017-09-28 RX ADMIN — PROPOFOL 50 MG: 10 INJECTION, EMULSION INTRAVENOUS at 11:35

## 2017-09-28 RX ADMIN — PROPOFOL 50 MG: 10 INJECTION, EMULSION INTRAVENOUS at 11:43

## 2017-09-28 ASSESSMENT — PULMONARY FUNCTION TESTS
PIF_VALUE: 1
PIF_VALUE: 2
PIF_VALUE: 1
PIF_VALUE: 2
PIF_VALUE: 1
PIF_VALUE: 2
PIF_VALUE: 2
PIF_VALUE: 1
PIF_VALUE: 2
PIF_VALUE: 2
PIF_VALUE: 0
PIF_VALUE: 1
PIF_VALUE: 2
PIF_VALUE: 0

## 2017-09-28 ASSESSMENT — PAIN DESCRIPTION - LOCATION: LOCATION: BACK

## 2017-09-28 ASSESSMENT — PAIN SCALES - GENERAL
PAINLEVEL_OUTOF10: 0
PAINLEVEL_OUTOF10: 6
PAINLEVEL_OUTOF10: 0
PAINLEVEL_OUTOF10: 0

## 2017-09-28 ASSESSMENT — PAIN DESCRIPTION - PAIN TYPE: TYPE: CHRONIC PAIN

## 2017-09-28 ASSESSMENT — ENCOUNTER SYMPTOMS: SHORTNESS OF BREATH: 1

## 2017-09-28 ASSESSMENT — PAIN DESCRIPTION - ORIENTATION: ORIENTATION: LOWER

## 2017-10-04 ENCOUNTER — OFFICE VISIT (OUTPATIENT)
Dept: CARDIOLOGY CLINIC | Age: 57
End: 2017-10-04
Payer: MEDICARE

## 2017-10-04 VITALS
HEART RATE: 90 BPM | BODY MASS INDEX: 31.51 KG/M2 | SYSTOLIC BLOOD PRESSURE: 120 MMHG | HEIGHT: 73 IN | DIASTOLIC BLOOD PRESSURE: 70 MMHG | WEIGHT: 237.8 LBS

## 2017-10-04 DIAGNOSIS — I48.3 TYPICAL ATRIAL FLUTTER (HCC): ICD-10-CM

## 2017-10-04 DIAGNOSIS — I10 ESSENTIAL HYPERTENSION: Primary | ICD-10-CM

## 2017-10-04 DIAGNOSIS — I25.810 CORONARY ARTERY DISEASE INVOLVING CORONARY BYPASS GRAFT OF NATIVE HEART WITHOUT ANGINA PECTORIS: Chronic | ICD-10-CM

## 2017-10-04 DIAGNOSIS — I70.213 ATHEROSCLEROSIS OF NATIVE ARTERY OF BOTH LOWER EXTREMITIES WITH INTERMITTENT CLAUDICATION (HCC): ICD-10-CM

## 2017-10-04 DIAGNOSIS — I73.9 CLAUDICATION (HCC): ICD-10-CM

## 2017-10-04 DIAGNOSIS — Z95.1 S/P CABG X 1: ICD-10-CM

## 2017-10-04 PROCEDURE — 1111F DSCHRG MED/CURRENT MED MERGE: CPT | Performed by: INTERNAL MEDICINE

## 2017-10-04 PROCEDURE — G8598 ASA/ANTIPLAT THER USED: HCPCS | Performed by: INTERNAL MEDICINE

## 2017-10-04 PROCEDURE — G8417 CALC BMI ABV UP PARAM F/U: HCPCS | Performed by: INTERNAL MEDICINE

## 2017-10-04 PROCEDURE — 1036F TOBACCO NON-USER: CPT | Performed by: INTERNAL MEDICINE

## 2017-10-04 PROCEDURE — 99213 OFFICE O/P EST LOW 20 MIN: CPT | Performed by: INTERNAL MEDICINE

## 2017-10-04 PROCEDURE — 3017F COLORECTAL CA SCREEN DOC REV: CPT | Performed by: INTERNAL MEDICINE

## 2017-10-04 PROCEDURE — G8427 DOCREV CUR MEDS BY ELIG CLIN: HCPCS | Performed by: INTERNAL MEDICINE

## 2017-10-04 PROCEDURE — G8482 FLU IMMUNIZE ORDER/ADMIN: HCPCS | Performed by: INTERNAL MEDICINE

## 2017-10-04 ASSESSMENT — ENCOUNTER SYMPTOMS
SHORTNESS OF BREATH: 1
EYES NEGATIVE: 1
GASTROINTESTINAL NEGATIVE: 1

## 2017-10-04 NOTE — MR AVS SNAPSHOT
type 2 diabetes, stroke, gallstones, arthritis, sleep apnea, and certain cancers. BMI is not perfect. It may overestimate body fat in athletes and people who are more muscular. Even a small weight loss (between 5 and 10 percent of your current weight) by decreasing your calorie intake and becoming more physically active will help lower your risk of developing or worsening diseases associated with obesity. Learn more at: [email or web address]             Medications and Orders      Your Current Medications Are              HYDROcodone-acetaminophen (NORCO) 5-325 MG per tablet Take 1-2 tablets by mouth every 4 hours as needed for Pain . acetaminophen (TYLENOL) 325 MG tablet Take 2 tablets by mouth every 6 hours as needed for Pain or Fever    aspirin 81 MG chewable tablet Take 81 mg by mouth daily    Amino Acids (LIQUACEL) LIQD Take 1 Package by mouth three times a week Monday wednesdsay friday    albuterol sulfate  (90 BASE) MCG/ACT inhaler Inhale 2 puffs into the lungs every 6 hours as needed for Wheezing or Shortness of Breath    carvedilol (COREG) 3.125 MG tablet Take 1 tablet by mouth 2 times daily    OXYGEN Inhale 3 L into the lungs nightly as needed     calcium carbonate (TUMS) 500 MG chewable tablet Take 5 tablets by mouth 3 times daily (with meals) And 3 tabs as needed with snacks    FLUoxetine (PROZAC) 20 MG capsule Take 20 mg by mouth daily    calcitRIOL (ROCALTROL) 0.25 MCG capsule Take 0.25 mcg by mouth three times a week Indications: takes every other day     insulin glargine (LANTUS) 100 UNIT/ML injection Inject 45 Units into the skin every morning (before breakfast)     Multiple Vitamin (MULTI-VITAMIN PO) Take 1 tablet by mouth daily     ezetimibe-simvastatin (VYTORIN) 10-40 MG per tablet Take 1 tablet by mouth nightly.     insulin lispro (HUMALOG) 100 UNIT/ML injection Inject  into the skin 3 times daily (before meals). SLIDING SCALE HAS NOT USED IN LAST 3 MONTHS    pregabalin (LYRICA) 50 MG capsule Take 50 mg by mouth 2 times daily.     ferrous sulfate 325 (65 Fe) MG tablet Take 1 tablet by mouth 2 times daily (with meals)      Allergies              Vicodin [Hydrocodone-acetaminophen] Nausea Only    Darvocet A500 [Propoxyphene N-acetaminophen] Nausea And Vomiting    Demerol Hcl [Meperidine] Nausea And Vomiting         Additional Information        Basic Information     Date Of Birth Sex Race Ethnicity Preferred Language Preferred Written Language    1960 Male White Non-/Non  English English      Problem List as of 10/4/2017  Date Reviewed: 10/4/2017                ESRD (end stage renal disease) on dialysis [facility])    Gastrointestinal hemorrhage with melena    Iron deficiency anemia due to chronic blood loss    Osteomyelitis of finger of right hand (HCC)    Chronic blood loss anemia    LGI bleed    Infected finger    Coronary artery disease involving coronary bypass graft of native heart without angina pectoris (Chronic)    Typical atrial flutter (HCC)    Hyperphosphatemia (Chronic)    Obesity (BMI 30.0-34.9)    S/P AVR (aortic valve replacement)    S/P CABG x 1    Nonrheumatic aortic valve stenosis    Cardiomyopathy ([address]    Severe calcific aortic stenosis    JESSENIA (obstructive sleep apnea)    Gastric AV malformation    AVM (arteriovenous malformation) of colon without hemorrhage    Gastric polyp    Acute upper GI bleed    Rotator cuff impingement syndrome of left shoulder    Calcific tendinitis of left shoulder    Wound of right leg    Metabolic acidosis    Diabetic polyneuropathy associated with type 2 diabetes mellitus ([address]    Charcot's joint of foot in type 2 diabetes mellitus ([address]    Proliferative diabetic retinopathy without macular edema associated with type 2 diabetes mellitus ([address]    Secondary hyperparathyroidism of renal origin ([address]    Bilateral edema of lower extremity results, renew your prescriptions, schedule appointments, view visit notes, and more. How Do I Sign Up? 1. In your Internet browser, go to [email or web address]  2. Click on the Sign Up Now link in the Sign In box. You will see the New Member Sign Up page. 3. Enter your [employer] Access Code exactly as it appears below. You will not need to use this code after youve completed the sign-up process. If you do not sign up before the expiration date, you must request a new code. [employer] Access Code: [email or web address]  Expires: 11/10/2017  2:50 PM    4. Enter your Social Security Number ([id number]) and Date of Birth (mm/dd/yyyy) as indicated and click Submit. You will be taken to the next sign-up page. 5. Create a Connected Data ID. This will be your Connected [employer] login ID and cannot be changed, so think of one that is secure and easy to remember. 6. Create a [employer] password. You can change your password at any time. 7. Enter your Password Reset Question and Answer. This can be used at a later time if you forget your password. 8. Enter your e-mail address. You will receive e-mail notification when new information is available in [address] 9. Click Sign Up. You can now view your medical record. Additional Information  If you have questions, please contact the physician practice where you receive care. Remember, [employer] is NOT to be used for urgent needs. For medical emergencies, dial 911. For questions regarding your [employer] account call [phone]. If you have a clinical question, please call your doctor's office.

## 2017-10-04 NOTE — PROGRESS NOTES
puffs into the lungs every 6 hours as needed for Wheezing or Shortness of Breath 1 Inhaler 3    carvedilol (COREG) 3.125 MG tablet Take 1 tablet by mouth 2 times daily 60 tablet 3    OXYGEN Inhale 3 L into the lungs nightly as needed       calcium carbonate (TUMS) 500 MG chewable tablet Take 5 tablets by mouth 3 times daily (with meals) And 3 tabs as needed with snacks      FLUoxetine (PROZAC) 20 MG capsule Take 20 mg by mouth daily      calcitRIOL (ROCALTROL) 0.25 MCG capsule Take 0.25 mcg by mouth three times a week Indications: takes every other day       insulin glargine (LANTUS) 100 UNIT/ML injection Inject 45 Units into the skin every morning (before breakfast)       Multiple Vitamin (MULTI-VITAMIN PO) Take 1 tablet by mouth daily       ezetimibe-simvastatin (VYTORIN) 10-40 MG per tablet Take 1 tablet by mouth nightly.  insulin lispro (HUMALOG) 100 UNIT/ML injection Inject  into the skin 3 times daily (before meals). SLIDING SCALE HAS NOT USED IN LAST 3 MONTHS      pregabalin (LYRICA) 50 MG capsule Take 50 mg by mouth 2 times daily.  ferrous sulfate 325 (65 Fe) MG tablet Take 1 tablet by mouth 2 times daily (with meals) 60 tablet 0     No current facility-administered medications for this visit. EKG: NSR. LBBB. Subjective:      Review of Systems   Constitutional: Positive for fatigue. HENT: Negative. Eyes: Negative. Respiratory: Positive for shortness of breath. Cardiovascular: Negative. Gastrointestinal: Negative. Genitourinary: Negative. Musculoskeletal: Negative. Skin: Negative. Neurological: Negative. Psychiatric/Behavioral: Negative. Objective:     Physical Exam   Constitutional: He is oriented to person, place, and time. He appears well-developed and well-nourished. No distress. HENT:   Head: Normocephalic and atraumatic.    Mouth/Throat: Oropharynx is clear and moist.   Eyes: Conjunctivae and EOM are normal. Pupils are equal, round, and 09/11/2017    CREATININE 7.9 09/09/2017    LABGLOM 8 09/28/2017    GLUCOSE 207 09/28/2017    PROT 5.9 09/11/2017    LABALBU 2.5 09/11/2017    CALCIUM 9.2 09/28/2017    BILITOT 0.2 09/11/2017    ALKPHOS 76 09/11/2017    AST 16 09/11/2017    ALT 6 09/11/2017     Hepatic Function Panel:    Lab Results   Component Value Date    ALKPHOS 76 09/11/2017    ALT 6 09/11/2017    AST 16 09/11/2017    PROT 5.9 09/11/2017    BILITOT 0.2 09/11/2017    BILIDIR <0.2 09/06/2017    LABALBU 2.5 09/11/2017     Magnesium:    Lab Results   Component Value Date    MG 2.5 03/23/2017     PT/INR:    Lab Results   Component Value Date    PROTIME 13.6 02/25/2016    INR 1.07 09/06/2017    INR 1.32 07/18/2017    INR 1.39 03/17/2017     HgBA1c:    Lab Results   Component Value Date    LABA1C 4.6 03/26/2017     FLP:    Lab Results   Component Value Date    TRIG 160 12/06/2016    HDL 18 12/06/2016    LDLCALC 33 12/06/2016     TSH:  No results found for: TSH  No results for input(s): CKTOTAL, CKMB, CKMBINDEX, TROPONINI in the last 72 hours. Assessment:   Mr. Eber Ceja is a 61 y.o. who is known to us with history of ESRD on hemodialysis and most likely prevents HD and is a candidate for that. He has ulceration on both feet. Left side is bothering him more, although both are the same. No popliteal and distal pulses. Ulcer in the front aspect of the left foot. Tiny scab on the medial aspect of the left second toe. Peripheral angiography and evaluation of both. BOZENA's:  1. Severely diminished right BOZENA 4 1   2. Could not calculate left BOZENA due to noncompressible calcified vessels   3. Doppler:  Moderately dampened monophasic waveforms bilaterally, consistent with significant peripheral vascular disease bilaterally     The following diagnoses were addressed during this visit:  1. Essential hypertension     2. S/P CABG x 1     3. Typical atrial flutter ([address]     4.  Coronary artery disease involving coronary bypass graft of native heart

## 2017-10-30 ENCOUNTER — HOSPITAL ENCOUNTER (OUTPATIENT)
Dept: INPATIENT UNIT | Age: 57
Discharge: HOME | End: 2017-10-30
Attending: INTERNAL MEDICINE | Admitting: INTERNAL MEDICINE
Payer: MEDICARE

## 2017-10-30 VITALS
BODY MASS INDEX: 31.41 KG/M2 | SYSTOLIC BLOOD PRESSURE: 130 MMHG | OXYGEN SATURATION: 94 % | TEMPERATURE: 98.2 F | HEIGHT: 73 IN | RESPIRATION RATE: 16 BRPM | HEART RATE: 82 BPM | WEIGHT: 237 LBS | DIASTOLIC BLOOD PRESSURE: 60 MMHG

## 2017-10-30 PROBLEM — Z98.62 S/P PERIPHERAL ARTERY ANGIOPLASTY: Status: ACTIVE | Noted: 2017-10-30

## 2017-10-30 LAB
ABO CHECK: NORMAL
ACTIVATED CLOTTING TIME: 158 SECONDS (ref 1–150)
ANION GAP SERPL CALCULATED.3IONS-SCNC: 18 MEQ/L (ref 8–16)
BUN BLDV-MCNC: 39 MG/DL (ref 7–22)
CALCIUM SERPL-MCNC: 9.3 MG/DL (ref 8.5–10.5)
CHLORIDE BLD-SCNC: 98 MEQ/L (ref 98–111)
CO2: 25 MEQ/L (ref 23–33)
CREAT SERPL-MCNC: 8.3 MG/DL (ref 0.4–1.2)
GEL INDIRECT COOMBS: NORMAL
GFR SERPL CREATININE-BSD FRML MDRD: 7 ML/MIN/1.73M2
GLUCOSE BLD-MCNC: 163 MG/DL (ref 70–108)
HCT VFR BLD CALC: 35 % (ref 42–52)
HEMOGLOBIN: 11.6 GM/DL (ref 14–18)
INR BLD: 1.11 (ref 0.85–1.13)
MCH RBC QN AUTO: 29.7 PG (ref 27–31)
MCHC RBC AUTO-ENTMCNC: 33.2 GM/DL (ref 33–37)
MCV RBC AUTO: 89.5 FL (ref 80–94)
PDW BLD-RTO: 15.7 % (ref 11.5–14.5)
PLATELET # BLD: 224 THOU/MM3 (ref 130–400)
PMV BLD AUTO: 7.7 MCM (ref 7.4–10.4)
POTASSIUM SERPL-SCNC: 3.6 MEQ/L (ref 3.5–5.2)
RBC # BLD: 3.91 MILL/MM3 (ref 4.7–6.1)
RH FACTOR: NORMAL
SODIUM BLD-SCNC: 141 MEQ/L (ref 135–145)
WBC # BLD: 9.7 THOU/MM3 (ref 4.8–10.8)

## 2017-10-30 PROCEDURE — C1760 CLOSURE DEV, VASC: HCPCS

## 2017-10-30 PROCEDURE — 86901 BLOOD TYPING SEROLOGIC RH(D): CPT

## 2017-10-30 PROCEDURE — 6370000000 HC RX 637 (ALT 250 FOR IP)

## 2017-10-30 PROCEDURE — 75630 X-RAY AORTA LEG ARTERIES: CPT | Performed by: INTERNAL MEDICINE

## 2017-10-30 PROCEDURE — 75625 CONTRAST EXAM ABDOMINL AORTA: CPT | Performed by: INTERNAL MEDICINE

## 2017-10-30 PROCEDURE — 80048 BASIC METABOLIC PNL TOTAL CA: CPT

## 2017-10-30 PROCEDURE — 75774 ARTERY X-RAY EACH VESSEL: CPT | Performed by: INTERNAL MEDICINE

## 2017-10-30 PROCEDURE — 6360000004 HC RX CONTRAST MEDICATION: Performed by: INTERNAL MEDICINE

## 2017-10-30 PROCEDURE — C1725 CATH, TRANSLUMIN NON-LASER: HCPCS

## 2017-10-30 PROCEDURE — 37224 PR REVSC OPN/PRG FEM/POP W/ANGIOPLASTY UNI: CPT | Performed by: INTERNAL MEDICINE

## 2017-10-30 PROCEDURE — A6258 TRANSPARENT FILM >16<=48 IN: HCPCS

## 2017-10-30 PROCEDURE — 85347 COAGULATION TIME ACTIVATED: CPT

## 2017-10-30 PROCEDURE — 85027 COMPLETE CBC AUTOMATED: CPT

## 2017-10-30 PROCEDURE — 2500000003 HC RX 250 WO HCPCS

## 2017-10-30 PROCEDURE — 2580000003 HC RX 258: Performed by: INTERNAL MEDICINE

## 2017-10-30 PROCEDURE — 86885 COOMBS TEST INDIRECT QUAL: CPT

## 2017-10-30 PROCEDURE — 85610 PROTHROMBIN TIME: CPT

## 2017-10-30 PROCEDURE — C1769 GUIDE WIRE: HCPCS

## 2017-10-30 PROCEDURE — 6370000000 HC RX 637 (ALT 250 FOR IP): Performed by: INTERNAL MEDICINE

## 2017-10-30 PROCEDURE — C1894 INTRO/SHEATH, NON-LASER: HCPCS

## 2017-10-30 PROCEDURE — 2780000010 HC IMPLANT OTHER

## 2017-10-30 PROCEDURE — 6360000002 HC RX W HCPCS: Performed by: INTERNAL MEDICINE

## 2017-10-30 PROCEDURE — 36415 COLL VENOUS BLD VENIPUNCTURE: CPT

## 2017-10-30 PROCEDURE — 37224 HC PLASTY UNI FEMPOP: CPT | Performed by: INTERNAL MEDICINE

## 2017-10-30 PROCEDURE — C1887 CATHETER, GUIDING: HCPCS

## 2017-10-30 PROCEDURE — 75716 ARTERY X-RAYS ARMS/LEGS: CPT | Performed by: INTERNAL MEDICINE

## 2017-10-30 PROCEDURE — 86900 BLOOD TYPING SEROLOGIC ABO: CPT

## 2017-10-30 PROCEDURE — 6360000002 HC RX W HCPCS

## 2017-10-30 RX ORDER — ACETAMINOPHEN 325 MG/1
650 TABLET ORAL EVERY 4 HOURS PRN
Status: DISCONTINUED | OUTPATIENT
Start: 2017-10-30 | End: 2017-10-30 | Stop reason: HOSPADM

## 2017-10-30 RX ORDER — SODIUM CHLORIDE 0.9 % (FLUSH) 0.9 %
10 SYRINGE (ML) INJECTION EVERY 12 HOURS SCHEDULED
Status: DISCONTINUED | OUTPATIENT
Start: 2017-10-30 | End: 2017-10-30 | Stop reason: HOSPADM

## 2017-10-30 RX ORDER — SUCRALFATE ORAL 1 G/10ML
1 SUSPENSION ORAL
Qty: 1200 ML | Refills: 3 | Status: ON HOLD | OUTPATIENT
Start: 2017-10-30 | End: 2017-11-28 | Stop reason: ALTCHOICE

## 2017-10-30 RX ORDER — FENTANYL CITRATE 50 UG/ML
50 INJECTION, SOLUTION INTRAMUSCULAR; INTRAVENOUS ONCE
Status: COMPLETED | OUTPATIENT
Start: 2017-10-30 | End: 2017-10-30

## 2017-10-30 RX ORDER — SODIUM CHLORIDE 0.9 % (FLUSH) 0.9 %
10 SYRINGE (ML) INJECTION PRN
Status: DISCONTINUED | OUTPATIENT
Start: 2017-10-30 | End: 2017-10-30 | Stop reason: HOSPADM

## 2017-10-30 RX ORDER — ATORVASTATIN CALCIUM 80 MG/1
80 TABLET, FILM COATED ORAL NIGHTLY
Status: DISCONTINUED | OUTPATIENT
Start: 2017-10-30 | End: 2017-10-30 | Stop reason: HOSPADM

## 2017-10-30 RX ORDER — HEPARIN SODIUM 1000 [USP'U]/ML
3000 INJECTION, SOLUTION INTRAVENOUS; SUBCUTANEOUS ONCE
Status: COMPLETED | OUTPATIENT
Start: 2017-10-30 | End: 2017-10-30

## 2017-10-30 RX ORDER — DIAPER,BRIEF,INFANT-TODD,DISP
EACH MISCELLANEOUS ONCE
Status: COMPLETED | OUTPATIENT
Start: 2017-10-30 | End: 2017-10-30

## 2017-10-30 RX ORDER — SODIUM CHLORIDE 9 MG/ML
INJECTION, SOLUTION INTRAVENOUS CONTINUOUS
Status: DISCONTINUED | OUTPATIENT
Start: 2017-10-30 | End: 2017-10-30 | Stop reason: HOSPADM

## 2017-10-30 RX ORDER — FENTANYL CITRATE 50 UG/ML
25 INJECTION, SOLUTION INTRAMUSCULAR; INTRAVENOUS ONCE
Status: COMPLETED | OUTPATIENT
Start: 2017-10-30 | End: 2017-10-30

## 2017-10-30 RX ORDER — MIDAZOLAM HYDROCHLORIDE 1 MG/ML
1 INJECTION INTRAMUSCULAR; INTRAVENOUS ONCE
Status: COMPLETED | OUTPATIENT
Start: 2017-10-30 | End: 2017-10-30

## 2017-10-30 RX ORDER — HEPARIN SODIUM 1000 [USP'U]/ML
2000 INJECTION, SOLUTION INTRAVENOUS; SUBCUTANEOUS ONCE
Status: COMPLETED | OUTPATIENT
Start: 2017-10-30 | End: 2017-10-30

## 2017-10-30 RX ORDER — PANTOPRAZOLE SODIUM 40 MG/1
40 TABLET, DELAYED RELEASE ORAL 2 TIMES DAILY
COMMUNITY

## 2017-10-30 RX ORDER — CLOPIDOGREL 300 MG/1
300 TABLET, FILM COATED ORAL ONCE
Status: DISCONTINUED | OUTPATIENT
Start: 2017-10-30 | End: 2017-10-30

## 2017-10-30 RX ORDER — ATROPINE SULFATE 0.4 MG/ML
0.5 AMPUL (ML) INJECTION
Status: DISCONTINUED | OUTPATIENT
Start: 2017-10-30 | End: 2017-10-30 | Stop reason: HOSPADM

## 2017-10-30 RX ORDER — ASPIRIN 81 MG/1
81 TABLET, CHEWABLE ORAL DAILY
Status: DISCONTINUED | OUTPATIENT
Start: 2017-10-31 | End: 2017-10-30 | Stop reason: HOSPADM

## 2017-10-30 RX ORDER — CLOPIDOGREL BISULFATE 75 MG/1
75 TABLET ORAL DAILY
Status: DISCONTINUED | OUTPATIENT
Start: 2017-10-31 | End: 2017-10-30 | Stop reason: HOSPADM

## 2017-10-30 RX ORDER — MIDAZOLAM HYDROCHLORIDE 1 MG/ML
2 INJECTION INTRAMUSCULAR; INTRAVENOUS
Status: DISCONTINUED | OUTPATIENT
Start: 2017-10-30 | End: 2017-10-30 | Stop reason: HOSPADM

## 2017-10-30 RX ORDER — FENTANYL CITRATE 50 UG/ML
25 INJECTION, SOLUTION INTRAMUSCULAR; INTRAVENOUS
Status: DISCONTINUED | OUTPATIENT
Start: 2017-10-30 | End: 2017-10-30 | Stop reason: HOSPADM

## 2017-10-30 RX ORDER — MIDAZOLAM HYDROCHLORIDE 1 MG/ML
2 INJECTION INTRAMUSCULAR; INTRAVENOUS ONCE
Status: COMPLETED | OUTPATIENT
Start: 2017-10-30 | End: 2017-10-30

## 2017-10-30 RX ORDER — SUCRALFATE ORAL 1 G/10ML
1 SUSPENSION ORAL
Status: DISCONTINUED | OUTPATIENT
Start: 2017-10-30 | End: 2017-10-30 | Stop reason: HOSPADM

## 2017-10-30 RX ORDER — ONDANSETRON 2 MG/ML
4 INJECTION INTRAMUSCULAR; INTRAVENOUS EVERY 6 HOURS PRN
Status: DISCONTINUED | OUTPATIENT
Start: 2017-10-30 | End: 2017-10-30 | Stop reason: HOSPADM

## 2017-10-30 RX ORDER — CLOPIDOGREL BISULFATE 75 MG/1
75 TABLET ORAL DAILY
Qty: 30 TABLET | Refills: 3 | Status: ON HOLD | OUTPATIENT
Start: 2017-10-31 | End: 2017-11-29 | Stop reason: HOSPADM

## 2017-10-30 RX ORDER — SODIUM CHLORIDE 0.9 % (FLUSH) 0.9 %
10 SYRINGE (ML) INJECTION EVERY 12 HOURS SCHEDULED
Status: DISCONTINUED | OUTPATIENT
Start: 2017-10-30 | End: 2017-10-30 | Stop reason: SDUPTHER

## 2017-10-30 RX ADMIN — MIDAZOLAM 1 MG: 1 INJECTION INTRAMUSCULAR; INTRAVENOUS at 12:58

## 2017-10-30 RX ADMIN — IOVERSOL 165 ML: 678 INJECTION INTRA-ARTERIAL; INTRAVENOUS at 14:09

## 2017-10-30 RX ADMIN — SODIUM CHLORIDE: 9 INJECTION, SOLUTION INTRAVENOUS at 08:23

## 2017-10-30 RX ADMIN — BACITRACIN ZINC 1 G: 500 OINTMENT TOPICAL at 14:10

## 2017-10-30 RX ADMIN — FENTANYL CITRATE 25 MCG: 50 INJECTION INTRAMUSCULAR; INTRAVENOUS at 12:58

## 2017-10-30 RX ADMIN — HEPARIN SODIUM 2000 UNITS: 1000 INJECTION, SOLUTION INTRAVENOUS; SUBCUTANEOUS at 13:40

## 2017-10-30 RX ADMIN — FENTANYL CITRATE 50 MCG: 50 INJECTION INTRAMUSCULAR; INTRAVENOUS at 12:35

## 2017-10-30 RX ADMIN — HEPARIN SODIUM 3000 UNITS: 1000 INJECTION, SOLUTION INTRAVENOUS; SUBCUTANEOUS at 13:48

## 2017-10-30 RX ADMIN — MIDAZOLAM 2 MG: 1 INJECTION INTRAMUSCULAR; INTRAVENOUS at 12:35

## 2017-10-30 RX ADMIN — HEPARIN SODIUM 2000 UNITS: 1000 INJECTION, SOLUTION INTRAVENOUS; SUBCUTANEOUS at 13:11

## 2017-10-30 RX ADMIN — HEPARIN SODIUM 2000 UNITS: 1000 INJECTION, SOLUTION INTRAVENOUS; SUBCUTANEOUS at 13:00

## 2017-10-30 ASSESSMENT — PAIN DESCRIPTION - DESCRIPTORS: DESCRIPTORS: ACHING

## 2017-10-30 ASSESSMENT — PAIN DESCRIPTION - ORIENTATION: ORIENTATION: LOWER

## 2017-10-30 ASSESSMENT — PAIN DESCRIPTION - PAIN TYPE: TYPE: CHRONIC PAIN

## 2017-10-30 ASSESSMENT — PAIN SCALES - GENERAL
PAINLEVEL_OUTOF10: 5
PAINLEVEL_OUTOF10: 0
PAINLEVEL_OUTOF10: 0

## 2017-10-30 ASSESSMENT — PAIN DESCRIPTION - LOCATION: LOCATION: BACK

## 2017-10-30 NOTE — PLAN OF CARE
Problem: Discharge Planning:  Goal: Participates in care planning  Participates in care planning   1930 discharge instructions given to pt and spouse and family members, all \"voice understanding\"      Problem: Tissue Perfusion - Cardiopulmonary, Altered:  Goal: Hemodynamic stability will improve  Hemodynamic stability will improve   1930 right femoral site with no bleeding or hematoma

## 2017-10-30 NOTE — FLOWSHEET NOTE
Care taken over for CECILE Velazco RN  Pt preparing for discharge   Pt and family voicing concern over being sent home on Plavix. Dr Maria G Thurstonfts in and spoke to pt and spouse and family at length, answering all their concerns.

## 2017-10-30 NOTE — H&P
[address] [address]  Sedation/Analgesia History & Physical    Pt Name: Joel Shirley  MRN: [id number]  YOB: 1960  Provider Performing Procedure: oIna Bravo  Primary Care Physician: Rickey Gore MD    PRE-PROCEDURE   DNR-CCA/DNR-CC []Yes [x]No  Brief History/Pre-Procedure Diagnosis: Claudications and leg pain at rest. Skin ulcers. CONSENT  I have discussed with the patient and or the patient representative the indication, alternatives, and the possible risk and/ or complications of the planned procedure and the anesthesia or conscious sedation methods. The patient and or representative appear to understand and agree to proceed. I have discussed with the patient risks, benefits, and alternatives (and relevant risks, benefits, and side effects related to alternatives or not receiving care), and likelihood of the success. MEDICAL HISTORY        has a past medical history of Anemia; Arthritis; CAD (coronary artery disease); Charcot ankle; CHF (congestive heart failure) ([address] Chronic ankle pain; Chronic back pain; Chronic venous hypertension w ulceration (HCC); CKD (chronic kidney disease) stage 3, GFR 30-59 ml/min; DDD (degenerative disc disease); Diabetic retinopathy ([address] DM (dermatomyositis); DM (diabetes mellitus) ([address] Full dentures; GERD (gastroesophageal reflux disease); H/O Bell's palsy; H/O Clostridium difficile infection; Hemodialysis patient [facility]); History of blood transfusion; HTN (hypertension); Hyperkalemia; Hyperlipidemia; MRSA (methicillin resistant Staphylococcus aureus); Murmur, cardiac; Nephrotic syndrome; Obstructive sleep apnea; Osteomyelitis of finger of right hand ([address] Pancreatitis due to biliary obstruction; Peripheral neuropathy ([address] Peripheral neuropathy ([address] Psychiatric problem; Pulmonary edema; Renal failure; SOB (shortness of breath);  Vision blurred; VRE (vancomycin resistant enterococcus) culture positive; and Wears glasses. SURGICAL HISTORY   has a past surgical history that includes fracture surgery (Right); Cholecystectomy; Colonoscopy; Endoscopy, colon, diagnostic; other surgical history; Dialysis fistula creation (Left, 02/2014); Kidney biopsy (Bilateral, 09/2013); eye surgery (Bilateral); eye surgery (Left, 05/17/2013); eye surgery (Bilateral); Cardiac surgery (2/19/2014); Cardiac surgery (2006); Cardiac surgery (2003); vitrectomy (Right, 3/27/14); Appendectomy; ablation of dysrhythmic focus (07/21/2017); vascular surgery; egd transoral biopsy single/multiple (N/A, 8/17/2017); Hand surgery (Right, 8/18/2017); colon ca scrn not hi rsk ind (Left, 9/8/2017); Enteroscopy (9/8/2017); amputate metacarpal+finger (Right, 9/28/2017); and Finger amputation (Right, 09/28/2017). Additional information:       ALLERGIES   Allergies as of 10/30/2017 - Review Complete 10/30/2017   Allergen Reaction Noted    Vicodin [hydrocodone-acetaminophen] Nausea Only 03/25/2014    Darvocet a500 [propoxyphene n-acetaminophen] Nausea And Vomiting 08/08/2013    Demerol hcl [meperidine] Nausea And Vomiting 08/08/2013     Additional information:       MEDICATIONS   Coumadin Use Last 7 Days [x]No []Yes  Antiplatelet drug therapy use last 7 days  []No [x]Yes  Other anticoagulant use last 7 days  [x]No []Yes      Current Facility-Administered Medications:     0.9 % sodium chloride infusion, , Intravenous, Continuous, Geovany Corbin MD, Last Rate: 75 mL/hr at 10/30/17 0823    sodium chloride flush 0.9 % injection 10 mL, 10 mL, Intravenous, 2 times per day, Geovany Corbin MD  Prior to Admission medications    Medication Sig Start Date End Date Taking?  Authorizing Provider   pantoprazole (PROTONIX) 40 MG tablet Take 40 mg by mouth daily   Yes Historical Provider, MD   OXYGEN Inhale 2 L into the lungs nightly as needed   Yes Historical Provider, MD   acetaminophen (TYLENOL) 325 MG tablet Take 2 tablets by mouth every 6 hours as needed for Pain or Fever 9/11/17  Yes Lynda Gordillo MD   aspirin 81 MG chewable tablet Take 81 mg by mouth daily   Yes Historical Provider, MD   carvedilol (COREG) 3.125 MG tablet Take 1 tablet by mouth 2 times daily 4/11/17  Yes Haily Sharma MD   calcium carbonate (TUMS) 500 MG chewable tablet Take 5 tablets by mouth 3 times daily (with meals) And 3 tabs as needed with snacks   Yes Historical Provider, MD   FLUoxetine (PROZAC) 20 MG capsule Take 20 mg by mouth daily   Yes Historical Provider, MD   insulin glargine (LANTUS) 100 UNIT/ML injection Inject 45 Units into the skin every morning (before breakfast)    Yes Historical Provider, MD   Multiple Vitamin (MULTI-VITAMIN PO) Take 1 tablet by mouth daily    Yes Historical Provider, MD   pregabalin (LYRICA) 50 MG capsule Take 50 mg by mouth 2 times daily. Yes Historical Provider, MD   Amino Acids (LIQUACEL) LIQD Take 1 Package by mouth three times a week Monday wednesdsay friday 6/5/17   Historical Provider, MD   calcitRIOL (ROCALTROL) 0.25 MCG capsule Take 0.25 mcg by mouth three times a week Indications: takes every other day     Historical Provider, MD   ezetimibe-simvastatin (VYTORIN) 10-40 MG per tablet Take 1 tablet by mouth nightly. Historical Provider, MD   insulin lispro (HUMALOG) 100 UNIT/ML injection Inject  into the skin 3 times daily (before meals).  SLIDING SCALE HAS NOT USED IN LAST 3 MONTHS    Historical Provider, MD     Additional information:       VITAL SIGNS   Vitals:    10/30/17 0830   BP: (!) 142/72   Pulse: 68   Resp: 13   Temp: 98.3 °F (36.8 °C)   SpO2: 99%       PHYSICAL:   Heart:  [x]Regular rate and rhythm  []Other:    Lungs:  [x]Clear    []Other:    Abdomen: [x]Soft    []Other:    Mental Status: [x]Alert & Oriented  []Other:      PLANNED PROCEDURE   []JAVIER  []Pacemaker/ICD []Cardioversion  []Cath  []PCI   []LOOP RECORDER INSERTION OR REMOVAL  [x]Peripheral angiography      SEDATION  Planned

## 2017-10-31 ENCOUNTER — TELEPHONE (OUTPATIENT)
Dept: CARDIOLOGY CLINIC | Age: 57
End: 2017-10-31

## 2017-10-31 NOTE — TELEPHONE ENCOUNTER
Pt called today said he was told by Dr. Ralph Ames to never go on a blood thinner and if someone tried to put him on one to let Dr. Ralph Ames know? ??  Pt had Angioplasty to left popliteal artery yesterday and was put on plavix   Pt wanted Dr. Ralph Ames to be aware

## 2017-11-28 ENCOUNTER — HOSPITAL ENCOUNTER (INPATIENT)
Age: 57
LOS: 1 days | Discharge: HOME | DRG: 696 | End: 2017-11-29
Attending: INTERNAL MEDICINE | Admitting: INTERNAL MEDICINE
Payer: MEDICARE

## 2017-11-28 PROBLEM — R31.0 GROSS HEMATURIA: Status: ACTIVE | Noted: 2017-11-28

## 2017-11-28 PROBLEM — R10.9 RIGHT FLANK PAIN: Status: ACTIVE | Noted: 2017-11-28

## 2017-11-28 PROBLEM — R10.9 ABDOMINAL PAIN: Status: ACTIVE | Noted: 2017-11-28

## 2017-11-28 PROBLEM — I75.023 ATHEROEMBOLISM OF BOTH LOWER EXTREMITIES (HCC): Status: ACTIVE | Noted: 2017-11-28

## 2017-11-28 PROBLEM — I73.9 PERIPHERAL VASCULAR DISEASE (HCC): Status: ACTIVE | Noted: 2017-11-28

## 2017-11-28 PROBLEM — E13.9 DIABETES 1.5, MANAGED AS TYPE 1 (HCC): Status: ACTIVE | Noted: 2017-11-28

## 2017-11-28 LAB
ANISOCYTOSIS: ABNORMAL
BACTERIA: ABNORMAL /HPF
BASOPHILS # BLD: 0.3 %
BASOPHILS ABSOLUTE: 0 THOU/MM3 (ref 0–0.1)
BILIRUBIN URINE: NEGATIVE
BLOOD, URINE: ABNORMAL
CASTS 2: ABNORMAL /LPF
CASTS UA: ABNORMAL /LPF
CHARACTER, URINE: ABNORMAL
COLOR: ABNORMAL
CRYSTALS, UA: ABNORMAL
EKG ATRIAL RATE: 76 BPM
EKG P AXIS: 29 DEGREES
EKG P-R INTERVAL: 248 MS
EKG Q-T INTERVAL: 494 MS
EKG QRS DURATION: 150 MS
EKG QTC CALCULATION (BAZETT): 555 MS
EKG R AXIS: 161 DEGREES
EKG T AXIS: -4 DEGREES
EKG VENTRICULAR RATE: 76 BPM
EOSINOPHIL # BLD: 1.9 %
EOSINOPHILS ABSOLUTE: 0.2 THOU/MM3 (ref 0–0.4)
EPITHELIAL CELLS, UA: ABNORMAL /HPF
GLUCOSE BLD-MCNC: 142 MG/DL (ref 70–108)
GLUCOSE BLD-MCNC: 159 MG/DL (ref 70–108)
GLUCOSE URINE: 500 MG/DL
HCT VFR BLD CALC: 30.8 % (ref 42–52)
HEMOGLOBIN: 10.1 GM/DL (ref 14–18)
KETONES, URINE: NEGATIVE
LEUKOCYTE ESTERASE, URINE: ABNORMAL
LYMPHOCYTES # BLD: 8.1 %
LYMPHOCYTES ABSOLUTE: 0.8 THOU/MM3 (ref 1–4.8)
MCH RBC QN AUTO: 28.9 PG (ref 27–31)
MCHC RBC AUTO-ENTMCNC: 32.9 GM/DL (ref 33–37)
MCV RBC AUTO: 87.8 FL (ref 80–94)
MISCELLANEOUS 2: ABNORMAL
MONOCYTES # BLD: 9 %
MONOCYTES ABSOLUTE: 0.9 THOU/MM3 (ref 0.4–1.3)
NITRITE, URINE: NEGATIVE
NUCLEATED RED BLOOD CELLS: 0 /100 WBC
PDW BLD-RTO: 17.3 % (ref 11.5–14.5)
PH UA: 6
PLATELET # BLD: 204 THOU/MM3 (ref 130–400)
PMV BLD AUTO: 8.1 MCM (ref 7.4–10.4)
PROTEIN UA: 300
RBC # BLD: 3.5 MILL/MM3 (ref 4.7–6.1)
RBC URINE: ABNORMAL /HPF
RENAL EPITHELIAL, UA: ABNORMAL
SEG NEUTROPHILS: 80.7 %
SEGMENTED NEUTROPHILS ABSOLUTE COUNT: 8.2 THOU/MM3 (ref 1.8–7.7)
SPECIFIC GRAVITY, URINE: 1.02 (ref 1–1.03)
TROPONIN T: 0.2 NG/ML
UROBILINOGEN, URINE: 0.2 EU/DL
WBC # BLD: 10.1 THOU/MM3 (ref 4.8–10.8)
WBC UA: ABNORMAL /HPF
YEAST: ABNORMAL

## 2017-11-28 PROCEDURE — 99220 PR INITIAL OBSERVATION CARE/DAY 70 MINUTES: CPT | Performed by: INTERNAL MEDICINE

## 2017-11-28 PROCEDURE — 36415 COLL VENOUS BLD VENIPUNCTURE: CPT

## 2017-11-28 PROCEDURE — 84484 ASSAY OF TROPONIN QUANT: CPT

## 2017-11-28 PROCEDURE — 1200000003 HC TELEMETRY R&B

## 2017-11-28 PROCEDURE — 82948 REAGENT STRIP/BLOOD GLUCOSE: CPT

## 2017-11-28 PROCEDURE — 6370000000 HC RX 637 (ALT 250 FOR IP): Performed by: INTERNAL MEDICINE

## 2017-11-28 PROCEDURE — 99221 1ST HOSP IP/OBS SF/LOW 40: CPT | Performed by: NURSE PRACTITIONER

## 2017-11-28 PROCEDURE — 81001 URINALYSIS AUTO W/SCOPE: CPT

## 2017-11-28 PROCEDURE — 93005 ELECTROCARDIOGRAM TRACING: CPT

## 2017-11-28 PROCEDURE — 99221 1ST HOSP IP/OBS SF/LOW 40: CPT | Performed by: INTERNAL MEDICINE

## 2017-11-28 PROCEDURE — 2580000003 HC RX 258: Performed by: INTERNAL MEDICINE

## 2017-11-28 PROCEDURE — 87086 URINE CULTURE/COLONY COUNT: CPT

## 2017-11-28 PROCEDURE — 85025 COMPLETE CBC W/AUTO DIFF WBC: CPT

## 2017-11-28 PROCEDURE — 90945 DIALYSIS ONE EVALUATION: CPT

## 2017-11-28 RX ORDER — SODIUM CHLORIDE, SODIUM LACTATE, CALCIUM CHLORIDE, MAGNESIUM CHLORIDE AND DEXTROSE 2.5; 538; 448; 18.3; 5.08 G/100ML; MG/100ML; MG/100ML; MG/100ML; MG/100ML
2000 INJECTION, SOLUTION INTRAPERITONEAL 4 TIMES DAILY
Status: DISCONTINUED | OUTPATIENT
Start: 2017-11-28 | End: 2017-11-29 | Stop reason: HOSPADM

## 2017-11-28 RX ORDER — SODIUM CHLORIDE 0.9 % (FLUSH) 0.9 %
10 SYRINGE (ML) INJECTION EVERY 12 HOURS SCHEDULED
Status: DISCONTINUED | OUTPATIENT
Start: 2017-11-28 | End: 2017-11-29 | Stop reason: HOSPADM

## 2017-11-28 RX ORDER — DEXTROSE MONOHYDRATE 50 MG/ML
100 INJECTION, SOLUTION INTRAVENOUS PRN
Status: DISCONTINUED | OUTPATIENT
Start: 2017-11-28 | End: 2017-11-29 | Stop reason: HOSPADM

## 2017-11-28 RX ORDER — NICOTINE POLACRILEX 4 MG
15 LOZENGE BUCCAL PRN
Status: DISCONTINUED | OUTPATIENT
Start: 2017-11-28 | End: 2017-11-29 | Stop reason: HOSPADM

## 2017-11-28 RX ORDER — SODIUM CHLORIDE 0.9 % (FLUSH) 0.9 %
10 SYRINGE (ML) INJECTION PRN
Status: DISCONTINUED | OUTPATIENT
Start: 2017-11-28 | End: 2017-11-29 | Stop reason: HOSPADM

## 2017-11-28 RX ORDER — DOCUSATE SODIUM 100 MG/1
100 CAPSULE, LIQUID FILLED ORAL 2 TIMES DAILY
Status: DISCONTINUED | OUTPATIENT
Start: 2017-11-28 | End: 2017-11-29 | Stop reason: HOSPADM

## 2017-11-28 RX ORDER — CARVEDILOL 3.12 MG/1
3.12 TABLET ORAL 2 TIMES DAILY
Status: DISCONTINUED | OUTPATIENT
Start: 2017-11-28 | End: 2017-11-29 | Stop reason: HOSPADM

## 2017-11-28 RX ORDER — HYDROCODONE BITARTRATE AND ACETAMINOPHEN 5; 325 MG/1; MG/1
1 TABLET ORAL EVERY 4 HOURS PRN
Status: DISCONTINUED | OUTPATIENT
Start: 2017-11-28 | End: 2017-11-29 | Stop reason: HOSPADM

## 2017-11-28 RX ORDER — HYDROCODONE BITARTRATE AND ACETAMINOPHEN 5; 325 MG/1; MG/1
2 TABLET ORAL EVERY 4 HOURS PRN
Status: DISCONTINUED | OUTPATIENT
Start: 2017-11-28 | End: 2017-11-29 | Stop reason: HOSPADM

## 2017-11-28 RX ORDER — INSULIN GLARGINE 100 [IU]/ML
45 INJECTION, SOLUTION SUBCUTANEOUS
Status: DISCONTINUED | OUTPATIENT
Start: 2017-11-29 | End: 2017-11-29 | Stop reason: HOSPADM

## 2017-11-28 RX ORDER — PANTOPRAZOLE SODIUM 40 MG/1
40 TABLET, DELAYED RELEASE ORAL DAILY
Status: DISCONTINUED | OUTPATIENT
Start: 2017-11-28 | End: 2017-11-29 | Stop reason: HOSPADM

## 2017-11-28 RX ORDER — FLUOXETINE HYDROCHLORIDE 20 MG/1
20 CAPSULE ORAL DAILY
Status: DISCONTINUED | OUTPATIENT
Start: 2017-11-28 | End: 2017-11-29 | Stop reason: HOSPADM

## 2017-11-28 RX ORDER — ONDANSETRON 2 MG/ML
4 INJECTION INTRAMUSCULAR; INTRAVENOUS EVERY 6 HOURS PRN
Status: DISCONTINUED | OUTPATIENT
Start: 2017-11-28 | End: 2017-11-29 | Stop reason: HOSPADM

## 2017-11-28 RX ORDER — SIMVASTATIN 10 MG
10 TABLET ORAL NIGHTLY
Status: DISCONTINUED | OUTPATIENT
Start: 2017-11-28 | End: 2017-11-28 | Stop reason: CLARIF

## 2017-11-28 RX ORDER — ACETAMINOPHEN 325 MG/1
650 TABLET ORAL EVERY 4 HOURS PRN
Status: DISCONTINUED | OUTPATIENT
Start: 2017-11-28 | End: 2017-11-29 | Stop reason: HOSPADM

## 2017-11-28 RX ORDER — MINOXIDIL 10 MG/1
5 TABLET ORAL DAILY
COMMUNITY
End: 2018-07-06

## 2017-11-28 RX ORDER — DEXTROSE MONOHYDRATE 25 G/50ML
12.5 INJECTION, SOLUTION INTRAVENOUS PRN
Status: DISCONTINUED | OUTPATIENT
Start: 2017-11-28 | End: 2017-11-29 | Stop reason: HOSPADM

## 2017-11-28 RX ORDER — CALCIUM CARBONATE 200(500)MG
5 TABLET,CHEWABLE ORAL
Status: DISCONTINUED | OUTPATIENT
Start: 2017-11-28 | End: 2017-11-29 | Stop reason: HOSPADM

## 2017-11-28 RX ORDER — SIMVASTATIN 40 MG
40 TABLET ORAL NIGHTLY
Status: DISCONTINUED | OUTPATIENT
Start: 2017-11-28 | End: 2017-11-29 | Stop reason: HOSPADM

## 2017-11-28 RX ORDER — CALCITRIOL 0.25 UG/1
0.25 CAPSULE, LIQUID FILLED ORAL
Status: DISCONTINUED | OUTPATIENT
Start: 2017-11-29 | End: 2017-11-29 | Stop reason: HOSPADM

## 2017-11-28 RX ADMIN — SODIUM CHLORIDE, SODIUM LACTATE, CALCIUM CHLORIDE, MAGNESIUM CHLORIDE AND DEXTROSE 2000 ML: 2.5; 538; 448; 18.3; 5.08 INJECTION, SOLUTION INTRAPERITONEAL at 22:08

## 2017-11-28 RX ADMIN — INSULIN LISPRO 1 UNITS: 100 INJECTION, SOLUTION INTRAVENOUS; SUBCUTANEOUS at 20:09

## 2017-11-28 RX ADMIN — FLUOXETINE 20 MG: 20 CAPSULE ORAL at 18:17

## 2017-11-28 RX ADMIN — Medication 2 UNITS: at 18:16

## 2017-11-28 RX ADMIN — DOCUSATE SODIUM 100 MG: 100 CAPSULE ORAL at 20:08

## 2017-11-28 RX ADMIN — HYDROCODONE BITARTRATE AND ACETAMINOPHEN 1 TABLET: 5; 325 TABLET ORAL at 15:47

## 2017-11-28 RX ADMIN — Medication 10 ML: at 20:16

## 2017-11-28 RX ADMIN — PANTOPRAZOLE SODIUM 40 MG: 40 TABLET, DELAYED RELEASE ORAL at 18:17

## 2017-11-28 RX ADMIN — ANTACID TABLETS 2500 MG: 500 TABLET, CHEWABLE ORAL at 18:17

## 2017-11-28 RX ADMIN — HYDROCODONE BITARTRATE AND ACETAMINOPHEN 1 TABLET: 5; 325 TABLET ORAL at 20:08

## 2017-11-28 RX ADMIN — CARVEDILOL 3.12 MG: 3.12 TABLET, FILM COATED ORAL at 20:08

## 2017-11-28 ASSESSMENT — PAIN SCALES - GENERAL
PAINLEVEL_OUTOF10: 10
PAINLEVEL_OUTOF10: 6

## 2017-11-28 ASSESSMENT — PAIN DESCRIPTION - PAIN TYPE
TYPE: ACUTE PAIN
TYPE: ACUTE PAIN

## 2017-11-28 ASSESSMENT — PAIN DESCRIPTION - DESCRIPTORS: DESCRIPTORS: DISCOMFORT;SHARP

## 2017-11-28 ASSESSMENT — PAIN DESCRIPTION - LOCATION
LOCATION: FLANK;ABDOMEN
LOCATION: FLANK;ABDOMEN

## 2017-11-28 ASSESSMENT — PAIN DESCRIPTION - DIRECTION: RADIATING_TOWARDS: RIGHT GROIN

## 2017-11-28 ASSESSMENT — PAIN DESCRIPTION - ORIENTATION: ORIENTATION: RIGHT

## 2017-11-28 NOTE — FLOWSHEET NOTE
11/28/17 1532   Provider Notification   Reason for Communication Review case   Provider Name [facility]   Provider Notification Nurse Practitioner   Method of Communication Secure Message   Response Waiting for response   Notification Time    new consult for Hematuria.  Leopoldo Setting has seen in the past.    1535 - Dr Miah Kate will see the patient

## 2017-11-28 NOTE — H&P
History & Physical    Patient:  Caridad Castillo  YOB: 1960  Date of Service: 11/28/2017  MRN: [id number]   Acct:  [de-identified]   Primary Care Physician: Janina Perea MD    Chief Complaint:R flank pain  History of Present Illness:   History obtained from the patient. The patient is a 61 y.o. male who presents with PMHx of ESRD on PD, PVD and diabetes who had Bilateral LE angiogram and balloon angioplasty. Pt notes that two days ago at 4 AM, he woke up with R flank pain and hematuria. That cleared easily. One day ago, flank pain returned. This time was severe, rated as 10/10, achy , constant and radiates across the RLQ. In addition, there was N/V and hematuria. [facility] to [facility] where . his hg was stable at 10, cr 8.7 while on HD and trop elevated. Pt denies any chest pain, no fever, no SOB. He was treated for N/V and this has since resolved. However, pain is still very much an issue.       Past Medical History:        Diagnosis Date    Anemia     Arthritis     CAD (coronary artery disease) LAST ONE 02/19/2014    ANGIOPLASTY X 2-TOTAL 4 STENTS     Charcot ankle     LEFT, SCHEDULED FOR SURGERY 04/03/2014    CHF (congestive heart failure) (HCC)     Chronic ankle pain     Chronic back pain     Chronic venous hypertension w ulceration (HCC)     CKD (chronic kidney disease) stage 3, GFR 30-59 ml/min     ON DIALYSIS    DDD (degenerative disc disease)     Diabetic retinopathy (HCC)     DM (dermatomyositis)     DM (diabetes mellitus) ([address] DX 1978    ON INSULIN    Full dentures     GERD (gastroesophageal reflux disease)     H/O Bell's palsy     H/O Clostridium difficile infection febuary 2017    Hemodialysis patient [facility])     last dialysis, 3/26/2014, Mon Wed Fri at [facility] in [place] History of blood transfusion     HTN (hypertension) DX 1979    Hyperkalemia     Hyperlipidemia     MRSA (methicillin resistant Staphylococcus aureus) 1/24/2014    blood hx from another facility    Murmur, cardiac     Nephrotic syndrome 8/21/2013    Obstructive sleep apnea     has been told he has but has never been tested    Osteomyelitis of finger of right hand ([facility] 9/28/2017    Pancreatitis due to biliary obstruction     Peripheral neuropathy (HCC)     of the feet from the diabetes    Peripheral neuropathy ([facility]     Psychiatric problem     depression    Pulmonary edema     Renal failure     KIDNEY FUNCTION 12%    S/P peripheral artery angioplasty: 10/30/2017: PTA of the left popliteal artery. 10/30/2017    10/30/2017: PTA of the left popliteal artery.  Dr. Guero Villagran    SOB (shortness of breath)     Vision blurred     RIGHT    VRE (vancomycin resistant enterococcus) culture positive 03/2017    Wears glasses        Past Surgical History:        Procedure Laterality Date    ABLATION OF DYSRHYTHMIC FOCUS  07/21/2017    Atrial Flutter Ablation    APPENDECTOMY      CARDIAC SURGERY  2/19/2014    2 stents placed   [address]  2006    stents x 2    CARDIAC SURGERY  2003    stent x 1    CHOLECYSTECTOMY      COLONOSCOPY      DIALYSIS FISTULA CREATION Left 02/2014    ARM    ENDOSCOPY, COLON, DIAGNOSTIC      ENTEROSCOPY  9/8/2017    ENTEROSCOPY PUSH BIOPSY performed by Kelvin Jhaveri MD at [address] Bilateral     CATARACT EXTRACTION WITH IOL    EYE SURGERY Left 05/17/2013    VITRECTOMY    EYE SURGERY Bilateral     MULTIPLE LASER PROCEDURES    FINGER AMPUTATION Right 09/28/2017    FRACTURE SURGERY Right     ankle, surgery x 7    HAND SURGERY Right 8/18/2017    I & D RIGHT INDEX FINGER performed by Sandi Sanchez MD at [facility] Bilateral 09/2013    SUBSEQUENT HEMORRHAGE    OTHER SURGICAL HISTORY      DIALYSIS CHEST PORT INSERTED AND REMOVED    [place] AMPUTATE METACARPAL+FINGER Right 9/28/2017    RIGHT INDEX FINGER AMPUTATION performed by Sandi Sanchez MD at [id number] [place] [address] [address] [place] [address] Left 9/8/2017    COLONOSCOPY n-acetaminophen]; and Demerol hcl [meperidine]    Social History:    reports that he has never smoked. He has never used smokeless tobacco. He reports that he drinks alcohol. He reports that he does not use drugs. Family History:       Problem Relation Age of Onset    Cancer Mother      SKIN    Diabetes Mother      IDDM    Heart Disease Mother     High Blood Pressure Mother     Other Father      COPD, CHRONIC BRONCHITIS    Cancer Father      PROSTATE    Diabetes Father      NIDDM       Review of systems:  All systems reviewed, pertinent as in HPI    10 point review of systems completed, all other than noted above are negative. Vitals:   Vitals:    11/28/17 1330   BP: (!) 159/72   Pulse: 75   Resp: 18   Temp: 98.1 °F (36.7 °C)   SpO2: 93%      BMI: Body mass index is 33.48 kg/m². Exam:  Physical Examination:   General appearance - Pt lying in bed, NAD,  Mental status - A&OX3  Neck - supple, no significant adenopathy, no JVD, or carotid bruits  Chest - Deminished air entry, lower zones  Heart -normal rate, regular rhythm, normal S1, S2,  Abdomen - {soft, tender R flank  Neurological -alert, oriented, normal speech, no focal findings or movement disorder noted  Musculoskeletal -No joint swelling or pain  Extremities - peripheral pulses normal,++ pedal edema,   Skin -Ischemia, bilat LE, toes    Review of Labs and Diagnostic Testing:    See records from OSH  Radiology:     No results found. EKGpending  Assessment:    Principal Problem:    Right flank pain  Active Problems:    Abdominal pain    Gross hematuria    Peripheral vascular disease (HCC)    Atheroembolism of both lower extremities (HCC)    Diabetes 1.5, managed as type 1 ([address]      Plan:  1. Admit  2. Renal US  3. Basic labs, trops, EKG  4. Resume home meds  5. Glucose control  6.  Nephrology consult, Hematuria and PD.   7. Consider Cardiology and vascular consult depending on findings      DVT prophylaxis: [] Lovenox

## 2017-11-28 NOTE — FLOWSHEET NOTE
11/28/17 1355   Provider Notification   Reason for Communication Review case   Provider Name Dr Christine Mccarthy   Provider Notification Physician   Method of Communication Secure Message   Response Waiting for response   Notification Time [id number]   update patient arrived to unit, orders needed

## 2017-11-28 NOTE — PROGRESS NOTES
Pharmacy Medication History Note      List of current medications patient is taking is complete. Source of information: patient, [facility] currently closed. Pt reports wife is bringing list, requested RN review homemed list once list arrives. Pt was able to confirm most medications without difficulty    Changes made to medication list:  Medications removed (include reason, ex. therapy complete or physician discontinued): · Lantus 45 units every AM- dose adjusted   · Sucralfate- pt reports physician stopped this medication    Medications added/doses adjusted:  · Calcitriol   · Lantus 50 units every AM- dose increased 11/27  · Minoxidil 5mg daily- was restarted 11/27 per patient     Other notes (ex. Recent course of antibiotics, Coumadin dosing):  · Plavix 75 mg daily- prescribed 10/30 by Dr Alma Baig after angioplasty. Pt reports that he was told to only take for 14 days and reports completing plavix 11/16/17. I am unable to find documentation that pt was instructed to stop after 2 weeks. There was documentation regarding Dr Alma Baig speaking to family regarding Plavix and Dr Cely Thomason approving pt starting Plavix. [facility] filled full 30 day supply. Plavix was left on homemed list for review upon discharge. · Denies use of other OTC or herbal medications.       Allergies reviewed      Electronically signed by Luisito Alba, [address] [place] [address] on 11/28/2017 at 4:24 PM

## 2017-11-28 NOTE — PROGRESS NOTES
Pt admitted to  [address] [facility].   Complaints: Abdominal pain right side and hematuria x3 . IV site free of s/s of infection or infiltration. Vital signs obtained. Assessment and data collection initiated. Oriented to room. Policies and procedures for 6K explained All questions answered with no further questions at this time. Fall prevention and safety brochure discussed with patient. The best day to schedule a follow up Dr appointment is:  Tuesday or Thursday a.m.

## 2017-11-28 NOTE — CONSULTS
ROM.  Neurological: The patient denies any symptoms of neurological impairment or TIA. Psychiatric: Denies anxiety or depression. Skin: Denies rash or lesions. All remaining ROS negative. PHYSICAL EXAM:  VITALS:  BP (!) 159/72   Pulse 75   Temp 98.1 °F (36.7 °C) (Oral)   Resp 18   Ht 6' 1\" (1.854 m)   Wt 253 lb 12.8 oz (115.1 kg)   SpO2 93%   BMI 33.48 kg/m² . Nursing note and vitals reviewed. Constitutional: Alert and oriented times x3, no acute distress, and cooperative to examination with appropriate mood and affect. HEENT:   Head:         Normocephalic and atraumatic. Mouth/Throat:          Mucous membranes are normal.   Eyes:         EOM are normal. No scleral icterus. Nose:    The external appearance of the nose is normal  Ears: The ears appear normal to external inspection. Cardiovascular:       Normal rate, regular rhythm. Pulmonary/Chest:  Normal respiratory rate and rhthym. No use of accessory muscles. Lungs clear bilaterally. Abdominal:          Soft. No tenderness. Active bowel sounds             Genitalia:    Voiding tea colored slightly cloudy but transparent urine  Musculoskeletal:    Normal range of motion x 4 extremities  Extremities:    Pt is missing a partial digit on right hand. Trace bilateral pretibial edema. Neurological:    Alert and oriented to person place and time.          DATA:    Creatinine 8.7  CBC:   Lab Results   Component Value Date    WBC 10.1 11/28/2017    RBC 3.50 11/28/2017    HGB 10.1 11/28/2017    HCT 30.8 11/28/2017    MCV 87.8 11/28/2017    MCH 28.9 11/28/2017    MCHC 32.9 11/28/2017    RDW 17.3 11/28/2017     11/28/2017    MPV 8.1 11/28/2017     BMP:    Lab Results   Component Value Date     10/30/2017    K 3.6 10/30/2017    CL 98 10/30/2017    CO2 25 10/30/2017    BUN 39 10/30/2017    CREATININE 8.3 10/30/2017    CALCIUM 9.3 10/30/2017    LABGLOM 7 10/30/2017    GLUCOSE 163 10/30/2017     BUN/Creatinine:    Lab Results

## 2017-11-29 ENCOUNTER — APPOINTMENT (OUTPATIENT)
Dept: ULTRASOUND IMAGING | Age: 57
DRG: 696 | End: 2017-11-29
Attending: INTERNAL MEDICINE
Payer: MEDICARE

## 2017-11-29 ENCOUNTER — TELEPHONE (OUTPATIENT)
Dept: CARDIOLOGY CLINIC | Age: 57
End: 2017-11-29

## 2017-11-29 VITALS
TEMPERATURE: 99.5 F | RESPIRATION RATE: 16 BRPM | OXYGEN SATURATION: 97 % | HEIGHT: 73 IN | SYSTOLIC BLOOD PRESSURE: 110 MMHG | HEART RATE: 79 BPM | BODY MASS INDEX: 33.64 KG/M2 | DIASTOLIC BLOOD PRESSURE: 59 MMHG | WEIGHT: 253.8 LBS

## 2017-11-29 LAB
ALBUMIN SERPL-MCNC: 2.6 G/DL (ref 3.5–5.1)
ALP BLD-CCNC: 83 U/L (ref 38–126)
ALT SERPL-CCNC: 9 U/L (ref 11–66)
ANION GAP SERPL CALCULATED.3IONS-SCNC: 20 MEQ/L (ref 8–16)
ANISOCYTOSIS: ABNORMAL
AST SERPL-CCNC: 16 U/L (ref 5–40)
BASOPHILS # BLD: 0.5 %
BASOPHILS ABSOLUTE: 0 THOU/MM3 (ref 0–0.1)
BILIRUB SERPL-MCNC: 0.3 MG/DL (ref 0.3–1.2)
BUN BLDV-MCNC: 50 MG/DL (ref 7–22)
CALCIUM SERPL-MCNC: 8.4 MG/DL (ref 8.5–10.5)
CHLORIDE BLD-SCNC: 94 MEQ/L (ref 98–111)
CO2: 24 MEQ/L (ref 23–33)
CREAT SERPL-MCNC: 8.9 MG/DL (ref 0.4–1.2)
EOSINOPHIL # BLD: 3 %
EOSINOPHILS ABSOLUTE: 0.2 THOU/MM3 (ref 0–0.4)
GFR SERPL CREATININE-BSD FRML MDRD: 6 ML/MIN/1.73M2
GLUCOSE BLD-MCNC: 172 MG/DL (ref 70–108)
GLUCOSE BLD-MCNC: 180 MG/DL (ref 70–108)
GLUCOSE BLD-MCNC: 201 MG/DL (ref 70–108)
HCT VFR BLD CALC: 29.9 % (ref 42–52)
HEMOGLOBIN: 9.9 GM/DL (ref 14–18)
LYMPHOCYTES # BLD: 8.2 %
LYMPHOCYTES ABSOLUTE: 0.7 THOU/MM3 (ref 1–4.8)
MCH RBC QN AUTO: 29.6 PG (ref 27–31)
MCHC RBC AUTO-ENTMCNC: 33.2 GM/DL (ref 33–37)
MCV RBC AUTO: 89.2 FL (ref 80–94)
MONOCYTES # BLD: 9.6 %
MONOCYTES ABSOLUTE: 0.8 THOU/MM3 (ref 0.4–1.3)
NUCLEATED RED BLOOD CELLS: 0 /100 WBC
PDW BLD-RTO: 16.6 % (ref 11.5–14.5)
PHOSPHORUS: 8.4 MG/DL (ref 2.4–4.7)
PLATELET # BLD: 178 THOU/MM3 (ref 130–400)
PLATELET ESTIMATE: ADEQUATE
PMV BLD AUTO: 8.2 MCM (ref 7.4–10.4)
POTASSIUM SERPL-SCNC: 3.1 MEQ/L (ref 3.5–5.2)
RBC # BLD: 3.35 MILL/MM3 (ref 4.7–6.1)
SCAN OF BLOOD SMEAR: NORMAL
SEG NEUTROPHILS: 78.7 %
SEGMENTED NEUTROPHILS ABSOLUTE COUNT: 6.4 THOU/MM3 (ref 1.8–7.7)
SODIUM BLD-SCNC: 138 MEQ/L (ref 135–145)
TOTAL PROTEIN: 6.8 G/DL (ref 6.1–8)
TROPONIN T: 0.22 NG/ML
WBC # BLD: 8.1 THOU/MM3 (ref 4.8–10.8)

## 2017-11-29 PROCEDURE — 80053 COMPREHEN METABOLIC PANEL: CPT

## 2017-11-29 PROCEDURE — 6360000002 HC RX W HCPCS: Performed by: NURSE PRACTITIONER

## 2017-11-29 PROCEDURE — 99232 SBSQ HOSP IP/OBS MODERATE 35: CPT | Performed by: NURSE PRACTITIONER

## 2017-11-29 PROCEDURE — 76770 US EXAM ABDO BACK WALL COMP: CPT

## 2017-11-29 PROCEDURE — 99238 HOSP IP/OBS DSCHRG MGMT 30/<: CPT | Performed by: INTERNAL MEDICINE

## 2017-11-29 PROCEDURE — 90945 DIALYSIS ONE EVALUATION: CPT

## 2017-11-29 PROCEDURE — 85025 COMPLETE CBC W/AUTO DIFF WBC: CPT

## 2017-11-29 PROCEDURE — 88112 CYTOPATH CELL ENHANCE TECH: CPT

## 2017-11-29 PROCEDURE — 36415 COLL VENOUS BLD VENIPUNCTURE: CPT

## 2017-11-29 PROCEDURE — 6370000000 HC RX 637 (ALT 250 FOR IP): Performed by: INTERNAL MEDICINE

## 2017-11-29 PROCEDURE — 82948 REAGENT STRIP/BLOOD GLUCOSE: CPT

## 2017-11-29 PROCEDURE — G0378 HOSPITAL OBSERVATION PER HR: HCPCS

## 2017-11-29 PROCEDURE — 2580000003 HC RX 258: Performed by: INTERNAL MEDICINE

## 2017-11-29 PROCEDURE — 96374 THER/PROPH/DIAG INJ IV PUSH: CPT

## 2017-11-29 PROCEDURE — 84484 ASSAY OF TROPONIN QUANT: CPT

## 2017-11-29 PROCEDURE — 84100 ASSAY OF PHOSPHORUS: CPT

## 2017-11-29 PROCEDURE — 2580000003 HC RX 258: Performed by: NURSE PRACTITIONER

## 2017-11-29 RX ORDER — ASPIRIN 81 MG/1
81 TABLET, CHEWABLE ORAL DAILY
Qty: 30 TABLET | Refills: 3 | Status: ON HOLD | OUTPATIENT
Start: 2017-11-29 | End: 2018-07-12 | Stop reason: ALTCHOICE

## 2017-11-29 RX ORDER — POTASSIUM CHLORIDE 20 MEQ/1
40 TABLET, EXTENDED RELEASE ORAL ONCE
Status: COMPLETED | OUTPATIENT
Start: 2017-11-29 | End: 2017-11-29

## 2017-11-29 RX ADMIN — SODIUM CHLORIDE, SODIUM LACTATE, CALCIUM CHLORIDE, MAGNESIUM CHLORIDE AND DEXTROSE 2000 ML: 2.5; 538; 448; 18.3; 5.08 INJECTION, SOLUTION INTRAPERITONEAL at 09:53

## 2017-11-29 RX ADMIN — ACETAMINOPHEN 650 MG: 325 TABLET ORAL at 10:42

## 2017-11-29 RX ADMIN — Medication 10 ML: at 08:37

## 2017-11-29 RX ADMIN — Medication 2 UNITS: at 08:32

## 2017-11-29 RX ADMIN — CEFTRIAXONE 1 G: 1 INJECTION, POWDER, FOR SOLUTION INTRAMUSCULAR; INTRAVENOUS at 08:30

## 2017-11-29 RX ADMIN — DOCUSATE SODIUM 100 MG: 100 CAPSULE ORAL at 08:28

## 2017-11-29 RX ADMIN — INSULIN GLARGINE 45 UNITS: 100 INJECTION, SOLUTION SUBCUTANEOUS at 08:32

## 2017-11-29 RX ADMIN — CARVEDILOL 3.12 MG: 3.12 TABLET, FILM COATED ORAL at 08:28

## 2017-11-29 RX ADMIN — FLUOXETINE 20 MG: 20 CAPSULE ORAL at 08:28

## 2017-11-29 RX ADMIN — ANTACID TABLETS 2500 MG: 500 TABLET, CHEWABLE ORAL at 11:55

## 2017-11-29 RX ADMIN — CALCITRIOL 0.25 MCG: 0.25 CAPSULE, LIQUID FILLED ORAL at 08:28

## 2017-11-29 RX ADMIN — POTASSIUM CHLORIDE 40 MEQ: 1500 TABLET, EXTENDED RELEASE ORAL at 09:51

## 2017-11-29 RX ADMIN — SODIUM CHLORIDE, SODIUM LACTATE, CALCIUM CHLORIDE, MAGNESIUM CHLORIDE AND DEXTROSE 2000 ML: 2.5; 538; 448; 18.3; 5.08 INJECTION, SOLUTION INTRAPERITONEAL at 03:43

## 2017-11-29 RX ADMIN — PANTOPRAZOLE SODIUM 40 MG: 40 TABLET, DELAYED RELEASE ORAL at 08:28

## 2017-11-29 RX ADMIN — HYDROCODONE BITARTRATE AND ACETAMINOPHEN 1 TABLET: 5; 325 TABLET ORAL at 08:28

## 2017-11-29 RX ADMIN — ANTACID TABLETS 2500 MG: 500 TABLET, CHEWABLE ORAL at 08:28

## 2017-11-29 RX ADMIN — Medication 4 UNITS: at 11:55

## 2017-11-29 ASSESSMENT — PAIN SCALES - GENERAL
PAINLEVEL_OUTOF10: 6
PAINLEVEL_OUTOF10: 5
PAINLEVEL_OUTOF10: 6
PAINLEVEL_OUTOF10: 5

## 2017-11-29 ASSESSMENT — PAIN DESCRIPTION - ORIENTATION
ORIENTATION_2: ANTERIOR
ORIENTATION: RIGHT

## 2017-11-29 ASSESSMENT — PAIN DESCRIPTION - INTENSITY: RATING_2: 6

## 2017-11-29 ASSESSMENT — PAIN DESCRIPTION - PAIN TYPE
TYPE: ACUTE PAIN
TYPE_2: ACUTE PAIN

## 2017-11-29 ASSESSMENT — PAIN DESCRIPTION - LOCATION
LOCATION_2: HEAD
LOCATION: ABDOMEN;FLANK;BACK

## 2017-11-29 NOTE — PLAN OF CARE
Problem: Pain:  Goal: Control of acute pain  Control of acute pain   Outcome: Ongoing  Patient states pain in the abdomen has subsided, denying tenderness with palpation. Patient complaining of a headache of the 6 out of 10 pain headache. Problem: Falls - Risk of  Goal: Absence of falls  Outcome: Ongoing  Falling star protocol in place. Fall sign and band in place. Bed alarm on, call light and belongings all within reach. Problem: Discharge Planning:  Goal: Participates in care planning  Participates in care planning   Outcome: Ongoing  Falling star protocol in place. Fall sign and band in place. Bed alarm on, call light and belongings all within reach. Problem: Skin Integrity - Impaired:  Goal: Absence of new skin breakdown  Absence of new skin breakdown   Outcome: Ongoing  No new signs of skin breakdown noted this shift. Dry dressing placed on right 2nd toe. See LDA    Comments: Care plan reviewed with patient. Patient verbalize understanding of the plan of care and contribute to goal setting.

## 2017-11-29 NOTE — CONSULTS
[facility]    [facility]  [address]  [place], [facility]  [address] [place] Nephrology Consult      11/28/2017 7:02 PM         Pt Name:    Melly Rutledge  MRN:     [id number]   [id number]  YOB: 1960  Admit Date:    11/28/2017  1:37 PM  Primary Care Physician:  Stephanie Hawk MD   Room Number:  9R-23/941-B   Reason for Consult:  Management of peritoneal dialysis  Requesting Providor: Dr. Kelsi Martinez  Primary Nephrologist: Dr. Moustapha Stephen    ### ISOLATION ###:   none     Admit Chief Complaint: colicky right flank pain. History of Chief Complaint:   The patient is a 61y.o. year old white male who receives peritoneal dialysis at the [facility] dialysis unit in [place], [place] under the care of Dr. Moustapha Stephen to treat ESRD from DM. He was seen in clinic 11/27/17 and was well. He then developed a sudden onset of colicky right flank pain. The pain did not improve and he reported to ER where CT scan showed blood in the right kidney collecting system. He was sent to [facility] for further care. His dialysis has been going well. His dialysate remains clear and inflow and outflow of dialysate remains painless. He has no peritoneal signs of peritonitis. 24 hour summary:  The patient is relaxing in bed. His pain has improved. Baseline eGFR Peritoneal dialysis patient   Admit eGFR eGFR < 15 ml/min   Current eGFR      Compliance with treatment plan: 80%     Allergies and Intolerances:   ALLERGIES: Vicodin [hydrocodone-acetaminophen]; Darvocet a500 [propoxyphene n-acetaminophen]; and Demerol hcl [meperidine]  MEDICATION INTOLERANCES: none  FOOD ALLERGIES: none  INSECT ALLERGIES: none  PLANT ALLERGIES: none     Past Medical History:  Past Medical History:   Diagnosis Date    Anemia     Arthritis     CAD (coronary artery disease) LAST ONE 02/19/2014    ANGIOPLASTY X 2-TOTAL 4 STENTS     Charcot ankle     LEFT, SCHEDULED FOR SURGERY 04/03/2014    CHF (congestive heart failure) ([facility] [address] Date Taking? Authorizing Provider   pantoprazole (PROTONIX) 40 MG tablet Take 40 mg by mouth daily   Yes Historical Provider, MD   clopidogrel (PLAVIX) 75 MG tablet Take 1 tablet by mouth daily 10/31/17  Yes Blane Lund MD   aspirin 81 MG chewable tablet Take 81 mg by mouth daily   Yes Historical Provider, MD   carvedilol (COREG) 3.125 MG tablet Take 1 tablet by mouth 2 times daily 4/11/17  Yes Janina Ruiz MD   calcium carbonate (TUMS) 500 MG chewable tablet Take 5 tablets by mouth 3 times daily (with meals) And 3 tabs as needed with snacks   Yes Historical Provider, MD   FLUoxetine (PROZAC) 20 MG capsule Take 20 mg by mouth daily   Yes Historical Provider, MD   calcitRIOL (ROCALTROL) 0.25 MCG capsule Take 0.25 mcg by mouth four times a week Sunday, Monday, Wednesday and Friday   Yes Historical Provider, MD   insulin glargine (LANTUS) 100 UNIT/ML injection Inject 50 Units into the skin every morning (before breakfast)    Yes Historical Provider, MD   Multiple Vitamin (MULTI-VITAMIN PO) Take 1 tablet by mouth daily    Yes Historical Provider, MD   ezetimibe-simvastatin (VYTORIN) 10-40 MG per tablet Take 1 tablet by mouth nightly. Yes Historical Provider, MD   insulin lispro (HUMALOG) 100 UNIT/ML injection Inject 10-15 Units into the skin 3 times daily (before meals) Use scale if chem > 200   Yes Historical Provider, MD   pregabalin (LYRICA) 50 MG capsule Take 50 mg by mouth 2 times daily. Yes Historical Provider, MD   minoxidil (LONITEN) 10 MG tablet Take 5 mg by mouth daily    Historical Provider, MD   Amino Acids (LIQUACEL) LIQD Take 1 Package by mouth three times a week Monday wednesdsay friday 6/5/17   Historical Provider, MD        Lab data:  CBC:    Recent Labs      11/28/17   1523   WBC  10.1   HGB  10.1*   PLT  204     CMP:  No results for input(s): NA, K, CL, CO2, BUN, CREATININE, GLUCOSE, CALCIUM in the last 72 hours.   Urine:  Recent Labs      11/28/17   1700   COLORU  DK YELLOW*   PHUR  6.0 WBCUA  5-10   RBCUA  OBSCURED   YEAST  NONE SEEN   BACTERIA  NONE   LEUKOCYTESUR  SMALL*   UROBILINOGEN  0.2   BILIRUBINUR  NEGATIVE   BLOODU  LARGE*      Sed Rate: No results for input(s): SEDRATE in the last 72 hours. Radiology       Review of Systems:  Source of data: patient    CONSTITUTIONAL  Fever: none, Chills: none, Weight loss: none, Malaise: none, Fatigue: none, Diaphoresis: none,   Weakness: none    SKIN  Rash: none, Itch: none, Open sores: none    HENT:  Headache: none, Hearing loss: none, Tinnitus: none, Ear pain: none, Ear discharge: none,   Nasal bleeding: none, Congestion: none, Stridor: none, Sore throat: none. EYES  Blurred vision: none, Double vision: none, Photophobia: none, Eye pain: none, Eye discharge: none,  Eye redness: none. CARDIOVASCULAR  Chest pain: none, Arm pain: none, Palpitations: none, Orthopnea: none, Claudication: none,   Leg swelling: none, PND: none. RESPIRATORY  Cough: none, Hemoptysis: none, Sputum production: none, Shortness of breath: none, Wheezing: none    GASTROINTESTINAL  Heartburn: none, Nausea: none, Vomiting: none, Abdominal pain: none, Diarrhea: none,   Constipation: none, Blood in the stool: none, Melena: none, Rebound: none, Rovsing's: none. GENITOURINARY  Dysuria: none, Pyuria: none, Gross hematuria: none, Urgency: none, Flank pain: none, STD: none. MUSCULOSKELETAL  Myalgia: none, Neck pain: none, Thoracic pain: none, Lumbar pain: none, Joint pain: none, Falls: none    ENDOCRINE/HEMATOLOGY/ALLERGIC  Easy bruising: none, Easy bleeding: none, Environmental allergies: none, Polydipsia: none.     NEUROLOGICAL  Dizziness: none, Tingling: none, Numbness: none, Tremor: none, Sensory change: none,   Speech change: none, Focal weakness: none, Seizures: none, Loss of consciousness: none,    PSYCHIATRIC  Depression: none, Suicidal ideation: none, Heroin abuse: none, Cocaine abuse: none,   Marijuana abuse: none, Hallucinations: none, Anxiety: none, Memory loss: none       Physical Examination:  BP (!) 159/75   Pulse 77   Temp 97.8 °F (36.6 °C) (Oral)   Resp 20   Ht 6' 1\" (1.854 m)   Wt 253 lb 12.8 oz (115.1 kg)   SpO2 96%   BMI 33.48 kg/m²       General appearance: alert and cooperative with exam  HEENT: Head: Normocephalic, no lesions, without obvious abnormality. Neck: no adenopathy, no carotid bruit and no JVD  Lungs: clear to auscultation bilaterally  Heart: regular rate and rhythm, S1, S2 normal, no murmur, click, rub or gallop  Abdomen: soft, non-tender; bowel sounds normal; no masses,  no organomegaly. No rebouind  Extremities: extremities normal, atraumatic, no cyanosis or edema  Neurologic: Mental status: Alert, oriented, thought content appropriate  PSY: No evidence of depression. Mood is normal for the patient. Skin: Warm and dry. No unusual lesions or rashes noted. Muscles: Hand  is equal and strong bilaterally. Leg strength is equal and strong. Skeletal: No bony or skeletal abnormalities noted. Admission weight: 253 lb 12.8 oz (115.1 kg)  Wt Readings from Last 3 Encounters:   11/28/17 253 lb 12.8 oz (115.1 kg)   10/30/17 237 lb (107.5 kg)   10/04/17 237 lb 12.8 oz (107.9 kg)     Body mass index is 33.48 kg/m². Clinical Impressions:    1. ESRD from Dm and HTN  2. Sudden onset of right flank pain with blood in the right kidney collecting system. I suspect that he may have ruptured a cyst.  3. DM  4. HTN  5. CAD  6. S/P CABG  7. Anemia from kidney disease. Plan of Care    1. Will continue peritoneal dialysis       Total time spent interviewing the patient and/or family, evaluating lab data and X-ray data and processing orders was 55 minutes. I personally spent more than 50% of the visit time face to face with the patient in counseling and coordination of the patient's care. Armin Thornton DO  [facility].

## 2017-11-29 NOTE — PROGRESS NOTES
Patient drained, with little output 200ml. Attempted to move and reposition patient. Patient stated that he believes he unhooked himself this morning from cycler without being instilled.

## 2017-11-29 NOTE — PROGRESS NOTES
[facility]         Renal Inpatient Follow-Up note         11/29/2017 10:41 AM    Pt Name:   Kassy Aguirre  YOB: 1960  Attending:   Jose C Barraza MD    Chief Complaint : Kassy Aguirre is a 61 y.o. male being followed by nephrology for ESRD on PD    Interval History :   Patient seen and examined by me. No distress  Good UF on 2.5 PD, 800 mL UF last night, 150 so far today. Denies SOB, NVD, chest pain. Flank pain is \"all but gone\". Urology has seen and cleared to go home today.    -130     Scheduled Medications :    sodium chloride flush  10 mL Intravenous 2 times per day    docusate sodium  100 mg Oral BID    insulin lispro  0-12 Units Subcutaneous TID WC    insulin lispro  0-6 Units Subcutaneous Nightly    calcitRIOL  0.25 mcg Oral Once per day on Sun Mon Wed Fri    calcium carbonate  5 tablet Oral TID WC    carvedilol  3.125 mg Oral BID    FLUoxetine  20 mg Oral Daily    insulin glargine  45 Units Subcutaneous QAM AC    pantoprazole  40 mg Oral Daily    simvastatin  40 mg Oral Nightly    dianeal lo-alessio 2.5%  2,000 mL Intraperitoneal 4x Daily    cefTRIAXone (ROCEPHIN) IV  1 g Intravenous Q24H      dextrose         Vitals :  /70   Pulse 84   Temp 98.7 °F (37.1 °C) (Oral)   Resp 18   Ht 6' 1\" (1.854 m)   Wt 253 lb 12.8 oz (115.1 kg)   SpO2 97%   BMI 33.48 kg/m²     24HR INTAKE/OUTPUT:      Intake/Output Summary (Last 24 hours) at 11/29/17 1041  Last data filed at 11/29/17 1023   Gross per 24 hour   Intake             6730 ml   Output             5375 ml   Net             1355 ml       Last 3 Weights  Wt Readings from Last 3 Encounters:   11/28/17 253 lb 12.8 oz (115.1 kg)   10/30/17 237 lb (107.5 kg)   10/04/17 237 lb 12.8 oz (107.9 kg)           Physical Exam :  Constitutional: alert and cooperative with exam, appears comfortable, no distress  Oral: moist oral mucus membranes  Neck: No JVD  Lungs: Clear  Heart: regular rate and rhythm, S1, S2

## 2017-11-29 NOTE — FLOWSHEET NOTE
11/29/17 0806   Provider Notification   Reason for Communication Review case   Provider Name Dr Carole Velasco   Provider Notification Physician   Method of Communication Call   Response See orders   Notification Time 0800   update on potassium 3.1, instructed to place order for 40 mEq potassium PO once.  See orders

## 2017-11-29 NOTE — PLAN OF CARE
Problem: Pain:  Goal: Pain level will decrease  Pain level will decrease   Outcome: Ongoing  Patient admitted with abdominal pain. Patient rated pain of 6 on scale 0-10. PRN pain medication given. Problem: Falls - Risk of  Goal: Absence of falls  Outcome: Ongoing  Patient free of falls. Nonskid socks on. Call light in reach. Bed alarm on. Patient up with one assist.     Comments: Care plan reviewed with patient. Patient verbalize understanding of the plan of care and contribute to goal setting.

## 2017-11-29 NOTE — DISCHARGE SUMMARY
Hospital Medicine Discharge Summary      Patient Identification:   Levi Borja   : 1960  MRN: [id number]   Account: [de-identified]      Patient's PCP: Parvez Doss MD    Admit Date: 2017     Discharge Date: 2017     Admitting Physician: Reddy Maldonado MD     Discharge Physician: Vilma Yeh MD     Discharge Diagnoses: Active Hospital Problems    Diagnosis Date Noted    Abdominal pain [R10.9] 2017    Right flank pain [R10.9] 2017    Gross hematuria [R31.0] 2017    Peripheral vascular disease ([facility] [address] [I73.9] 2017    Atheroembolism of both lower extremities ([facility] [address] [E70.161] 2017    Diabetes 1.5, managed as type 1 ([facility] [address] [E10.9] 2017    Encounter for peritoneal dialysis for ESRD ([facility] [address] [N18.6, Z99.2]        The patient was seen and examined on day of discharge and this discharge summary is in conjunction with any daily progress note from day of discharge. Hospital Course:   Levi Borja is a 61 y.o. male admitted to [facility] on 2017 for sudden right flank pain with hematuria. He is on capd. He came to an OSH and was transferred here. A CT of the abdomen showed a small right pleural effusion. He was seen by Dr Jeimy Amaya who felt he had ruptured a renal cyst.  He was feeling much better the next day and was discharged. He will follow up with Dr Jeimy Amaya. .              Exam:     Vitals:  Vitals:    17 0118 17 0338 17 0815 17 1221   BP: 130/69 (!) 110/54 119/70 (!) 110/59   Pulse: 85 82 84 79   Resp: 18 16 18 16   Temp: 98.9 °F (37.2 °C) 98.9 °F (37.2 °C) 98.7 °F (37.1 °C) 99.5 °F (37.5 °C)   TempSrc: Oral Oral Oral Oral   SpO2: 98% 94% 97% 97%   Weight:       Height:         Weight: Weight: 253 lb 12.8 oz (115.1 kg)     24 hour intake/output:  Intake/Output Summary (Last 24 hours) at 17 1830  Last data filed at 17 1023   Gross per 24 hour   Intake             6610 ml   Output voice recognition technology. **       Final report electronically signed by Dr. Arie Starr on 11/29/2017 6:06 AM             Consults:     IP CONSULT TO NEPHROLOGY  IP CONSULT TO UROLOGY    Disposition:    [x] Home       [] TCU       [] Rehab       [] Psych       [] SNF       [] [place]       [] Other-    Condition at Discharge: Stable    Code Status:  Full Code     Patient Instructions:    Discharge lab work: Activity: activity as tolerated  Diet: DIET GENERAL; Low Sodium (2 GM)      Follow-up visits:   Estefanía Melchor MD  [address] [phone]    On 12/20/2017  at 2:15PM    [place] [address]  [phone]    On 12/6/2017  AT 1:45PM         Discharge Medications:      Manjeet Kemp   [place] Medication Instructions [id number]    Printed on:11/29/17 7456   Medication Information                      Amino Acids (LIQUACEL) LIQD  Take 1 Package by mouth three times a week Monday wednesdsay friday             aspirin 81 MG chewable tablet  Take 1 tablet by mouth daily Resume 12/1             calcitRIOL (ROCALTROL) 0.25 MCG capsule  Take 0.25 mcg by mouth four times a week Sunday, Monday, Wednesday and Friday             calcium carbonate (TUMS) 500 MG chewable tablet  Take 5 tablets by mouth 3 times daily (with meals) And 3 tabs as needed with snacks             carvedilol (COREG) 3.125 MG tablet  Take 1 tablet by mouth 2 times daily             ezetimibe-simvastatin (VYTORIN) 10-40 MG per tablet  Take 1 tablet by mouth nightly.              FLUoxetine (PROZAC) 20 MG capsule  Take 20 mg by mouth daily             insulin glargine (LANTUS) 100 UNIT/ML injection  Inject 50 Units into the skin every morning (before breakfast)              insulin lispro (HUMALOG) 100 UNIT/ML injection  Inject 10-15 Units into the skin 3 times daily (before meals) Use scale if chem > 200             minoxidil (LONITEN) 10 MG

## 2017-11-29 NOTE — TELEPHONE ENCOUNTER
Tosin from [facility] called and states that pt is being discharged today and was told by Karley Pak who had done the stent in October that pt would be on the plavix for a short time and pt has not taken it for 2 weeks. Informed her that Karley Pak is out of town, but this pt is a  pt. Please advise.

## 2017-11-29 NOTE — PROGRESS NOTES
Department of Urology  Progress Note      SUBJECTIVE:  No active hematuria at this time, urine today is light brown colored, denies dysuria or difficulty voiding, right flank pain resolved    OBJECTIVE:  Stable, afeb    Physical  VITALS:  /70   Pulse 84   Temp 98.7 °F (37.1 °C) (Oral)   Resp 18   Ht 6' 1\" (1.854 m)   Wt 253 lb 12.8 oz (115.1 kg)   SpO2 97%   BMI 33.48 kg/m²   TEMPERATURE:  Current - Temp: 98.7 °F (37.1 °C); Max - Temp  Av.5 °F (36.9 °C)  Min: 97.8 °F (36.6 °C)  Max: 98.9 °F (37.2 °C)  24HR INTAKE/OUTPUT:    Intake/Output Summary (Last 24 hours) at 17 1046  Last data filed at 17 1023   Gross per 24 hour   Intake             6730 ml   Output             5375 ml   Net             1355 ml     CONSTITUTIONAL:  awake, alert, cooperative, no apparent distress, and appears stated age  ABDOMEN:  normal bowel sounds, soft, non-distended, non-tender, no flank pain  NEUROLOGIC:  Awake, alert, oriented to name, place and time. Cranial nerves II-XII are grossly intact. Motor is 5 out of 5 bilaterally  SKIN:  ernestina toes with blood filled blisters, no open wound    Data  CBC with Differential:    Lab Results   Component Value Date    WBC 8.1 2017    RBC 3.35 2017    HGB 9.9 2017    HCT 29.9 2017     2017    MCV 89.2 2017    MCH 29.6 2017    MCHC 33.2 2017    RDW 16.6 2017    NRBC 0 2017    SEGSPCT 78.7 2017    METASPCT 1 2017    MONOPCT 9.6 2017    MYELOPCT 1 2017    MONOSABS 0.8 2017    LYMPHSABS 0.7 2017    EOSABS 0.2 2017    BASOSABS 0.0 2017    DIFFTYPE see below 2017       Renal US 17  No hydronephrosis. Possible subcentimeter nonobstructing right renal calculi versus arterial calcifications. Nearly empty urinary bladder. Small amount of fluid in Morison's pouch and small amount of fluid in the pelvis.      CT outside facility: reviewed report      ASSESSMENT AND PLAN  60yM with ESRD on PD and oliguria and new onset of gross hematuria (resolving) and mild right flank pain (resolved)  s/p LE angiogram with angioplasty of left popliteal artery on 10/30/2017. Hematuria likely 2/2 UTI (UCX pending, UA indicative of possible UTI). Started on Rocephin. PLAN    From urology standpoint can be discharged to home. F/u with urologist for hematuria workup as outpat (will need cystoscopy and bilateral retrograde pyelogram). Please discharge with oral Ciprofloxacin 250mg q24h after PD x 5 days. Plan discussed with nurse and patient. All questions addressed.     Nina uV MD PhD

## 2017-11-30 LAB — URINE CULTURE REFLEX: NORMAL

## 2017-12-06 NOTE — TELEPHONE ENCOUNTER
He needed Plavix for two-three weeks after the procedure, which was a balloon angioplasty without stent.

## 2018-01-01 ENCOUNTER — HOSPITAL ENCOUNTER (INPATIENT)
Age: 58
LOS: 5 days | Discharge: HOME | DRG: 617 | End: 2018-10-21
Attending: PODIATRIST | Admitting: PODIATRIST
Payer: MEDICARE

## 2018-01-01 ENCOUNTER — OFFICE VISIT (OUTPATIENT)
Dept: CARDIOLOGY CLINIC | Age: 58
End: 2018-01-01
Payer: MEDICARE

## 2018-01-01 ENCOUNTER — APPOINTMENT (OUTPATIENT)
Dept: INTERVENTIONAL RADIOLOGY/VASCULAR | Age: 58
DRG: 617 | End: 2018-01-01
Attending: PODIATRIST
Payer: MEDICARE

## 2018-01-01 ENCOUNTER — HOSPITAL ENCOUNTER (OUTPATIENT)
Dept: INTERVENTIONAL RADIOLOGY/VASCULAR | Age: 58
Discharge: HOME | End: 2018-12-31
Payer: MEDICARE

## 2018-01-01 ENCOUNTER — APPOINTMENT (OUTPATIENT)
Dept: GENERAL RADIOLOGY | Age: 58
DRG: 617 | End: 2018-01-01
Payer: MEDICARE

## 2018-01-01 ENCOUNTER — APPOINTMENT (OUTPATIENT)
Dept: GENERAL RADIOLOGY | Age: 58
DRG: 377 | End: 2018-01-01
Attending: HOSPITALIST
Payer: MEDICARE

## 2018-01-01 ENCOUNTER — HOSPITAL ENCOUNTER (INPATIENT)
Age: 58
LOS: 5 days | Discharge: ANOTHER HOSPITAL | DRG: 377 | End: 2018-11-13
Attending: HOSPITALIST | Admitting: HOSPITALIST
Payer: MEDICARE

## 2018-01-01 ENCOUNTER — APPOINTMENT (OUTPATIENT)
Dept: CT IMAGING | Age: 58
DRG: 617 | End: 2018-01-01
Attending: PODIATRIST
Payer: MEDICARE

## 2018-01-01 ENCOUNTER — NURSE ONLY (OUTPATIENT)
Dept: CARDIOLOGY CLINIC | Age: 58
End: 2018-01-01
Payer: MEDICARE

## 2018-01-01 ENCOUNTER — APPOINTMENT (OUTPATIENT)
Dept: GENERAL RADIOLOGY | Age: 58
DRG: 617 | End: 2018-01-01
Attending: PODIATRIST
Payer: MEDICARE

## 2018-01-01 ENCOUNTER — APPOINTMENT (OUTPATIENT)
Dept: INTERVENTIONAL RADIOLOGY/VASCULAR | Age: 58
DRG: 377 | End: 2018-01-01
Attending: HOSPITALIST
Payer: MEDICARE

## 2018-01-01 ENCOUNTER — HOSPITAL ENCOUNTER (EMERGENCY)
Age: 58
Discharge: HOME | DRG: 617 | End: 2018-10-15
Attending: FAMILY MEDICINE
Payer: MEDICARE

## 2018-01-01 VITALS
DIASTOLIC BLOOD PRESSURE: 55 MMHG | WEIGHT: 253.4 LBS | SYSTOLIC BLOOD PRESSURE: 127 MMHG | BODY MASS INDEX: 33.58 KG/M2 | TEMPERATURE: 98.2 F | RESPIRATION RATE: 18 BRPM | HEART RATE: 63 BPM | HEIGHT: 73 IN | OXYGEN SATURATION: 96 %

## 2018-01-01 VITALS
HEART RATE: 74 BPM | SYSTOLIC BLOOD PRESSURE: 119 MMHG | OXYGEN SATURATION: 95 % | TEMPERATURE: 97.5 F | DIASTOLIC BLOOD PRESSURE: 60 MMHG | WEIGHT: 260 LBS | RESPIRATION RATE: 16 BRPM | BODY MASS INDEX: 34.46 KG/M2 | HEIGHT: 73 IN

## 2018-01-01 VITALS
DIASTOLIC BLOOD PRESSURE: 74 MMHG | HEIGHT: 73 IN | BODY MASS INDEX: 32.47 KG/M2 | HEART RATE: 80 BPM | WEIGHT: 245 LBS | SYSTOLIC BLOOD PRESSURE: 116 MMHG

## 2018-01-01 VITALS
HEART RATE: 75 BPM | TEMPERATURE: 98.3 F | OXYGEN SATURATION: 94 % | HEIGHT: 73 IN | WEIGHT: 250 LBS | BODY MASS INDEX: 33.13 KG/M2 | RESPIRATION RATE: 17 BRPM | DIASTOLIC BLOOD PRESSURE: 50 MMHG | SYSTOLIC BLOOD PRESSURE: 128 MMHG

## 2018-01-01 VITALS
DIASTOLIC BLOOD PRESSURE: 60 MMHG | HEIGHT: 73 IN | SYSTOLIC BLOOD PRESSURE: 130 MMHG | RESPIRATION RATE: 16 BRPM | WEIGHT: 252.1 LBS | BODY MASS INDEX: 33.41 KG/M2 | TEMPERATURE: 98.2 F | HEART RATE: 65 BPM | OXYGEN SATURATION: 94 %

## 2018-01-01 DIAGNOSIS — Z95.2 S/P AVR: ICD-10-CM

## 2018-01-01 DIAGNOSIS — Z95.0 PACEMAKER: ICD-10-CM

## 2018-01-01 DIAGNOSIS — I10 ESSENTIAL HYPERTENSION: ICD-10-CM

## 2018-01-01 DIAGNOSIS — M86.10 OTHER ACUTE OSTEOMYELITIS, UNSPECIFIED SITE (HCC): Primary | ICD-10-CM

## 2018-01-01 DIAGNOSIS — Z99.2 STAGE 5 CHRONIC KIDNEY DISEASE ON CHRONIC DIALYSIS (HCC): ICD-10-CM

## 2018-01-01 DIAGNOSIS — N18.6 STAGE 5 CHRONIC KIDNEY DISEASE ON CHRONIC DIALYSIS (HCC): ICD-10-CM

## 2018-01-01 DIAGNOSIS — E78.01 FAMILIAL HYPERCHOLESTEROLEMIA: ICD-10-CM

## 2018-01-01 DIAGNOSIS — Z95.0 PACEMAKER: Primary | ICD-10-CM

## 2018-01-01 DIAGNOSIS — I25.10 CORONARY ARTERY DISEASE INVOLVING NATIVE CORONARY ARTERY OF NATIVE HEART WITHOUT ANGINA PECTORIS: ICD-10-CM

## 2018-01-01 LAB
ABO CHECK: NORMAL
ABO CHECK: NORMAL
ABSOLUTE RETIC #: 137 THOU/MM3 (ref 20–115)
AEROBIC CULTURE: ABNORMAL
AEROBIC CULTURE: NORMAL
ALBUMIN SERPL-MCNC: 3.7 G/DL (ref 3.5–5.1)
ALBUMIN SERPL-MCNC: 3.7 G/DL (ref 3.5–5.1)
ALBUMIN SERPL-MCNC: 4.1 G/DL (ref 3.5–5.1)
ALP BLD-CCNC: 125 U/L (ref 38–126)
ALP BLD-CCNC: 132 U/L (ref 38–126)
ALP BLD-CCNC: 134 U/L (ref 38–126)
ALT SERPL-CCNC: 12 U/L (ref 11–66)
ALT SERPL-CCNC: 12 U/L (ref 11–66)
ALT SERPL-CCNC: 9 U/L (ref 11–66)
AMYLASE: 36 U/L (ref 20–104)
ANAEROBIC CULTURE: ABNORMAL
ANAEROBIC CULTURE: NORMAL
ANION GAP SERPL CALCULATED.3IONS-SCNC: 16 MEQ/L (ref 8–16)
ANION GAP SERPL CALCULATED.3IONS-SCNC: 18 MEQ/L (ref 8–16)
ANION GAP SERPL CALCULATED.3IONS-SCNC: 18 MEQ/L (ref 8–16)
ANION GAP SERPL CALCULATED.3IONS-SCNC: 19 MEQ/L (ref 8–16)
ANION GAP SERPL CALCULATED.3IONS-SCNC: 20 MEQ/L (ref 8–16)
ANION GAP SERPL CALCULATED.3IONS-SCNC: 22 MEQ/L (ref 8–16)
APTT: 38.7 SECONDS (ref 22–38)
AST SERPL-CCNC: 17 U/L (ref 5–40)
AST SERPL-CCNC: 22 U/L (ref 5–40)
AST SERPL-CCNC: 24 U/L (ref 5–40)
AVERAGE GLUCOSE: 123 MG/DL (ref 70–126)
BACTERIA: ABNORMAL /HPF
BASOPHILS # BLD: 0.5 %
BASOPHILS # BLD: 0.5 %
BASOPHILS # BLD: 0.6 %
BASOPHILS # BLD: 0.6 %
BASOPHILS ABSOLUTE: 0 THOU/MM3 (ref 0–0.1)
BILIRUB SERPL-MCNC: 0.4 MG/DL (ref 0.3–1.2)
BILIRUB SERPL-MCNC: 0.4 MG/DL (ref 0.3–1.2)
BILIRUB SERPL-MCNC: 0.5 MG/DL (ref 0.3–1.2)
BILIRUBIN URINE: ABNORMAL
BLOOD CULTURE, ROUTINE: NORMAL
BLOOD, URINE: ABNORMAL
BUN BLDV-MCNC: 34 MG/DL (ref 7–22)
BUN BLDV-MCNC: 36 MG/DL (ref 7–22)
BUN BLDV-MCNC: 37 MG/DL (ref 7–22)
BUN BLDV-MCNC: 47 MG/DL (ref 7–22)
BUN BLDV-MCNC: 53 MG/DL (ref 7–22)
BUN BLDV-MCNC: 54 MG/DL (ref 7–22)
BUN BLDV-MCNC: 63 MG/DL (ref 7–22)
BUN BLDV-MCNC: 83 MG/DL (ref 7–22)
C-REACTIVE PROTEIN: 1.17 MG/DL (ref 0–1)
CALCIUM SERPL-MCNC: 8.5 MG/DL (ref 8.5–10.5)
CALCIUM SERPL-MCNC: 8.6 MG/DL (ref 8.5–10.5)
CALCIUM SERPL-MCNC: 8.9 MG/DL (ref 8.5–10.5)
CALCIUM SERPL-MCNC: 9 MG/DL (ref 8.5–10.5)
CALCIUM SERPL-MCNC: 9 MG/DL (ref 8.5–10.5)
CALCIUM SERPL-MCNC: 9.1 MG/DL (ref 8.5–10.5)
CALCIUM SERPL-MCNC: 9.2 MG/DL (ref 8.5–10.5)
CALCIUM SERPL-MCNC: 9.6 MG/DL (ref 8.5–10.5)
CASTS 2: ABNORMAL /LPF
CASTS UA: ABNORMAL /LPF
CHARACTER, URINE: ABNORMAL
CHLORIDE BLD-SCNC: 100 MEQ/L (ref 98–111)
CHLORIDE BLD-SCNC: 100 MEQ/L (ref 98–111)
CHLORIDE BLD-SCNC: 92 MEQ/L (ref 98–111)
CHLORIDE BLD-SCNC: 92 MEQ/L (ref 98–111)
CHLORIDE BLD-SCNC: 93 MEQ/L (ref 98–111)
CHLORIDE BLD-SCNC: 95 MEQ/L (ref 98–111)
CHLORIDE BLD-SCNC: 96 MEQ/L (ref 98–111)
CHLORIDE BLD-SCNC: 98 MEQ/L (ref 98–111)
CHOLESTEROL, TOTAL: 69 MG/DL (ref 100–199)
CO2: 20 MEQ/L (ref 23–33)
CO2: 22 MEQ/L (ref 23–33)
CO2: 22 MEQ/L (ref 23–33)
CO2: 23 MEQ/L (ref 23–33)
CO2: 24 MEQ/L (ref 23–33)
CO2: 24 MEQ/L (ref 23–33)
CO2: 25 MEQ/L (ref 23–33)
CO2: 27 MEQ/L (ref 23–33)
COLOR: ABNORMAL
CREAT SERPL-MCNC: 4.3 MG/DL (ref 0.4–1.2)
CREAT SERPL-MCNC: 5 MG/DL (ref 0.4–1.2)
CREAT SERPL-MCNC: 5.4 MG/DL (ref 0.4–1.2)
CREAT SERPL-MCNC: 5.7 MG/DL (ref 0.4–1.2)
CREAT SERPL-MCNC: 6.1 MG/DL (ref 0.4–1.2)
CREAT SERPL-MCNC: 6.8 MG/DL (ref 0.4–1.2)
CREAT SERPL-MCNC: 7.5 MG/DL (ref 0.4–1.2)
CREAT SERPL-MCNC: 8 MG/DL (ref 0.4–1.2)
CRYSTALS, UA: ABNORMAL
EKG ATRIAL RATE: 66 BPM
EKG ATRIAL RATE: 81 BPM
EKG P AXIS: -17 DEGREES
EKG P AXIS: 40 DEGREES
EKG P-R INTERVAL: 264 MS
EKG P-R INTERVAL: 268 MS
EKG Q-T INTERVAL: 466 MS
EKG Q-T INTERVAL: 532 MS
EKG QRS DURATION: 156 MS
EKG QRS DURATION: 160 MS
EKG QTC CALCULATION (BAZETT): 524 MS
EKG QTC CALCULATION (BAZETT): 557 MS
EKG R AXIS: 151 DEGREES
EKG R AXIS: 154 DEGREES
EKG T AXIS: -140 DEGREES
EKG T AXIS: 10 DEGREES
EKG VENTRICULAR RATE: 66 BPM
EKG VENTRICULAR RATE: 76 BPM
EOSINOPHIL # BLD: 2.7 %
EOSINOPHIL # BLD: 2.9 %
EOSINOPHIL # BLD: 3.6 %
EOSINOPHIL # BLD: 3.9 %
EOSINOPHILS ABSOLUTE: 0.1 THOU/MM3 (ref 0–0.4)
EOSINOPHILS ABSOLUTE: 0.2 THOU/MM3 (ref 0–0.4)
EOSINOPHILS ABSOLUTE: 0.2 THOU/MM3 (ref 0–0.4)
EOSINOPHILS ABSOLUTE: 0.3 THOU/MM3 (ref 0–0.4)
EPITHELIAL CELLS, UA: ABNORMAL /HPF
ERYTHROCYTE [DISTWIDTH] IN BLOOD BY AUTOMATED COUNT: 16 % (ref 11.5–14.5)
ERYTHROCYTE [DISTWIDTH] IN BLOOD BY AUTOMATED COUNT: 16 % (ref 11.5–14.5)
ERYTHROCYTE [DISTWIDTH] IN BLOOD BY AUTOMATED COUNT: 16.2 % (ref 11.5–14.5)
ERYTHROCYTE [DISTWIDTH] IN BLOOD BY AUTOMATED COUNT: 16.3 % (ref 11.5–14.5)
ERYTHROCYTE [DISTWIDTH] IN BLOOD BY AUTOMATED COUNT: 17.9 % (ref 11.5–14.5)
ERYTHROCYTE [DISTWIDTH] IN BLOOD BY AUTOMATED COUNT: 18.1 % (ref 11.5–14.5)
ERYTHROCYTE [DISTWIDTH] IN BLOOD BY AUTOMATED COUNT: 18.5 % (ref 11.5–14.5)
ERYTHROCYTE [DISTWIDTH] IN BLOOD BY AUTOMATED COUNT: 18.5 % (ref 11.5–14.5)
ERYTHROCYTE [DISTWIDTH] IN BLOOD BY AUTOMATED COUNT: 18.6 % (ref 11.5–14.5)
ERYTHROCYTE [DISTWIDTH] IN BLOOD BY AUTOMATED COUNT: 19.3 % (ref 11.5–14.5)
ERYTHROCYTE [DISTWIDTH] IN BLOOD BY AUTOMATED COUNT: 19.9 % (ref 11.5–14.5)
ERYTHROCYTE [DISTWIDTH] IN BLOOD BY AUTOMATED COUNT: 20.1 % (ref 11.5–14.5)
ERYTHROCYTE [DISTWIDTH] IN BLOOD BY AUTOMATED COUNT: 52.8 FL (ref 35–45)
ERYTHROCYTE [DISTWIDTH] IN BLOOD BY AUTOMATED COUNT: 53.4 FL (ref 35–45)
ERYTHROCYTE [DISTWIDTH] IN BLOOD BY AUTOMATED COUNT: 54.4 FL (ref 35–45)
ERYTHROCYTE [DISTWIDTH] IN BLOOD BY AUTOMATED COUNT: 54.7 FL (ref 35–45)
ERYTHROCYTE [DISTWIDTH] IN BLOOD BY AUTOMATED COUNT: 55.5 FL (ref 35–45)
ERYTHROCYTE [DISTWIDTH] IN BLOOD BY AUTOMATED COUNT: 55.8 FL (ref 35–45)
ERYTHROCYTE [DISTWIDTH] IN BLOOD BY AUTOMATED COUNT: 56 FL (ref 35–45)
ERYTHROCYTE [DISTWIDTH] IN BLOOD BY AUTOMATED COUNT: 59 FL (ref 35–45)
ERYTHROCYTE [DISTWIDTH] IN BLOOD BY AUTOMATED COUNT: 59.3 FL (ref 35–45)
ERYTHROCYTE [DISTWIDTH] IN BLOOD BY AUTOMATED COUNT: 59.4 FL (ref 35–45)
ERYTHROCYTE [DISTWIDTH] IN BLOOD BY AUTOMATED COUNT: 63.4 FL (ref 35–45)
ERYTHROCYTE [DISTWIDTH] IN BLOOD BY AUTOMATED COUNT: 64.6 FL (ref 35–45)
FERRITIN: 242 NG/ML (ref 22–322)
FERRITIN: 384 NG/ML (ref 22–322)
FOLATE: 18.5 NG/ML (ref 4.8–24.2)
GEL INDIRECT COOMBS: NORMAL
GEL INDIRECT COOMBS: NORMAL
GFR SERPL CREATININE-BSD FRML MDRD: 10 ML/MIN/1.73M2
GFR SERPL CREATININE-BSD FRML MDRD: 11 ML/MIN/1.73M2
GFR SERPL CREATININE-BSD FRML MDRD: 12 ML/MIN/1.73M2
GFR SERPL CREATININE-BSD FRML MDRD: 14 ML/MIN/1.73M2
GFR SERPL CREATININE-BSD FRML MDRD: 7 ML/MIN/1.73M2
GFR SERPL CREATININE-BSD FRML MDRD: 7 ML/MIN/1.73M2
GFR SERPL CREATININE-BSD FRML MDRD: 8 ML/MIN/1.73M2
GFR SERPL CREATININE-BSD FRML MDRD: 9 ML/MIN/1.73M2
GLUCOSE BLD-MCNC: 129 MG/DL (ref 70–108)
GLUCOSE BLD-MCNC: 147 MG/DL (ref 70–108)
GLUCOSE BLD-MCNC: 147 MG/DL (ref 70–108)
GLUCOSE BLD-MCNC: 150 MG/DL (ref 70–108)
GLUCOSE BLD-MCNC: 156 MG/DL (ref 70–108)
GLUCOSE BLD-MCNC: 156 MG/DL (ref 70–108)
GLUCOSE BLD-MCNC: 157 MG/DL (ref 70–108)
GLUCOSE BLD-MCNC: 159 MG/DL (ref 70–108)
GLUCOSE BLD-MCNC: 160 MG/DL (ref 70–108)
GLUCOSE BLD-MCNC: 161 MG/DL (ref 70–108)
GLUCOSE BLD-MCNC: 164 MG/DL (ref 70–108)
GLUCOSE BLD-MCNC: 169 MG/DL (ref 70–108)
GLUCOSE BLD-MCNC: 170 MG/DL (ref 70–108)
GLUCOSE BLD-MCNC: 181 MG/DL (ref 70–108)
GLUCOSE BLD-MCNC: 183 MG/DL (ref 70–108)
GLUCOSE BLD-MCNC: 184 MG/DL (ref 70–108)
GLUCOSE BLD-MCNC: 190 MG/DL (ref 70–108)
GLUCOSE BLD-MCNC: 191 MG/DL (ref 70–108)
GLUCOSE BLD-MCNC: 194 MG/DL (ref 70–108)
GLUCOSE BLD-MCNC: 195 MG/DL (ref 70–108)
GLUCOSE BLD-MCNC: 205 MG/DL (ref 70–108)
GLUCOSE BLD-MCNC: 205 MG/DL (ref 70–108)
GLUCOSE BLD-MCNC: 211 MG/DL (ref 70–108)
GLUCOSE BLD-MCNC: 216 MG/DL (ref 70–108)
GLUCOSE BLD-MCNC: 222 MG/DL (ref 70–108)
GLUCOSE BLD-MCNC: 223 MG/DL (ref 70–108)
GLUCOSE BLD-MCNC: 232 MG/DL (ref 70–108)
GLUCOSE BLD-MCNC: 233 MG/DL (ref 70–108)
GLUCOSE BLD-MCNC: 247 MG/DL (ref 70–108)
GLUCOSE URINE: 250 MG/DL
GRAM STAIN RESULT: ABNORMAL
GRAM STAIN RESULT: NORMAL
HBA1C MFR BLD: 6.1 % (ref 4.4–6.4)
HCT VFR BLD CALC: 20.1 % (ref 42–52)
HCT VFR BLD CALC: 22.3 % (ref 42–52)
HCT VFR BLD CALC: 22.3 % (ref 42–52)
HCT VFR BLD CALC: 22.5 % (ref 42–52)
HCT VFR BLD CALC: 22.6 % (ref 42–52)
HCT VFR BLD CALC: 22.9 % (ref 42–52)
HCT VFR BLD CALC: 23.5 % (ref 42–52)
HCT VFR BLD CALC: 23.8 % (ref 42–52)
HCT VFR BLD CALC: 23.8 % (ref 42–52)
HCT VFR BLD CALC: 23.9 % (ref 42–52)
HCT VFR BLD CALC: 24.1 % (ref 42–52)
HCT VFR BLD CALC: 24.3 % (ref 42–52)
HCT VFR BLD CALC: 24.6 % (ref 42–52)
HCT VFR BLD CALC: 24.7 % (ref 42–52)
HCT VFR BLD CALC: 28 % (ref 42–52)
HCT VFR BLD CALC: 30.2 % (ref 42–52)
HCT VFR BLD CALC: 30.3 % (ref 42–52)
HCT VFR BLD CALC: 31.4 % (ref 42–52)
HCT VFR BLD CALC: 32.1 % (ref 42–52)
HDLC SERPL-MCNC: 19 MG/DL
HEMOCCULT STL QL: POSITIVE
HEMOGLOBIN: 6.1 GM/DL (ref 14–18)
HEMOGLOBIN: 6.6 GM/DL (ref 14–18)
HEMOGLOBIN: 6.8 GM/DL (ref 14–18)
HEMOGLOBIN: 7 GM/DL (ref 14–18)
HEMOGLOBIN: 7.1 GM/DL (ref 14–18)
HEMOGLOBIN: 7.3 GM/DL (ref 14–18)
HEMOGLOBIN: 7.4 GM/DL (ref 14–18)
HEMOGLOBIN: 7.5 GM/DL (ref 14–18)
HEMOGLOBIN: 7.9 GM/DL (ref 14–18)
HEMOGLOBIN: 8.1 GM/DL (ref 14–18)
HEMOGLOBIN: 8.4 GM/DL (ref 14–18)
HEMOGLOBIN: 9.3 GM/DL (ref 14–18)
HEMOGLOBIN: 9.4 GM/DL (ref 14–18)
HEMOGLOBIN: 9.7 GM/DL (ref 14–18)
HEMOGLOBIN: 9.9 GM/DL (ref 14–18)
ICTOTEST: NEGATIVE
IMMATURE GRANS (ABS): 0.03 THOU/MM3 (ref 0–0.07)
IMMATURE GRANS (ABS): 0.06 THOU/MM3 (ref 0–0.07)
IMMATURE GRANS (ABS): 0.07 THOU/MM3 (ref 0–0.07)
IMMATURE GRANS (ABS): 0.07 THOU/MM3 (ref 0–0.07)
IMMATURE GRANULOCYTES: 0.6 %
IMMATURE GRANULOCYTES: 0.8 %
IMMATURE GRANULOCYTES: 1 %
IMMATURE GRANULOCYTES: 1 %
IMMATURE RETIC FRACT: 42.1 % (ref 2.3–13.4)
INR BLD: 1.61 (ref 0.85–1.13)
INR BLD: 1.67 (ref 0.85–1.13)
INR BLD: 1.74 (ref 0.85–1.13)
INR BLD: 1.86 (ref 0.85–1.13)
INR BLD: 1.91 (ref 0.85–1.13)
INR BLD: 1.92 (ref 0.85–1.13)
INR BLD: 1.96 (ref 0.85–1.13)
INR BLD: 2 (ref 0.85–1.13)
IRON SATURATION: 14 % (ref 20–50)
IRON SATURATION: 18 % (ref 20–50)
IRON: 31 UG/DL (ref 65–195)
IRON: 45 UG/DL (ref 65–195)
KETONES, URINE: ABNORMAL
LACTIC ACID, SEPSIS: 1.9 MMOL/L (ref 0.5–1.9)
LACTIC ACID: 1.5 MMOL/L (ref 0.5–2.2)
LDL CHOLESTEROL CALCULATED: 19 MG/DL
LEUKOCYTE ESTERASE, URINE: ABNORMAL
LIPASE: 51 U/L (ref 5.6–51.3)
LV EF: 55 %
LVEF MODALITY: NORMAL
LYMPHOCYTES # BLD: 10.8 %
LYMPHOCYTES # BLD: 6.3 %
LYMPHOCYTES # BLD: 8.6 %
LYMPHOCYTES # BLD: 9.6 %
LYMPHOCYTES ABSOLUTE: 0.4 THOU/MM3 (ref 1–4.8)
LYMPHOCYTES ABSOLUTE: 0.5 THOU/MM3 (ref 1–4.8)
LYMPHOCYTES ABSOLUTE: 0.6 THOU/MM3 (ref 1–4.8)
LYMPHOCYTES ABSOLUTE: 0.7 THOU/MM3 (ref 1–4.8)
MAGNESIUM: 1.8 MG/DL (ref 1.6–2.4)
MAGNESIUM: 1.9 MG/DL (ref 1.6–2.4)
MCH RBC QN AUTO: 26.9 PG (ref 26–33)
MCH RBC QN AUTO: 27.9 PG (ref 26–33)
MCH RBC QN AUTO: 28.1 PG (ref 26–33)
MCH RBC QN AUTO: 28.5 PG (ref 26–33)
MCH RBC QN AUTO: 28.6 PG (ref 26–33)
MCH RBC QN AUTO: 28.7 PG (ref 26–33)
MCH RBC QN AUTO: 28.8 PG (ref 26–33)
MCH RBC QN AUTO: 29 PG (ref 26–33)
MCH RBC QN AUTO: 29.1 PG (ref 26–33)
MCH RBC QN AUTO: 29.2 PG (ref 26–33)
MCH RBC QN AUTO: 29.3 PG (ref 26–33)
MCH RBC QN AUTO: 29.4 PG (ref 26–33)
MCHC RBC AUTO-ENTMCNC: 29.4 GM/DL (ref 32.2–35.5)
MCHC RBC AUTO-ENTMCNC: 30 GM/DL (ref 32.2–35.5)
MCHC RBC AUTO-ENTMCNC: 30.2 GM/DL (ref 32.2–35.5)
MCHC RBC AUTO-ENTMCNC: 30.3 GM/DL (ref 32.2–35.5)
MCHC RBC AUTO-ENTMCNC: 30.4 GM/DL (ref 32.2–35.5)
MCHC RBC AUTO-ENTMCNC: 30.7 GM/DL (ref 32.2–35.5)
MCHC RBC AUTO-ENTMCNC: 30.8 GM/DL (ref 32.2–35.5)
MCHC RBC AUTO-ENTMCNC: 30.9 GM/DL (ref 32.2–35.5)
MCHC RBC AUTO-ENTMCNC: 31 GM/DL (ref 32.2–35.5)
MCHC RBC AUTO-ENTMCNC: 31.1 GM/DL (ref 32.2–35.5)
MCHC RBC AUTO-ENTMCNC: 31.1 GM/DL (ref 32.2–35.5)
MCHC RBC AUTO-ENTMCNC: 33.6 GM/DL (ref 32.2–35.5)
MCV RBC AUTO: 87.4 FL (ref 80–94)
MCV RBC AUTO: 89.7 FL (ref 80–94)
MCV RBC AUTO: 91.8 FL (ref 80–94)
MCV RBC AUTO: 91.9 FL (ref 80–94)
MCV RBC AUTO: 92.4 FL (ref 80–94)
MCV RBC AUTO: 92.6 FL (ref 80–94)
MCV RBC AUTO: 93.3 FL (ref 80–94)
MCV RBC AUTO: 93.3 FL (ref 80–94)
MCV RBC AUTO: 95 FL (ref 80–94)
MCV RBC AUTO: 95.6 FL (ref 80–94)
MCV RBC AUTO: 96.2 FL (ref 80–94)
MCV RBC AUTO: 96.5 FL (ref 80–94)
MISCELLANEOUS 2: ABNORMAL
MONOCYTES # BLD: 11.1 %
MONOCYTES # BLD: 12.1 %
MONOCYTES # BLD: 9.1 %
MONOCYTES # BLD: 9.6 %
MONOCYTES ABSOLUTE: 0.6 THOU/MM3 (ref 0.4–1.3)
MONOCYTES ABSOLUTE: 1 THOU/MM3 (ref 0.4–1.3)
MRSA SCREEN RT-PCR: NEGATIVE
MRSA SCREEN: NORMAL
NITRITE, URINE: NEGATIVE
NUCLEATED RED BLOOD CELLS: 0 /100 WBC
ORGANISM: ABNORMAL
OSMOLALITY CALCULATION: 296 MOSMOL/KG (ref 275–300)
PATHOLOGIST REVIEW: ABNORMAL
PH UA: 5.5
PHOSPHORUS: 4.2 MG/DL (ref 2.4–4.7)
PLATELET # BLD: 175 THOU/MM3 (ref 130–400)
PLATELET # BLD: 182 THOU/MM3 (ref 130–400)
PLATELET # BLD: 183 THOU/MM3 (ref 130–400)
PLATELET # BLD: 183 THOU/MM3 (ref 130–400)
PLATELET # BLD: 184 THOU/MM3 (ref 130–400)
PLATELET # BLD: 186 THOU/MM3 (ref 130–400)
PLATELET # BLD: 197 THOU/MM3 (ref 130–400)
PLATELET # BLD: 209 THOU/MM3 (ref 130–400)
PLATELET # BLD: 211 THOU/MM3 (ref 130–400)
PLATELET # BLD: 224 THOU/MM3 (ref 130–400)
PLATELET # BLD: 229 THOU/MM3 (ref 130–400)
PLATELET # BLD: 233 THOU/MM3 (ref 130–400)
PMV BLD AUTO: 10 FL (ref 9.4–12.4)
PMV BLD AUTO: 10.2 FL (ref 9.4–12.4)
PMV BLD AUTO: 10.4 FL (ref 9.4–12.4)
PMV BLD AUTO: 10.4 FL (ref 9.4–12.4)
PMV BLD AUTO: 10.5 FL (ref 9.4–12.4)
PMV BLD AUTO: 9.7 FL (ref 9.4–12.4)
PMV BLD AUTO: 9.8 FL (ref 9.4–12.4)
PMV BLD AUTO: 9.9 FL (ref 9.4–12.4)
POTASSIUM REFLEX MAGNESIUM: 3.6 MEQ/L (ref 3.5–5.2)
POTASSIUM SERPL-SCNC: 3.6 MEQ/L (ref 3.5–5.2)
POTASSIUM SERPL-SCNC: 3.7 MEQ/L (ref 3.5–5.2)
POTASSIUM SERPL-SCNC: 4 MEQ/L (ref 3.5–5.2)
POTASSIUM SERPL-SCNC: 4 MEQ/L (ref 3.5–5.2)
POTASSIUM SERPL-SCNC: 4.7 MEQ/L (ref 3.5–5.2)
POTASSIUM SERPL-SCNC: 5.1 MEQ/L (ref 3.5–5.2)
POTASSIUM SERPL-SCNC: 5.2 MEQ/L (ref 3.5–5.2)
PRO-BNP: ABNORMAL PG/ML (ref 0–900)
PRO-BNP: ABNORMAL PG/ML (ref 0–900)
PROCALCITONIN: 0.25 NG/ML (ref 0.01–0.09)
PROTEIN UA: 300
RBC # BLD: 2.19 MILL/MM3 (ref 4.7–6.1)
RBC # BLD: 2.34 MILL/MM3 (ref 4.7–6.1)
RBC # BLD: 2.46 MILL/MM3 (ref 4.7–6.1)
RBC # BLD: 2.49 MILL/MM3 (ref 4.7–6.1)
RBC # BLD: 2.55 MILL/MM3 (ref 4.7–6.1)
RBC # BLD: 2.56 MILL/MM3 (ref 4.7–6.1)
RBC # BLD: 2.69 MILL/MM3 (ref 4.7–6.1)
RBC # BLD: 3.12 MILL/MM3 (ref 4.7–6.1)
RBC # BLD: 3.19 MILL/MM3 (ref 4.7–6.1)
RBC # BLD: 3.27 MILL/MM3 (ref 4.7–6.1)
RBC # BLD: 3.39 MILL/MM3 (ref 4.7–6.1)
RBC # BLD: 3.44 MILL/MM3 (ref 4.7–6.1)
RBC URINE: > 200 /HPF
RENAL EPITHELIAL, UA: ABNORMAL
RETIC HEMOGLOBIN: 28.8 PG (ref 28.2–35.7)
RETICULOCYTE ABSOLUTE COUNT: 6.2 % (ref 0.5–2)
RH FACTOR: NORMAL
RH FACTOR: NORMAL
SCAN OF BLOOD SMEAR: NORMAL
SEDIMENTATION RATE, ERYTHROCYTE: 50 MM/HR (ref 0–10)
SEDIMENTATION RATE, ERYTHROCYTE: 55 MM/HR (ref 0–10)
SEG NEUTROPHILS: 74.5 %
SEG NEUTROPHILS: 75.1 %
SEG NEUTROPHILS: 75.4 %
SEG NEUTROPHILS: 79.1 %
SEGMENTED NEUTROPHILS ABSOLUTE COUNT: 3.9 THOU/MM3 (ref 1.8–7.7)
SEGMENTED NEUTROPHILS ABSOLUTE COUNT: 4.4 THOU/MM3 (ref 1.8–7.7)
SEGMENTED NEUTROPHILS ABSOLUTE COUNT: 5.5 THOU/MM3 (ref 1.8–7.7)
SEGMENTED NEUTROPHILS ABSOLUTE COUNT: 6.5 THOU/MM3 (ref 1.8–7.7)
SODIUM BLD-SCNC: 132 MEQ/L (ref 135–145)
SODIUM BLD-SCNC: 133 MEQ/L (ref 135–145)
SODIUM BLD-SCNC: 136 MEQ/L (ref 135–145)
SODIUM BLD-SCNC: 136 MEQ/L (ref 135–145)
SODIUM BLD-SCNC: 137 MEQ/L (ref 135–145)
SODIUM BLD-SCNC: 140 MEQ/L (ref 135–145)
SODIUM BLD-SCNC: 142 MEQ/L (ref 135–145)
SODIUM BLD-SCNC: 142 MEQ/L (ref 135–145)
SPECIFIC GRAVITY, URINE: 1.02 (ref 1–1.03)
TOTAL IRON BINDING CAPACITY: 223 UG/DL (ref 171–450)
TOTAL IRON BINDING CAPACITY: 248 UG/DL (ref 171–450)
TOTAL PROTEIN: 7.3 G/DL (ref 6.1–8)
TOTAL PROTEIN: 7.8 G/DL (ref 6.1–8)
TOTAL PROTEIN: 7.9 G/DL (ref 6.1–8)
TRIGL SERPL-MCNC: 128 MG/DL (ref 0–199)
TRIGL SERPL-MCNC: 157 MG/DL (ref 0–199)
TROPONIN T: 0.08 NG/ML
TROPONIN T: 0.08 NG/ML
TROPONIN T: 0.09 NG/ML
TROPONIN T: 0.11 NG/ML
TROPONIN T: 0.12 NG/ML
TROPONIN T: 0.12 NG/ML
URINE CULTURE REFLEX: ABNORMAL
UROBILINOGEN, URINE: 0.2 EU/DL
VANCOMYCIN RESISTANT ENTEROCOCCUS: POSITIVE
VITAMIN B-12: 855 PG/ML (ref 211–911)
WBC # BLD: 10.1 THOU/MM3 (ref 4.8–10.8)
WBC # BLD: 12.1 THOU/MM3 (ref 4.8–10.8)
WBC # BLD: 12.9 THOU/MM3 (ref 4.8–10.8)
WBC # BLD: 13 THOU/MM3 (ref 4.8–10.8)
WBC # BLD: 13.3 THOU/MM3 (ref 4.8–10.8)
WBC # BLD: 15.2 THOU/MM3 (ref 4.8–10.8)
WBC # BLD: 5.2 THOU/MM3 (ref 4.8–10.8)
WBC # BLD: 5.9 THOU/MM3 (ref 4.8–10.8)
WBC # BLD: 6.9 THOU/MM3 (ref 4.8–10.8)
WBC # BLD: 8.6 THOU/MM3 (ref 4.8–10.8)
WBC # BLD: 8.9 THOU/MM3 (ref 4.8–10.8)
WBC # BLD: 9.2 THOU/MM3 (ref 4.8–10.8)
WBC UA: > 200 /HPF
YEAST: ABNORMAL

## 2018-01-01 PROCEDURE — 2580000003 HC RX 258: Performed by: INTERNAL MEDICINE

## 2018-01-01 PROCEDURE — 86905 BLOOD TYPING RBC ANTIGENS: CPT

## 2018-01-01 PROCEDURE — 99233 SBSQ HOSP IP/OBS HIGH 50: CPT | Performed by: INTERNAL MEDICINE

## 2018-01-01 PROCEDURE — G8987 SELF CARE CURRENT STATUS: HCPCS

## 2018-01-01 PROCEDURE — 6370000000 HC RX 637 (ALT 250 FOR IP): Performed by: STUDENT IN AN ORGANIZED HEALTH CARE EDUCATION/TRAINING PROGRAM

## 2018-01-01 PROCEDURE — 88311 DECALCIFY TISSUE: CPT

## 2018-01-01 PROCEDURE — 36415 COLL VENOUS BLD VENIPUNCTURE: CPT

## 2018-01-01 PROCEDURE — 6360000002 HC RX W HCPCS

## 2018-01-01 PROCEDURE — 90935 HEMODIALYSIS ONE EVALUATION: CPT | Performed by: NURSE PRACTITIONER

## 2018-01-01 PROCEDURE — 84478 ASSAY OF TRIGLYCERIDES: CPT

## 2018-01-01 PROCEDURE — 85610 PROTHROMBIN TIME: CPT

## 2018-01-01 PROCEDURE — 6370000000 HC RX 637 (ALT 250 FOR IP): Performed by: INTERNAL MEDICINE

## 2018-01-01 PROCEDURE — 3600000003 HC SURGERY LEVEL 3 BASE: Performed by: PODIATRIST

## 2018-01-01 PROCEDURE — 87077 CULTURE AEROBIC IDENTIFY: CPT

## 2018-01-01 PROCEDURE — G8417 CALC BMI ABV UP PARAM F/U: HCPCS | Performed by: NUCLEAR MEDICINE

## 2018-01-01 PROCEDURE — 3600000013 HC SURGERY LEVEL 3 ADDTL 15MIN: Performed by: PODIATRIST

## 2018-01-01 PROCEDURE — 73700 CT LOWER EXTREMITY W/O DYE: CPT

## 2018-01-01 PROCEDURE — 6360000002 HC RX W HCPCS: Performed by: INTERNAL MEDICINE

## 2018-01-01 PROCEDURE — 3609013000 HC EGD TRANSORAL CONTROL BLEEDING ANY METHOD: Performed by: INTERNAL MEDICINE

## 2018-01-01 PROCEDURE — 85027 COMPLETE CBC AUTOMATED: CPT

## 2018-01-01 PROCEDURE — 80048 BASIC METABOLIC PNL TOTAL CA: CPT

## 2018-01-01 PROCEDURE — 2580000003 HC RX 258: Performed by: HOSPITALIST

## 2018-01-01 PROCEDURE — 85018 HEMOGLOBIN: CPT

## 2018-01-01 PROCEDURE — 83540 ASSAY OF IRON: CPT

## 2018-01-01 PROCEDURE — 90935 HEMODIALYSIS ONE EVALUATION: CPT

## 2018-01-01 PROCEDURE — 6360000002 HC RX W HCPCS: Performed by: STUDENT IN AN ORGANIZED HEALTH CARE EDUCATION/TRAINING PROGRAM

## 2018-01-01 PROCEDURE — 99231 SBSQ HOSP IP/OBS SF/LOW 25: CPT | Performed by: OPHTHALMOLOGY

## 2018-01-01 PROCEDURE — 86922 COMPATIBILITY TEST ANTIGLOB: CPT

## 2018-01-01 PROCEDURE — 82948 REAGENT STRIP/BLOOD GLUCOSE: CPT

## 2018-01-01 PROCEDURE — 84484 ASSAY OF TROPONIN QUANT: CPT

## 2018-01-01 PROCEDURE — 2709999900 HC NON-CHARGEABLE SUPPLY: Performed by: INTERNAL MEDICINE

## 2018-01-01 PROCEDURE — 99231 SBSQ HOSP IP/OBS SF/LOW 25: CPT | Performed by: FAMILY MEDICINE

## 2018-01-01 PROCEDURE — 83550 IRON BINDING TEST: CPT

## 2018-01-01 PROCEDURE — 6370000000 HC RX 637 (ALT 250 FOR IP): Performed by: RADIOLOGY

## 2018-01-01 PROCEDURE — 80053 COMPREHEN METABOLIC PANEL: CPT

## 2018-01-01 PROCEDURE — 93005 ELECTROCARDIOGRAM TRACING: CPT | Performed by: STUDENT IN AN ORGANIZED HEALTH CARE EDUCATION/TRAINING PROGRAM

## 2018-01-01 PROCEDURE — 3017F COLORECTAL CA SCREEN DOC REV: CPT | Performed by: NUCLEAR MEDICINE

## 2018-01-01 PROCEDURE — 99232 SBSQ HOSP IP/OBS MODERATE 35: CPT | Performed by: INTERNAL MEDICINE

## 2018-01-01 PROCEDURE — 86885 COOMBS TEST INDIRECT QUAL: CPT

## 2018-01-01 PROCEDURE — 99239 HOSP IP/OBS DSCHRG MGMT >30: CPT | Performed by: INTERNAL MEDICINE

## 2018-01-01 PROCEDURE — 85014 HEMATOCRIT: CPT

## 2018-01-01 PROCEDURE — 2060000000 HC ICU INTERMEDIATE R&B

## 2018-01-01 PROCEDURE — 0DB68ZX EXCISION OF STOMACH, VIA NATURAL OR ARTIFICIAL OPENING ENDOSCOPIC, DIAGNOSTIC: ICD-10-PCS | Performed by: INTERNAL MEDICINE

## 2018-01-01 PROCEDURE — 93010 ELECTROCARDIOGRAM REPORT: CPT | Performed by: NUCLEAR MEDICINE

## 2018-01-01 PROCEDURE — 71045 X-RAY EXAM CHEST 1 VIEW: CPT

## 2018-01-01 PROCEDURE — 2500000003 HC RX 250 WO HCPCS: Performed by: PODIATRIST

## 2018-01-01 PROCEDURE — 88305 TISSUE EXAM BY PATHOLOGIST: CPT

## 2018-01-01 PROCEDURE — 99223 1ST HOSP IP/OBS HIGH 75: CPT | Performed by: INTERNAL MEDICINE

## 2018-01-01 PROCEDURE — 2709999900 HC NON-CHARGEABLE SUPPLY

## 2018-01-01 PROCEDURE — 83735 ASSAY OF MAGNESIUM: CPT

## 2018-01-01 PROCEDURE — 87040 BLOOD CULTURE FOR BACTERIA: CPT

## 2018-01-01 PROCEDURE — 6370000000 HC RX 637 (ALT 250 FOR IP): Performed by: NURSE PRACTITIONER

## 2018-01-01 PROCEDURE — 87075 CULTR BACTERIA EXCEPT BLOOD: CPT

## 2018-01-01 PROCEDURE — 86900 BLOOD TYPING SEROLOGIC ABO: CPT

## 2018-01-01 PROCEDURE — 99231 SBSQ HOSP IP/OBS SF/LOW 25: CPT | Performed by: INTERNAL MEDICINE

## 2018-01-01 PROCEDURE — 87500 VANOMYCIN DNA AMP PROBE: CPT

## 2018-01-01 PROCEDURE — 82728 ASSAY OF FERRITIN: CPT

## 2018-01-01 PROCEDURE — 90935 HEMODIALYSIS ONE EVALUATION: CPT | Performed by: INTERNAL MEDICINE

## 2018-01-01 PROCEDURE — 83036 HEMOGLOBIN GLYCOSYLATED A1C: CPT

## 2018-01-01 PROCEDURE — 0W3P8ZZ CONTROL BLEEDING IN GASTROINTESTINAL TRACT, VIA NATURAL OR ARTIFICIAL OPENING ENDOSCOPIC: ICD-10-PCS | Performed by: INTERNAL MEDICINE

## 2018-01-01 PROCEDURE — G8980 MOBILITY D/C STATUS: HCPCS

## 2018-01-01 PROCEDURE — 2580000003 HC RX 258: Performed by: STUDENT IN AN ORGANIZED HEALTH CARE EDUCATION/TRAINING PROGRAM

## 2018-01-01 PROCEDURE — 36556 INSERT NON-TUNNEL CV CATH: CPT | Performed by: RADIOLOGY

## 2018-01-01 PROCEDURE — 87641 MR-STAPH DNA AMP PROBE: CPT

## 2018-01-01 PROCEDURE — 97165 OT EVAL LOW COMPLEX 30 MIN: CPT

## 2018-01-01 PROCEDURE — C1769 GUIDE WIRE: HCPCS

## 2018-01-01 PROCEDURE — 85046 RETICYTE/HGB CONCENTRATE: CPT

## 2018-01-01 PROCEDURE — G8598 ASA/ANTIPLAT THER USED: HCPCS | Performed by: NUCLEAR MEDICINE

## 2018-01-01 PROCEDURE — 85025 COMPLETE CBC W/AUTO DIFF WBC: CPT

## 2018-01-01 PROCEDURE — 77001 FLUOROGUIDE FOR VEIN DEVICE: CPT | Performed by: RADIOLOGY

## 2018-01-01 PROCEDURE — G8484 FLU IMMUNIZE NO ADMIN: HCPCS | Performed by: NUCLEAR MEDICINE

## 2018-01-01 PROCEDURE — 99232 SBSQ HOSP IP/OBS MODERATE 35: CPT | Performed by: OPHTHALMOLOGY

## 2018-01-01 PROCEDURE — 84145 PROCALCITONIN (PCT): CPT

## 2018-01-01 PROCEDURE — C1894 INTRO/SHEATH, NON-LASER: HCPCS

## 2018-01-01 PROCEDURE — 87205 SMEAR GRAM STAIN: CPT

## 2018-01-01 PROCEDURE — G8427 DOCREV CUR MEDS BY ELIG CLIN: HCPCS | Performed by: NUCLEAR MEDICINE

## 2018-01-01 PROCEDURE — 2580000003 HC RX 258: Performed by: RADIOLOGY

## 2018-01-01 PROCEDURE — C9113 INJ PANTOPRAZOLE SODIUM, VIA: HCPCS | Performed by: INTERNAL MEDICINE

## 2018-01-01 PROCEDURE — 5A1D70Z PERFORMANCE OF URINARY FILTRATION, INTERMITTENT, LESS THAN 6 HOURS PER DAY: ICD-10-PCS | Performed by: INTERNAL MEDICINE

## 2018-01-01 PROCEDURE — 81001 URINALYSIS AUTO W/SCOPE: CPT

## 2018-01-01 PROCEDURE — 82607 VITAMIN B-12: CPT

## 2018-01-01 PROCEDURE — 80061 LIPID PANEL: CPT

## 2018-01-01 PROCEDURE — 1200000003 HC TELEMETRY R&B

## 2018-01-01 PROCEDURE — 73630 X-RAY EXAM OF FOOT: CPT

## 2018-01-01 PROCEDURE — 93005 ELECTROCARDIOGRAM TRACING: CPT | Performed by: INTERNAL MEDICINE

## 2018-01-01 PROCEDURE — 2720000010 HC SURG SUPPLY STERILE: Performed by: INTERNAL MEDICINE

## 2018-01-01 PROCEDURE — APPSS180 APP SPLIT SHARED TIME > 60 MINUTES: Performed by: NURSE PRACTITIONER

## 2018-01-01 PROCEDURE — 6370000000 HC RX 637 (ALT 250 FOR IP): Performed by: PODIATRIST

## 2018-01-01 PROCEDURE — G8979 MOBILITY GOAL STATUS: HCPCS

## 2018-01-01 PROCEDURE — 93925 LOWER EXTREMITY STUDY: CPT

## 2018-01-01 PROCEDURE — 99231 SBSQ HOSP IP/OBS SF/LOW 25: CPT | Performed by: PHYSICIAN ASSISTANT

## 2018-01-01 PROCEDURE — 82272 OCCULT BLD FECES 1-3 TESTS: CPT

## 2018-01-01 PROCEDURE — 93288 INTERROG EVL PM/LDLS PM IP: CPT | Performed by: INTERNAL MEDICINE

## 2018-01-01 PROCEDURE — 87186 SC STD MICRODIL/AGAR DIL: CPT

## 2018-01-01 PROCEDURE — 83605 ASSAY OF LACTIC ACID: CPT

## 2018-01-01 PROCEDURE — P9016 RBC LEUKOCYTES REDUCED: HCPCS

## 2018-01-01 PROCEDURE — C1725 CATH, TRANSLUMIN NON-LASER: HCPCS

## 2018-01-01 PROCEDURE — 83880 ASSAY OF NATRIURETIC PEPTIDE: CPT

## 2018-01-01 PROCEDURE — 99232 SBSQ HOSP IP/OBS MODERATE 35: CPT | Performed by: NURSE PRACTITIONER

## 2018-01-01 PROCEDURE — 97161 PT EVAL LOW COMPLEX 20 MIN: CPT

## 2018-01-01 PROCEDURE — 88304 TISSUE EXAM BY PATHOLOGIST: CPT

## 2018-01-01 PROCEDURE — 87086 URINE CULTURE/COLONY COUNT: CPT

## 2018-01-01 PROCEDURE — 76937 US GUIDE VASCULAR ACCESS: CPT | Performed by: RADIOLOGY

## 2018-01-01 PROCEDURE — 99152 MOD SED SAME PHYS/QHP 5/>YRS: CPT | Performed by: INTERNAL MEDICINE

## 2018-01-01 PROCEDURE — 82150 ASSAY OF AMYLASE: CPT

## 2018-01-01 PROCEDURE — 87070 CULTURE OTHR SPECIMN AEROBIC: CPT

## 2018-01-01 PROCEDURE — 86140 C-REACTIVE PROTEIN: CPT

## 2018-01-01 PROCEDURE — 2500000003 HC RX 250 WO HCPCS

## 2018-01-01 PROCEDURE — 82746 ASSAY OF FOLIC ACID SERUM: CPT

## 2018-01-01 PROCEDURE — 36430 TRANSFUSION BLD/BLD COMPNT: CPT

## 2018-01-01 PROCEDURE — 36902 INTRO CATH DIALYSIS CIRCUIT: CPT

## 2018-01-01 PROCEDURE — 2500000003 HC RX 250 WO HCPCS: Performed by: RADIOLOGY

## 2018-01-01 PROCEDURE — G8988 SELF CARE GOAL STATUS: HCPCS

## 2018-01-01 PROCEDURE — 87081 CULTURE SCREEN ONLY: CPT

## 2018-01-01 PROCEDURE — 99214 OFFICE O/P EST MOD 30 MIN: CPT | Performed by: NUCLEAR MEDICINE

## 2018-01-01 PROCEDURE — 3609014000 HC ENTEROSCOPY BIOPSY: Performed by: INTERNAL MEDICINE

## 2018-01-01 PROCEDURE — 85651 RBC SED RATE NONAUTOMATED: CPT

## 2018-01-01 PROCEDURE — 6360000004 HC RX CONTRAST MEDICATION: Performed by: RADIOLOGY

## 2018-01-01 PROCEDURE — 6370000000 HC RX 637 (ALT 250 FOR IP): Performed by: PHARMACIST

## 2018-01-01 PROCEDURE — 0Y6P0Z0 DETACHMENT AT RIGHT 1ST TOE, COMPLETE, OPEN APPROACH: ICD-10-PCS | Performed by: PODIATRIST

## 2018-01-01 PROCEDURE — 1036F TOBACCO NON-USER: CPT | Performed by: NUCLEAR MEDICINE

## 2018-01-01 PROCEDURE — 83690 ASSAY OF LIPASE: CPT

## 2018-01-01 PROCEDURE — 71046 X-RAY EXAM CHEST 2 VIEWS: CPT

## 2018-01-01 PROCEDURE — 93306 TTE W/DOPPLER COMPLETE: CPT

## 2018-01-01 PROCEDURE — 99284 EMERGENCY DEPT VISIT MOD MDM: CPT

## 2018-01-01 PROCEDURE — 87184 SC STD DISK METHOD PER PLATE: CPT

## 2018-01-01 PROCEDURE — 87147 CULTURE TYPE IMMUNOLOGIC: CPT

## 2018-01-01 PROCEDURE — 86901 BLOOD TYPING SEROLOGIC RH(D): CPT

## 2018-01-01 PROCEDURE — 1111F DSCHRG MED/CURRENT MED MERGE: CPT | Performed by: NUCLEAR MEDICINE

## 2018-01-01 PROCEDURE — 99231 SBSQ HOSP IP/OBS SF/LOW 25: CPT | Performed by: NURSE PRACTITIONER

## 2018-01-01 PROCEDURE — 2709999900 HC NON-CHARGEABLE SUPPLY: Performed by: PODIATRIST

## 2018-01-01 PROCEDURE — 85730 THROMBOPLASTIN TIME PARTIAL: CPT

## 2018-01-01 PROCEDURE — 84100 ASSAY OF PHOSPHORUS: CPT

## 2018-01-01 PROCEDURE — 99221 1ST HOSP IP/OBS SF/LOW 40: CPT | Performed by: INTERNAL MEDICINE

## 2018-01-01 PROCEDURE — G8978 MOBILITY CURRENT STATUS: HCPCS

## 2018-01-01 PROCEDURE — C1751 CATH, INF, PER/CENT/MIDLINE: HCPCS

## 2018-01-01 PROCEDURE — 99999 PR OFFICE/OUTPT VISIT,PROCEDURE ONLY: CPT | Performed by: NURSE PRACTITIONER

## 2018-01-01 RX ORDER — FOLIC ACID/VIT B COMPLEX AND C 5 MG
1 TABLET ORAL DAILY
Status: DISCONTINUED | OUTPATIENT
Start: 2018-01-01 | End: 2018-01-01 | Stop reason: HOSPADM

## 2018-01-01 RX ORDER — CALCITRIOL 0.25 UG/1
0.25 CAPSULE, LIQUID FILLED ORAL DAILY
Status: DISCONTINUED | OUTPATIENT
Start: 2018-01-01 | End: 2018-01-01

## 2018-01-01 RX ORDER — SODIUM CHLORIDE 0.9 % (FLUSH) 0.9 %
10 SYRINGE (ML) INJECTION EVERY 12 HOURS SCHEDULED
Status: DISCONTINUED | OUTPATIENT
Start: 2018-01-01 | End: 2018-01-01 | Stop reason: HOSPADM

## 2018-01-01 RX ORDER — PANTOPRAZOLE SODIUM 40 MG/10ML
40 INJECTION, POWDER, LYOPHILIZED, FOR SOLUTION INTRAVENOUS 2 TIMES DAILY
Status: DISCONTINUED | OUTPATIENT
Start: 2018-01-01 | End: 2018-01-01 | Stop reason: SDUPTHER

## 2018-01-01 RX ORDER — PREGABALIN 50 MG/1
50 CAPSULE ORAL 2 TIMES DAILY
Status: DISCONTINUED | OUTPATIENT
Start: 2018-01-01 | End: 2018-01-01 | Stop reason: HOSPADM

## 2018-01-01 RX ORDER — SODIUM CHLORIDE 0.9 % (FLUSH) 0.9 %
10 SYRINGE (ML) INJECTION PRN
Status: DISCONTINUED | OUTPATIENT
Start: 2018-01-01 | End: 2018-01-01 | Stop reason: SDUPTHER

## 2018-01-01 RX ORDER — MIDAZOLAM HYDROCHLORIDE 1 MG/ML
0.5 INJECTION INTRAMUSCULAR; INTRAVENOUS ONCE
Status: COMPLETED | OUTPATIENT
Start: 2018-01-01 | End: 2018-01-01

## 2018-01-01 RX ORDER — CALCIUM CARBONATE 200(500)MG
5 TABLET,CHEWABLE ORAL
Status: DISCONTINUED | OUTPATIENT
Start: 2018-01-01 | End: 2018-01-01

## 2018-01-01 RX ORDER — FLUOXETINE HYDROCHLORIDE 20 MG/1
20 CAPSULE ORAL DAILY
Status: DISCONTINUED | OUTPATIENT
Start: 2018-01-01 | End: 2018-01-01 | Stop reason: HOSPADM

## 2018-01-01 RX ORDER — MIDODRINE HYDROCHLORIDE 10 MG/1
10 TABLET ORAL 3 TIMES DAILY
Status: DISCONTINUED | OUTPATIENT
Start: 2018-01-01 | End: 2018-01-01 | Stop reason: HOSPADM

## 2018-01-01 RX ORDER — CARVEDILOL 6.25 MG/1
6.25 TABLET ORAL 2 TIMES DAILY WITH MEALS
Status: DISCONTINUED | OUTPATIENT
Start: 2018-01-01 | End: 2018-01-01 | Stop reason: HOSPADM

## 2018-01-01 RX ORDER — MIDAZOLAM HYDROCHLORIDE 1 MG/ML
INJECTION INTRAMUSCULAR; INTRAVENOUS PRN
Status: DISCONTINUED | OUTPATIENT
Start: 2018-01-01 | End: 2018-01-01 | Stop reason: HOSPADM

## 2018-01-01 RX ORDER — CALCITRIOL 0.25 UG/1
0.25 CAPSULE, LIQUID FILLED ORAL DAILY
Qty: 30 CAPSULE | Refills: 3 | Status: SHIPPED | OUTPATIENT
Start: 2018-01-01

## 2018-01-01 RX ORDER — NITROGLYCERIN 0.4 MG/1
0.4 TABLET SUBLINGUAL EVERY 5 MIN PRN
Status: DISCONTINUED | OUTPATIENT
Start: 2018-01-01 | End: 2018-01-01 | Stop reason: HOSPADM

## 2018-01-01 RX ORDER — LEVOFLOXACIN 750 MG/1
750 TABLET ORAL DAILY
Status: DISCONTINUED | OUTPATIENT
Start: 2018-01-01 | End: 2018-01-01

## 2018-01-01 RX ORDER — WARFARIN SODIUM 7.5 MG/1
7.5 TABLET ORAL ONCE
Status: COMPLETED | OUTPATIENT
Start: 2018-01-01 | End: 2018-01-01

## 2018-01-01 RX ORDER — FERROUS SULFATE 325(65) MG
325 TABLET ORAL 2 TIMES DAILY WITH MEALS
Qty: 30 TABLET | Refills: 3 | Status: SHIPPED | OUTPATIENT
Start: 2018-01-01

## 2018-01-01 RX ORDER — MIDAZOLAM HYDROCHLORIDE 5 MG/ML
INJECTION INTRAMUSCULAR; INTRAVENOUS PRN
Status: DISCONTINUED | OUTPATIENT
Start: 2018-01-01 | End: 2018-01-01 | Stop reason: HOSPADM

## 2018-01-01 RX ORDER — LIDOCAINE 40 MG/G
CREAM TOPICAL ONCE
Status: COMPLETED | OUTPATIENT
Start: 2018-01-01 | End: 2018-01-01

## 2018-01-01 RX ORDER — FENTANYL CITRATE 50 UG/ML
INJECTION, SOLUTION INTRAMUSCULAR; INTRAVENOUS PRN
Status: DISCONTINUED | OUTPATIENT
Start: 2018-01-01 | End: 2018-01-01 | Stop reason: HOSPADM

## 2018-01-01 RX ORDER — POLYETHYLENE GLYCOL 3350 17 G/17G
17 POWDER, FOR SOLUTION ORAL DAILY
Status: ON HOLD | COMMUNITY
End: 2018-01-01

## 2018-01-01 RX ORDER — BUPIVACAINE HYDROCHLORIDE 5 MG/ML
INJECTION, SOLUTION EPIDURAL; INTRACAUDAL PRN
Status: DISCONTINUED | OUTPATIENT
Start: 2018-01-01 | End: 2018-01-01 | Stop reason: HOSPADM

## 2018-01-01 RX ORDER — 0.9 % SODIUM CHLORIDE 0.9 %
250 INTRAVENOUS SOLUTION INTRAVENOUS ONCE
Status: DISCONTINUED | OUTPATIENT
Start: 2018-01-01 | End: 2018-01-01 | Stop reason: HOSPADM

## 2018-01-01 RX ORDER — PANTOPRAZOLE SODIUM 40 MG/1
40 TABLET, DELAYED RELEASE ORAL 2 TIMES DAILY
Status: DISCONTINUED | OUTPATIENT
Start: 2018-01-01 | End: 2018-01-01

## 2018-01-01 RX ORDER — FERROUS SULFATE 325(65) MG
325 TABLET ORAL 2 TIMES DAILY WITH MEALS
Status: DISCONTINUED | OUTPATIENT
Start: 2018-01-01 | End: 2018-01-01 | Stop reason: HOSPADM

## 2018-01-01 RX ORDER — CLINDAMYCIN PHOSPHATE 600 MG/50ML
600 INJECTION INTRAVENOUS
Status: COMPLETED | OUTPATIENT
Start: 2018-01-01 | End: 2018-01-01

## 2018-01-01 RX ORDER — MIDAZOLAM HYDROCHLORIDE 1 MG/ML
1 INJECTION INTRAMUSCULAR; INTRAVENOUS ONCE
Status: COMPLETED | OUTPATIENT
Start: 2018-01-01 | End: 2018-01-01

## 2018-01-01 RX ORDER — ONDANSETRON 2 MG/ML
4 INJECTION INTRAMUSCULAR; INTRAVENOUS EVERY 6 HOURS PRN
Status: DISCONTINUED | OUTPATIENT
Start: 2018-01-01 | End: 2018-01-01 | Stop reason: HOSPADM

## 2018-01-01 RX ORDER — POLYETHYLENE GLYCOL 3350 17 G/17G
17 POWDER, FOR SOLUTION ORAL DAILY
Status: DISCONTINUED | OUTPATIENT
Start: 2018-01-01 | End: 2018-01-01 | Stop reason: HOSPADM

## 2018-01-01 RX ORDER — MIDODRINE HYDROCHLORIDE 10 MG/1
10 TABLET ORAL 3 TIMES DAILY PRN
COMMUNITY

## 2018-01-01 RX ORDER — DEXTROSE MONOHYDRATE 50 MG/ML
100 INJECTION, SOLUTION INTRAVENOUS PRN
Status: DISCONTINUED | OUTPATIENT
Start: 2018-01-01 | End: 2018-01-01 | Stop reason: HOSPADM

## 2018-01-01 RX ORDER — ACETAMINOPHEN 325 MG/1
650 TABLET ORAL EVERY 6 HOURS PRN
Status: DISCONTINUED | OUTPATIENT
Start: 2018-01-01 | End: 2018-01-01 | Stop reason: SDUPTHER

## 2018-01-01 RX ORDER — DEXTROSE MONOHYDRATE 25 G/50ML
12.5 INJECTION, SOLUTION INTRAVENOUS PRN
Status: DISCONTINUED | OUTPATIENT
Start: 2018-01-01 | End: 2018-01-01 | Stop reason: HOSPADM

## 2018-01-01 RX ORDER — MIDAZOLAM HYDROCHLORIDE 1 MG/ML
1 INJECTION INTRAMUSCULAR; INTRAVENOUS ONCE
Status: CANCELLED | OUTPATIENT
Start: 2018-01-01 | End: 2018-01-01

## 2018-01-01 RX ORDER — SODIUM CHLORIDE 450 MG/100ML
INJECTION, SOLUTION INTRAVENOUS CONTINUOUS
Status: CANCELLED | OUTPATIENT
Start: 2018-01-01

## 2018-01-01 RX ORDER — LIDOCAINE HYDROCHLORIDE 10 MG/ML
5 INJECTION, SOLUTION EPIDURAL; INFILTRATION; INTRACAUDAL; PERINEURAL ONCE
Status: DISCONTINUED | OUTPATIENT
Start: 2018-01-01 | End: 2018-01-01

## 2018-01-01 RX ORDER — INSULIN GLARGINE 100 [IU]/ML
55 INJECTION, SOLUTION SUBCUTANEOUS DAILY
COMMUNITY

## 2018-01-01 RX ORDER — 0.9 % SODIUM CHLORIDE 0.9 %
250 INTRAVENOUS SOLUTION INTRAVENOUS ONCE
Status: COMPLETED | OUTPATIENT
Start: 2018-01-01 | End: 2018-01-01

## 2018-01-01 RX ORDER — ATORVASTATIN CALCIUM 40 MG/1
40 TABLET, FILM COATED ORAL NIGHTLY
COMMUNITY

## 2018-01-01 RX ORDER — CLOPIDOGREL BISULFATE 75 MG/1
75 TABLET ORAL DAILY
Status: DISCONTINUED | OUTPATIENT
Start: 2018-01-01 | End: 2018-01-01 | Stop reason: HOSPADM

## 2018-01-01 RX ORDER — CLINDAMYCIN HYDROCHLORIDE 150 MG/1
300 CAPSULE ORAL 4 TIMES DAILY
Qty: 28 CAPSULE | Refills: 0 | Status: ON HOLD | OUTPATIENT
Start: 2018-01-01 | End: 2018-01-01

## 2018-01-01 RX ORDER — ACETAMINOPHEN 325 MG/1
650 TABLET ORAL EVERY 4 HOURS PRN
Status: DISCONTINUED | OUTPATIENT
Start: 2018-01-01 | End: 2018-01-01 | Stop reason: HOSPADM

## 2018-01-01 RX ORDER — PHYTONADIONE 5 MG/1
2.5 TABLET ORAL ONCE
Status: COMPLETED | OUTPATIENT
Start: 2018-01-01 | End: 2018-01-01

## 2018-01-01 RX ORDER — CLINDAMYCIN PHOSPHATE 600 MG/50ML
600 INJECTION INTRAVENOUS
Status: CANCELLED | OUTPATIENT
Start: 2018-01-01

## 2018-01-01 RX ORDER — CALCIUM CARBONATE 200(500)MG
5 TABLET,CHEWABLE ORAL
Status: DISCONTINUED | OUTPATIENT
Start: 2018-01-01 | End: 2018-01-01 | Stop reason: HOSPADM

## 2018-01-01 RX ORDER — DIPHENHYDRAMINE HYDROCHLORIDE 50 MG/ML
INJECTION INTRAMUSCULAR; INTRAVENOUS PRN
Status: DISCONTINUED | OUTPATIENT
Start: 2018-01-01 | End: 2018-01-01 | Stop reason: HOSPADM

## 2018-01-01 RX ORDER — HYDROCODONE BITARTRATE AND ACETAMINOPHEN 5; 325 MG/1; MG/1
1 TABLET ORAL EVERY 4 HOURS PRN
Status: DISCONTINUED | OUTPATIENT
Start: 2018-01-01 | End: 2018-01-01 | Stop reason: HOSPADM

## 2018-01-01 RX ORDER — ACETAMINOPHEN 325 MG/1
650 TABLET ORAL EVERY 6 HOURS PRN
Status: DISCONTINUED | OUTPATIENT
Start: 2018-01-01 | End: 2018-01-01 | Stop reason: HOSPADM

## 2018-01-01 RX ORDER — DOCUSATE SODIUM 100 MG/1
100 CAPSULE, LIQUID FILLED ORAL 2 TIMES DAILY
Status: DISCONTINUED | OUTPATIENT
Start: 2018-01-01 | End: 2018-01-01 | Stop reason: HOSPADM

## 2018-01-01 RX ORDER — DIAPER,BRIEF,INFANT-TODD,DISP
EACH MISCELLANEOUS 2 TIMES DAILY
Status: DISCONTINUED | OUTPATIENT
Start: 2018-01-01 | End: 2018-01-01 | Stop reason: HOSPADM

## 2018-01-01 RX ORDER — ASPIRIN 81 MG/1
81 TABLET, CHEWABLE ORAL DAILY
Status: DISCONTINUED | OUTPATIENT
Start: 2018-01-01 | End: 2018-01-01

## 2018-01-01 RX ORDER — CALCITRIOL 0.25 UG/1
0.25 CAPSULE, LIQUID FILLED ORAL DAILY
Status: DISCONTINUED | OUTPATIENT
Start: 2018-01-01 | End: 2018-01-01 | Stop reason: HOSPADM

## 2018-01-01 RX ORDER — SODIUM CHLORIDE 0.9 % (FLUSH) 0.9 %
10 SYRINGE (ML) INJECTION PRN
Status: DISCONTINUED | OUTPATIENT
Start: 2018-01-01 | End: 2018-01-01 | Stop reason: HOSPADM

## 2018-01-01 RX ORDER — NICOTINE POLACRILEX 4 MG
15 LOZENGE BUCCAL PRN
Status: DISCONTINUED | OUTPATIENT
Start: 2018-01-01 | End: 2018-01-01 | Stop reason: HOSPADM

## 2018-01-01 RX ORDER — CLOPIDOGREL BISULFATE 75 MG/1
75 TABLET ORAL DAILY
Status: DISCONTINUED | OUTPATIENT
Start: 2018-01-01 | End: 2018-01-01

## 2018-01-01 RX ORDER — MIDODRINE HYDROCHLORIDE 10 MG/1
10 TABLET ORAL
Status: DISCONTINUED | OUTPATIENT
Start: 2018-01-01 | End: 2018-01-01 | Stop reason: HOSPADM

## 2018-01-01 RX ORDER — LEVOFLOXACIN 500 MG/1
750 TABLET, FILM COATED ORAL DAILY
Qty: 15 TABLET | Refills: 0 | Status: ON HOLD | OUTPATIENT
Start: 2018-01-01 | End: 2018-01-01

## 2018-01-01 RX ORDER — HEPARIN SODIUM 1000 [USP'U]/ML
3000 INJECTION, SOLUTION INTRAVENOUS; SUBCUTANEOUS ONCE
Status: COMPLETED | OUTPATIENT
Start: 2018-01-01 | End: 2018-01-01

## 2018-01-01 RX ORDER — MORPHINE SULFATE 2 MG/ML
2 INJECTION, SOLUTION INTRAMUSCULAR; INTRAVENOUS
Status: DISCONTINUED | OUTPATIENT
Start: 2018-01-01 | End: 2018-01-01 | Stop reason: HOSPADM

## 2018-01-01 RX ORDER — POLYETHYLENE GLYCOL 3350 17 G/17G
17 POWDER, FOR SOLUTION ORAL DAILY
Status: DISCONTINUED | OUTPATIENT
Start: 2018-01-01 | End: 2018-01-01 | Stop reason: SDUPTHER

## 2018-01-01 RX ORDER — OXYCODONE HYDROCHLORIDE AND ACETAMINOPHEN 5; 325 MG/1; MG/1
1 TABLET ORAL EVERY 4 HOURS PRN
Status: DISCONTINUED | OUTPATIENT
Start: 2018-01-01 | End: 2018-01-01 | Stop reason: HOSPADM

## 2018-01-01 RX ORDER — PANTOPRAZOLE SODIUM 40 MG/1
40 TABLET, DELAYED RELEASE ORAL DAILY
Status: DISCONTINUED | OUTPATIENT
Start: 2018-01-01 | End: 2018-01-01 | Stop reason: HOSPADM

## 2018-01-01 RX ORDER — ASPIRIN 81 MG/1
81 TABLET, CHEWABLE ORAL DAILY
Status: DISCONTINUED | OUTPATIENT
Start: 2018-01-01 | End: 2018-01-01 | Stop reason: HOSPADM

## 2018-01-01 RX ORDER — SODIUM CHLORIDE 0.9 % (FLUSH) 0.9 %
10 SYRINGE (ML) INJECTION EVERY 12 HOURS SCHEDULED
Status: DISCONTINUED | OUTPATIENT
Start: 2018-01-01 | End: 2018-01-01

## 2018-01-01 RX ORDER — PANTOPRAZOLE SODIUM 40 MG/1
40 TABLET, DELAYED RELEASE ORAL
Status: DISCONTINUED | OUTPATIENT
Start: 2018-01-01 | End: 2018-01-01 | Stop reason: HOSPADM

## 2018-01-01 RX ORDER — DIAZEPAM 5 MG/1
5 TABLET ORAL
Status: DISCONTINUED | OUTPATIENT
Start: 2018-01-01 | End: 2018-01-01 | Stop reason: HOSPADM

## 2018-01-01 RX ORDER — SODIUM CHLORIDE 0.9 % (FLUSH) 0.9 %
10 SYRINGE (ML) INJECTION EVERY 12 HOURS SCHEDULED
Status: DISCONTINUED | OUTPATIENT
Start: 2018-01-01 | End: 2018-01-01 | Stop reason: SDUPTHER

## 2018-01-01 RX ORDER — SENNA PLUS 8.6 MG/1
1 TABLET ORAL NIGHTLY
Status: DISCONTINUED | OUTPATIENT
Start: 2018-01-01 | End: 2018-01-01 | Stop reason: HOSPADM

## 2018-01-01 RX ORDER — SODIUM CHLORIDE 450 MG/100ML
INJECTION, SOLUTION INTRAVENOUS CONTINUOUS
Status: DISCONTINUED | OUTPATIENT
Start: 2018-01-01 | End: 2019-01-01 | Stop reason: HOSPADM

## 2018-01-01 RX ORDER — ATORVASTATIN CALCIUM 40 MG/1
40 TABLET, FILM COATED ORAL NIGHTLY
Status: DISCONTINUED | OUTPATIENT
Start: 2018-01-01 | End: 2018-01-01 | Stop reason: HOSPADM

## 2018-01-01 RX ORDER — ATORVASTATIN CALCIUM 40 MG/1
40 TABLET, FILM COATED ORAL DAILY
Status: DISCONTINUED | OUTPATIENT
Start: 2018-01-01 | End: 2018-01-01 | Stop reason: HOSPADM

## 2018-01-01 RX ORDER — LIDOCAINE 40 MG/G
CREAM TOPICAL ONCE
Status: CANCELLED | OUTPATIENT
Start: 2018-01-01 | End: 2018-01-01

## 2018-01-01 RX ORDER — INSULIN GLARGINE 100 [IU]/ML
5 INJECTION, SOLUTION SUBCUTANEOUS DAILY
Status: DISCONTINUED | OUTPATIENT
Start: 2018-01-01 | End: 2018-01-01 | Stop reason: HOSPADM

## 2018-01-01 RX ORDER — SODIUM CHLORIDE 9 MG/ML
INJECTION, SOLUTION INTRAVENOUS CONTINUOUS
Status: DISCONTINUED | OUTPATIENT
Start: 2018-01-01 | End: 2018-01-01

## 2018-01-01 RX ORDER — CLINDAMYCIN HYDROCHLORIDE 150 MG/1
300 CAPSULE ORAL 4 TIMES DAILY
Status: DISCONTINUED | OUTPATIENT
Start: 2018-01-01 | End: 2018-01-01

## 2018-01-01 RX ADMIN — PANTOPRAZOLE SODIUM 40 MG: 40 INJECTION, POWDER, FOR SOLUTION INTRAVENOUS at 20:18

## 2018-01-01 RX ADMIN — MIDODRINE HYDROCHLORIDE 10 MG: 10 TABLET ORAL at 06:41

## 2018-01-01 RX ADMIN — Medication 1 TABLET: at 13:37

## 2018-01-01 RX ADMIN — MIDODRINE HYDROCHLORIDE 10 MG: 10 TABLET ORAL at 08:11

## 2018-01-01 RX ADMIN — ATORVASTATIN CALCIUM 40 MG: 40 TABLET, FILM COATED ORAL at 20:52

## 2018-01-01 RX ADMIN — ATORVASTATIN CALCIUM 40 MG: 40 TABLET, FILM COATED ORAL at 20:05

## 2018-01-01 RX ADMIN — VANCOMYCIN HYDROCHLORIDE 1000 MG: 1 INJECTION, POWDER, LYOPHILIZED, FOR SOLUTION INTRAVENOUS at 16:51

## 2018-01-01 RX ADMIN — INSULIN LISPRO 1 UNITS: 100 INJECTION, SOLUTION INTRAVENOUS; SUBCUTANEOUS at 20:56

## 2018-01-01 RX ADMIN — Medication 10 ML: at 09:27

## 2018-01-01 RX ADMIN — MIDODRINE HYDROCHLORIDE 10 MG: 10 TABLET ORAL at 16:51

## 2018-01-01 RX ADMIN — PANTOPRAZOLE SODIUM 40 MG: 40 TABLET, DELAYED RELEASE ORAL at 13:38

## 2018-01-01 RX ADMIN — FLUOXETINE HYDROCHLORIDE 20 MG: 20 CAPSULE ORAL at 13:40

## 2018-01-01 RX ADMIN — PANTOPRAZOLE SODIUM 40 MG: 40 INJECTION, POWDER, FOR SOLUTION INTRAVENOUS at 20:58

## 2018-01-01 RX ADMIN — PREGABALIN 50 MG: 50 CAPSULE ORAL at 20:53

## 2018-01-01 RX ADMIN — LIDOCAINE 4%: 4 CREAM TOPICAL at 08:18

## 2018-01-01 RX ADMIN — MIDODRINE HYDROCHLORIDE 10 MG: 10 TABLET ORAL at 08:01

## 2018-01-01 RX ADMIN — Medication 10 ML: at 20:22

## 2018-01-01 RX ADMIN — ASPIRIN 81 MG CHEWABLE TABLET 81 MG: 81 TABLET CHEWABLE at 14:18

## 2018-01-01 RX ADMIN — CARVEDILOL 6.25 MG: 6.25 TABLET, FILM COATED ORAL at 08:05

## 2018-01-01 RX ADMIN — CARVEDILOL 6.25 MG: 6.25 TABLET, FILM COATED ORAL at 05:51

## 2018-01-01 RX ADMIN — PREGABALIN 50 MG: 50 CAPSULE ORAL at 20:12

## 2018-01-01 RX ADMIN — FLUOXETINE HYDROCHLORIDE 20 MG: 20 CAPSULE ORAL at 13:37

## 2018-01-01 RX ADMIN — Medication 1 UNITS: at 17:15

## 2018-01-01 RX ADMIN — SODIUM CHLORIDE: 9 INJECTION, SOLUTION INTRAVENOUS at 15:08

## 2018-01-01 RX ADMIN — FERROUS SULFATE TAB 325 MG (65 MG ELEMENTAL FE) 325 MG: 325 (65 FE) TAB at 08:05

## 2018-01-01 RX ADMIN — Medication 2 UNITS: at 18:06

## 2018-01-01 RX ADMIN — PREGABALIN 50 MG: 50 CAPSULE ORAL at 21:20

## 2018-01-01 RX ADMIN — Medication 1 TABLET: at 08:01

## 2018-01-01 RX ADMIN — PREGABALIN 50 MG: 50 CAPSULE ORAL at 20:56

## 2018-01-01 RX ADMIN — FLUOXETINE HYDROCHLORIDE 20 MG: 20 CAPSULE ORAL at 08:06

## 2018-01-01 RX ADMIN — PANTOPRAZOLE SODIUM 40 MG: 40 INJECTION, POWDER, FOR SOLUTION INTRAVENOUS at 09:21

## 2018-01-01 RX ADMIN — ATORVASTATIN CALCIUM 40 MG: 40 TABLET, FILM COATED ORAL at 20:58

## 2018-01-01 RX ADMIN — CLOPIDOGREL BISULFATE 75 MG: 75 TABLET ORAL at 07:17

## 2018-01-01 RX ADMIN — PREGABALIN 50 MG: 50 CAPSULE ORAL at 20:22

## 2018-01-01 RX ADMIN — SODIUM CHLORIDE: 9 INJECTION, SOLUTION INTRAVENOUS at 14:23

## 2018-01-01 RX ADMIN — PREGABALIN 50 MG: 50 CAPSULE ORAL at 20:17

## 2018-01-01 RX ADMIN — ANTACID TABLETS 2500 MG: 500 TABLET, CHEWABLE ORAL at 16:28

## 2018-01-01 RX ADMIN — ANTACID TABLETS 2500 MG: 500 TABLET, CHEWABLE ORAL at 18:59

## 2018-01-01 RX ADMIN — CALCITRIOL 0.25 MCG: 0.25 CAPSULE ORAL at 08:11

## 2018-01-01 RX ADMIN — FERROUS SULFATE TAB 325 MG (65 MG ELEMENTAL FE) 325 MG: 325 (65 FE) TAB at 14:18

## 2018-01-01 RX ADMIN — ANTACID TABLETS 2500 MG: 500 TABLET, CHEWABLE ORAL at 17:36

## 2018-01-01 RX ADMIN — ATORVASTATIN CALCIUM 40 MG: 40 TABLET, FILM COATED ORAL at 20:18

## 2018-01-01 RX ADMIN — MIDODRINE HYDROCHLORIDE 10 MG: 10 TABLET ORAL at 12:31

## 2018-01-01 RX ADMIN — FLUOXETINE HYDROCHLORIDE 20 MG: 20 CAPSULE ORAL at 08:11

## 2018-01-01 RX ADMIN — PREGABALIN 50 MG: 50 CAPSULE ORAL at 09:20

## 2018-01-01 RX ADMIN — CARVEDILOL 6.25 MG: 6.25 TABLET, FILM COATED ORAL at 09:22

## 2018-01-01 RX ADMIN — HYDROCODONE BITARTRATE AND ACETAMINOPHEN 1 TABLET: 5; 325 TABLET ORAL at 17:05

## 2018-01-01 RX ADMIN — Medication 10 ML: at 08:01

## 2018-01-01 RX ADMIN — ANTACID TABLETS 2500 MG: 500 TABLET, CHEWABLE ORAL at 10:06

## 2018-01-01 RX ADMIN — INSULIN LISPRO 1 UNITS: 100 INJECTION, SOLUTION INTRAVENOUS; SUBCUTANEOUS at 20:53

## 2018-01-01 RX ADMIN — Medication 2 UNITS: at 12:03

## 2018-01-01 RX ADMIN — CLOPIDOGREL BISULFATE 75 MG: 75 TABLET ORAL at 16:02

## 2018-01-01 RX ADMIN — MIDODRINE HYDROCHLORIDE 10 MG: 10 TABLET ORAL at 13:37

## 2018-01-01 RX ADMIN — ATORVASTATIN CALCIUM 40 MG: 40 TABLET, FILM COATED ORAL at 20:22

## 2018-01-01 RX ADMIN — FLUOXETINE HYDROCHLORIDE 20 MG: 20 CAPSULE ORAL at 07:17

## 2018-01-01 RX ADMIN — CARVEDILOL 6.25 MG: 6.25 TABLET, FILM COATED ORAL at 05:40

## 2018-01-01 RX ADMIN — CLOPIDOGREL BISULFATE 75 MG: 75 TABLET ORAL at 10:06

## 2018-01-01 RX ADMIN — PANTOPRAZOLE SODIUM 40 MG: 40 TABLET, DELAYED RELEASE ORAL at 05:51

## 2018-01-01 RX ADMIN — MIDAZOLAM HYDROCHLORIDE 0.5 MG: 1 INJECTION INTRAMUSCULAR; INTRAVENOUS at 09:50

## 2018-01-01 RX ADMIN — PREGABALIN 50 MG: 50 CAPSULE ORAL at 20:25

## 2018-01-01 RX ADMIN — PREGABALIN 50 MG: 50 CAPSULE ORAL at 10:06

## 2018-01-01 RX ADMIN — ATORVASTATIN CALCIUM 40 MG: 40 TABLET, FILM COATED ORAL at 20:56

## 2018-01-01 RX ADMIN — CARVEDILOL 6.25 MG: 6.25 TABLET, FILM COATED ORAL at 16:53

## 2018-01-01 RX ADMIN — MEROPENEM 500 MG: 500 INJECTION, POWDER, FOR SOLUTION INTRAVENOUS at 21:23

## 2018-01-01 RX ADMIN — ANTACID TABLETS 2500 MG: 500 TABLET, CHEWABLE ORAL at 14:17

## 2018-01-01 RX ADMIN — FERROUS SULFATE TAB 325 MG (65 MG ELEMENTAL FE) 325 MG: 325 (65 FE) TAB at 07:17

## 2018-01-01 RX ADMIN — INSULIN GLARGINE 5 UNITS: 100 INJECTION, SOLUTION SUBCUTANEOUS at 08:15

## 2018-01-01 RX ADMIN — ATORVASTATIN CALCIUM 40 MG: 40 TABLET, FILM COATED ORAL at 20:26

## 2018-01-01 RX ADMIN — Medication 10 ML: at 07:18

## 2018-01-01 RX ADMIN — CARVEDILOL 6.25 MG: 6.25 TABLET, FILM COATED ORAL at 16:51

## 2018-01-01 RX ADMIN — CALCITRIOL 0.25 MCG: 0.25 CAPSULE ORAL at 09:19

## 2018-01-01 RX ADMIN — HYDROCORTISONE: 1 CREAM TOPICAL at 08:12

## 2018-01-01 RX ADMIN — Medication 0.5 MG: at 09:46

## 2018-01-01 RX ADMIN — ANTACID TABLETS 2500 MG: 500 TABLET, CHEWABLE ORAL at 09:20

## 2018-01-01 RX ADMIN — SODIUM CHLORIDE: 9 INJECTION, SOLUTION INTRAVENOUS at 13:40

## 2018-01-01 RX ADMIN — DIAZEPAM 5 MG: 5 TABLET ORAL at 15:50

## 2018-01-01 RX ADMIN — FERROUS SULFATE TAB 325 MG (65 MG ELEMENTAL FE) 325 MG: 325 (65 FE) TAB at 16:28

## 2018-01-01 RX ADMIN — Medication 10 ML: at 20:56

## 2018-01-01 RX ADMIN — ONDANSETRON 4 MG: 2 INJECTION INTRAMUSCULAR; INTRAVENOUS at 22:06

## 2018-01-01 RX ADMIN — PHYTONADIONE 2.5 MG: 5 TABLET ORAL at 18:04

## 2018-01-01 RX ADMIN — FLUOXETINE HYDROCHLORIDE 20 MG: 20 CAPSULE ORAL at 08:00

## 2018-01-01 RX ADMIN — ACETAMINOPHEN 650 MG: 325 TABLET ORAL at 19:44

## 2018-01-01 RX ADMIN — CARVEDILOL 6.25 MG: 6.25 TABLET, FILM COATED ORAL at 17:36

## 2018-01-01 RX ADMIN — Medication 2 UNITS: at 17:02

## 2018-01-01 RX ADMIN — Medication 1 UNITS: at 13:29

## 2018-01-01 RX ADMIN — MIDAZOLAM HYDROCHLORIDE 0.5 MG: 1 INJECTION INTRAMUSCULAR; INTRAVENOUS at 09:46

## 2018-01-01 RX ADMIN — CALCITRIOL 0.25 MCG: 0.25 CAPSULE ORAL at 15:37

## 2018-01-01 RX ADMIN — FLUOXETINE HYDROCHLORIDE 20 MG: 20 CAPSULE ORAL at 14:19

## 2018-01-01 RX ADMIN — Medication 1 TABLET: at 09:19

## 2018-01-01 RX ADMIN — Medication 1 UNITS: at 17:37

## 2018-01-01 RX ADMIN — CALCITRIOL 0.25 MCG: 0.25 CAPSULE ORAL at 08:00

## 2018-01-01 RX ADMIN — MIDODRINE HYDROCHLORIDE 10 MG: 10 TABLET ORAL at 09:19

## 2018-01-01 RX ADMIN — Medication 1 UNITS: at 09:29

## 2018-01-01 RX ADMIN — ASPIRIN 81 MG CHEWABLE TABLET 81 MG: 81 TABLET CHEWABLE at 07:17

## 2018-01-01 RX ADMIN — SODIUM CHLORIDE: 9 INJECTION, SOLUTION INTRAVENOUS at 08:05

## 2018-01-01 RX ADMIN — MIDODRINE HYDROCHLORIDE 10 MG: 10 TABLET ORAL at 10:06

## 2018-01-01 RX ADMIN — PANTOPRAZOLE SODIUM 40 MG: 40 TABLET, DELAYED RELEASE ORAL at 06:50

## 2018-01-01 RX ADMIN — ANTACID TABLETS 2500 MG: 500 TABLET, CHEWABLE ORAL at 08:06

## 2018-01-01 RX ADMIN — CARVEDILOL 6.25 MG: 6.25 TABLET, FILM COATED ORAL at 18:59

## 2018-01-01 RX ADMIN — FLUOXETINE HYDROCHLORIDE 20 MG: 20 CAPSULE ORAL at 09:20

## 2018-01-01 RX ADMIN — ATORVASTATIN CALCIUM 40 MG: 40 TABLET, FILM COATED ORAL at 21:21

## 2018-01-01 RX ADMIN — PREGABALIN 50 MG: 50 CAPSULE ORAL at 07:17

## 2018-01-01 RX ADMIN — INSULIN LISPRO 1 UNITS: 100 INJECTION, SOLUTION INTRAVENOUS; SUBCUTANEOUS at 20:18

## 2018-01-01 RX ADMIN — Medication 2 UNITS: at 16:53

## 2018-01-01 RX ADMIN — CALCITRIOL 0.25 MCG: 0.25 CAPSULE ORAL at 13:37

## 2018-01-01 RX ADMIN — INSULIN GLARGINE 5 UNITS: 100 INJECTION, SOLUTION SUBCUTANEOUS at 08:02

## 2018-01-01 RX ADMIN — ANTACID TABLETS 2500 MG: 500 TABLET, CHEWABLE ORAL at 11:40

## 2018-01-01 RX ADMIN — PREGABALIN 50 MG: 50 CAPSULE ORAL at 14:18

## 2018-01-01 RX ADMIN — MIDODRINE HYDROCHLORIDE 10 MG: 10 TABLET ORAL at 12:05

## 2018-01-01 RX ADMIN — CLINDAMYCIN PHOSPHATE 600 MG: 600 INJECTION, SOLUTION INTRAVENOUS at 08:18

## 2018-01-01 RX ADMIN — ATORVASTATIN CALCIUM 40 MG: 40 TABLET, FILM COATED ORAL at 19:44

## 2018-01-01 RX ADMIN — MEROPENEM 500 MG: 500 INJECTION, POWDER, FOR SOLUTION INTRAVENOUS at 20:25

## 2018-01-01 RX ADMIN — MIDODRINE HYDROCHLORIDE 10 MG: 10 TABLET ORAL at 16:02

## 2018-01-01 RX ADMIN — MIDODRINE HYDROCHLORIDE 10 MG: 10 TABLET ORAL at 17:36

## 2018-01-01 RX ADMIN — WARFARIN SODIUM 7.5 MG: 7.5 TABLET ORAL at 21:30

## 2018-01-01 RX ADMIN — ASPIRIN 81 MG 81 MG: 81 TABLET ORAL at 16:02

## 2018-01-01 RX ADMIN — ANTACID TABLETS 2500 MG: 500 TABLET, CHEWABLE ORAL at 13:01

## 2018-01-01 RX ADMIN — Medication 10 ML: at 20:18

## 2018-01-01 RX ADMIN — PREGABALIN 50 MG: 50 CAPSULE ORAL at 13:37

## 2018-01-01 RX ADMIN — PANTOPRAZOLE SODIUM 40 MG: 40 INJECTION, POWDER, FOR SOLUTION INTRAVENOUS at 20:04

## 2018-01-01 RX ADMIN — ASPIRIN 81 MG CHEWABLE TABLET 81 MG: 81 TABLET CHEWABLE at 08:05

## 2018-01-01 RX ADMIN — ANTACID TABLETS 2500 MG: 500 TABLET, CHEWABLE ORAL at 11:12

## 2018-01-01 RX ADMIN — CARVEDILOL 6.25 MG: 6.25 TABLET, FILM COATED ORAL at 16:32

## 2018-01-01 RX ADMIN — Medication 10 ML: at 20:53

## 2018-01-01 RX ADMIN — CARVEDILOL 6.25 MG: 6.25 TABLET, FILM COATED ORAL at 17:05

## 2018-01-01 RX ADMIN — ANTACID TABLETS 2500 MG: 500 TABLET, CHEWABLE ORAL at 17:05

## 2018-01-01 RX ADMIN — Medication 2 UNITS: at 13:02

## 2018-01-01 RX ADMIN — WARFARIN SODIUM 7.5 MG: 7.5 TABLET ORAL at 17:20

## 2018-01-01 RX ADMIN — MIDODRINE HYDROCHLORIDE 10 MG: 10 TABLET ORAL at 11:40

## 2018-01-01 RX ADMIN — Medication 10 ML: at 20:05

## 2018-01-01 RX ADMIN — Medication 10 ML: at 09:26

## 2018-01-01 RX ADMIN — PANTOPRAZOLE SODIUM 40 MG: 40 TABLET, DELAYED RELEASE ORAL at 17:15

## 2018-01-01 RX ADMIN — CLOPIDOGREL BISULFATE 75 MG: 75 TABLET ORAL at 08:05

## 2018-01-01 RX ADMIN — PREGABALIN 50 MG: 50 CAPSULE ORAL at 08:01

## 2018-01-01 RX ADMIN — ANTACID TABLETS 2500 MG: 500 TABLET, CHEWABLE ORAL at 13:40

## 2018-01-01 RX ADMIN — INSULIN LISPRO 1 UNITS: 100 INJECTION, SOLUTION INTRAVENOUS; SUBCUTANEOUS at 20:05

## 2018-01-01 RX ADMIN — SODIUM CHLORIDE: 9 INJECTION, SOLUTION INTRAVENOUS at 21:41

## 2018-01-01 RX ADMIN — PANTOPRAZOLE SODIUM 40 MG: 40 TABLET, DELAYED RELEASE ORAL at 05:40

## 2018-01-01 RX ADMIN — ANTACID TABLETS 2500 MG: 500 TABLET, CHEWABLE ORAL at 16:53

## 2018-01-01 RX ADMIN — PANTOPRAZOLE SODIUM 40 MG: 40 TABLET, DELAYED RELEASE ORAL at 05:16

## 2018-01-01 RX ADMIN — ANTACID TABLETS 2500 MG: 500 TABLET, CHEWABLE ORAL at 16:51

## 2018-01-01 RX ADMIN — PREGABALIN 50 MG: 50 CAPSULE ORAL at 19:44

## 2018-01-01 RX ADMIN — DOCUSATE SODIUM 100 MG: 100 CAPSULE, LIQUID FILLED ORAL at 20:56

## 2018-01-01 RX ADMIN — ATORVASTATIN CALCIUM 40 MG: 40 TABLET, FILM COATED ORAL at 21:30

## 2018-01-01 RX ADMIN — INSULIN GLARGINE 5 UNITS: 100 INJECTION, SOLUTION SUBCUTANEOUS at 12:31

## 2018-01-01 RX ADMIN — SODIUM CHLORIDE 250 ML: 9 INJECTION, SOLUTION INTRAVENOUS at 10:32

## 2018-01-01 RX ADMIN — MAGNESIUM HYDROXIDE 30 ML: 400 SUSPENSION ORAL at 17:46

## 2018-01-01 RX ADMIN — PREGABALIN 50 MG: 50 CAPSULE ORAL at 08:05

## 2018-01-01 RX ADMIN — PANTOPRAZOLE SODIUM 40 MG: 40 TABLET, DELAYED RELEASE ORAL at 05:04

## 2018-01-01 RX ADMIN — PREGABALIN 50 MG: 50 CAPSULE ORAL at 13:41

## 2018-01-01 RX ADMIN — PANTOPRAZOLE SODIUM 40 MG: 40 TABLET, DELAYED RELEASE ORAL at 05:41

## 2018-01-01 RX ADMIN — ASPIRIN 81 MG 81 MG: 81 TABLET ORAL at 08:12

## 2018-01-01 RX ADMIN — CARVEDILOL 6.25 MG: 6.25 TABLET, FILM COATED ORAL at 16:28

## 2018-01-01 RX ADMIN — PANTOPRAZOLE SODIUM 40 MG: 40 TABLET, DELAYED RELEASE ORAL at 16:02

## 2018-01-01 RX ADMIN — FLUOXETINE HYDROCHLORIDE 20 MG: 20 CAPSULE ORAL at 10:06

## 2018-01-01 RX ADMIN — Medication 1 UNITS: at 16:33

## 2018-01-01 RX ADMIN — Medication 2 UNITS: at 08:14

## 2018-01-01 RX ADMIN — SODIUM CHLORIDE 250 ML: 9 INJECTION, SOLUTION INTRAVENOUS at 00:17

## 2018-01-01 RX ADMIN — PREGABALIN 50 MG: 50 CAPSULE ORAL at 20:05

## 2018-01-01 RX ADMIN — Medication 10 ML: at 13:37

## 2018-01-01 RX ADMIN — SODIUM CHLORIDE 250 ML: 9 INJECTION, SOLUTION INTRAVENOUS at 15:37

## 2018-01-01 RX ADMIN — MIDODRINE HYDROCHLORIDE 10 MG: 10 TABLET ORAL at 16:53

## 2018-01-01 RX ADMIN — MIDODRINE HYDROCHLORIDE 10 MG: 10 TABLET ORAL at 16:32

## 2018-01-01 RX ADMIN — MIDODRINE HYDROCHLORIDE 10 MG: 10 TABLET ORAL at 07:17

## 2018-01-01 RX ADMIN — PANTOPRAZOLE SODIUM 40 MG: 40 INJECTION, POWDER, FOR SOLUTION INTRAVENOUS at 13:36

## 2018-01-01 RX ADMIN — ANTACID TABLETS 2500 MG: 500 TABLET, CHEWABLE ORAL at 18:04

## 2018-01-01 RX ADMIN — MIDODRINE HYDROCHLORIDE 10 MG: 10 TABLET ORAL at 11:12

## 2018-01-01 RX ADMIN — PREGABALIN 50 MG: 50 CAPSULE ORAL at 21:29

## 2018-01-01 RX ADMIN — CLOPIDOGREL BISULFATE 75 MG: 75 TABLET ORAL at 14:18

## 2018-01-01 RX ADMIN — FERROUS SULFATE TAB 325 MG (65 MG ELEMENTAL FE) 325 MG: 325 (65 FE) TAB at 18:37

## 2018-01-01 RX ADMIN — Medication 1 TABLET: at 08:11

## 2018-01-01 RX ADMIN — SODIUM CHLORIDE 250 ML: 9 INJECTION, SOLUTION INTRAVENOUS at 10:52

## 2018-01-01 RX ADMIN — INSULIN GLARGINE 5 UNITS: 100 INJECTION, SOLUTION SUBCUTANEOUS at 09:20

## 2018-01-01 RX ADMIN — ANTACID TABLETS 2500 MG: 500 TABLET, CHEWABLE ORAL at 07:17

## 2018-01-01 RX ADMIN — MIDODRINE HYDROCHLORIDE 10 MG: 10 TABLET ORAL at 18:59

## 2018-01-01 RX ADMIN — CARVEDILOL 6.25 MG: 6.25 TABLET, FILM COATED ORAL at 21:21

## 2018-01-01 RX ADMIN — MIDODRINE HYDROCHLORIDE 10 MG: 10 TABLET ORAL at 17:15

## 2018-01-01 RX ADMIN — MIDODRINE HYDROCHLORIDE 10 MG: 10 TABLET ORAL at 11:17

## 2018-01-01 RX ADMIN — INSULIN GLARGINE 5 UNITS: 100 INJECTION, SOLUTION SUBCUTANEOUS at 13:37

## 2018-01-01 RX ADMIN — Medication 10 ML: at 20:59

## 2018-01-01 RX ADMIN — CARVEDILOL 6.25 MG: 6.25 TABLET, FILM COATED ORAL at 08:01

## 2018-01-01 RX ADMIN — CARVEDILOL 6.25 MG: 6.25 TABLET, FILM COATED ORAL at 07:17

## 2018-01-01 RX ADMIN — MIDODRINE HYDROCHLORIDE 10 MG: 10 TABLET ORAL at 14:17

## 2018-01-01 RX ADMIN — DOCUSATE SODIUM 100 MG: 100 CAPSULE, LIQUID FILLED ORAL at 08:12

## 2018-01-01 RX ADMIN — MEROPENEM 500 MG: 500 INJECTION, POWDER, FOR SOLUTION INTRAVENOUS at 21:30

## 2018-01-01 RX ADMIN — FLUOXETINE HYDROCHLORIDE 20 MG: 20 CAPSULE ORAL at 09:19

## 2018-01-01 RX ADMIN — MIDODRINE HYDROCHLORIDE 10 MG: 10 TABLET ORAL at 13:01

## 2018-01-01 RX ADMIN — MIDODRINE HYDROCHLORIDE 10 MG: 10 TABLET ORAL at 18:04

## 2018-01-01 RX ADMIN — PANTOPRAZOLE SODIUM 40 MG: 40 INJECTION, POWDER, FOR SOLUTION INTRAVENOUS at 20:22

## 2018-01-01 RX ADMIN — PREGABALIN 50 MG: 50 CAPSULE ORAL at 08:12

## 2018-01-01 RX ADMIN — Medication 10 ML: at 08:12

## 2018-01-01 RX ADMIN — ERYTHROPOIETIN 10000 UNITS: 10000 INJECTION, SOLUTION INTRAVENOUS; SUBCUTANEOUS at 13:37

## 2018-01-01 RX ADMIN — ATORVASTATIN CALCIUM 40 MG: 40 TABLET, FILM COATED ORAL at 20:12

## 2018-01-01 RX ADMIN — PREGABALIN 50 MG: 50 CAPSULE ORAL at 20:59

## 2018-01-01 RX ADMIN — DARBEPOETIN ALFA 60 MCG: 60 INJECTION, SOLUTION INTRAVENOUS; SUBCUTANEOUS at 20:58

## 2018-01-01 RX ADMIN — CARVEDILOL 6.25 MG: 6.25 TABLET, FILM COATED ORAL at 10:06

## 2018-01-01 RX ADMIN — HEPARIN SODIUM 3000 UNITS: 1000 INJECTION, SOLUTION INTRAVENOUS; SUBCUTANEOUS at 10:05

## 2018-01-01 RX ADMIN — Medication 0.5 MG: at 09:50

## 2018-01-01 RX ADMIN — SODIUM CHLORIDE: 4.5 INJECTION, SOLUTION INTRAVENOUS at 08:14

## 2018-01-01 RX ADMIN — MIDODRINE HYDROCHLORIDE 10 MG: 10 TABLET ORAL at 11:43

## 2018-01-01 RX ADMIN — MIDODRINE HYDROCHLORIDE 10 MG: 10 TABLET ORAL at 17:05

## 2018-01-01 RX ADMIN — MIDODRINE HYDROCHLORIDE 10 MG: 10 TABLET ORAL at 16:28

## 2018-01-01 RX ADMIN — HYDROCORTISONE: 1 CREAM TOPICAL at 20:56

## 2018-01-01 RX ADMIN — IOPAMIDOL 55 ML: 510 INJECTION, SOLUTION INTRAVASCULAR at 10:19

## 2018-01-01 RX ADMIN — INSULIN GLARGINE 5 UNITS: 100 INJECTION, SOLUTION SUBCUTANEOUS at 09:28

## 2018-01-01 RX ADMIN — PANTOPRAZOLE SODIUM 40 MG: 40 INJECTION, POWDER, FOR SOLUTION INTRAVENOUS at 08:01

## 2018-01-01 RX ADMIN — Medication 1 UNITS: at 13:37

## 2018-01-01 RX ADMIN — ANTACID TABLETS 2500 MG: 500 TABLET, CHEWABLE ORAL at 13:36

## 2018-01-01 RX ADMIN — MIDODRINE HYDROCHLORIDE 10 MG: 10 TABLET ORAL at 08:06

## 2018-01-01 RX ADMIN — MEROPENEM 500 MG: 500 INJECTION, POWDER, FOR SOLUTION INTRAVENOUS at 21:42

## 2018-01-01 RX ADMIN — MEROPENEM 500 MG: 500 INJECTION, POWDER, FOR SOLUTION INTRAVENOUS at 22:04

## 2018-01-01 RX ADMIN — SENNOSIDES 8.6 MG: 8.6 TABLET, FILM COATED ORAL at 20:57

## 2018-01-01 RX ADMIN — ANTACID TABLETS 2500 MG: 500 TABLET, CHEWABLE ORAL at 16:32

## 2018-01-01 RX ADMIN — PANTOPRAZOLE SODIUM 40 MG: 40 INJECTION, POWDER, FOR SOLUTION INTRAVENOUS at 09:19

## 2018-01-01 RX ADMIN — CLOPIDOGREL BISULFATE 75 MG: 75 TABLET ORAL at 08:11

## 2018-01-01 RX ADMIN — ASPIRIN 81 MG CHEWABLE TABLET 81 MG: 81 TABLET CHEWABLE at 10:06

## 2018-01-01 ASSESSMENT — PAIN SCALES - GENERAL
PAINLEVEL_OUTOF10: 0
PAINLEVEL_OUTOF10: 2
PAINLEVEL_OUTOF10: 0
PAINLEVEL_OUTOF10: 2
PAINLEVEL_OUTOF10: 0

## 2018-01-01 ASSESSMENT — ENCOUNTER SYMPTOMS
ABDOMINAL PAIN: 0
VOICE CHANGE: 0
SHORTNESS OF BREATH: 0
WHEEZING: 0
NAUSEA: 0
VOMITING: 0
GASTROINTESTINAL NEGATIVE: 1
TROUBLE SWALLOWING: 0

## 2018-01-01 ASSESSMENT — PAIN - FUNCTIONAL ASSESSMENT
PAIN_FUNCTIONAL_ASSESSMENT: 0-10
PAIN_FUNCTIONAL_ASSESSMENT: 0-10

## 2018-01-29 ENCOUNTER — HOSPITAL ENCOUNTER (OUTPATIENT)
Dept: INTERVENTIONAL RADIOLOGY/VASCULAR | Age: 58
Discharge: HOME | End: 2018-01-29
Payer: MEDICARE

## 2018-01-29 VITALS
RESPIRATION RATE: 18 BRPM | HEART RATE: 74 BPM | OXYGEN SATURATION: 93 % | HEIGHT: 73 IN | TEMPERATURE: 98.2 F | WEIGHT: 240 LBS | DIASTOLIC BLOOD PRESSURE: 57 MMHG | SYSTOLIC BLOOD PRESSURE: 101 MMHG | BODY MASS INDEX: 31.81 KG/M2

## 2018-01-29 PROCEDURE — 2500000003 HC RX 250 WO HCPCS

## 2018-01-29 PROCEDURE — A4450 NON-WATERPROOF TAPE: HCPCS

## 2018-01-29 PROCEDURE — 49422 REMOVE TUNNELED IP CATH: CPT | Performed by: RADIOLOGY

## 2018-01-29 PROCEDURE — A6258 TRANSPARENT FILM >16<=48 IN: HCPCS

## 2018-01-29 PROCEDURE — 6360000002 HC RX W HCPCS: Performed by: RADIOLOGY

## 2018-01-29 PROCEDURE — 2500000003 HC RX 250 WO HCPCS: Performed by: RADIOLOGY

## 2018-01-29 PROCEDURE — 6360000002 HC RX W HCPCS

## 2018-01-29 PROCEDURE — 2580000003 HC RX 258: Performed by: RADIOLOGY

## 2018-01-29 RX ORDER — SODIUM CHLORIDE 450 MG/100ML
INJECTION, SOLUTION INTRAVENOUS CONTINUOUS
Status: DISCONTINUED | OUTPATIENT
Start: 2018-01-29 | End: 2018-01-30 | Stop reason: HOSPADM

## 2018-01-29 RX ORDER — MIDAZOLAM HYDROCHLORIDE 1 MG/ML
1 INJECTION INTRAMUSCULAR; INTRAVENOUS ONCE
Status: COMPLETED | OUTPATIENT
Start: 2018-01-29 | End: 2018-01-29

## 2018-01-29 RX ORDER — DOXYCYCLINE HYCLATE 50 MG/1
100 CAPSULE ORAL 2 TIMES DAILY
COMMUNITY
End: 2018-07-06

## 2018-01-29 RX ORDER — FENTANYL CITRATE 50 UG/ML
50 INJECTION, SOLUTION INTRAMUSCULAR; INTRAVENOUS ONCE
Status: DISCONTINUED | OUTPATIENT
Start: 2018-01-29 | End: 2018-01-29

## 2018-01-29 RX ADMIN — CEFAZOLIN SODIUM 1 G: 1 INJECTION, SOLUTION INTRAVENOUS at 07:52

## 2018-01-29 RX ADMIN — MIDAZOLAM HYDROCHLORIDE 1 MG: 1 INJECTION INTRAMUSCULAR; INTRAVENOUS at 09:18

## 2018-01-29 RX ADMIN — Medication 1 MG: at 09:20

## 2018-01-29 RX ADMIN — SODIUM CHLORIDE: 4.5 INJECTION, SOLUTION INTRAVENOUS at 07:52

## 2018-01-29 RX ADMIN — Medication 1 MG: at 09:13

## 2018-01-29 RX ADMIN — SODIUM CHLORIDE 50000 UNITS: 0.9 IRRIGANT IRRIGATION at 09:28

## 2018-01-29 RX ADMIN — MIDAZOLAM HYDROCHLORIDE 1 MG: 1 INJECTION INTRAMUSCULAR; INTRAVENOUS at 09:13

## 2018-01-29 ASSESSMENT — PAIN SCALES - GENERAL
PAINLEVEL_OUTOF10: 0
PAINLEVEL_OUTOF10: 3
PAINLEVEL_OUTOF10: 0

## 2018-01-29 ASSESSMENT — PAIN - FUNCTIONAL ASSESSMENT: PAIN_FUNCTIONAL_ASSESSMENT: 0-10

## 2018-01-29 NOTE — PROGRESS NOTES
Formulation and discussion of sedation / procedure plans, risks, benefits, side effects and alternatives with patient and/or responsible adult completed.     Electronically signed by Jose Luis aPck MD on 1/29/2018 at 9:51 AM
surgery (Bilateral); Cardiac surgery (2/19/2014); Cardiac surgery (2006); Cardiac surgery (2003); vitrectomy (Right, 3/27/14); Appendectomy; ablation of dysrhythmic focus (07/21/2017); vascular surgery; egd transoral biopsy single/multiple (N/A, 8/17/2017); Hand surgery (Right, 8/18/2017); colon ca scrn not hi rsk ind (Left, 9/8/2017); Enteroscopy (9/8/2017); amputate metacarpal+finger (Right, 9/28/2017); and Finger amputation (Right, 09/28/2017).   Additional information:       ALLERGIES   Allergies as of 01/29/2018 - Review Complete 01/29/2018   Allergen Reaction Noted    Vicodin [hydrocodone-acetaminophen] Nausea Only 03/25/2014    Darvocet a500 [propoxyphene n-acetaminophen] Nausea And Vomiting 08/08/2013    Demerol hcl [meperidine] Nausea And Vomiting 08/08/2013     Additional information:       MEDICATIONS   Coumadin Use Last 5 Days [x]No []Yes  Antiplatelet drug therapy use last 5 days  [x]No []Yes  Other anticoagulant use last 5 days  [x]No []Yes    Current Outpatient Prescriptions:     doxycycline (VIBRAMYCIN) 50 MG capsule, Take 100 mg by mouth 2 times daily, Disp: , Rfl:     minoxidil (LONITEN) 10 MG tablet, Take 5 mg by mouth daily, Disp: , Rfl:     carvedilol (COREG) 3.125 MG tablet, Take 1 tablet by mouth 2 times daily, Disp: 60 tablet, Rfl: 3    calcium carbonate (TUMS) 500 MG chewable tablet, Take 5 tablets by mouth 3 times daily (with meals) And 3 tabs as needed with snacks, Disp: , Rfl:     FLUoxetine (PROZAC) 20 MG capsule, Take 20 mg by mouth daily, Disp: , Rfl:     calcitRIOL (ROCALTROL) 0.25 MCG capsule, Take 0.25 mcg by mouth four times a week Sunday, Monday, Wednesday and Friday, Disp: , Rfl:     insulin glargine (LANTUS) 100 UNIT/ML injection, Inject 50 Units into the skin every morning (before breakfast) , Disp: , Rfl:     Multiple Vitamin (MULTI-VITAMIN PO), Take 1 tablet by mouth daily , Disp: , Rfl:     ezetimibe-simvastatin (VYTORIN) 10-40 MG per tablet, Take 1 tablet by

## 2018-02-23 ENCOUNTER — HOSPITAL ENCOUNTER (OUTPATIENT)
Dept: INTERVENTIONAL RADIOLOGY/VASCULAR | Age: 58
Discharge: HOME | End: 2018-02-23
Payer: MEDICARE

## 2018-02-23 DIAGNOSIS — N18.6 ESRD (END STAGE RENAL DISEASE) (HCC): ICD-10-CM

## 2018-02-23 PROCEDURE — 99211 OFF/OP EST MAY X REQ PHY/QHP: CPT

## 2018-04-23 RX ORDER — CLINDAMYCIN PHOSPHATE 600 MG/50ML
600 INJECTION INTRAVENOUS
Status: CANCELLED | OUTPATIENT
Start: 2018-04-23

## 2018-04-23 RX ORDER — MIDAZOLAM HYDROCHLORIDE 1 MG/ML
1 INJECTION INTRAMUSCULAR; INTRAVENOUS ONCE
Status: CANCELLED | OUTPATIENT
Start: 2018-04-23 | End: 2018-04-23

## 2018-04-23 RX ORDER — LIDOCAINE 40 MG/G
CREAM TOPICAL ONCE
Status: CANCELLED | OUTPATIENT
Start: 2018-04-23 | End: 2018-04-23

## 2018-04-23 RX ORDER — SODIUM CHLORIDE 450 MG/100ML
INJECTION, SOLUTION INTRAVENOUS CONTINUOUS
Status: CANCELLED | OUTPATIENT
Start: 2018-04-23

## 2018-04-24 ENCOUNTER — HOSPITAL ENCOUNTER (OUTPATIENT)
Dept: INTERVENTIONAL RADIOLOGY/VASCULAR | Age: 58
Discharge: HOME | End: 2018-04-24
Payer: MEDICARE

## 2018-04-24 VITALS
WEIGHT: 240 LBS | TEMPERATURE: 97.7 F | RESPIRATION RATE: 16 BRPM | DIASTOLIC BLOOD PRESSURE: 67 MMHG | OXYGEN SATURATION: 93 % | SYSTOLIC BLOOD PRESSURE: 130 MMHG | HEART RATE: 87 BPM | BODY MASS INDEX: 31.66 KG/M2

## 2018-04-24 DIAGNOSIS — N18.6 ESRD (END STAGE RENAL DISEASE) (HCC): ICD-10-CM

## 2018-04-24 PROCEDURE — C1894 INTRO/SHEATH, NON-LASER: HCPCS

## 2018-04-24 PROCEDURE — 36215 PLACE CATHETER IN ARTERY: CPT | Performed by: RADIOLOGY

## 2018-04-24 PROCEDURE — 2500000003 HC RX 250 WO HCPCS

## 2018-04-24 PROCEDURE — 6360000004 HC RX CONTRAST MEDICATION: Performed by: RADIOLOGY

## 2018-04-24 PROCEDURE — 6360000002 HC RX W HCPCS

## 2018-04-24 PROCEDURE — 6370000000 HC RX 637 (ALT 250 FOR IP): Performed by: RADIOLOGY

## 2018-04-24 PROCEDURE — 36902 INTRO CATH DIALYSIS CIRCUIT: CPT | Performed by: RADIOLOGY

## 2018-04-24 PROCEDURE — 2580000003 HC RX 258: Performed by: RADIOLOGY

## 2018-04-24 PROCEDURE — C1769 GUIDE WIRE: HCPCS

## 2018-04-24 PROCEDURE — 75710 ARTERY X-RAYS ARM/LEG: CPT | Performed by: RADIOLOGY

## 2018-04-24 PROCEDURE — 2780000010 HC IMPLANT OTHER

## 2018-04-24 PROCEDURE — C1725 CATH, TRANSLUMIN NON-LASER: HCPCS

## 2018-04-24 RX ORDER — HEPARIN SODIUM 1000 [USP'U]/ML
2000 INJECTION, SOLUTION INTRAVENOUS; SUBCUTANEOUS ONCE
Status: COMPLETED | OUTPATIENT
Start: 2018-04-24 | End: 2018-04-24

## 2018-04-24 RX ORDER — LIDOCAINE 40 MG/G
CREAM TOPICAL ONCE
Status: COMPLETED | OUTPATIENT
Start: 2018-04-24 | End: 2018-04-24

## 2018-04-24 RX ORDER — MIDAZOLAM HYDROCHLORIDE 1 MG/ML
1 INJECTION INTRAMUSCULAR; INTRAVENOUS ONCE
Status: COMPLETED | OUTPATIENT
Start: 2018-04-24 | End: 2018-04-24

## 2018-04-24 RX ORDER — SODIUM CHLORIDE 450 MG/100ML
INJECTION, SOLUTION INTRAVENOUS CONTINUOUS
Status: DISCONTINUED | OUTPATIENT
Start: 2018-04-24 | End: 2018-04-25 | Stop reason: HOSPADM

## 2018-04-24 RX ADMIN — HEPARIN SODIUM 2000 UNITS: 1000 INJECTION, SOLUTION INTRAVENOUS; SUBCUTANEOUS at 13:50

## 2018-04-24 RX ADMIN — IOVERSOL 60 ML: 509 INJECTION INTRA-ARTERIAL; INTRAVENOUS at 14:08

## 2018-04-24 RX ADMIN — LIDOCAINE 4%: 4 CREAM TOPICAL at 11:13

## 2018-04-24 RX ADMIN — MIDAZOLAM HYDROCHLORIDE 1 MG: 1 INJECTION INTRAMUSCULAR; INTRAVENOUS at 13:18

## 2018-04-24 RX ADMIN — Medication 1 MG: at 13:18

## 2018-04-24 RX ADMIN — SODIUM CHLORIDE: 4.5 INJECTION, SOLUTION INTRAVENOUS at 11:22

## 2018-04-24 ASSESSMENT — PAIN DESCRIPTION - LOCATION: LOCATION: BACK

## 2018-04-24 ASSESSMENT — PAIN SCALES - GENERAL
PAINLEVEL_OUTOF10: 0
PAINLEVEL_OUTOF10: 5

## 2018-04-24 ASSESSMENT — PAIN DESCRIPTION - PAIN TYPE: TYPE: CHRONIC PAIN

## 2018-04-24 ASSESSMENT — PAIN - FUNCTIONAL ASSESSMENT: PAIN_FUNCTIONAL_ASSESSMENT: 0-10

## 2018-05-14 ENCOUNTER — HOSPITAL ENCOUNTER (EMERGENCY)
Age: 58
Discharge: HOME | End: 2018-05-14
Attending: FAMILY MEDICINE
Payer: MEDICARE

## 2018-05-14 ENCOUNTER — APPOINTMENT (OUTPATIENT)
Dept: GENERAL RADIOLOGY | Age: 58
End: 2018-05-14
Payer: MEDICARE

## 2018-05-14 ENCOUNTER — TELEPHONE (OUTPATIENT)
Dept: CARDIOLOGY | Age: 58
End: 2018-05-14

## 2018-05-14 VITALS
HEART RATE: 74 BPM | WEIGHT: 240 LBS | OXYGEN SATURATION: 98 % | DIASTOLIC BLOOD PRESSURE: 78 MMHG | BODY MASS INDEX: 31.66 KG/M2 | RESPIRATION RATE: 18 BRPM | SYSTOLIC BLOOD PRESSURE: 167 MMHG

## 2018-05-14 DIAGNOSIS — R77.8 ELEVATED TROPONIN: Primary | ICD-10-CM

## 2018-05-14 DIAGNOSIS — Z99.2 DIALYSIS PATIENT (HCC): ICD-10-CM

## 2018-05-14 LAB
ALBUMIN SERPL-MCNC: 3.9 G/DL (ref 3.5–5.1)
ALP BLD-CCNC: 132 U/L (ref 38–126)
ALT SERPL-CCNC: 15 U/L (ref 11–66)
ANION GAP SERPL CALCULATED.3IONS-SCNC: 17 MEQ/L (ref 8–16)
ANISOCYTOSIS: ABNORMAL
APTT: 32.1 SECONDS (ref 22–38)
AST SERPL-CCNC: 22 U/L (ref 5–40)
BASOPHILS # BLD: 0.6 %
BASOPHILS ABSOLUTE: 0 THOU/MM3 (ref 0–0.1)
BILIRUB SERPL-MCNC: 0.3 MG/DL (ref 0.3–1.2)
BUN BLDV-MCNC: 49 MG/DL (ref 7–22)
CALCIUM SERPL-MCNC: 8.6 MG/DL (ref 8.5–10.5)
CHLORIDE BLD-SCNC: 101 MEQ/L (ref 98–111)
CO2: 23 MEQ/L (ref 23–33)
CREAT SERPL-MCNC: 6.6 MG/DL (ref 0.4–1.2)
EKG ATRIAL RATE: 73 BPM
EKG P AXIS: 65 DEGREES
EKG P-R INTERVAL: 280 MS
EKG Q-T INTERVAL: 484 MS
EKG QRS DURATION: 156 MS
EKG QTC CALCULATION (BAZETT): 533 MS
EKG R AXIS: -176 DEGREES
EKG T AXIS: 56 DEGREES
EKG VENTRICULAR RATE: 73 BPM
EOSINOPHIL # BLD: 3.5 %
EOSINOPHILS ABSOLUTE: 0.2 THOU/MM3 (ref 0–0.4)
GFR SERPL CREATININE-BSD FRML MDRD: 9 ML/MIN/1.73M2
GLUCOSE BLD-MCNC: 158 MG/DL (ref 70–108)
HCT VFR BLD CALC: 30.6 % (ref 42–52)
HEMOGLOBIN: 10.3 GM/DL (ref 14–18)
INR BLD: 1.08 (ref 0.85–1.13)
LACTIC ACID: 1.4 MMOL/L (ref 0.5–2.2)
LIPASE: 43.4 U/L (ref 5.6–51.3)
LYMPHOCYTES # BLD: 15.5 %
LYMPHOCYTES ABSOLUTE: 0.9 THOU/MM3 (ref 1–4.8)
MCH RBC QN AUTO: 30.3 PG (ref 27–31)
MCHC RBC AUTO-ENTMCNC: 33.7 GM/DL (ref 33–37)
MCV RBC AUTO: 89.9 FL (ref 80–94)
MONOCYTES # BLD: 10.5 %
MONOCYTES ABSOLUTE: 0.6 THOU/MM3 (ref 0.4–1.3)
NUCLEATED RED BLOOD CELLS: 0 /100 WBC
OSMOLALITY CALCULATION: 297.5 MOSMOL/KG (ref 275–300)
PDW BLD-RTO: 16.3 % (ref 11.5–14.5)
PLATELET # BLD: 144 THOU/MM3 (ref 130–400)
PMV BLD AUTO: 8.9 FL (ref 7.4–10.4)
POTASSIUM REFLEX MAGNESIUM: 3.9 MEQ/L (ref 3.5–5.2)
PRO-BNP: ABNORMAL PG/ML (ref 0–900)
RBC # BLD: 3.4 MILL/MM3 (ref 4.7–6.1)
SEG NEUTROPHILS: 69.9 %
SEGMENTED NEUTROPHILS ABSOLUTE COUNT: 3.8 THOU/MM3 (ref 1.8–7.7)
SODIUM BLD-SCNC: 141 MEQ/L (ref 135–145)
TOTAL PROTEIN: 7.3 G/DL (ref 6.1–8)
TROPONIN T: 0.11 NG/ML
TSH SERPL DL<=0.05 MIU/L-ACNC: 3.57 UIU/ML (ref 0.4–4.2)
WBC # BLD: 5.5 THOU/MM3 (ref 4.8–10.8)

## 2018-05-14 PROCEDURE — 80053 COMPREHEN METABOLIC PANEL: CPT

## 2018-05-14 PROCEDURE — 36415 COLL VENOUS BLD VENIPUNCTURE: CPT

## 2018-05-14 PROCEDURE — 85025 COMPLETE CBC W/AUTO DIFF WBC: CPT

## 2018-05-14 PROCEDURE — 84443 ASSAY THYROID STIM HORMONE: CPT

## 2018-05-14 PROCEDURE — 83690 ASSAY OF LIPASE: CPT

## 2018-05-14 PROCEDURE — 93005 ELECTROCARDIOGRAM TRACING: CPT | Performed by: FAMILY MEDICINE

## 2018-05-14 PROCEDURE — 83880 ASSAY OF NATRIURETIC PEPTIDE: CPT

## 2018-05-14 PROCEDURE — 71045 X-RAY EXAM CHEST 1 VIEW: CPT

## 2018-05-14 PROCEDURE — 84484 ASSAY OF TROPONIN QUANT: CPT

## 2018-05-14 PROCEDURE — 85610 PROTHROMBIN TIME: CPT

## 2018-05-14 PROCEDURE — 83605 ASSAY OF LACTIC ACID: CPT

## 2018-05-14 PROCEDURE — 85730 THROMBOPLASTIN TIME PARTIAL: CPT

## 2018-05-14 PROCEDURE — 99284 EMERGENCY DEPT VISIT MOD MDM: CPT

## 2018-05-14 ASSESSMENT — ENCOUNTER SYMPTOMS
RHINORRHEA: 0
PHOTOPHOBIA: 0
EYE REDNESS: 0
BLOOD IN STOOL: 0
CHOKING: 0
EYE DISCHARGE: 0
VOMITING: 0
VOICE CHANGE: 0
COUGH: 0
COLOR CHANGE: 0
NAUSEA: 0
SINUS PRESSURE: 0
WHEEZING: 0
DIARRHEA: 0
TROUBLE SWALLOWING: 0
EYE PAIN: 0
ABDOMINAL PAIN: 0
SHORTNESS OF BREATH: 0
FACIAL SWELLING: 0
SORE THROAT: 0
BACK PAIN: 0
ABDOMINAL DISTENTION: 0
CONSTIPATION: 0

## 2018-05-15 ENCOUNTER — OFFICE VISIT (OUTPATIENT)
Dept: CARDIOLOGY CLINIC | Age: 58
End: 2018-05-15
Payer: MEDICARE

## 2018-05-15 VITALS
SYSTOLIC BLOOD PRESSURE: 161 MMHG | WEIGHT: 240 LBS | HEART RATE: 88 BPM | BODY MASS INDEX: 31.66 KG/M2 | DIASTOLIC BLOOD PRESSURE: 79 MMHG

## 2018-05-15 DIAGNOSIS — R06.09 DYSPNEA ON EXERTION: ICD-10-CM

## 2018-05-15 DIAGNOSIS — R07.9 CHEST PAIN, UNSPECIFIED TYPE: ICD-10-CM

## 2018-05-15 DIAGNOSIS — I73.9 PVD (PERIPHERAL VASCULAR DISEASE) (HCC): ICD-10-CM

## 2018-05-15 DIAGNOSIS — Z95.2 S/P AVR: ICD-10-CM

## 2018-05-15 DIAGNOSIS — I10 ESSENTIAL HYPERTENSION: ICD-10-CM

## 2018-05-15 DIAGNOSIS — I25.810 CORONARY ARTERY DISEASE INVOLVING CORONARY BYPASS GRAFT OF NATIVE HEART WITHOUT ANGINA PECTORIS: Primary | Chronic | ICD-10-CM

## 2018-05-15 PROCEDURE — 93000 ELECTROCARDIOGRAM COMPLETE: CPT | Performed by: NUCLEAR MEDICINE

## 2018-05-15 PROCEDURE — 1036F TOBACCO NON-USER: CPT | Performed by: NUCLEAR MEDICINE

## 2018-05-15 PROCEDURE — G8598 ASA/ANTIPLAT THER USED: HCPCS | Performed by: NUCLEAR MEDICINE

## 2018-05-15 PROCEDURE — 99214 OFFICE O/P EST MOD 30 MIN: CPT | Performed by: NUCLEAR MEDICINE

## 2018-05-15 PROCEDURE — G8417 CALC BMI ABV UP PARAM F/U: HCPCS | Performed by: NUCLEAR MEDICINE

## 2018-05-15 PROCEDURE — G8427 DOCREV CUR MEDS BY ELIG CLIN: HCPCS | Performed by: NUCLEAR MEDICINE

## 2018-05-15 PROCEDURE — 3017F COLORECTAL CA SCREEN DOC REV: CPT | Performed by: NUCLEAR MEDICINE

## 2018-05-15 PROCEDURE — 93010 ELECTROCARDIOGRAM REPORT: CPT | Performed by: INTERNAL MEDICINE

## 2018-06-07 ENCOUNTER — HOSPITAL ENCOUNTER (OUTPATIENT)
Dept: NON INVASIVE DIAGNOSTICS | Age: 58
Discharge: HOME | End: 2018-06-07
Payer: MEDICARE

## 2018-06-07 ENCOUNTER — TELEPHONE (OUTPATIENT)
Dept: CARDIOLOGY CLINIC | Age: 58
End: 2018-06-07

## 2018-06-07 VITALS — HEIGHT: 73 IN

## 2018-06-07 DIAGNOSIS — I25.810 CORONARY ARTERY DISEASE INVOLVING CORONARY BYPASS GRAFT OF NATIVE HEART WITHOUT ANGINA PECTORIS: Chronic | ICD-10-CM

## 2018-06-07 DIAGNOSIS — R93.1 ABNORMAL ECHOCARDIOGRAM: ICD-10-CM

## 2018-06-07 DIAGNOSIS — I25.110 CORONARY ARTERY DISEASE INVOLVING NATIVE CORONARY ARTERY OF NATIVE HEART WITH UNSTABLE ANGINA PECTORIS (HCC): ICD-10-CM

## 2018-06-07 DIAGNOSIS — R06.09 DYSPNEA ON EXERTION: ICD-10-CM

## 2018-06-07 DIAGNOSIS — R94.39 ABNORMAL STRESS TEST: Primary | ICD-10-CM

## 2018-06-07 DIAGNOSIS — Z95.2 S/P AVR: ICD-10-CM

## 2018-06-07 LAB
LV EF: 38 %
LVEF MODALITY: NORMAL

## 2018-06-07 PROCEDURE — 3430000000 HC RX DIAGNOSTIC RADIOPHARMACEUTICAL: Performed by: NUCLEAR MEDICINE

## 2018-06-07 PROCEDURE — 6360000002 HC RX W HCPCS

## 2018-06-07 PROCEDURE — A9500 TC99M SESTAMIBI: HCPCS | Performed by: NUCLEAR MEDICINE

## 2018-06-07 PROCEDURE — 93017 CV STRESS TEST TRACING ONLY: CPT

## 2018-06-07 PROCEDURE — 78452 HT MUSCLE IMAGE SPECT MULT: CPT

## 2018-06-07 PROCEDURE — 93306 TTE W/DOPPLER COMPLETE: CPT

## 2018-06-07 RX ADMIN — Medication 8.5 MILLICURIE: at 08:53

## 2018-06-07 RX ADMIN — Medication 30.4 MILLICURIE: at 09:45

## 2018-07-06 ENCOUNTER — APPOINTMENT (OUTPATIENT)
Dept: GENERAL RADIOLOGY | Age: 58
DRG: 246 | End: 2018-07-06
Payer: MEDICARE

## 2018-07-06 ENCOUNTER — HOSPITAL ENCOUNTER (INPATIENT)
Age: 58
LOS: 1 days | Discharge: HOME | DRG: 246 | End: 2018-07-08
Attending: FAMILY MEDICINE | Admitting: INTERNAL MEDICINE
Payer: MEDICARE

## 2018-07-06 DIAGNOSIS — R07.9 CHEST PAIN, UNSPECIFIED TYPE: Primary | ICD-10-CM

## 2018-07-06 LAB
ANION GAP SERPL CALCULATED.3IONS-SCNC: 17 MEQ/L (ref 8–16)
BASOPHILS # BLD: 0.7 %
BASOPHILS ABSOLUTE: 0 THOU/MM3 (ref 0–0.1)
BUN BLDV-MCNC: 40 MG/DL (ref 7–22)
CALCIUM SERPL-MCNC: 9.4 MG/DL (ref 8.5–10.5)
CHLORIDE BLD-SCNC: 100 MEQ/L (ref 98–111)
CO2: 21 MEQ/L (ref 23–33)
CREAT SERPL-MCNC: 6.7 MG/DL (ref 0.4–1.2)
EOSINOPHIL # BLD: 4.1 %
EOSINOPHILS ABSOLUTE: 0.3 THOU/MM3 (ref 0–0.4)
ERYTHROCYTE [DISTWIDTH] IN BLOOD BY AUTOMATED COUNT: 16 % (ref 11.5–14.5)
ERYTHROCYTE [DISTWIDTH] IN BLOOD BY AUTOMATED COUNT: 54.9 FL (ref 35–45)
GFR SERPL CREATININE-BSD FRML MDRD: 9 ML/MIN/1.73M2
GLUCOSE BLD-MCNC: 136 MG/DL (ref 70–108)
HCT VFR BLD CALC: 39.9 % (ref 42–52)
HEMOGLOBIN: 12.4 GM/DL (ref 14–18)
IMMATURE GRANS (ABS): 0.07 THOU/MM3 (ref 0–0.07)
IMMATURE GRANULOCYTES: 1 %
LYMPHOCYTES # BLD: 12.7 %
LYMPHOCYTES ABSOLUTE: 0.9 THOU/MM3 (ref 1–4.8)
MCH RBC QN AUTO: 29.7 PG (ref 26–33)
MCHC RBC AUTO-ENTMCNC: 31.1 GM/DL (ref 32.2–35.5)
MCV RBC AUTO: 95.7 FL (ref 80–94)
MONOCYTES # BLD: 8.6 %
MONOCYTES ABSOLUTE: 0.6 THOU/MM3 (ref 0.4–1.3)
NUCLEATED RED BLOOD CELLS: 0 /100 WBC
OSMOLALITY CALCULATION: 287.5 MOSMOL/KG (ref 275–300)
PLATELET # BLD: 198 THOU/MM3 (ref 130–400)
PMV BLD AUTO: 10.5 FL (ref 9.4–12.4)
POTASSIUM SERPL-SCNC: 4.5 MEQ/L (ref 3.5–5.2)
PRO-BNP: ABNORMAL PG/ML (ref 0–900)
RBC # BLD: 4.17 MILL/MM3 (ref 4.7–6.1)
SEG NEUTROPHILS: 72.9 %
SEGMENTED NEUTROPHILS ABSOLUTE COUNT: 5.2 THOU/MM3 (ref 1.8–7.7)
SODIUM BLD-SCNC: 138 MEQ/L (ref 135–145)
TROPONIN T: 0.11 NG/ML
WBC # BLD: 7.1 THOU/MM3 (ref 4.8–10.8)

## 2018-07-06 PROCEDURE — 84484 ASSAY OF TROPONIN QUANT: CPT

## 2018-07-06 PROCEDURE — 83880 ASSAY OF NATRIURETIC PEPTIDE: CPT

## 2018-07-06 PROCEDURE — 85025 COMPLETE CBC W/AUTO DIFF WBC: CPT

## 2018-07-06 PROCEDURE — 93005 ELECTROCARDIOGRAM TRACING: CPT | Performed by: FAMILY MEDICINE

## 2018-07-06 PROCEDURE — 80048 BASIC METABOLIC PNL TOTAL CA: CPT

## 2018-07-06 PROCEDURE — 36415 COLL VENOUS BLD VENIPUNCTURE: CPT

## 2018-07-06 PROCEDURE — 71046 X-RAY EXAM CHEST 2 VIEWS: CPT

## 2018-07-06 PROCEDURE — 99285 EMERGENCY DEPT VISIT HI MDM: CPT

## 2018-07-06 RX ORDER — ASPIRIN 81 MG/1
324 TABLET, CHEWABLE ORAL ONCE
Status: DISCONTINUED | OUTPATIENT
Start: 2018-07-06 | End: 2018-07-07 | Stop reason: HOSPADM

## 2018-07-06 RX ORDER — NITROGLYCERIN 0.4 MG/1
0.4 TABLET SUBLINGUAL EVERY 5 MIN PRN
Status: DISCONTINUED | OUTPATIENT
Start: 2018-07-06 | End: 2018-07-07 | Stop reason: HOSPADM

## 2018-07-06 ASSESSMENT — PAIN DESCRIPTION - LOCATION
LOCATION: CHEST
LOCATION: CHEST

## 2018-07-06 ASSESSMENT — PAIN DESCRIPTION - PAIN TYPE
TYPE: ACUTE PAIN
TYPE: ACUTE PAIN

## 2018-07-06 ASSESSMENT — ENCOUNTER SYMPTOMS
WHEEZING: 0
BACK PAIN: 0
NAUSEA: 0
EYE DISCHARGE: 0
COUGH: 0
EYE REDNESS: 0
SORE THROAT: 0
RHINORRHEA: 0
VOMITING: 0
DIARRHEA: 0
ABDOMINAL PAIN: 0
SHORTNESS OF BREATH: 1

## 2018-07-06 ASSESSMENT — PAIN SCALES - GENERAL
PAINLEVEL_OUTOF10: 6
PAINLEVEL_OUTOF10: 2

## 2018-07-07 PROBLEM — D63.8 ANEMIA, CHRONIC DISEASE: Status: RESOLVED | Noted: 2017-02-05 | Resolved: 2018-07-07

## 2018-07-07 PROBLEM — R60.0 BILATERAL EDEMA OF LOWER EXTREMITY: Status: RESOLVED | Noted: 2017-02-05 | Resolved: 2018-07-07

## 2018-07-07 PROBLEM — D50.0 CHRONIC BLOOD LOSS ANEMIA: Status: RESOLVED | Noted: 2017-09-07 | Resolved: 2018-07-07

## 2018-07-07 PROBLEM — N43.3 BILATERAL HYDROCELE: Status: RESOLVED | Noted: 2017-02-05 | Resolved: 2018-07-07

## 2018-07-07 PROBLEM — R10.9 RIGHT FLANK PAIN: Status: RESOLVED | Noted: 2017-11-28 | Resolved: 2018-07-07

## 2018-07-07 PROBLEM — K92.2 LGI BLEED: Status: RESOLVED | Noted: 2017-09-06 | Resolved: 2018-07-07

## 2018-07-07 PROBLEM — R31.0 GROSS HEMATURIA: Status: RESOLVED | Noted: 2017-11-28 | Resolved: 2018-07-07

## 2018-07-07 PROBLEM — E13.9 DIABETES 1.5, MANAGED AS TYPE 1 (HCC): Status: RESOLVED | Noted: 2017-11-28 | Resolved: 2018-07-07

## 2018-07-07 PROBLEM — R07.9 CHEST PAIN: Status: ACTIVE | Noted: 2018-07-07

## 2018-07-07 PROBLEM — R10.9 ABDOMINAL PAIN: Status: RESOLVED | Noted: 2017-11-28 | Resolved: 2018-07-07

## 2018-07-07 PROBLEM — I48.3 TYPICAL ATRIAL FLUTTER (HCC): Status: RESOLVED | Noted: 2017-07-21 | Resolved: 2018-07-07

## 2018-07-07 LAB
ACTIVATED CLOTTING TIME: 197 SECONDS (ref 1–150)
C-REACTIVE PROTEIN: 1.52 MG/DL (ref 0–1)
GLUCOSE BLD-MCNC: 102 MG/DL (ref 70–108)
GLUCOSE BLD-MCNC: 104 MG/DL (ref 70–108)
GLUCOSE BLD-MCNC: 125 MG/DL (ref 70–108)
GLUCOSE BLD-MCNC: 169 MG/DL (ref 70–108)
HBV SURFACE AB TITR SER: NEGATIVE {TITER}
HEPATITIS B CORE IGM ANTIBODY: NEGATIVE
TROPONIN T: 0.1 NG/ML

## 2018-07-07 PROCEDURE — 90935 HEMODIALYSIS ONE EVALUATION: CPT

## 2018-07-07 PROCEDURE — 87040 BLOOD CULTURE FOR BACTERIA: CPT

## 2018-07-07 PROCEDURE — 36415 COLL VENOUS BLD VENIPUNCTURE: CPT

## 2018-07-07 PROCEDURE — C1725 CATH, TRANSLUMIN NON-LASER: HCPCS

## 2018-07-07 PROCEDURE — 6370000000 HC RX 637 (ALT 250 FOR IP)

## 2018-07-07 PROCEDURE — 99223 1ST HOSP IP/OBS HIGH 75: CPT | Performed by: NUCLEAR MEDICINE

## 2018-07-07 PROCEDURE — C9600 PERC DRUG-EL COR STENT SING: HCPCS | Performed by: INTERNAL MEDICINE

## 2018-07-07 PROCEDURE — 5A1D70Z PERFORMANCE OF URINARY FILTRATION, INTERMITTENT, LESS THAN 6 HOURS PER DAY: ICD-10-PCS | Performed by: INTERNAL MEDICINE

## 2018-07-07 PROCEDURE — 82948 REAGENT STRIP/BLOOD GLUCOSE: CPT

## 2018-07-07 PROCEDURE — 93005 ELECTROCARDIOGRAM TRACING: CPT | Performed by: INTERNAL MEDICINE

## 2018-07-07 PROCEDURE — C1887 CATHETER, GUIDING: HCPCS

## 2018-07-07 PROCEDURE — C1769 GUIDE WIRE: HCPCS

## 2018-07-07 PROCEDURE — B41F1ZZ FLUOROSCOPY OF RIGHT LOWER EXTREMITY ARTERIES USING LOW OSMOLAR CONTRAST: ICD-10-PCS | Performed by: INTERNAL MEDICINE

## 2018-07-07 PROCEDURE — 2720000010 HC SURG SUPPLY STERILE

## 2018-07-07 PROCEDURE — 6370000000 HC RX 637 (ALT 250 FOR IP): Performed by: NUCLEAR MEDICINE

## 2018-07-07 PROCEDURE — 2500000003 HC RX 250 WO HCPCS

## 2018-07-07 PROCEDURE — 86706 HEP B SURFACE ANTIBODY: CPT

## 2018-07-07 PROCEDURE — 93454 CORONARY ARTERY ANGIO S&I: CPT | Performed by: INTERNAL MEDICINE

## 2018-07-07 PROCEDURE — 6370000000 HC RX 637 (ALT 250 FOR IP): Performed by: NURSE PRACTITIONER

## 2018-07-07 PROCEDURE — 6370000000 HC RX 637 (ALT 250 FOR IP): Performed by: INTERNAL MEDICINE

## 2018-07-07 PROCEDURE — 84484 ASSAY OF TROPONIN QUANT: CPT

## 2018-07-07 PROCEDURE — 90935 HEMODIALYSIS ONE EVALUATION: CPT | Performed by: INTERNAL MEDICINE

## 2018-07-07 PROCEDURE — 87340 HEPATITIS B SURFACE AG IA: CPT

## 2018-07-07 PROCEDURE — 93458 L HRT ARTERY/VENTRICLE ANGIO: CPT | Performed by: INTERNAL MEDICINE

## 2018-07-07 PROCEDURE — 85347 COAGULATION TIME ACTIVATED: CPT

## 2018-07-07 PROCEDURE — B2111ZZ FLUOROSCOPY OF MULTIPLE CORONARY ARTERIES USING LOW OSMOLAR CONTRAST: ICD-10-PCS | Performed by: INTERNAL MEDICINE

## 2018-07-07 PROCEDURE — 6360000002 HC RX W HCPCS: Performed by: PHYSICIAN ASSISTANT

## 2018-07-07 PROCEDURE — 2580000003 HC RX 258: Performed by: INTERNAL MEDICINE

## 2018-07-07 PROCEDURE — 86140 C-REACTIVE PROTEIN: CPT

## 2018-07-07 PROCEDURE — 1200000003 HC TELEMETRY R&B

## 2018-07-07 PROCEDURE — 2780000010 HC IMPLANT OTHER

## 2018-07-07 PROCEDURE — C1894 INTRO/SHEATH, NON-LASER: HCPCS

## 2018-07-07 PROCEDURE — 6360000002 HC RX W HCPCS: Performed by: INTERNAL MEDICINE

## 2018-07-07 PROCEDURE — 027034Z DILATION OF CORONARY ARTERY, ONE ARTERY WITH DRUG-ELUTING INTRALUMINAL DEVICE, PERCUTANEOUS APPROACH: ICD-10-PCS | Performed by: INTERNAL MEDICINE

## 2018-07-07 PROCEDURE — 99233 SBSQ HOSP IP/OBS HIGH 50: CPT | Performed by: INTERNAL MEDICINE

## 2018-07-07 PROCEDURE — 6360000002 HC RX W HCPCS

## 2018-07-07 PROCEDURE — C1760 CLOSURE DEV, VASC: HCPCS

## 2018-07-07 PROCEDURE — C1874 STENT, COATED/COV W/DEL SYS: HCPCS

## 2018-07-07 PROCEDURE — 99219 PR INITIAL OBSERVATION CARE/DAY 50 MINUTES: CPT | Performed by: NURSE PRACTITIONER

## 2018-07-07 PROCEDURE — 99999 PR OFFICE/OUTPT VISIT,PROCEDURE ONLY: CPT | Performed by: INTERNAL MEDICINE

## 2018-07-07 PROCEDURE — 92928 PRQ TCAT PLMT NTRAC ST 1 LES: CPT | Performed by: INTERNAL MEDICINE

## 2018-07-07 PROCEDURE — 86705 HEP B CORE ANTIBODY IGM: CPT

## 2018-07-07 RX ORDER — ACETAMINOPHEN 325 MG/1
650 TABLET ORAL EVERY 4 HOURS PRN
Status: DISCONTINUED | OUTPATIENT
Start: 2018-07-07 | End: 2018-07-08 | Stop reason: HOSPADM

## 2018-07-07 RX ORDER — HEPARIN SODIUM 5000 [USP'U]/ML
5000 INJECTION, SOLUTION INTRAVENOUS; SUBCUTANEOUS EVERY 8 HOURS SCHEDULED
Status: DISCONTINUED | OUTPATIENT
Start: 2018-07-07 | End: 2018-07-08 | Stop reason: HOSPADM

## 2018-07-07 RX ORDER — DEXTROSE MONOHYDRATE 50 MG/ML
100 INJECTION, SOLUTION INTRAVENOUS PRN
Status: DISCONTINUED | OUTPATIENT
Start: 2018-07-07 | End: 2018-07-08 | Stop reason: HOSPADM

## 2018-07-07 RX ORDER — SODIUM CHLORIDE 0.9 % (FLUSH) 0.9 %
10 SYRINGE (ML) INJECTION PRN
Status: DISCONTINUED | OUTPATIENT
Start: 2018-07-07 | End: 2018-07-07

## 2018-07-07 RX ORDER — SODIUM CHLORIDE 0.9 % (FLUSH) 0.9 %
10 SYRINGE (ML) INJECTION PRN
Status: DISCONTINUED | OUTPATIENT
Start: 2018-07-07 | End: 2018-07-08 | Stop reason: HOSPADM

## 2018-07-07 RX ORDER — CARVEDILOL 3.12 MG/1
3.12 TABLET ORAL 2 TIMES DAILY
Status: DISCONTINUED | OUTPATIENT
Start: 2018-07-07 | End: 2018-07-07

## 2018-07-07 RX ORDER — SODIUM CHLORIDE 0.9 % (FLUSH) 0.9 %
10 SYRINGE (ML) INJECTION EVERY 12 HOURS SCHEDULED
Status: DISCONTINUED | OUTPATIENT
Start: 2018-07-07 | End: 2018-07-08 | Stop reason: HOSPADM

## 2018-07-07 RX ORDER — GLYCERIN/MALTODEXTRIN
1 LIQUID (ML) ORAL
Status: DISCONTINUED | OUTPATIENT
Start: 2018-07-09 | End: 2018-07-07 | Stop reason: CLARIF

## 2018-07-07 RX ORDER — MORPHINE SULFATE 2 MG/ML
2 INJECTION, SOLUTION INTRAMUSCULAR; INTRAVENOUS
Status: ACTIVE | OUTPATIENT
Start: 2018-07-07 | End: 2018-07-07

## 2018-07-07 RX ORDER — FLUOXETINE HYDROCHLORIDE 20 MG/1
20 CAPSULE ORAL DAILY
Status: DISCONTINUED | OUTPATIENT
Start: 2018-07-07 | End: 2018-07-08 | Stop reason: HOSPADM

## 2018-07-07 RX ORDER — PREGABALIN 50 MG/1
50 CAPSULE ORAL 2 TIMES DAILY
Status: DISCONTINUED | OUTPATIENT
Start: 2018-07-07 | End: 2018-07-08 | Stop reason: HOSPADM

## 2018-07-07 RX ORDER — SODIUM CHLORIDE 9 MG/ML
75 INJECTION, SOLUTION INTRAVENOUS CONTINUOUS
Status: DISCONTINUED | OUTPATIENT
Start: 2018-07-07 | End: 2018-07-07

## 2018-07-07 RX ORDER — HYDRALAZINE HYDROCHLORIDE 20 MG/ML
10 INJECTION INTRAMUSCULAR; INTRAVENOUS ONCE
Status: COMPLETED | OUTPATIENT
Start: 2018-07-07 | End: 2018-07-07

## 2018-07-07 RX ORDER — ACETAMINOPHEN 325 MG/1
650 TABLET ORAL EVERY 4 HOURS PRN
Status: DISCONTINUED | OUTPATIENT
Start: 2018-07-07 | End: 2018-07-07

## 2018-07-07 RX ORDER — DEXTROSE MONOHYDRATE 25 G/50ML
12.5 INJECTION, SOLUTION INTRAVENOUS PRN
Status: CANCELLED | OUTPATIENT
Start: 2018-07-07

## 2018-07-07 RX ORDER — DEXTROSE MONOHYDRATE 50 MG/ML
100 INJECTION, SOLUTION INTRAVENOUS PRN
Status: CANCELLED | OUTPATIENT
Start: 2018-07-07

## 2018-07-07 RX ORDER — DOCUSATE SODIUM 100 MG/1
100 CAPSULE, LIQUID FILLED ORAL 2 TIMES DAILY PRN
Status: DISCONTINUED | OUTPATIENT
Start: 2018-07-07 | End: 2018-07-08 | Stop reason: HOSPADM

## 2018-07-07 RX ORDER — ATROPINE SULFATE 0.4 MG/ML
0.5 AMPUL (ML) INJECTION
Status: ACTIVE | OUTPATIENT
Start: 2018-07-07 | End: 2018-07-07

## 2018-07-07 RX ORDER — HYDRALAZINE HYDROCHLORIDE 20 MG/ML
20 INJECTION INTRAMUSCULAR; INTRAVENOUS EVERY 6 HOURS PRN
Status: DISCONTINUED | OUTPATIENT
Start: 2018-07-07 | End: 2018-07-08 | Stop reason: HOSPADM

## 2018-07-07 RX ORDER — CALCIUM CARBONATE 200(500)MG
5 TABLET,CHEWABLE ORAL
Status: DISCONTINUED | OUTPATIENT
Start: 2018-07-07 | End: 2018-07-08 | Stop reason: HOSPADM

## 2018-07-07 RX ORDER — ONDANSETRON 2 MG/ML
4 INJECTION INTRAMUSCULAR; INTRAVENOUS EVERY 6 HOURS PRN
Status: DISCONTINUED | OUTPATIENT
Start: 2018-07-07 | End: 2018-07-08 | Stop reason: HOSPADM

## 2018-07-07 RX ORDER — SODIUM CHLORIDE 0.9 % (FLUSH) 0.9 %
10 SYRINGE (ML) INJECTION EVERY 12 HOURS SCHEDULED
Status: DISCONTINUED | OUTPATIENT
Start: 2018-07-07 | End: 2018-07-07

## 2018-07-07 RX ORDER — LISINOPRIL 5 MG/1
5 TABLET ORAL DAILY
Status: DISCONTINUED | OUTPATIENT
Start: 2018-07-07 | End: 2018-07-08 | Stop reason: HOSPADM

## 2018-07-07 RX ORDER — CALCITRIOL 0.25 UG/1
0.25 CAPSULE, LIQUID FILLED ORAL DAILY
Status: DISCONTINUED | OUTPATIENT
Start: 2018-07-07 | End: 2018-07-08 | Stop reason: HOSPADM

## 2018-07-07 RX ORDER — CALCITRIOL 0.25 UG/1
0.25 CAPSULE, LIQUID FILLED ORAL
Status: DISCONTINUED | OUTPATIENT
Start: 2018-07-07 | End: 2018-07-07

## 2018-07-07 RX ORDER — ASPIRIN 81 MG/1
81 TABLET, CHEWABLE ORAL DAILY
Status: DISCONTINUED | OUTPATIENT
Start: 2018-07-07 | End: 2018-07-08 | Stop reason: HOSPADM

## 2018-07-07 RX ORDER — CARVEDILOL 6.25 MG/1
6.25 TABLET ORAL 2 TIMES DAILY WITH MEALS
Status: DISCONTINUED | OUTPATIENT
Start: 2018-07-07 | End: 2018-07-08 | Stop reason: HOSPADM

## 2018-07-07 RX ORDER — FENOFIBRATE 160 MG/1
160 TABLET ORAL DAILY
Status: DISCONTINUED | OUTPATIENT
Start: 2018-07-07 | End: 2018-07-08 | Stop reason: HOSPADM

## 2018-07-07 RX ORDER — PANTOPRAZOLE SODIUM 40 MG/1
40 TABLET, DELAYED RELEASE ORAL DAILY
Status: DISCONTINUED | OUTPATIENT
Start: 2018-07-07 | End: 2018-07-08 | Stop reason: HOSPADM

## 2018-07-07 RX ORDER — LORAZEPAM 2 MG/ML
0.5 INJECTION INTRAMUSCULAR EVERY 8 HOURS PRN
Status: DISCONTINUED | OUTPATIENT
Start: 2018-07-07 | End: 2018-07-08 | Stop reason: HOSPADM

## 2018-07-07 RX ORDER — DEXTROSE MONOHYDRATE 25 G/50ML
12.5 INJECTION, SOLUTION INTRAVENOUS PRN
Status: DISCONTINUED | OUTPATIENT
Start: 2018-07-07 | End: 2018-07-08 | Stop reason: HOSPADM

## 2018-07-07 RX ORDER — MULTIVITAMIN WITH FOLIC ACID 400 MCG
1 TABLET ORAL DAILY
Status: DISCONTINUED | OUTPATIENT
Start: 2018-07-07 | End: 2018-07-08 | Stop reason: HOSPADM

## 2018-07-07 RX ORDER — NICOTINE POLACRILEX 4 MG
15 LOZENGE BUCCAL PRN
Status: DISCONTINUED | OUTPATIENT
Start: 2018-07-07 | End: 2018-07-08 | Stop reason: HOSPADM

## 2018-07-07 RX ORDER — INSULIN GLARGINE 100 [IU]/ML
55 INJECTION, SOLUTION SUBCUTANEOUS
Status: DISCONTINUED | OUTPATIENT
Start: 2018-07-07 | End: 2018-07-08 | Stop reason: HOSPADM

## 2018-07-07 RX ORDER — CLOPIDOGREL BISULFATE 75 MG/1
75 TABLET ORAL DAILY
Status: DISCONTINUED | OUTPATIENT
Start: 2018-07-08 | End: 2018-07-08 | Stop reason: HOSPADM

## 2018-07-07 RX ORDER — SIMVASTATIN 40 MG
40 TABLET ORAL NIGHTLY
Status: DISCONTINUED | OUTPATIENT
Start: 2018-07-07 | End: 2018-07-08 | Stop reason: HOSPADM

## 2018-07-07 RX ORDER — ONDANSETRON 2 MG/ML
4 INJECTION INTRAMUSCULAR; INTRAVENOUS EVERY 6 HOURS PRN
Status: DISCONTINUED | OUTPATIENT
Start: 2018-07-07 | End: 2018-07-07

## 2018-07-07 RX ORDER — NICOTINE POLACRILEX 4 MG
15 LOZENGE BUCCAL PRN
Status: CANCELLED | OUTPATIENT
Start: 2018-07-07

## 2018-07-07 RX ORDER — EZETIMIBE AND SIMVASTATIN 10; 40 MG/1; MG/1
1 TABLET ORAL NIGHTLY
Status: DISCONTINUED | OUTPATIENT
Start: 2018-07-07 | End: 2018-07-07 | Stop reason: CLARIF

## 2018-07-07 RX ADMIN — ANTACID TABLETS 2500 MG: 500 TABLET, CHEWABLE ORAL at 18:03

## 2018-07-07 RX ADMIN — INSULIN GLARGINE 55 UNITS: 100 INJECTION, SOLUTION SUBCUTANEOUS at 06:19

## 2018-07-07 RX ADMIN — HYDRALAZINE HYDROCHLORIDE 10 MG: 20 INJECTION INTRAMUSCULAR; INTRAVENOUS at 13:52

## 2018-07-07 RX ADMIN — PREGABALIN 50 MG: 50 CAPSULE ORAL at 10:54

## 2018-07-07 RX ADMIN — PREGABALIN 50 MG: 50 CAPSULE ORAL at 19:47

## 2018-07-07 RX ADMIN — SIMVASTATIN 40 MG: 40 TABLET, FILM COATED ORAL at 19:47

## 2018-07-07 RX ADMIN — ASPIRIN 81 MG: 81 TABLET, CHEWABLE ORAL at 10:54

## 2018-07-07 RX ADMIN — FENOFIBRATE 160 MG: 160 TABLET ORAL at 10:53

## 2018-07-07 RX ADMIN — ACETAMINOPHEN 650 MG: 325 TABLET ORAL at 19:47

## 2018-07-07 RX ADMIN — THERA TABS 1 TABLET: TAB at 10:54

## 2018-07-07 RX ADMIN — LISINOPRIL 5 MG: 5 TABLET ORAL at 18:07

## 2018-07-07 RX ADMIN — FLUOXETINE HYDROCHLORIDE 20 MG: 20 CAPSULE ORAL at 10:54

## 2018-07-07 RX ADMIN — CALCITRIOL 0.25 MCG: 0.25 CAPSULE, LIQUID FILLED ORAL at 10:56

## 2018-07-07 RX ADMIN — LORAZEPAM 0.5 MG: 2 INJECTION INTRAMUSCULAR; INTRAVENOUS at 15:06

## 2018-07-07 RX ADMIN — Medication 10 ML: at 19:48

## 2018-07-07 RX ADMIN — ACETAMINOPHEN 650 MG: 325 TABLET ORAL at 06:17

## 2018-07-07 RX ADMIN — PANTOPRAZOLE SODIUM 40 MG: 40 TABLET, DELAYED RELEASE ORAL at 06:17

## 2018-07-07 RX ADMIN — CARVEDILOL 6.25 MG: 6.25 TABLET, FILM COATED ORAL at 18:07

## 2018-07-07 ASSESSMENT — PAIN SCALES - GENERAL
PAINLEVEL_OUTOF10: 5
PAINLEVEL_OUTOF10: 3
PAINLEVEL_OUTOF10: 0

## 2018-07-07 ASSESSMENT — PAIN DESCRIPTION - PAIN TYPE: TYPE: ACUTE PAIN

## 2018-07-07 ASSESSMENT — PAIN DESCRIPTION - DESCRIPTORS: DESCRIPTORS: HEADACHE

## 2018-07-07 ASSESSMENT — PAIN DESCRIPTION - LOCATION: LOCATION: HEAD

## 2018-07-07 NOTE — ED NOTES
Pt called out with concerns of being placed in observation status since his insurance would not pay for the stay unless he was placed as inpatient.   Page was sent to hospitalist to update them on patient request.       Richa Bernardo RN  07/07/18 [id number]

## 2018-07-07 NOTE — ED PROVIDER NOTES
(2017); pr amputate metacarpal+finger (Right, 2017); and Finger amputation (Right, 2017). CURRENT MEDICATIONS       Previous Medications    AMINO ACIDS (LIQUACEL) LIQD    Take 1 Package by mouth three times a week     ASPIRIN 81 MG CHEWABLE TABLET    Take 1 tablet by mouth daily Resume     CALCITRIOL (ROCALTROL) 0.25 MCG CAPSULE    Take 0.25 mcg by mouth four times a week , Monday, Wednesday and Friday    CALCIUM CARBONATE (TUMS) 500 MG CHEWABLE TABLET    Take 5 tablets by mouth 3 times daily (with meals) And 3 tabs as needed with snacks    CARVEDILOL (COREG) 3.125 MG TABLET    Take 1 tablet by mouth 2 times daily    EZETIMIBE-SIMVASTATIN (VYTORIN) 10-40 MG PER TABLET    Take 1 tablet by mouth nightly. FENOFIBRATE PO    Take 145 mg by mouth     FLUOXETINE (PROZAC) 20 MG CAPSULE    Take 20 mg by mouth daily    INSULIN GLARGINE (LANTUS) 100 UNIT/ML INJECTION    Inject 55 Units into the skin every morning (before breakfast)     INSULIN LISPRO (HUMALOG) 100 UNIT/ML INJECTION    Inject 10-15 Units into the skin 3 times daily (before meals) Use scale if chem > 200    MULTIPLE VITAMIN (MULTI-VITAMIN PO)    Take 1 tablet by mouth daily     PANTOPRAZOLE (PROTONIX) 40 MG TABLET    Take 40 mg by mouth daily    PREGABALIN (LYRICA) 50 MG CAPSULE    Take 50 mg by mouth 2 times daily. ALLERGIES     is allergic to vicodin [hydrocodone-acetaminophen]; darvocet a500 [propoxyphene n-acetaminophen]; and demerol hcl [meperidine]. FAMILY HISTORY     indicated that his mother is . He indicated that his father is . family history includes Cancer in his father and mother; Diabetes in his father and mother; Heart Disease in his mother; High Blood Pressure in his mother; Other in his father. SOCIAL HISTORY      reports that he has never smoked. He has never used smokeless tobacco. He reports that he drinks alcohol.  He reports that he does not use

## 2018-07-07 NOTE — H&P
NEGATIVE 11/28/2017    WBCUA 5-10 11/28/2017    BACTERIA NONE 11/28/2017    RBCUA OBSCURED 11/28/2017    BLOODU LARGE 11/28/2017    GLUCOSEU 500 11/28/2017       Radiology:     XR CHEST STANDARD (2 VW)   Final Result   Stable radiographic appearance of the chest.   Mild cephalization of the pulmonary vessels which may be baseline. Early changes of congestion cannot be excluded         **This report has been created using voice recognition software. It may contain minor errors which are inherent in voice recognition technology. **            No change from XR CHEST PORTABLE with report dated 5/14/2018 6:36 PM.               **This report has been created using voice recognition software. It may contain minor errors which are inherent in voice recognition technology. **      Final report electronically signed by Dr. Winston Jackman on 7/6/2018 11:21 PM            DVT prophylaxis: [] Lovenox                                 [] SCDs                                 [x] SQ Heparin                                 [] Encourage ambulation           [] Already on Anticoagulation    Code Status: Prior      Disposition:    [x] Home       [] TCU       [] Rehab       [] Psych       [] SNF       [] [place]       [] Other-    ASSESSMENT:    Active Hospital Problems    Diagnosis Date Noted    Chest pain [R07.9] 07/07/2018     Priority: High    ESRD (end stage renal disease) on dialysis ([facility] [address] [N18.6, Z99.2]      Priority: Low    Iron deficiency anemia due to chronic blood loss [D50.0] 12/05/2016     Priority: Low     Class: Acute    S/P CABG x 1 [Z95.1]     S/P AVR (aortic valve replacement) [Z95.2]     Anemia in chronic kidney disease (CKD) [N18.9, D63.1]     HTN (hypertension) [I10]     DM (diabetes mellitus), secondary, with renal complications ([address] [L40.50]     Peripheral neuropathy ([address] [G62.9]        PLAN:    1. Chest pain, \"chest discomfort\", with associated dyspnea and diaphoresis.   Noted recent history of 6/7/2018 abnormal stress test.  ECHO was completed at that time ?small density posterior leaflet mitral valve. Troponin elevated on admission ?chronic. EKG no acute. Repeat Troponin, Cardiology to evaluate. 2. ?small density posterior leaflet mitral valve-Victor Manuel was scheduled as outpatient 7/12/2018. Bld Cltx and CRP pending. 3. ESRD-Home Hemo, established Dr. Leeanna Mead,  4. ASHD s/p CABG and AVR-tissue  5. DMT2, controlled, with complications of ESRD and peripheral neuropathy. 6. GERD,  History  7. PAF, Atrial Fibrillation, Patient is on ASA, history of GI bleed  8. Dyslipidemia, history  9. Anemia, chronic  10. Anxiety, Depression, history  11. Obesity, BMI 32.9    Full Code        Thank you Susana Rosales MD for the opportunity to be involved in this patient's care.     Electronically signed by BRAEDEN Astorga CNP on 7/7/2018 at 3:11 AM

## 2018-07-07 NOTE — PROGRESS NOTES
congestion cannot be excluded **This report has been created using voice recognition software. It may contain minor errors which are inherent in voice recognition technology. ** No change from XR CHEST PORTABLE with report dated 5/14/2018 6:36 PM. **This report has been created using voice recognition software. It may contain minor errors which are inherent in voice recognition technology. ** Final report electronically signed by Dr. Emile Sweeney on 7/6/2018 11:21 PM       Physical Exam:  Vitals: BP (!) 186/97   Pulse 76   Temp 98.2 °F (36.8 °C)   Resp 18   Ht 6' 1\" (1.854 m)   Wt 244 lb 11.4 oz (111 kg)   SpO2 95%   BMI 32.29 kg/m²   24 hour intake/output:No intake or output data in the 24 hours ending 07/07/18 1428  Last 3 weights:   Wt Readings from Last 3 Encounters:   07/07/18 244 lb 11.4 oz (111 kg)   05/15/18 240 lb (108.9 kg)   05/14/18 240 lb (108.9 kg)       General appearance - alert, well appearing, and in no distress  HEENT: Normocephalic, Atraumatic, Conjuctiva pink, PERRL, Oral mucosa normal, Lips, teeth and gums normal, Trachea midline, Thyroid normal and No noted lymphadenopathy  Chest - clear to auscultation, no wheezes, rales or rhonchi, symmetric air entry  Cardiovascular - normal rate, regular rhythm, normal S1, S2, no murmurs, rubs, clicks or gallops  Abdomen - soft, nontender, nondistended, no masses or organomegaly   Neurological - Alert and oriented, Normal speech, No focal findings or movement disorder noted and Motor and sensory grossly normal bilaterally  Integumentary - Skin color, texture, turgor normal. No Rashes or lesions  Musculoskeletal -Full ROM times 4 extremities, No clubbing or cyanosis and No peripheral edema      DVT prophylaxis: [] Lovenox                                 [] SCDs                                 [x] SQ Heparin                                 [] Encourage ambulation           [] Already on Anticoagulation                 ASSESSMENT / PLAN :    Principal Problem:    Chest pain  S/p PCI to the proximal LAD. Patient to be observed overnight and for discharge tomorrow whenever cleared by cardiology consult. Active Problems:    Iron deficiency anemia due to chronic blood loss  H&H stable with no indication for Hemo-transfusion      ESRD (end stage renal disease) on dialysis [facility])  Patient scheduled for HD after the procedure and nephrology folowing      DM (diabetes mellitus), secondary, with renal complications ([address]  Maintain on current regimen and utilize SSI prn for control, ADA diet      Anemia in chronic kidney disease (CKD)  H&H stable. Further recommendation per Nephrologist      HTN (hypertension)  Uncontrolled, add prn Hydralazine with parameters to his medications to optimize control      Metabolic acidosis  To be corrected with HD      Secondary hyperparathyroidism of renal origin ([facility] [address]  Management as per nephrologist      S/P AVR (aortic valve replacement), S/P CABG x 1  S/p PCI and currently stable    Resolved Problems:    * No resolved hospital problems.  *    Patient had a PCI to the proximal LAD and will be observed overnight for any arrhythmias for possible d/c tomorrow or whenever cleared by cardiologist.    Electronically signed by Nonie Cowden, MD on 7/7/2018 at 2:28 PM    [facility]

## 2018-07-07 NOTE — PROGRESS NOTES
Patient admitted to 8B25 per ED tech and stretcher. Patient ambulated from stretcher to bed. Gait steady. Vitals, assessment and data obtained. Patient with no IV present. Patient oriented to room and call light. Fresh water and snack provided. No other needs expressed at this time.

## 2018-07-07 NOTE — FLOWSHEET NOTE
07/07/18 0707   Provider Notification   Reason for Communication Evaluate   Provider Name Diana Go   Provider Notification Physician   Method of Communication Secure Message   Response Waiting for response   Notification Time [phone]     Notified of consult for chest pain. Waiting for response.

## 2018-07-08 LAB
ANION GAP SERPL CALCULATED.3IONS-SCNC: 18 MEQ/L (ref 8–16)
BUN BLDV-MCNC: 27 MG/DL (ref 7–22)
CALCIUM SERPL-MCNC: 9.7 MG/DL (ref 8.5–10.5)
CHLORIDE BLD-SCNC: 96 MEQ/L (ref 98–111)
CO2: 23 MEQ/L (ref 23–33)
CREAT SERPL-MCNC: 6.1 MG/DL (ref 0.4–1.2)
EKG ATRIAL RATE: 71 BPM
EKG ATRIAL RATE: 80 BPM
EKG P AXIS: 38 DEGREES
EKG P-R INTERVAL: 336 MS
EKG P-R INTERVAL: 352 MS
EKG Q-T INTERVAL: 462 MS
EKG Q-T INTERVAL: 502 MS
EKG QRS DURATION: 146 MS
EKG QRS DURATION: 156 MS
EKG QTC CALCULATION (BAZETT): 532 MS
EKG QTC CALCULATION (BAZETT): 545 MS
EKG R AXIS: -133 DEGREES
EKG R AXIS: -161 DEGREES
EKG T AXIS: 46 DEGREES
EKG VENTRICULAR RATE: 71 BPM
EKG VENTRICULAR RATE: 80 BPM
GFR SERPL CREATININE-BSD FRML MDRD: 9 ML/MIN/1.73M2
GLUCOSE BLD-MCNC: 105 MG/DL (ref 70–108)
GLUCOSE BLD-MCNC: 112 MG/DL (ref 70–108)
GLUCOSE BLD-MCNC: 139 MG/DL (ref 70–108)
POTASSIUM SERPL-SCNC: 3.8 MEQ/L (ref 3.5–5.2)
SODIUM BLD-SCNC: 137 MEQ/L (ref 135–145)

## 2018-07-08 PROCEDURE — 36415 COLL VENOUS BLD VENIPUNCTURE: CPT

## 2018-07-08 PROCEDURE — 99231 SBSQ HOSP IP/OBS SF/LOW 25: CPT | Performed by: PHYSICIAN ASSISTANT

## 2018-07-08 PROCEDURE — 6370000000 HC RX 637 (ALT 250 FOR IP): Performed by: NUCLEAR MEDICINE

## 2018-07-08 PROCEDURE — 2580000003 HC RX 258: Performed by: INTERNAL MEDICINE

## 2018-07-08 PROCEDURE — 6370000000 HC RX 637 (ALT 250 FOR IP): Performed by: INTERNAL MEDICINE

## 2018-07-08 PROCEDURE — 99232 SBSQ HOSP IP/OBS MODERATE 35: CPT | Performed by: INTERNAL MEDICINE

## 2018-07-08 PROCEDURE — 82948 REAGENT STRIP/BLOOD GLUCOSE: CPT

## 2018-07-08 PROCEDURE — 80048 BASIC METABOLIC PNL TOTAL CA: CPT

## 2018-07-08 PROCEDURE — 6370000000 HC RX 637 (ALT 250 FOR IP): Performed by: NURSE PRACTITIONER

## 2018-07-08 PROCEDURE — 99239 HOSP IP/OBS DSCHRG MGMT >30: CPT | Performed by: HOSPITALIST

## 2018-07-08 RX ORDER — NITROGLYCERIN 0.4 MG/1
0.4 TABLET SUBLINGUAL EVERY 5 MIN PRN
Qty: 25 TABLET | Refills: 0 | Status: SHIPPED | OUTPATIENT
Start: 2018-07-08 | End: 2018-07-19 | Stop reason: SDUPTHER

## 2018-07-08 RX ORDER — CLOPIDOGREL BISULFATE 75 MG/1
75 TABLET ORAL DAILY
Qty: 30 TABLET | Refills: 5 | Status: SHIPPED | OUTPATIENT
Start: 2018-07-09 | End: 2019-01-01 | Stop reason: SDUPTHER

## 2018-07-08 RX ORDER — CARVEDILOL 6.25 MG/1
6.25 TABLET ORAL 2 TIMES DAILY WITH MEALS
Qty: 60 TABLET | Refills: 3 | Status: SHIPPED | OUTPATIENT
Start: 2018-07-08 | End: 2018-01-01

## 2018-07-08 RX ORDER — LISINOPRIL 5 MG/1
5 TABLET ORAL DAILY
Qty: 30 TABLET | Refills: 0 | Status: ON HOLD | OUTPATIENT
Start: 2018-07-09 | End: 2018-08-24 | Stop reason: HOSPADM

## 2018-07-08 RX ADMIN — ANTACID TABLETS 2500 MG: 500 TABLET, CHEWABLE ORAL at 12:08

## 2018-07-08 RX ADMIN — FLUOXETINE HYDROCHLORIDE 20 MG: 20 CAPSULE ORAL at 07:59

## 2018-07-08 RX ADMIN — LISINOPRIL 5 MG: 5 TABLET ORAL at 07:59

## 2018-07-08 RX ADMIN — PREGABALIN 50 MG: 50 CAPSULE ORAL at 08:04

## 2018-07-08 RX ADMIN — Medication 10 ML: at 07:59

## 2018-07-08 RX ADMIN — CALCITRIOL 0.25 MCG: 0.25 CAPSULE, LIQUID FILLED ORAL at 07:59

## 2018-07-08 RX ADMIN — CLOPIDOGREL BISULFATE 75 MG: 75 TABLET ORAL at 08:03

## 2018-07-08 RX ADMIN — PANTOPRAZOLE SODIUM 40 MG: 40 TABLET, DELAYED RELEASE ORAL at 05:50

## 2018-07-08 RX ADMIN — ACETAMINOPHEN 650 MG: 325 TABLET ORAL at 08:03

## 2018-07-08 RX ADMIN — INSULIN GLARGINE 55 UNITS: 100 INJECTION, SOLUTION SUBCUTANEOUS at 08:06

## 2018-07-08 RX ADMIN — ASPIRIN 81 MG: 81 TABLET, CHEWABLE ORAL at 08:03

## 2018-07-08 RX ADMIN — CARVEDILOL 6.25 MG: 6.25 TABLET, FILM COATED ORAL at 07:59

## 2018-07-08 RX ADMIN — THERA TABS 1 TABLET: TAB at 07:59

## 2018-07-08 RX ADMIN — ANTACID TABLETS 2500 MG: 500 TABLET, CHEWABLE ORAL at 07:58

## 2018-07-08 RX ADMIN — FENOFIBRATE 160 MG: 160 TABLET ORAL at 07:59

## 2018-07-08 ASSESSMENT — PAIN SCALES - GENERAL
PAINLEVEL_OUTOF10: 3
PAINLEVEL_OUTOF10: 3

## 2018-07-08 ASSESSMENT — PAIN DESCRIPTION - PAIN TYPE: TYPE: OTHER (COMMENT)

## 2018-07-08 NOTE — PLAN OF CARE
Problem: Falls - Risk of:  Goal: Will remain free from falls  Will remain free from falls   Outcome: Ongoing  Patient has remained free from falls. Call light is within reach. Bed alarm is on. Will continue to follow fall precautions. Problem: Risk for Impaired Skin Integrity  Goal: Tissue integrity - skin and mucous membranes  Structural intactness and normal physiological function of skin and  mucous membranes. Outcome: Ongoing  Patient shows no signs of skin breakdown. Will continue to assess. Problem: Cardiovascular  Goal: Hemodynamic stability  Outcome: Ongoing  Patients vital signs are stable. Non-pitting edema is noted in the lower extremities. Patient is on hemodialysis. Problem: Discharge Planning:  Goal: Discharged to appropriate level of care  Discharged to appropriate level of care   Outcome: Ongoing  Will continue to work with patient and family on plans for discharge. Problem: Serum Glucose Level - Abnormal:  Goal: Ability to maintain appropriate glucose levels will improve  Ability to maintain appropriate glucose levels will improve   Outcome: Ongoing  Patients glucose was 124 this evening. He did not need coverage. Will continue to check. Problem: Tissue Perfusion - Cardiopulmonary, Altered:  Goal: Absence of angina  Absence of angina   Outcome: Ongoing  Patient denies any chest pain at this time. Will reassess. Problem: Pain:  Goal: Pain level will decrease  Pain level will decrease   Outcome: Ongoing  Patient states pain of a 5 out of 10. Tylenol was given and patient states pain is still there but it is less than before. Goal: Control of acute pain  Control of acute pain   Outcome: Ongoing  Patient complains of a headache at a 5 out of 10. Tylenol was given and patient is seen resting comfortably with his eyes closed. Goal: Control of chronic pain  Control of chronic pain   Outcome: Ongoing  Patient denies chronic pain.     Comments: Care plan reviewed with patient and

## 2018-07-08 NOTE — PROGRESS NOTES
Cardiology Progress Note      Patient:  Lorrie Eisenberg  YOB: 1960  MRN: [id number]   Acct: [de-identified]  Admit Date:  7/6/2018  Primary Cardiologist: Bailee Carter MD  Seen by dr Kulwinder Noriega    Chief Complaint: chest pain  hpi per dr Kulwinder Noriega \"This is a pleasant 58-year-old gentleman with  a pretty complex history of coronary artery disease, previous bypass and  aortic valve replacement two years ago, history of renal failure,  hypertension, hyperlipidemia. The patient has been on chronic dialysis for  the last few years. He has had some symptoms of shortness of breath and  worsening chest discomfort over the last several months. The patient's  symptoms got worse lately. He describes symptoms of chest discomfort with  mild exertion, symptoms of chest pressure; however, he has not used any  nitroglycerin. He was admitted and we were consulted to assist in the  management of the patient. \"    Subjective (Events in last 24 hours): pt awake and alert. NAD.   No cp or sob      Objective:   BP (!) 148/68   Pulse 59   Temp 98.2 °F (36.8 °C) (Oral)   Resp 16   Ht 6' 1\" (1.854 m)   Wt 235 lb 7.2 oz (106.8 kg)   SpO2 95%   BMI 31.06 kg/m²        TELEMETRY: nsr    Physical Exam:  General Appearance: alert and oriented to person, place and time, in no acute distress  Cardiovascular: normal rate, regular rhythm, normal S1 and S2, no murmurs, rubs, clicks, or gallops, distal pulses intact, no carotid bruits, no JVD  Pulmonary/Chest: clear to auscultation bilaterally- no wheezes, rales or rhonchi, normal air movement, no respiratory distress  Abdomen: soft, non-tender, non-distended, normal bowel sounds, no masses Extremities: no cyanosis, clubbing or edema, pulse   Skin: warm and dry, no rash or erythema  Head: normocephalic and atraumatic  Eyes: pupils equal, round, and reactive to light  Neck: supple and non-tender without mass, no thyromegaly   Musculoskeletal: normal range of motion, no joint swelling,

## 2018-07-08 NOTE — PROCEDURES
was deployed. The stent was post dilated  with a 4.5 x 15 mm TREK balloon. PREPROCEDURE DIAGNOSIS:  95% mid focal LAD stenosis. POSTPROCEDURE DIAGNOSIS:  0% residual with SHAGGY grade 3 flow distally. The patient tolerated the procedure well. At the end of the procedure, an Angio-Seal device was deployed. The patient was transferred back to the floor for further monitoring and  management. The findings and treatment were discussed with him.         Isa Patel MD    D: 07/08/2018 10:14:46       T: 07/08/2018 10:44:35     [email or web address]  Job#: [id number]     Doc#: [id number]    CC:

## 2018-07-09 ENCOUNTER — APPOINTMENT (OUTPATIENT)
Dept: GENERAL RADIOLOGY | Age: 58
DRG: 228 | End: 2018-07-09
Payer: MEDICARE

## 2018-07-09 ENCOUNTER — HOSPITAL ENCOUNTER (INPATIENT)
Age: 58
LOS: 5 days | Discharge: HOME | DRG: 228 | End: 2018-07-14
Attending: EMERGENCY MEDICINE | Admitting: INTERNAL MEDICINE
Payer: MEDICARE

## 2018-07-09 DIAGNOSIS — R00.1 SYMPTOMATIC BRADYCARDIA: Primary | ICD-10-CM

## 2018-07-09 DIAGNOSIS — I44.2 HEART BLOCK AV THIRD DEGREE (HCC): ICD-10-CM

## 2018-07-09 DIAGNOSIS — Z98.890 S/P CARDIAC CATHETERIZATION: ICD-10-CM

## 2018-07-09 LAB
ABO CHECK: NORMAL
ALBUMIN SERPL-MCNC: 3.6 G/DL (ref 3.5–5.1)
ALP BLD-CCNC: 83 U/L (ref 38–126)
ALT SERPL-CCNC: 10 U/L (ref 11–66)
ANION GAP SERPL CALCULATED.3IONS-SCNC: 21 MEQ/L (ref 8–16)
AST SERPL-CCNC: 21 U/L (ref 5–40)
BASE EXCESS MIXED: -20.3 MMOL/L (ref -2–3)
BASOPHILIA: ABNORMAL
BASOPHILS # BLD: 0 %
BASOPHILS # BLD: 0.2 %
BASOPHILS ABSOLUTE: 0 THOU/MM3 (ref 0–0.1)
BASOPHILS ABSOLUTE: 0 THOU/MM3 (ref 0–0.1)
BILIRUB SERPL-MCNC: 0.4 MG/DL (ref 0.3–1.2)
BUN BLDV-MCNC: 51 MG/DL (ref 7–22)
CALCIUM SERPL-MCNC: 9.9 MG/DL (ref 8.5–10.5)
CALCIUM SERPL-MCNC: 9.9 MG/DL (ref 8.5–10.5)
CHLORIDE BLD-SCNC: 98 MEQ/L (ref 98–111)
CO2: 18 MEQ/L (ref 23–33)
COLLECTED BY:: ABNORMAL
CREAT SERPL-MCNC: 8.7 MG/DL (ref 0.4–1.2)
CRENATED RBC'S: ABNORMAL
DEVICE: ABNORMAL
EOSINOPHIL # BLD: 0.5 %
EOSINOPHIL # BLD: 2 %
EOSINOPHILS ABSOLUTE: 0 THOU/MM3 (ref 0–0.4)
EOSINOPHILS ABSOLUTE: 0.1 THOU/MM3 (ref 0–0.4)
ERYTHROCYTE [DISTWIDTH] IN BLOOD BY AUTOMATED COUNT: 15.7 % (ref 11.5–14.5)
ERYTHROCYTE [DISTWIDTH] IN BLOOD BY AUTOMATED COUNT: 15.8 % (ref 11.5–14.5)
ERYTHROCYTE [DISTWIDTH] IN BLOOD BY AUTOMATED COUNT: 57.3 FL (ref 35–45)
ERYTHROCYTE [DISTWIDTH] IN BLOOD BY AUTOMATED COUNT: 60.5 FL (ref 35–45)
GEL INDIRECT COOMBS: NORMAL
GFR SERPL CREATININE-BSD FRML MDRD: 6 ML/MIN/1.73M2
GLUCOSE BLD-MCNC: 182 MG/DL (ref 70–108)
HCO3, MIXED: 6 MMOL/L (ref 23–28)
HCT VFR BLD CALC: 14.4 % (ref 42–52)
HCT VFR BLD CALC: 36.9 % (ref 42–52)
HEMOGLOBIN: 11.1 GM/DL (ref 14–18)
HEMOGLOBIN: 4.1 GM/DL (ref 14–18)
HYPOCHROMIA: PRESENT
IMMATURE GRANS (ABS): 0.02 THOU/MM3 (ref 0–0.07)
IMMATURE GRANS (ABS): 0.06 THOU/MM3 (ref 0–0.07)
IMMATURE GRANULOCYTES: 0.7 %
IMMATURE GRANULOCYTES: 0.9 %
LYMPHOCYTES # BLD: 12.7 %
LYMPHOCYTES # BLD: 5.1 %
LYMPHOCYTES ABSOLUTE: 0.3 THOU/MM3 (ref 1–4.8)
LYMPHOCYTES ABSOLUTE: 0.4 THOU/MM3 (ref 1–4.8)
MAGNESIUM: 2.1 MG/DL (ref 1.6–2.4)
MAGNESIUM: 2.3 MG/DL (ref 1.6–2.4)
MCH RBC QN AUTO: 30 PG (ref 26–33)
MCH RBC QN AUTO: 30.4 PG (ref 26–33)
MCHC RBC AUTO-ENTMCNC: 28.5 GM/DL (ref 32.2–35.5)
MCHC RBC AUTO-ENTMCNC: 30.1 GM/DL (ref 32.2–35.5)
MCV RBC AUTO: 106.7 FL (ref 80–94)
MCV RBC AUTO: 99.7 FL (ref 80–94)
MONOCYTES # BLD: 8.5 %
MONOCYTES # BLD: 9.4 %
MONOCYTES ABSOLUTE: 0.3 THOU/MM3 (ref 0.4–1.3)
MONOCYTES ABSOLUTE: 0.6 THOU/MM3 (ref 0.4–1.3)
MRSA SCREEN RT-PCR: POSITIVE
NUCLEATED RED BLOOD CELLS: 0 /100 WBC
NUCLEATED RED BLOOD CELLS: 0 /100 WBC
O2 SAT, MIXED: 47 %
OSMOLALITY CALCULATION: 292.1 MOSMOL/KG (ref 275–300)
PCO2, MIXED VENOUS: 13 MMHG (ref 41–51)
PH, MIXED: 7.25 (ref 7.31–7.41)
PHOSPHORUS: 10.3 MG/DL (ref 2.4–4.7)
PLATELET # BLD: 166 THOU/MM3 (ref 130–400)
PLATELET # BLD: 63 THOU/MM3 (ref 130–400)
PLATELET ESTIMATE: ABNORMAL
PLATELET ESTIMATE: ADEQUATE
PMV BLD AUTO: 10.4 FL (ref 9.4–12.4)
PMV BLD AUTO: 10.8 FL (ref 9.4–12.4)
PO2 MIXED: 29 MMHG (ref 25–40)
POTASSIUM SERPL-SCNC: 5.3 MEQ/L (ref 3.5–5.2)
POTASSIUM, WHOLE BLOOD: NORMAL MEQ/L (ref 3.5–4.9)
PRO-BNP: ABNORMAL PG/ML (ref 0–900)
RBC # BLD: 1.35 MILL/MM3 (ref 4.7–6.1)
RBC # BLD: 3.7 MILL/MM3 (ref 4.7–6.1)
RH FACTOR: NORMAL
SCAN OF BLOOD SMEAR: NORMAL
SCAN OF BLOOD SMEAR: NORMAL
SEG NEUTROPHILS: 75.2 %
SEG NEUTROPHILS: 84.8 %
SEGMENTED NEUTROPHILS ABSOLUTE COUNT: 2.3 THOU/MM3 (ref 1.8–7.7)
SEGMENTED NEUTROPHILS ABSOLUTE COUNT: 5.5 THOU/MM3 (ref 1.8–7.7)
SITE: ABNORMAL
SODIUM BLD-SCNC: 137 MEQ/L (ref 135–145)
TOTAL PROTEIN: 7.6 G/DL (ref 6.1–8)
TROPONIN T: 0.1 NG/ML
TROPONIN T: 0.19 NG/ML
WBC # BLD: 3.1 THOU/MM3 (ref 4.8–10.8)
WBC # BLD: 6.5 THOU/MM3 (ref 4.8–10.8)

## 2018-07-09 PROCEDURE — 6360000002 HC RX W HCPCS

## 2018-07-09 PROCEDURE — 93005 ELECTROCARDIOGRAM TRACING: CPT | Performed by: EMERGENCY MEDICINE

## 2018-07-09 PROCEDURE — 33210 INSERT ELECTRD/PM CATH SNGL: CPT | Performed by: INTERNAL MEDICINE

## 2018-07-09 PROCEDURE — 86900 BLOOD TYPING SEROLOGIC ABO: CPT

## 2018-07-09 PROCEDURE — 87641 MR-STAPH DNA AMP PROBE: CPT

## 2018-07-09 PROCEDURE — 02WA3MZ REVISION OF CARDIAC LEAD IN HEART, PERCUTANEOUS APPROACH: ICD-10-PCS | Performed by: INTERNAL MEDICINE

## 2018-07-09 PROCEDURE — 84132 ASSAY OF SERUM POTASSIUM: CPT

## 2018-07-09 PROCEDURE — 80053 COMPREHEN METABOLIC PANEL: CPT

## 2018-07-09 PROCEDURE — 02PA3DZ REMOVAL OF INTRALUMINAL DEVICE FROM HEART, PERCUTANEOUS APPROACH: ICD-10-PCS | Performed by: INTERNAL MEDICINE

## 2018-07-09 PROCEDURE — 82310 ASSAY OF CALCIUM: CPT

## 2018-07-09 PROCEDURE — 2580000003 HC RX 258: Performed by: INTERNAL MEDICINE

## 2018-07-09 PROCEDURE — 87147 CULTURE TYPE IMMUNOLOGIC: CPT

## 2018-07-09 PROCEDURE — 71045 X-RAY EXAM CHEST 1 VIEW: CPT

## 2018-07-09 PROCEDURE — 36415 COLL VENOUS BLD VENIPUNCTURE: CPT

## 2018-07-09 PROCEDURE — 6360000002 HC RX W HCPCS: Performed by: EMERGENCY MEDICINE

## 2018-07-09 PROCEDURE — 86885 COOMBS TEST INDIRECT QUAL: CPT

## 2018-07-09 PROCEDURE — 84100 ASSAY OF PHOSPHORUS: CPT

## 2018-07-09 PROCEDURE — 86922 COMPATIBILITY TEST ANTIGLOB: CPT

## 2018-07-09 PROCEDURE — A6402 STERILE GAUZE <= 16 SQ IN: HCPCS

## 2018-07-09 PROCEDURE — 84484 ASSAY OF TROPONIN QUANT: CPT

## 2018-07-09 PROCEDURE — 87081 CULTURE SCREEN ONLY: CPT

## 2018-07-09 PROCEDURE — 2580000003 HC RX 258: Performed by: EMERGENCY MEDICINE

## 2018-07-09 PROCEDURE — 83880 ASSAY OF NATRIURETIC PEPTIDE: CPT

## 2018-07-09 PROCEDURE — 6370000000 HC RX 637 (ALT 250 FOR IP): Performed by: INTERNAL MEDICINE

## 2018-07-09 PROCEDURE — 86905 BLOOD TYPING RBC ANTIGENS: CPT

## 2018-07-09 PROCEDURE — 5A1223Z PERFORMANCE OF CARDIAC PACING, CONTINUOUS: ICD-10-PCS | Performed by: EMERGENCY MEDICINE

## 2018-07-09 PROCEDURE — 96365 THER/PROPH/DIAG IV INF INIT: CPT

## 2018-07-09 PROCEDURE — 99285 EMERGENCY DEPT VISIT HI MDM: CPT

## 2018-07-09 PROCEDURE — 96375 TX/PRO/DX INJ NEW DRUG ADDON: CPT

## 2018-07-09 PROCEDURE — 85025 COMPLETE CBC W/AUTO DIFF WBC: CPT

## 2018-07-09 PROCEDURE — 2000000000 HC ICU R&B

## 2018-07-09 PROCEDURE — 99223 1ST HOSP IP/OBS HIGH 75: CPT | Performed by: INTERNAL MEDICINE

## 2018-07-09 PROCEDURE — 83735 ASSAY OF MAGNESIUM: CPT

## 2018-07-09 PROCEDURE — 99291 CRITICAL CARE FIRST HOUR: CPT | Performed by: INTERNAL MEDICINE

## 2018-07-09 PROCEDURE — 86901 BLOOD TYPING SEROLOGIC RH(D): CPT

## 2018-07-09 PROCEDURE — 36556 INSERT NON-TUNNEL CV CATH: CPT

## 2018-07-09 PROCEDURE — 2500000003 HC RX 250 WO HCPCS: Performed by: EMERGENCY MEDICINE

## 2018-07-09 RX ORDER — MIDAZOLAM HYDROCHLORIDE 1 MG/ML
5 INJECTION INTRAMUSCULAR; INTRAVENOUS ONCE
Status: COMPLETED | OUTPATIENT
Start: 2018-07-09 | End: 2018-07-09

## 2018-07-09 RX ORDER — CALCIUM CHLORIDE 100 MG/ML
1 INJECTION INTRAVENOUS; INTRAVENTRICULAR ONCE
Status: COMPLETED | OUTPATIENT
Start: 2018-07-09 | End: 2018-07-09

## 2018-07-09 RX ORDER — ACETAMINOPHEN 325 MG/1
650 TABLET ORAL EVERY 6 HOURS PRN
Status: DISCONTINUED | OUTPATIENT
Start: 2018-07-09 | End: 2018-07-14 | Stop reason: HOSPADM

## 2018-07-09 RX ORDER — ONDANSETRON 2 MG/ML
4 INJECTION INTRAMUSCULAR; INTRAVENOUS EVERY 6 HOURS PRN
Status: DISCONTINUED | OUTPATIENT
Start: 2018-07-09 | End: 2018-07-11

## 2018-07-09 RX ORDER — SODIUM CHLORIDE 0.9 % (FLUSH) 0.9 %
10 SYRINGE (ML) INJECTION PRN
Status: DISCONTINUED | OUTPATIENT
Start: 2018-07-09 | End: 2018-07-14 | Stop reason: HOSPADM

## 2018-07-09 RX ORDER — CEFAZOLIN SODIUM 1 G/3ML
2 INJECTION, POWDER, FOR SOLUTION INTRAMUSCULAR; INTRAVENOUS ONCE
Status: DISCONTINUED | OUTPATIENT
Start: 2018-07-09 | End: 2018-07-09

## 2018-07-09 RX ORDER — ONDANSETRON 2 MG/ML
INJECTION INTRAMUSCULAR; INTRAVENOUS
Status: COMPLETED
Start: 2018-07-09 | End: 2018-07-09

## 2018-07-09 RX ORDER — FAMOTIDINE 20 MG/1
20 TABLET, FILM COATED ORAL DAILY
Status: DISCONTINUED | OUTPATIENT
Start: 2018-07-09 | End: 2018-07-14 | Stop reason: HOSPADM

## 2018-07-09 RX ORDER — CARVEDILOL 6.25 MG/1
6.25 TABLET ORAL 2 TIMES DAILY WITH MEALS
Status: DISCONTINUED | OUTPATIENT
Start: 2018-07-09 | End: 2018-07-09

## 2018-07-09 RX ORDER — CLOPIDOGREL BISULFATE 75 MG/1
75 TABLET ORAL DAILY
Status: DISCONTINUED | OUTPATIENT
Start: 2018-07-09 | End: 2018-07-14 | Stop reason: HOSPADM

## 2018-07-09 RX ORDER — ETOMIDATE 2 MG/ML
10 INJECTION INTRAVENOUS ONCE
Status: COMPLETED | OUTPATIENT
Start: 2018-07-09 | End: 2018-07-09

## 2018-07-09 RX ORDER — ASPIRIN 81 MG/1
81 TABLET, CHEWABLE ORAL DAILY
Status: DISCONTINUED | OUTPATIENT
Start: 2018-07-09 | End: 2018-07-14 | Stop reason: HOSPADM

## 2018-07-09 RX ORDER — 0.9 % SODIUM CHLORIDE 0.9 %
250 INTRAVENOUS SOLUTION INTRAVENOUS ONCE
Status: DISCONTINUED | OUTPATIENT
Start: 2018-07-09 | End: 2018-07-13

## 2018-07-09 RX ORDER — SODIUM CHLORIDE 0.9 % (FLUSH) 0.9 %
10 SYRINGE (ML) INJECTION EVERY 12 HOURS SCHEDULED
Status: DISCONTINUED | OUTPATIENT
Start: 2018-07-09 | End: 2018-07-14 | Stop reason: HOSPADM

## 2018-07-09 RX ORDER — CEFAZOLIN SODIUM 1 G/3ML
2 INJECTION, POWDER, FOR SOLUTION INTRAMUSCULAR; INTRAVENOUS ONCE
Status: DISCONTINUED | OUTPATIENT
Start: 2018-07-09 | End: 2018-07-09 | Stop reason: CLARIF

## 2018-07-09 RX ADMIN — CEFAZOLIN SODIUM 2 G: 10 INJECTION, POWDER, FOR SOLUTION INTRAVENOUS at 12:18

## 2018-07-09 RX ADMIN — FAMOTIDINE 20 MG: 20 TABLET ORAL at 22:50

## 2018-07-09 RX ADMIN — ACETAMINOPHEN 650 MG: 325 TABLET ORAL at 22:49

## 2018-07-09 RX ADMIN — ETOMIDATE 10 MG: 2 INJECTION INTRAVENOUS at 10:50

## 2018-07-09 RX ADMIN — ONDANSETRON 4 MG: 2 INJECTION INTRAMUSCULAR; INTRAVENOUS at 20:27

## 2018-07-09 RX ADMIN — CALCIUM CHLORIDE 1 G: 100 INJECTION, SOLUTION INTRAVENOUS at 10:45

## 2018-07-09 RX ADMIN — MIDAZOLAM 5 MG: 1 INJECTION INTRAMUSCULAR; INTRAVENOUS at 10:42

## 2018-07-09 RX ADMIN — Medication 10 ML: at 22:50

## 2018-07-09 RX ADMIN — ETOMIDATE 10 MG: 2 INJECTION INTRAVENOUS at 11:03

## 2018-07-09 RX ADMIN — Medication 50 MEQ: at 10:46

## 2018-07-09 RX ADMIN — ETOMIDATE 10 MG: 2 INJECTION INTRAVENOUS at 11:27

## 2018-07-09 ASSESSMENT — PAIN DESCRIPTION - PAIN TYPE
TYPE: ACUTE PAIN
TYPE: ACUTE PAIN

## 2018-07-09 ASSESSMENT — PAIN DESCRIPTION - ONSET
ONSET: ON-GOING
ONSET: GRADUAL

## 2018-07-09 ASSESSMENT — PAIN DESCRIPTION - FREQUENCY
FREQUENCY: CONTINUOUS
FREQUENCY: CONTINUOUS

## 2018-07-09 ASSESSMENT — PAIN SCALES - GENERAL
PAINLEVEL_OUTOF10: 2
PAINLEVEL_OUTOF10: 5

## 2018-07-09 ASSESSMENT — PAIN DESCRIPTION - PROGRESSION
CLINICAL_PROGRESSION: GRADUALLY WORSENING
CLINICAL_PROGRESSION: NOT CHANGED

## 2018-07-09 ASSESSMENT — PAIN DESCRIPTION - DESCRIPTORS
DESCRIPTORS: HEADACHE
DESCRIPTORS: HEADACHE

## 2018-07-09 ASSESSMENT — PAIN DESCRIPTION - LOCATION
LOCATION: HEAD
LOCATION: HEAD

## 2018-07-09 NOTE — ED NOTES
Bed: 003A  Expected date: 7/9/18  Expected time:   Means of arrival:   Comments:      Ophelia Castillo RN  07/09/18 [id number]

## 2018-07-09 NOTE — CONSULTS
hypertension,  anemia, coronary artery disease, chronic kidney disease, AVM, cardiomegaly,  aortic valve replacement-tissue valve, coronary arterybypass grafting    Patient Status: Routine    Height: 73 inches Weight: 240.01 pounds BSA: 2.33 m^2 BMI: 31.66 kg/m^2    BP: 161/79 mmHg    Allergies    - Vicodin. - Darvocet. - Demerol. Conclusions      Summary   Normally functioning bioprosthetic valve in aortic position. Ejection fraction is visually estimated at 35-40%. There was moderate global hypokinesis of the left ventricle. Left Ventricular size is Mildly increased . Severely dilated left atrium. Myxomatotic degeneration of mitral valve. Calcification of the mitral valve noted. Decreased mitral valve mobility noted. Mild mitral stenosis. Mild mitral regurgitation is present. cant exclude a small density on the posterior leaflet vs heavy   calcification and degenerated leaflets      Signature      ----------------------------------------------------------------   Electronically signed by Alexia Moore MD (Interpreting   physician) on 06/07/2018 at 06:19 PM   ----------------------------------------------------------------      Findings      Mitral Valve   Myxomatotic degeneration of mitral valve. Calcification of the mitral valve noted. Decreased mitral valve mobility noted. Mild mitral stenosis. Mild mitral regurgitation is present. cant exclude a small density on the posterior leaflet vs heavy   calcification and degenerated leaflets      Aortic Valve   Normally functioning bioprosthetic valve in aortic position. Tricuspid Valve   Tricuspid valve was not well visualized. Mild tricuspid regurgitation visualized. Pulmonic Valve   The pulmonic valve was not well visualized . Left Atrium   Severely dilated left atrium. Left Ventricle   Ejection fraction is visually estimated at 35-40%.    There was moderate global hypokinesis of the left 1340  07/07/18   1540  07/07/18   1940  07/08/18   0642  07/08/18   1035   POCGLU  104  102  125*  105  139*     Cardiac Enzymes: No results for input(s): CKTOTAL, CKMB, CKMBINDEX, TROPONINI in the last 72 hours. PT/INR: No results for input(s): PROTIME, INR in the last 72 hours. APTT: No results for input(s): APTT in the last 72 hours. Liver Profile:  Lab Results   Component Value Date    AST 21 07/09/2018    ALT 10 07/09/2018    BILIDIR <0.2 09/06/2017    BILITOT 0.4 07/09/2018    ALKPHOS 83 07/09/2018     Lab Results   Component Value Date    CHOL 83 12/06/2016    HDL 18 12/06/2016    TRIG 160 12/06/2016     TSH:   Lab Results   Component Value Date    TSH 3.570 05/14/2018     UA:   Lab Results   Component Value Date    COLORU DK YELLOW 11/28/2017    PHUR 6.0 11/28/2017    WBCUA 5-10 11/28/2017    RBCUA OBSCURED 11/28/2017    YEAST NONE SEEN 11/28/2017    BACTERIA NONE 11/28/2017    LEUKOCYTESUR SMALL 11/28/2017    UROBILINOGEN 0.2 11/28/2017    BILIRUBINUR NEGATIVE 11/28/2017    BLOODU LARGE 11/28/2017    GLUCOSEU 500 11/28/2017         Physical Exam:  Vitals:    07/09/18 1428   BP: 125/67   Pulse: 72   Resp: 16   Temp:    SpO2: 93%    No intake or output data in the 24 hours ending 07/09/18 1549     General:  Obtunded  Neck: R IJ in place    Heart: Bradycardic, regular, S1 and S2, no rubs, murmurs or gallops  Lungs: clear to ascultation no rales, wheezes, or rhonchi  Abdomen: positive bowel sounds, soft, non-tender, non-distended, no bruits, no masses  Extremities:no clubbing, cyanosis or edema  Neurologic: Obtunded  Skin: No rashes    Assessment:  Complete heart block with ventricular escape  ESRD  Hyperkalemia - 5.3  CAD s/p PCI of the LAD  EF 35-40%  Hx of Bioprosthetic AVR  JESSENIA    Plan:  1. Emergency TVP insertion at the bedside in the ED, good ventricular capture  2. CXR  3. Medical management of chronic medical issues  4. Nephrology for ESRD  5. EP consultation for PM/ICD  6.  Admit to ICU with

## 2018-07-09 NOTE — ED PROVIDER NOTES
[facility]  eMERGENCY dEPARTMENT eNCOUnter          CHIEF COMPLAINT       Chief Complaint   Patient presents with    Bradycardia       Nurses Notes reviewed and I agree except as noted in the HPI. HISTORY OF PRESENT ILLNESS    Erika Benoit is a 60 y.o. male with a past medical history of CHF, CAD, CKD, DM, HTN, HLD, and JESSENIA who presents to the ED vis [facility] as a transfer from [facility]. The patient is one day post op cardiac stent placement with Dr. Clif Parker (Cardiology). He was seen at [facility] this morning for symptomatic bradycardia with hear rate in the 20-30s. He was transferred for interventional cardiology consult and pacemaker placement. He was given atropine while at [facility] with no improvement in the heart rate. REVIEW OF SYSTEMS     Review of Systems   Unable to perform ROS: Acuity of condition         PAST MEDICAL HISTORY    has a past medical history of Anemia; Arthritis; CAD (coronary artery disease); Charcot ankle; CHF (congestive heart failure) ([address] Chronic ankle pain; Chronic back pain; Chronic venous hypertension w ulceration (HCC); CKD (chronic kidney disease) stage 3, GFR 30-59 ml/min; DDD (degenerative disc disease); Diabetic retinopathy ([address] DM (dermatomyositis); DM (diabetes mellitus) ([address] Full dentures; Gastric bleed; GERD (gastroesophageal reflux disease); H/O Bell's palsy; H/O Clostridium difficile infection; Hemodialysis patient [facility]); History of blood transfusion; History of cardiac cath; HTN (hypertension); Hyperkalemia; Hyperlipidemia; MRSA (methicillin resistant Staphylococcus aureus); Murmur, cardiac; Nephrotic syndrome; Obstructive sleep apnea; Osteomyelitis of finger of right hand ([address] Pancreatitis due to biliary obstruction; Peripheral neuropathy ([address] Peripheral neuropathy ([address] Psychiatric problem; Pulmonary edema;  Renal failure; S/P peripheral artery angioplasty: 10/30/2017: PTA of the left popliteal artery.; SOB (shortness of breath); Vision blurred; VRE (vancomycin resistant enterococcus) culture positive; and Wears glasses. SURGICAL HISTORY      has a past surgical history that includes fracture surgery (Right); Cholecystectomy; Colonoscopy; Endoscopy, colon, diagnostic; other surgical history; Dialysis fistula creation (Left, 02/2014); Kidney biopsy (Bilateral, 09/2013); eye surgery (Bilateral); eye surgery (Left, 05/17/2013); eye surgery (Bilateral); Cardiac surgery (2/19/2014); Cardiac surgery (2006); Cardiac surgery (2003); vitrectomy (Right, 3/27/14); Appendectomy; ablation of dysrhythmic focus (07/21/2017); vascular surgery; pr egd transoral biopsy single/multiple (N/A, 8/17/2017); Hand surgery (Right, 8/18/2017); pr colon ca scrn not hi rsk ind (Left, 9/8/2017); Enteroscopy (9/8/2017); pr amputate metacarpal+finger (Right, 9/28/2017); and Finger amputation (Right, 09/28/2017). CURRENT MEDICATIONS       Previous Medications    ASPIRIN 81 MG CHEWABLE TABLET    Take 1 tablet by mouth daily Resume 12/1    CALCITRIOL (ROCALTROL) 0.25 MCG CAPSULE    Take 0.25 mcg by mouth daily     CALCIUM CARBONATE (TUMS) 500 MG CHEWABLE TABLET    Take 5 tablets by mouth 3 times daily (with meals) And 3 tabs as needed with snacks    CARVEDILOL (COREG) 6.25 MG TABLET    Take 1 tablet by mouth 2 times daily (with meals)    CLOPIDOGREL (PLAVIX) 75 MG TABLET    Take 1 tablet by mouth daily    EZETIMIBE-SIMVASTATIN (VYTORIN) 10-40 MG PER TABLET    Take 1 tablet by mouth nightly.     FENOFIBRATE PO    Take 145 mg by mouth     FLUOXETINE (PROZAC) 20 MG CAPSULE    Take 20 mg by mouth daily    INSULIN GLARGINE (LANTUS) 100 UNIT/ML INJECTION    Inject 55 Units into the skin every morning (before breakfast)     INSULIN LISPRO (HUMALOG) 100 UNIT/ML INJECTION    Inject 10-15 Units into the skin 3 times daily (before meals) Use scale if chem > 200    LISINOPRIL (PRINIVIL;ZESTRIL) 5 MG TABLET    Take 1 tablet by mouth daily    MULTIPLE VITAMIN (MULTI-VITAMIN heard.  Pulmonary/Chest: Effort normal and breath sounds normal. No respiratory distress. He has no wheezes. He has no rales. Abdominal: Soft. Bowel sounds are normal. He exhibits no distension and no mass. There is no tenderness. There is no rebound and no guarding. Musculoskeletal: Normal range of motion. He exhibits no edema or tenderness. Lymphadenopathy:     He has no cervical adenopathy. Neurological: He is alert and oriented to person, place, and time. He has normal reflexes. No cranial nerve deficit or sensory deficit. He exhibits normal muscle tone. Coordination normal. GCS eye subscore is 4. GCS verbal subscore is 5. GCS motor subscore is 6. Alert but somnolent secondary to medication sedation able to follow commands and do as requested. Skin: Skin is warm and dry. No rash noted. He is not diaphoretic. Psychiatric: He has a normal mood and affect. His behavior is normal. Judgment and thought content normal.   Nursing note and vitals reviewed. DIFFERENTIAL DIAGNOSIS:   Differential diagnoses were discussed extensively with the patient and family including but no limited to symptomatic bradycardia     DIAGNOSTIC RESULTS     EKG: All EKG's are interpreted by the Emergency Department Physician who either signs or Co-signs this chart in the absence of a cardiologist.  EKG interpreted by Andrzej Dunham DO:    EKG read and interpreted by myself gives impression of sinus bradycardia with a LBBB and wide QRS with heart rate of 25; interval na; ;QTc 485; axis -62. RADIOLOGY: non-plain film images(s) such as CT, Ultrasound and MRI are read by the radiologist.    XR CHEST PORTABLE   Final Result   Catheter tip is now within the distal SVC. **This report has been created using voice recognition software. It may contain minor errors which are inherent in voice recognition technology. **      Final report electronically signed by Dr. Stephan Calvo on 7/9/2018 12:17 PM 07/09/18 1302 07/09/18 1304   BP: 106/67  106/67 (!) 147/70   Pulse: 71 71 71 72   Resp: 18 17 17 17   SpO2: 98% 98% 99% 99%     11:00 AM: The patient was seen and evaluated for bradycardia. The patient was on a demand pacer upon arrival in the ED via [facility] flight. He was placed on a cardiac monitor with demand pacer and an EKG was completed which showed a heart rate in the 20s. Interventional Cardiology was consulted and advised placing an internal transvenous pacemaker in the ED. Appropriate labs and imaging studies were ordered and reviewed. BNP was 17162. Troponin was 0.016. The patient was given calcium chloride, bicarb, and IV fluids while in the ED. The transvenous pacer was placed, post procedural CXR did not show any evidence of pneumothorax. Dr. Allie Morton (Interventional Cardiology) evaluated the patient in the ED, adjusted placement of the pacemaker, advised admission, and will consult on the patient's case. Dr. Patience Arana ([address] [place] [address]) was consulted and kindly accepted the patient for admission. Patient was started on Ancef prior to admission. CRITICAL CARE:   There was a high probability of clinically significant/life threatening deterioration in this patient's condition which required my urgent intervention. Total critical care time was 60 minutes. This excludes any time for separately reportable procedures. CONSULTS:  Dr. Allie Morton (Cardiology)     PROCEDURES:  Central Line  Date/Time: 7/9/2018 11:36 AM  Performed by: Grecia Cole by: Juan Carlos Albert     Consent:     Consent obtained:  Emergent situation    Consent given by:  Patient    Risks discussed:  Arterial puncture, incorrect placement, infection, nerve damage and pneumothorax    Alternatives discussed:  No treatment, delayed treatment and alternative treatment  Pre-procedure details:     Hand hygiene: Hand hygiene performed prior to insertion      Sterile barrier technique:  All elements of maximal sterile technique followed      Skin preparation:  ChloraPrep and 2% chlorhexidine    Skin preparation agent: Skin preparation agent completely dried prior to procedure    Sedation:     Sedation type: Moderate (conscious) sedation (Etomidate)  Procedure details:     Location:  R internal jugular    Patient position:  Flat    Procedural supplies:  Single lumen    Catheter size:  8 Fr    Landmarks identified: yes      Ultrasound guidance: yes      Sterile ultrasound techniques: Sterile gel and sterile probe covers were used      Number of attempts:  1    Successful placement: yes    Post-procedure details:     Post-procedure:  Dressing applied and line sutured    Assessment:  Blood return through all ports    Patient tolerance of procedure: Tolerated well, no immediate complications  Comments:      internal transvenous pacemaker placed. Patient was placed probe was placed. Unable to obtain capture. Cardiology was called. Dr. Alex Manuel came down and re -floated the pacer wire and achieve capture. Patient remained stable throughout course. FINAL IMPRESSION      1. Symptomatic bradycardia    2. Heart block AV third degree ([address]    3. S/P cardiac catheterization          DISPOSITION/PLAN   Admission     PATIENT REFERRED TO:  No follow-up provider specified. DISCHARGE MEDICATIONS:  New Prescriptions    No medications on file       (Please note that portions of this note were completed with a voice recognition program.  Efforts were made to edit the dictations but occasionally words are mis-transcribed.)    Scribe:  Marykay Schirmer 7/9/18 11:00 AM Scribing for and in the presence of Hammad Worthington DO. Scribe: Marykay Schirmer 7/9/18 11:00 AM    Provider:  I personally performed the services described in the documentation, reviewed and edited the documentation which was dictated to the scribe in my presence, and it accurately records my words and actions.     Hammad Worthington DO 7/9/18 1:11 PM        Hammad Worthington DO  07/09/18 [id number]

## 2018-07-09 NOTE — ED NOTES
External pacer turned off, Internal pacer not capturing 20 milliamps. External pacer on at a rate of 70 120 mA.      Renny Lancaster RN  07/09/18 1000 Brandon Dick RN  07/09/18 [id number]

## 2018-07-09 NOTE — ED NOTES
Patient to ed room 3 per [facility] ems. Patient sent to ED for symptomatic bradycardia. Patient being paced externally at a rate of 70 and 120 milliamps. Dr. Edgardo Long at bedside to evaluate patient.      Mathieu Barreto RN  07/09/18 3418

## 2018-07-10 VITALS
TEMPERATURE: 97.8 F | DIASTOLIC BLOOD PRESSURE: 76 MMHG | RESPIRATION RATE: 15 BRPM | SYSTOLIC BLOOD PRESSURE: 126 MMHG | HEIGHT: 73 IN | OXYGEN SATURATION: 96 % | WEIGHT: 235.45 LBS | HEART RATE: 70 BPM | BODY MASS INDEX: 31.21 KG/M2

## 2018-07-10 LAB
ANION GAP SERPL CALCULATED.3IONS-SCNC: 21 MEQ/L (ref 8–16)
BUN BLDV-MCNC: 62 MG/DL (ref 7–22)
CALCIUM SERPL-MCNC: 9 MG/DL (ref 8.5–10.5)
CHLORIDE BLD-SCNC: 101 MEQ/L (ref 98–111)
CO2: 18 MEQ/L (ref 23–33)
CREAT SERPL-MCNC: 9.7 MG/DL (ref 0.4–1.2)
EKG ATRIAL RATE: 25 BPM
EKG ATRIAL RATE: 31 BPM
EKG Q-T INTERVAL: 524 MS
EKG Q-T INTERVAL: 752 MS
EKG QRS DURATION: 162 MS
EKG QRS DURATION: 168 MS
EKG QTC CALCULATION (BAZETT): 485 MS
EKG QTC CALCULATION (BAZETT): 608 MS
EKG R AXIS: -62 DEGREES
EKG R AXIS: 58 DEGREES
EKG T AXIS: -109 DEGREES
EKG T AXIS: -150 DEGREES
EKG VENTRICULAR RATE: 25 BPM
EKG VENTRICULAR RATE: 81 BPM
ERYTHROCYTE [DISTWIDTH] IN BLOOD BY AUTOMATED COUNT: 15.9 % (ref 11.5–14.5)
ERYTHROCYTE [DISTWIDTH] IN BLOOD BY AUTOMATED COUNT: 56.5 FL (ref 35–45)
GFR SERPL CREATININE-BSD FRML MDRD: 6 ML/MIN/1.73M2
GLUCOSE BLD-MCNC: 111 MG/DL (ref 70–108)
GLUCOSE BLD-MCNC: 158 MG/DL (ref 70–108)
GLUCOSE BLD-MCNC: 65 MG/DL (ref 70–108)
GLUCOSE BLD-MCNC: 79 MG/DL (ref 70–108)
GLUCOSE BLD-MCNC: 84 MG/DL (ref 70–108)
HCT VFR BLD CALC: 35.9 % (ref 42–52)
HEMOGLOBIN: 11.1 GM/DL (ref 14–18)
HEPATITIS B SURFACE ANTIGEN: NORMAL
MCH RBC QN AUTO: 30 PG (ref 26–33)
MCHC RBC AUTO-ENTMCNC: 30.9 GM/DL (ref 32.2–35.5)
MCV RBC AUTO: 97 FL (ref 80–94)
ORIGINAL SAMPLE NUMBER: NORMAL
PLATELET # BLD: 181 THOU/MM3 (ref 130–400)
PMV BLD AUTO: 11.2 FL (ref 9.4–12.4)
POTASSIUM REFLEX MAGNESIUM: 4.9 MEQ/L (ref 3.5–5.2)
RBC # BLD: 3.7 MILL/MM3 (ref 4.7–6.1)
REFERENCE LOCATION: NORMAL
REFERENCE RANGE: NON REACTIVE
SODIUM BLD-SCNC: 140 MEQ/L (ref 135–145)
TEST RESULTS WITH UNITS: NON REACTIVE
TEST(S) BEING PERFORMED: NORMAL
WBC # BLD: 9.8 THOU/MM3 (ref 4.8–10.8)

## 2018-07-10 PROCEDURE — 6370000000 HC RX 637 (ALT 250 FOR IP): Performed by: INTERNAL MEDICINE

## 2018-07-10 PROCEDURE — 93010 ELECTROCARDIOGRAM REPORT: CPT | Performed by: INTERNAL MEDICINE

## 2018-07-10 PROCEDURE — 80048 BASIC METABOLIC PNL TOTAL CA: CPT

## 2018-07-10 PROCEDURE — 99231 SBSQ HOSP IP/OBS SF/LOW 25: CPT | Performed by: PHYSICIAN ASSISTANT

## 2018-07-10 PROCEDURE — 36415 COLL VENOUS BLD VENIPUNCTURE: CPT

## 2018-07-10 PROCEDURE — 99221 1ST HOSP IP/OBS SF/LOW 40: CPT | Performed by: INTERNAL MEDICINE

## 2018-07-10 PROCEDURE — 82948 REAGENT STRIP/BLOOD GLUCOSE: CPT

## 2018-07-10 PROCEDURE — 5A1D70Z PERFORMANCE OF URINARY FILTRATION, INTERMITTENT, LESS THAN 6 HOURS PER DAY: ICD-10-PCS | Performed by: INTERNAL MEDICINE

## 2018-07-10 PROCEDURE — 2700000000 HC OXYGEN THERAPY PER DAY

## 2018-07-10 PROCEDURE — 85027 COMPLETE CBC AUTOMATED: CPT

## 2018-07-10 PROCEDURE — 90935 HEMODIALYSIS ONE EVALUATION: CPT

## 2018-07-10 PROCEDURE — 99223 1ST HOSP IP/OBS HIGH 75: CPT | Performed by: INTERNAL MEDICINE

## 2018-07-10 PROCEDURE — 6370000000 HC RX 637 (ALT 250 FOR IP): Performed by: PHYSICIAN ASSISTANT

## 2018-07-10 PROCEDURE — 2000000000 HC ICU R&B

## 2018-07-10 PROCEDURE — 99233 SBSQ HOSP IP/OBS HIGH 50: CPT | Performed by: INTERNAL MEDICINE

## 2018-07-10 PROCEDURE — 2580000003 HC RX 258: Performed by: INTERNAL MEDICINE

## 2018-07-10 RX ORDER — HEPARIN SODIUM 5000 [USP'U]/ML
5000 INJECTION, SOLUTION INTRAVENOUS; SUBCUTANEOUS EVERY 8 HOURS SCHEDULED
Status: DISPENSED | OUTPATIENT
Start: 2018-07-10 | End: 2018-07-13

## 2018-07-10 RX ORDER — DEXTROSE MONOHYDRATE 50 MG/ML
100 INJECTION, SOLUTION INTRAVENOUS PRN
Status: DISCONTINUED | OUTPATIENT
Start: 2018-07-10 | End: 2018-08-15 | Stop reason: SDUPTHER

## 2018-07-10 RX ORDER — DEXTROSE MONOHYDRATE 25 G/50ML
12.5 INJECTION, SOLUTION INTRAVENOUS PRN
Status: DISCONTINUED | OUTPATIENT
Start: 2018-07-10 | End: 2018-08-15 | Stop reason: SDUPTHER

## 2018-07-10 RX ORDER — SIMVASTATIN 40 MG
40 TABLET ORAL NIGHTLY
Status: DISCONTINUED | OUTPATIENT
Start: 2018-07-10 | End: 2018-07-14 | Stop reason: HOSPADM

## 2018-07-10 RX ORDER — NICOTINE POLACRILEX 4 MG
15 LOZENGE BUCCAL PRN
Status: DISCONTINUED | OUTPATIENT
Start: 2018-07-10 | End: 2018-08-15 | Stop reason: SDUPTHER

## 2018-07-10 RX ORDER — INSULIN GLARGINE 100 [IU]/ML
20 INJECTION, SOLUTION SUBCUTANEOUS NIGHTLY
Status: DISCONTINUED | OUTPATIENT
Start: 2018-07-10 | End: 2018-07-14 | Stop reason: HOSPADM

## 2018-07-10 RX ADMIN — Medication 10 ML: at 20:54

## 2018-07-10 RX ADMIN — ASPIRIN 81 MG 81 MG: 81 TABLET ORAL at 08:59

## 2018-07-10 RX ADMIN — SIMVASTATIN 40 MG: 40 TABLET, FILM COATED ORAL at 20:54

## 2018-07-10 RX ADMIN — Medication 10 ML: at 08:59

## 2018-07-10 RX ADMIN — FAMOTIDINE 20 MG: 20 TABLET ORAL at 08:58

## 2018-07-10 RX ADMIN — ACETAMINOPHEN 650 MG: 325 TABLET ORAL at 10:53

## 2018-07-10 RX ADMIN — ACETAMINOPHEN 650 MG: 325 TABLET ORAL at 15:41

## 2018-07-10 RX ADMIN — INSULIN GLARGINE 20 UNITS: 100 INJECTION, SOLUTION SUBCUTANEOUS at 20:54

## 2018-07-10 RX ADMIN — CLOPIDOGREL BISULFATE 75 MG: 75 TABLET ORAL at 08:58

## 2018-07-10 RX ADMIN — ACETAMINOPHEN 650 MG: 325 TABLET ORAL at 22:27

## 2018-07-10 ASSESSMENT — PAIN SCALES - GENERAL
PAINLEVEL_OUTOF10: 0
PAINLEVEL_OUTOF10: 2
PAINLEVEL_OUTOF10: 9
PAINLEVEL_OUTOF10: 0
PAINLEVEL_OUTOF10: 5
PAINLEVEL_OUTOF10: 0
PAINLEVEL_OUTOF10: 0
PAINLEVEL_OUTOF10: 4
PAINLEVEL_OUTOF10: 0
PAINLEVEL_OUTOF10: 5
PAINLEVEL_OUTOF10: 0
PAINLEVEL_OUTOF10: 0

## 2018-07-10 ASSESSMENT — PAIN DESCRIPTION - LOCATION
LOCATION: HEAD;BACK
LOCATION: HEAD
LOCATION: BACK
LOCATION: HEAD

## 2018-07-10 ASSESSMENT — PAIN DESCRIPTION - DESCRIPTORS
DESCRIPTORS: HEADACHE

## 2018-07-10 ASSESSMENT — ENCOUNTER SYMPTOMS
SORE THROAT: 0
COUGH: 0
SHORTNESS OF BREATH: 0
DIARRHEA: 0
LEFT EYE: 0
BLURRED VISION: 0
BACK PAIN: 0
NAUSEA: 0
CONSTIPATION: 0
RIGHT EYE: 0
VOMITING: 0
DOUBLE VISION: 0
ABDOMINAL PAIN: 0
WHEEZING: 0

## 2018-07-10 ASSESSMENT — PAIN DESCRIPTION - PAIN TYPE
TYPE: ACUTE PAIN
TYPE: CHRONIC PAIN

## 2018-07-10 ASSESSMENT — PAIN DESCRIPTION - ONSET
ONSET: ON-GOING

## 2018-07-10 ASSESSMENT — PAIN DESCRIPTION - FREQUENCY
FREQUENCY: CONTINUOUS

## 2018-07-10 ASSESSMENT — PAIN DESCRIPTION - PROGRESSION
CLINICAL_PROGRESSION: GRADUALLY WORSENING
CLINICAL_PROGRESSION: GRADUALLY WORSENING

## 2018-07-10 NOTE — CONSULTS
 CKD (chronic kidney disease) stage 3, GFR 30-59 ml/min     ON DIALYSIS    DDD (degenerative disc disease)     Diabetic retinopathy (HCC)     DM (dermatomyositis)     DM (diabetes mellitus) ([address] DX 1978    ON INSULIN    Full dentures     Gastric bleed 2016    baki     GERD (gastroesophageal reflux disease)     H/O Bell's palsy     H/O Clostridium difficile infection febuary 2017    Hemodialysis patient [facility])     last dialysis, 3/26/2014, Mon Wed Fri at [facility] History of blood transfusion     History of cardiac cath 07/07/2018    Stent to Prox LAD     HTN (hypertension) DX 1979    Hyperkalemia     Hyperlipidemia     MRSA (methicillin resistant Staphylococcus aureus) 1/24/2014    blood hx from another facility    Murmur, cardiac     Nephrotic syndrome 8/21/2013    Obstructive sleep apnea     has been told he has but has never been tested    Osteomyelitis of finger of right hand ([address] 9/28/2017    Pancreatitis due to biliary obstruction     Peripheral neuropathy (HCC)     of the feet from the diabetes    Peripheral neuropathy ([address]     Psychiatric problem     depression    Pulmonary edema     Renal failure     KIDNEY FUNCTION 12%    S/P peripheral artery angioplasty: 10/30/2017: PTA of the left popliteal artery. 10/30/2017    10/30/2017: PTA of the left popliteal artery.  Dr. Bello Moncada    SOB (shortness of breath)     Vision blurred     RIGHT    VRE (vancomycin resistant enterococcus) culture positive 03/2017    Wears glasses        PSH:  Past Surgical History:   Procedure Laterality Date    ABLATION OF DYSRHYTHMIC FOCUS  07/21/2017    Atrial Flutter Ablation    APPENDECTOMY      CARDIAC SURGERY  2/19/2014    2 stents placed   [address]  2006    stents x 2    CARDIAC SURGERY  2003    stent x 1    CHOLECYSTECTOMY      COLONOSCOPY      DIALYSIS FISTULA CREATION Left 02/2014    ARM    ENDOSCOPY, COLON, DIAGNOSTIC      ENTEROSCOPY  9/8/2017    ENTEROSCOPY PUSH 5,000 Units  5,000 Units Subcutaneous 3 times per day Santana Carr MD        0.9 % sodium chloride bolus  250 mL Intravenous Once Christi Nelson DO        clopidogrel (PLAVIX) tablet 75 mg  75 mg Oral Daily Santana Carr MD   75 mg at 07/10/18 0858    aspirin chewable tablet 81 mg  81 mg Oral Daily Santana Carr MD   81 mg at 07/10/18 0859    sodium chloride flush 0.9 % injection 10 mL  10 mL Intravenous 2 times per day Santana Carr MD   10 mL at 07/10/18 0859    sodium chloride flush 0.9 % injection 10 mL  10 mL Intravenous PRN Santana Carr MD        ondansetron [facility]) injection 4 mg  4 mg Intravenous Q6H PRN Santana Carr MD   4 mg at 07/09/18 2027    famotidine (PEPCID) tablet 20 mg  20 mg Oral Daily Santana Carr MD   20 mg at 07/10/18 0858    acetaminophen (TYLENOL) tablet 650 mg  650 mg Oral Q6H PRN Santana Carr MD   650 mg at 07/10/18 1053     Facility-Administered Medications Ordered in Other Encounters   Medication Dose Route Frequency Provider Last Rate Last Dose    glucose (GLUTOSE) 40 % oral gel 15 g  15 g Oral PRN Celine Mac MD        dextrose 50 % solution 12.5 g  12.5 g Intravenous PRN Celine Mac MD        glucagon (rDNA) injection 1 mg  1 mg Intramuscular PRN Celine Mac MD        dextrose 5 % solution  100 mL/hr Intravenous PRN Celine Mac MD           REVIEW OF SYSTEMS:    Review of Systems   Constitution: Positive for malaise/fatigue. Negative for fever, weight gain and weight loss. HENT: Negative for hearing loss and sore throat. Eyes: Negative for blurred vision, double vision, vision loss in left eye and vision loss in right eye. Cardiovascular: Positive for syncope. Negative for chest pain, dyspnea on exertion, irregular heartbeat, near-syncope and palpitations. Respiratory: Negative for cough, shortness of breath and wheezing. Endocrine: Negative for cold intolerance, heat intolerance and polyuria.    Hematologic/Lymphatic: Negative for bleeding problem. Does not bruise/bleed easily. Skin: Negative for dry skin, itching and rash. Musculoskeletal: Negative for arthritis, back pain, joint pain and myalgias. Gastrointestinal: Negative for abdominal pain, constipation, diarrhea, nausea and vomiting. Genitourinary: Negative for dysuria, frequency, hematuria and urgency. Neurological: Negative for dizziness, headaches, light-headedness, numbness, paresthesias, seizures and vertigo. Psychiatric/Behavioral: Negative for depression and memory loss. The patient is not nervous/anxious. PHYSICAL EXAM:  /70   Pulse 70   Temp 98.3 °F (36.8 °C)   Resp 14   Ht 6' 1\" (1.854 m)   Wt 237 lb 7 oz (107.7 kg)   SpO2 96%   BMI 31.33 kg/m²   I/O    Intake/Output Summary (Last 24 hours) at 07/10/18 1232  Last data filed at 07/10/18 1142   Gross per 24 hour   Intake             1107 ml   Output             2700 ml   Net            -1593 ml     Patient Vitals for the past 96 hrs (Last 3 readings):   Weight   07/10/18 1142 237 lb 7 oz (107.7 kg)   07/10/18 0745 242 lb 8.1 oz (110 kg)   07/10/18 0603 243 lb 6.2 oz (110.4 kg)     Physical Exam   Constitutional: He is oriented to person, place, and time. No distress. HENT:   Head: Normocephalic and atraumatic. Head is without contusion. Right Ear: Hearing and external ear normal. No decreased hearing is noted. Left Ear: Hearing and external ear normal. No decreased hearing is noted. Nose: Nose normal. No sinus tenderness. No epistaxis. Mouth/Throat: Oropharynx is clear and moist. Mucous membranes are not dry. No oropharyngeal exudate. Eyes: Conjunctivae and EOM are normal. Pupils are equal, round, and reactive to light. Right eye exhibits no discharge. Left eye exhibits no discharge. Neck: Trachea normal. Neck supple. No JVD present. No edema present. No thyroid mass present. Cardiovascular: Normal rate, regular rhythm, normal heart sounds and normal pulses.    No extrasystoles are 12/06/2016    TRIG 160 12/06/2016    HDL 18 12/06/2016    ALT 10 (L) 07/09/2018    AST 21 07/09/2018     07/10/2018    K 4.9 07/10/2018     07/10/2018    CREATININE 9.7 (HH) 07/10/2018    BUN 62 (H) 07/10/2018    CO2 18 (L) 07/10/2018    TSH 3.570 05/14/2018    INR 1.08 05/14/2018    LABA1C 4.6 03/26/2017          IMPRESSION:  The patient is a 59 y/o male with a h/o ASCVD s/p AVR and CABG x 1 3/2017. He was in the hospital over the weekend and had a PCI and JC performed of the mid LAD. The patient had a syncopal episode yesterday morning and his ECG showed complete heart block. The patient's prior ECG's show a RBBB. His ventricular escape rhythm on yesterday's ECG showed a LBBB ventricular escape rhythm. The patient does not have any reversible etiologies. I discussed the situation with the patient and explained to him that he needs a permanent pacemaker. The patient was informed of the risks and benefits of the procedure. The patient understood the information and consents to the plan of care. I will make arrangements for the patient to have either a PHBP or biventricular pacemaker since his EF is 35-40% and he will be ventricular pacing 100% of the time. PLAN:  1. Schedule the patient for insertion of either a PBHP or a biventricular pacemaker tomorrow afternoon.           Electronically signed by Mary Grace Mei MD on 7/10/2018 at 12:32 PM

## 2018-07-10 NOTE — PLAN OF CARE
Problem: Nutrition  Goal: Optimal nutrition therapy  Outcome: Ongoing  Nutrition Problem: Inadequate oral intake  Intervention: Food and/or Nutrient Delivery:  (Diet per Dr as pt able)  Nutritional Goals: adequate nutrition within 1-4 days

## 2018-07-10 NOTE — CARE COORDINATION
7/10/18, 12:49 PM      Jessica Contreras       Admitted from: ED 7/9/2018/ 244[facility] day: 1   Location: 4D-03/003-A Reason for admit: Symptomatic bradycardia [R00.1] Status: IP  Admit order signed?: yes  PMH:  has a past medical history of Anemia; Arthritis; CAD (coronary artery disease); Charcot ankle; CHF (congestive heart failure) ([address] Chronic ankle pain; Chronic back pain; Chronic venous hypertension w ulceration (HCC); CKD (chronic kidney disease) stage 3, GFR 30-59 ml/min; DDD (degenerative disc disease); Diabetic retinopathy ([address] DM (dermatomyositis); DM (diabetes mellitus) ([address] Full dentures; Gastric bleed; GERD (gastroesophageal reflux disease); H/O Bell's palsy; H/O Clostridium difficile infection; Hemodialysis patient [facility]); History of blood transfusion; History of cardiac cath; HTN (hypertension); Hyperkalemia; Hyperlipidemia; MRSA (methicillin resistant Staphylococcus aureus); Murmur, cardiac; Nephrotic syndrome; Obstructive sleep apnea; Osteomyelitis of finger of right hand ([address] Pancreatitis due to biliary obstruction; Peripheral neuropathy ([address] Peripheral neuropathy ([address] Psychiatric problem; Pulmonary edema; Renal failure; S/P peripheral artery angioplasty: 10/30/2017: PTA of the left popliteal artery.; SOB (shortness of breath); Vision blurred; VRE (vancomycin resistant enterococcus) culture positive; and Wears glasses. Procedure:  7/9 TVP placed  7/9 CXR: No gross pneumothorax  Medications:  Scheduled Meds:   heparin (porcine)  5,000 Units Subcutaneous 3 times per day    sodium chloride  250 mL Intravenous Once    clopidogrel  75 mg Oral Daily    aspirin  81 mg Oral Daily    sodium chloride flush  10 mL Intravenous 2 times per day    famotidine  20 mg Oral Daily     Continuous Infusions:   Pertinent Info/Orders/Treatment Plan: Patient had been discharged home from [address] [place] [address] on 7/8/18 and developed syncope and bradycardia.  During transportation from [facility], he developed complete heart block requiring external pacing. TVP was placed and symptoms resolved. Plan for permanent pacemaker. Cardiology, EP, and Nephrology consulted. HD today - 2L removed. Afebrile. Sats 96% on 3L O2. Ox4. +MRSA nares. Telemetry, I&O, daily weight, SCDs. Asa, plavix, pepcid, sq heparin, prn IV zofran. Received IV ancef x1. Labs: K+ 5.3 - now 4.9, CO2 18, BUN 62, Creat 9.7, Pro-bnp 43696, Trop 0.103 then 0.191, hgb 11.1. Diet: Diet NPO Effective Now Exceptions are: Ice Chips   DVT Prophylaxis: Lovenox with SCD's ordered  Smoking status:  reports that he has never smoked. He has never used smokeless tobacco.   Influenza Vaccination Screening Completed: n/a  Pneumonia Vaccination Screening Completed: n/a  PCP: Jacklyn Villagomez MD  Readmission: yes  Patient has been readmitted within 1 day. Patient went to f/u appointment? n/a  If yes, was it within 7 days? n/a  Patient was able to fill prescriptions? yes  Patient is taking medications as prescribed? yes  Cause for readmission? Symptomatic bradycardia   Readmission Risk Score: 28%    Discharge Planning  Current Residence:  Private Residence  Living Arrangements:  Spouse/Significant Other   Support Systems:  Family Members, Children, Spouse/Significant Other  Current Services PTA:     Potential Assistance Needed:  Home Care  Potential Assistance Purchasing Medications:  No  Does patient want to participate in local refill/ meds to beds program?  No  Type of Home Care Services:   (hemodialysis cycler)  Patient expects to be discharged to:  home w/ spouse  Expected Discharge date:  07/11/18  Follow Up Appointment: Best Day/ Time: Monday AM    Discharge Plan: Spoke with Misti Hancock; states he lives at home with his wife and children. He states he does home dialysis M-F with a cycler. Misti Hancock states he plans to return home with his family at discharge, denies needs, and declines [facility] stating he doesn't want it.       Evaluation: no

## 2018-07-10 NOTE — PROGRESS NOTES
Wt: 184 lb (83.5 kg),     · BMI Classification: BMI 30.0 - 34.9 Obese Class I (31.4)  · Comparative Standards (Estimated Nutrition Needs):  · Estimated Daily Total Kcal: ~1990 kcals ( 18 kcals/kg on admit wt of 106.6 kg)  · Estimated Daily Protein (g): ~92 grams ( 1.1 gram protein/kg on IBW of 83.6 kg)   Monitor renal status. HD pt,     Estimated Intake vs Estimated Needs: Intake Less Than Needs    Nutrition Risk Level: High    Nutrition Interventions:    (Diet per Dr as pt able)  Continued Inpatient Monitoring, Education declined (Pt reports he understands renal diet)    Nutrition Evaluation:   · Evaluation: Goals set   · Goals: adequate nutrition within 1-4 days    · Monitoring: Diet Progression, NPO Status, Wound Healing, Weight, Pertinent Labs, Chewing/Swallowing    See Adult Nutrition Doc Flowsheet for more detail.      Electronically signed by Adebayo Wood RD, LD on 7/10/18 at 1:40 PM    Contact Number: [phone]

## 2018-07-10 NOTE — FLOWSHEET NOTE
07/10/18 0745 07/10/18 1142   Vital Signs   /72 130/66   Temp 98.4 °F (36.9 °C) 98.3 °F (36.8 °C)   Pulse 70 77   Weight 242 lb 8.1 oz (110 kg) 237 lb 7 oz (107.7 kg)   Weight Method Bed scale Bed scale   Percent Weight Change -0.36 -2.09   Post-Hemodialysis Assessment   Post-Treatment Procedures --  Blood returned; Access bleeding time < 10 minutes   Machine Disinfection Process --  Acid/Vinegar Clean;Heat Disinfect; Exterior Machine Disinfection   Total Liters Processed (l/min) --  69.4 l/min   Dialyzer Clearance --  Lightly streaked   Duration of Treatment (minutes) --  210 minutes   Heparin amount administered during treatment (units) --  0 units   Hemodialysis Intake (ml) --  700 ml   Hemodialysis Output (ml) --  2700 ml   NET Removed (ml) --  2000 ml   Tolerated Treatment --  Good   Stable tx,. Pt tolerated fluid removal well. drsg dry and intact. tx record printed for scanning into EMR.

## 2018-07-10 NOTE — PROGRESS NOTES
Cardiology Progress Note      Patient:  Rika Gunter  YOB: 1960  MRN: [id number]   Acct: [de-identified]  Admit Date:  7/9/2018  Primary Cardiologist: Enoch Vick MD  Seen by dr Veronica Vora    Chief Complaint: syncope/bradycardia/CHB  hpi per dr Veronica Vora \"This is a pleasant 60 y.o. male presents with hx of CKD, CAD, JESSENIA who presents as [facility] transfer for symptomatic bradycardia. Patient just had a stent placed by Dr. Rosina Nino. HR 20-30s. ECG showing SR, complete heart block, and ventricular escape. Atropine did not provide benefit. Patient obtunded due to sedation. LAD PCI 7/7/2018. EF 35-40%, mild MR, moderate global LV hypokinesis. No further history obtainable. From the cardiac cath, it appears he has a bioprosthetic AVR\"    Subjective (Events in last 24 hours): pt awake and alert. NAD.   No cp or sob      Objective:   /64   Pulse 72   Temp 98.3 °F (36.8 °C)   Resp 15   Ht 6' 1\" (1.854 m)   Wt 237 lb 7 oz (107.7 kg)   SpO2 96%   BMI 31.33 kg/m²        TELEMETRY: paced at 70    Physical Exam:  General Appearance: alert and oriented to person, place and time, in no acute distress  Cardiovascular: normal rate, regular rhythm, normal S1 and S2, no murmurs, rubs, clicks, or gallops, distal pulses intact, no carotid bruits, no JVD  Pulmonary/Chest: clear to auscultation bilaterally- no wheezes, rales or rhonchi, normal air movement, no respiratory distress  Abdomen: soft, non-tender, non-distended, normal bowel sounds, no masses Extremities: no cyanosis, clubbing or edema, pulse   Skin: warm and dry, no rash or erythema  Head: normocephalic and atraumatic  Eyes: pupils equal, round, and reactive to light  Neck: supple and non-tender without mass, no thyromegaly   Musculoskeletal: normal range of motion, no joint swelling, deformity or tenderness  Neurological: alert, oriented, normal speech, no focal findings or movement disorder noted    Medications:    heparin (porcine)  5,000 Units Subcutaneous 3 times per day    [START ON 7/11/2018] bacitracin in 0.9 % sodium chloride irrigation  50,000 Units Topical Once    sodium chloride  250 mL Intravenous Once    clopidogrel  75 mg Oral Daily    aspirin  81 mg Oral Daily    sodium chloride flush  10 mL Intravenous 2 times per day    famotidine  20 mg Oral Daily         sodium chloride flush 10 mL PRN   ondansetron 4 mg Q6H PRN   acetaminophen 650 mg Q6H PRN       Lab Data:    Cardiac Enzymes:  No results for input(s): CKTOTAL, CKMB, CKMBINDEX, TROPONINI in the last 72 hours.     CBC:   Lab Results   Component Value Date    WBC 9.8 07/10/2018    RBC 3.70 07/10/2018    HGB 11.1 07/10/2018    HCT 35.9 07/10/2018     07/10/2018       CMP:  Lab Results   Component Value Date     07/10/2018    K 4.9 07/10/2018     07/10/2018    CO2 18 07/10/2018    BUN 62 07/10/2018    CREATININE 9.7 07/10/2018    LABGLOM 6 07/10/2018    GLUCOSE 79 07/10/2018    CALCIUM 9.0 07/10/2018       Hepatic Function Panel:  Lab Results   Component Value Date    ALKPHOS 83 07/09/2018    ALT 10 07/09/2018    AST 21 07/09/2018    PROT 7.6 07/09/2018    BILITOT 0.4 07/09/2018    BILIDIR <0.2 09/06/2017    LABALBU 3.6 07/09/2018       Magnesium:    Lab Results   Component Value Date    MG 2.1 07/09/2018       PT/INR:    Lab Results   Component Value Date    PROTIME 13.6 02/25/2016    INR 1.08 05/14/2018       HgBA1c:    Lab Results   Component Value Date    LABA1C 4.6 03/26/2017       FLP:  Lab Results   Component Value Date    TRIG 160 12/06/2016    HDL 18 12/06/2016    LDLCALC 33 12/06/2016       TSH:    Lab Results   Component Value Date    TSH 3.570 05/14/2018         Assessment:  Syncope due to CHB  CHB with ventricular escape rhythm  S/p TVP 7/9/18  S/p PCI to mid LAD 7/7/18   S/p CABG x1 (RSVG to RCA) with AVR with tissue valve 3/13/17  Ef 35 -40 per echo 6/7/18  ESRD - on HD  Severe PVD - inoperable per dr Cody Castellon note 5/15/18  HTN  DM    Plan:  · Dr Nalini Moss seen

## 2018-07-10 NOTE — PROGRESS NOTES
2145: Spoke with Dr. Leon Alonso via phone. Informed him that transvenous pacer heart rate had dropped down to 58 bpm and was non sensing. I informed him of the current pacing settings. He stated it should be on VVI mode with a rate of 70bpm.    2149: Assessed pt underlying rhythm which is asystole. Changed pacer to VVI mode and assessed threshold. Pt was still capturing appropriately at [facility] ventricular output. Set ventricular output to 7ma. Pt tolerated changing settings well.

## 2018-07-10 NOTE — CONSULTS
112 (H) 07/08/2018    PHOS 10.3 (H) 07/09/2018    PHOS 8.4 (H) 11/29/2017    PHOS 5.1 (H) 03/23/2017    WBC 9.8 07/10/2018    WBC 6.5 07/09/2018    WBC 3.1 (L) 07/09/2018    HGB 11.1 (L) 07/10/2018    HGB 11.1 (L) 07/09/2018    HGB 4.1 (LL) 07/09/2018    HCT 35.9 (L) 07/10/2018    HCT 36.9 (L) 07/09/2018    HCT 14.4 (LL) 07/09/2018    MCV 97.0 (H) 07/10/2018     07/10/2018     Labs reviewed    Imaging:  CXR results:         Thank you Dr. Marcia Bazan MD for allowing us to participate in care of Bere Villafuerte       Electronically signed by Pia Mercedes MD on 7/10/2018 at 6:10 AM

## 2018-07-10 NOTE — PLAN OF CARE
Problem: Falls - Risk of:  Goal: Will remain free from falls  Will remain free from falls   Outcome: Met This Shift  Patient is currently free of falls. Goal: Absence of physical injury  Absence of physical injury   Outcome: Met This Shift  Patient is free of physical injury at this time. Problem: Bowel Function - Altered:  Goal: Bowel elimination is within specified parameters  Bowel elimination is within specified parameters   Outcome: Met This Shift  Patient had several bm's overnight. Problem: Cardiac Output - Decreased:  Goal: Hemodynamic stability will improve  Hemodynamic stability will improve   Outcome: Met This Shift  Patient is hemodynamically stable at this time. Problem: Fluid Volume - Imbalance:  Goal: Absence of imbalanced fluid volume signs and symptoms  Absence of imbalanced fluid volume signs and symptoms   Outcome: Ongoing  Patient had a run of hemodialysis today. Problem: Pain:  Goal: Pain level will decrease  Pain level will decrease    Outcome: Met This Shift  Pain controlled with multimodal measures. Goal: Recognizes and communicates pain  Recognizes and communicates pain   Outcome: Met This Shift    Goal: Control of acute pain  Control of acute pain   Outcome: Met This Shift    Goal: Control of chronic pain  Control of chronic pain   Outcome: Met This Shift      Problem: Pain:  Goal: Control of acute pain  Control of acute pain   Outcome: Met This Shift  Pain controlled with multimodal meausres  Goal: Control of chronic pain  Control of chronic pain   Outcome: Met This Shift  Pain controlled with multimodal measures. Goal: Pain level will decrease  Pain level will decrease    Outcome: Met This Shift  Pain controlled with multimodal measures. Problem: Risk for Impaired Skin Integrity  Goal: Tissue integrity - skin and mucous membranes  Structural intactness and normal physiological function of skin and  mucous membranes.    Outcome: Met This Shift  Close monitoring of patients skin with turns and assessments. Comments: Care plan reviewed with patient. Patient verbalizes understanding of the care plan and contributed to goal setting.

## 2018-07-11 ENCOUNTER — ANESTHESIA (OUTPATIENT)
Dept: CARDIAC CATH/INVASIVE PROCEDURES | Age: 58
DRG: 228 | End: 2018-07-11
Payer: MEDICARE

## 2018-07-11 ENCOUNTER — PREP FOR PROCEDURE (OUTPATIENT)
Dept: CARDIOLOGY | Age: 58
End: 2018-07-11

## 2018-07-11 ENCOUNTER — APPOINTMENT (OUTPATIENT)
Dept: GENERAL RADIOLOGY | Age: 58
DRG: 228 | End: 2018-07-11
Payer: MEDICARE

## 2018-07-11 ENCOUNTER — ANESTHESIA EVENT (OUTPATIENT)
Dept: CARDIAC CATH/INVASIVE PROCEDURES | Age: 58
DRG: 228 | End: 2018-07-11
Payer: MEDICARE

## 2018-07-11 ENCOUNTER — APPOINTMENT (OUTPATIENT)
Dept: CARDIAC CATH/INVASIVE PROCEDURES | Age: 58
DRG: 228 | End: 2018-07-11
Payer: MEDICARE

## 2018-07-11 VITALS — DIASTOLIC BLOOD PRESSURE: 63 MMHG | SYSTOLIC BLOOD PRESSURE: 136 MMHG | TEMPERATURE: 99.1 F | OXYGEN SATURATION: 93 %

## 2018-07-11 LAB
ANION GAP SERPL CALCULATED.3IONS-SCNC: 18 MEQ/L (ref 8–16)
BUN BLDV-MCNC: 38 MG/DL (ref 7–22)
CALCIUM SERPL-MCNC: 9.1 MG/DL (ref 8.5–10.5)
CHLORIDE BLD-SCNC: 96 MEQ/L (ref 98–111)
CO2: 22 MEQ/L (ref 23–33)
CREAT SERPL-MCNC: 7.3 MG/DL (ref 0.4–1.2)
ERYTHROCYTE [DISTWIDTH] IN BLOOD BY AUTOMATED COUNT: 15.9 % (ref 11.5–14.5)
ERYTHROCYTE [DISTWIDTH] IN BLOOD BY AUTOMATED COUNT: 53.7 FL (ref 35–45)
GFR SERPL CREATININE-BSD FRML MDRD: 8 ML/MIN/1.73M2
GLUCOSE BLD-MCNC: 118 MG/DL (ref 70–108)
GLUCOSE BLD-MCNC: 192 MG/DL (ref 70–108)
GLUCOSE BLD-MCNC: 90 MG/DL (ref 70–108)
GLUCOSE BLD-MCNC: 95 MG/DL (ref 70–108)
GLUCOSE BLD-MCNC: 97 MG/DL (ref 70–108)
HCT VFR BLD CALC: 34.7 % (ref 42–52)
HEMOGLOBIN: 11.1 GM/DL (ref 14–18)
MCH RBC QN AUTO: 29.8 PG (ref 26–33)
MCHC RBC AUTO-ENTMCNC: 32 GM/DL (ref 32.2–35.5)
MCV RBC AUTO: 93.3 FL (ref 80–94)
PLATELET # BLD: 154 THOU/MM3 (ref 130–400)
PMV BLD AUTO: 10.1 FL (ref 9.4–12.4)
POTASSIUM SERPL-SCNC: 4.3 MEQ/L (ref 3.5–5.2)
RBC # BLD: 3.72 MILL/MM3 (ref 4.7–6.1)
SODIUM BLD-SCNC: 136 MEQ/L (ref 135–145)
VRE CULTURE: NORMAL
WBC # BLD: 10 THOU/MM3 (ref 4.8–10.8)

## 2018-07-11 PROCEDURE — 6370000000 HC RX 637 (ALT 250 FOR IP): Performed by: INTERNAL MEDICINE

## 2018-07-11 PROCEDURE — 2580000003 HC RX 258: Performed by: INTERNAL MEDICINE

## 2018-07-11 PROCEDURE — 2140000000 HC CCU INTERMEDIATE R&B

## 2018-07-11 PROCEDURE — A4565 SLINGS: HCPCS

## 2018-07-11 PROCEDURE — 90935 HEMODIALYSIS ONE EVALUATION: CPT

## 2018-07-11 PROCEDURE — 82948 REAGENT STRIP/BLOOD GLUCOSE: CPT

## 2018-07-11 PROCEDURE — 7100000001 HC PACU RECOVERY - ADDTL 15 MIN

## 2018-07-11 PROCEDURE — 85027 COMPLETE CBC AUTOMATED: CPT

## 2018-07-11 PROCEDURE — 6360000002 HC RX W HCPCS: Performed by: INTERNAL MEDICINE

## 2018-07-11 PROCEDURE — 6370000000 HC RX 637 (ALT 250 FOR IP): Performed by: PHYSICIAN ASSISTANT

## 2018-07-11 PROCEDURE — 2720000010 HC SURG SUPPLY STERILE

## 2018-07-11 PROCEDURE — 99233 SBSQ HOSP IP/OBS HIGH 50: CPT | Performed by: INTERNAL MEDICINE

## 2018-07-11 PROCEDURE — 36415 COLL VENOUS BLD VENIPUNCTURE: CPT

## 2018-07-11 PROCEDURE — C1769 GUIDE WIRE: HCPCS

## 2018-07-11 PROCEDURE — 6360000002 HC RX W HCPCS: Performed by: NURSE ANESTHETIST, CERTIFIED REGISTERED

## 2018-07-11 PROCEDURE — 80048 BASIC METABOLIC PNL TOTAL CA: CPT

## 2018-07-11 PROCEDURE — 6370000000 HC RX 637 (ALT 250 FOR IP): Performed by: FAMILY MEDICINE

## 2018-07-11 PROCEDURE — 3700000001 HC ADD 15 MINUTES (ANESTHESIA)

## 2018-07-11 PROCEDURE — 7100000000 HC PACU RECOVERY - FIRST 15 MIN

## 2018-07-11 PROCEDURE — 2780000010 HC IMPLANT OTHER

## 2018-07-11 PROCEDURE — 33225 L VENTRIC PACING LEAD ADD-ON: CPT

## 2018-07-11 PROCEDURE — C1894 INTRO/SHEATH, NON-LASER: HCPCS

## 2018-07-11 PROCEDURE — C1773 RET DEV, INSERTABLE: HCPCS

## 2018-07-11 PROCEDURE — C1893 INTRO/SHEATH, FIXED,NON-PEEL: HCPCS

## 2018-07-11 PROCEDURE — C1900 LEAD, CORONARY VENOUS: HCPCS

## 2018-07-11 PROCEDURE — C1883 ADAPT/EXT, PACING/NEURO LEAD: HCPCS

## 2018-07-11 PROCEDURE — 2580000003 HC RX 258: Performed by: NURSE ANESTHETIST, CERTIFIED REGISTERED

## 2018-07-11 PROCEDURE — C1898 LEAD, PMKR, OTHER THAN TRANS: HCPCS

## 2018-07-11 PROCEDURE — 71045 X-RAY EXAM CHEST 1 VIEW: CPT

## 2018-07-11 PROCEDURE — 2500000003 HC RX 250 WO HCPCS: Performed by: NURSE ANESTHETIST, CERTIFIED REGISTERED

## 2018-07-11 PROCEDURE — 33208 INSRT HEART PM ATRIAL & VENT: CPT

## 2018-07-11 PROCEDURE — C2621 PMKR, OTHER THAN SING/DUAL: HCPCS

## 2018-07-11 PROCEDURE — 33208 INSRT HEART PM ATRIAL & VENT: CPT | Performed by: INTERNAL MEDICINE

## 2018-07-11 PROCEDURE — 33225 L VENTRIC PACING LEAD ADD-ON: CPT | Performed by: INTERNAL MEDICINE

## 2018-07-11 PROCEDURE — 3700000000 HC ANESTHESIA ATTENDED CARE

## 2018-07-11 RX ORDER — SODIUM CHLORIDE 9 MG/ML
INJECTION, SOLUTION INTRAVENOUS CONTINUOUS
Status: CANCELLED | OUTPATIENT
Start: 2018-07-11 | End: 2019-01-01

## 2018-07-11 RX ORDER — ONDANSETRON 2 MG/ML
4 INJECTION INTRAMUSCULAR; INTRAVENOUS EVERY 6 HOURS PRN
Status: DISCONTINUED | OUTPATIENT
Start: 2018-07-11 | End: 2018-07-13

## 2018-07-11 RX ORDER — ZOLPIDEM TARTRATE 5 MG/1
5 TABLET ORAL ONCE
Status: COMPLETED | OUTPATIENT
Start: 2018-07-11 | End: 2018-07-11

## 2018-07-11 RX ORDER — SODIUM CHLORIDE 9 MG/ML
INJECTION, SOLUTION INTRAVENOUS CONTINUOUS PRN
Status: DISCONTINUED | OUTPATIENT
Start: 2018-07-11 | End: 2018-07-11 | Stop reason: SDUPTHER

## 2018-07-11 RX ORDER — DEXAMETHASONE SODIUM PHOSPHATE 4 MG/ML
INJECTION, SOLUTION INTRA-ARTICULAR; INTRALESIONAL; INTRAMUSCULAR; INTRAVENOUS; SOFT TISSUE PRN
Status: DISCONTINUED | OUTPATIENT
Start: 2018-07-11 | End: 2018-07-11 | Stop reason: SDUPTHER

## 2018-07-11 RX ORDER — GLYCOPYRROLATE 1 MG/5 ML
SYRINGE (ML) INTRAVENOUS PRN
Status: DISCONTINUED | OUTPATIENT
Start: 2018-07-11 | End: 2018-07-11 | Stop reason: SDUPTHER

## 2018-07-11 RX ORDER — PROPOFOL 10 MG/ML
INJECTION, EMULSION INTRAVENOUS PRN
Status: DISCONTINUED | OUTPATIENT
Start: 2018-07-11 | End: 2018-07-11 | Stop reason: SDUPTHER

## 2018-07-11 RX ORDER — LIDOCAINE HYDROCHLORIDE 10 MG/ML
INJECTION, SOLUTION INFILTRATION; PERINEURAL PRN
Status: DISCONTINUED | OUTPATIENT
Start: 2018-07-11 | End: 2018-07-11 | Stop reason: SDUPTHER

## 2018-07-11 RX ORDER — NEOSTIGMINE METHYLSULFATE 1 MG/ML
INJECTION, SOLUTION INTRAVENOUS PRN
Status: DISCONTINUED | OUTPATIENT
Start: 2018-07-11 | End: 2018-07-11 | Stop reason: SDUPTHER

## 2018-07-11 RX ORDER — SUCCINYLCHOLINE CHLORIDE 20 MG/ML
INJECTION INTRAMUSCULAR; INTRAVENOUS PRN
Status: DISCONTINUED | OUTPATIENT
Start: 2018-07-11 | End: 2018-07-11 | Stop reason: SDUPTHER

## 2018-07-11 RX ORDER — FENTANYL CITRATE 50 UG/ML
INJECTION, SOLUTION INTRAMUSCULAR; INTRAVENOUS PRN
Status: DISCONTINUED | OUTPATIENT
Start: 2018-07-11 | End: 2018-07-11 | Stop reason: SDUPTHER

## 2018-07-11 RX ORDER — SODIUM CHLORIDE 0.9 % (FLUSH) 0.9 %
10 SYRINGE (ML) INJECTION PRN
Status: CANCELLED | OUTPATIENT
Start: 2018-07-11 | End: 2019-01-01

## 2018-07-11 RX ORDER — SODIUM CHLORIDE 0.9 % (FLUSH) 0.9 %
10 SYRINGE (ML) INJECTION EVERY 12 HOURS SCHEDULED
Status: CANCELLED | OUTPATIENT
Start: 2018-07-11 | End: 2019-01-01

## 2018-07-11 RX ORDER — ONDANSETRON 2 MG/ML
INJECTION INTRAMUSCULAR; INTRAVENOUS PRN
Status: DISCONTINUED | OUTPATIENT
Start: 2018-07-11 | End: 2018-07-11 | Stop reason: SDUPTHER

## 2018-07-11 RX ORDER — ROCURONIUM BROMIDE 10 MG/ML
INJECTION, SOLUTION INTRAVENOUS PRN
Status: DISCONTINUED | OUTPATIENT
Start: 2018-07-11 | End: 2018-07-11 | Stop reason: SDUPTHER

## 2018-07-11 RX ADMIN — Medication 10 ML: at 10:53

## 2018-07-11 RX ADMIN — ACETAMINOPHEN 650 MG: 325 TABLET ORAL at 20:35

## 2018-07-11 RX ADMIN — Medication 10 ML: at 20:38

## 2018-07-11 RX ADMIN — ROCURONIUM BROMIDE 10 MG: 10 INJECTION, SOLUTION INTRAVENOUS at 14:47

## 2018-07-11 RX ADMIN — PHENYLEPHRINE HYDROCHLORIDE 100 MCG: 10 INJECTION INTRAVENOUS at 14:04

## 2018-07-11 RX ADMIN — LIDOCAINE HYDROCHLORIDE 40 MG: 10 INJECTION, SOLUTION INFILTRATION; PERINEURAL at 13:43

## 2018-07-11 RX ADMIN — CEFAZOLIN SODIUM 2 G: 10 INJECTION, POWDER, FOR SOLUTION INTRAVENOUS at 23:02

## 2018-07-11 RX ADMIN — ROCURONIUM BROMIDE 25 MG: 10 INJECTION, SOLUTION INTRAVENOUS at 14:00

## 2018-07-11 RX ADMIN — SIMVASTATIN 40 MG: 40 TABLET, FILM COATED ORAL at 22:11

## 2018-07-11 RX ADMIN — NEOSTIGMINE METHYLSULFATE 2 MG: 1 INJECTION, SOLUTION INTRAVENOUS at 16:18

## 2018-07-11 RX ADMIN — ONDANSETRON HYDROCHLORIDE 4 MG: 4 INJECTION, SOLUTION INTRAMUSCULAR; INTRAVENOUS at 13:52

## 2018-07-11 RX ADMIN — ROCURONIUM BROMIDE 5 MG: 10 INJECTION, SOLUTION INTRAVENOUS at 13:43

## 2018-07-11 RX ADMIN — ZOLPIDEM TARTRATE 5 MG: 5 TABLET ORAL at 22:15

## 2018-07-11 RX ADMIN — DEXAMETHASONE SODIUM PHOSPHATE 4 MG: 4 INJECTION, SOLUTION INTRAMUSCULAR; INTRAVENOUS at 13:52

## 2018-07-11 RX ADMIN — FENTANYL CITRATE 50 MCG: 50 INJECTION INTRAMUSCULAR; INTRAVENOUS at 13:43

## 2018-07-11 RX ADMIN — PROPOFOL 20 MG: 10 INJECTION, EMULSION INTRAVENOUS at 13:58

## 2018-07-11 RX ADMIN — CLOPIDOGREL BISULFATE 75 MG: 75 TABLET ORAL at 18:19

## 2018-07-11 RX ADMIN — Medication 2 G: at 13:50

## 2018-07-11 RX ADMIN — PROPOFOL 30 MG: 10 INJECTION, EMULSION INTRAVENOUS at 14:47

## 2018-07-11 RX ADMIN — SODIUM CHLORIDE: 9 INJECTION, SOLUTION INTRAVENOUS at 13:40

## 2018-07-11 RX ADMIN — HEPARIN SODIUM 5000 UNITS: 5000 INJECTION, SOLUTION INTRAVENOUS; SUBCUTANEOUS at 23:02

## 2018-07-11 RX ADMIN — Medication 0.2 MG: at 16:18

## 2018-07-11 RX ADMIN — FAMOTIDINE 20 MG: 20 TABLET ORAL at 18:19

## 2018-07-11 RX ADMIN — FENTANYL CITRATE 50 MCG: 50 INJECTION INTRAMUSCULAR; INTRAVENOUS at 13:52

## 2018-07-11 RX ADMIN — ASPIRIN 81 MG 81 MG: 81 TABLET ORAL at 18:19

## 2018-07-11 RX ADMIN — PROPOFOL 150 MG: 10 INJECTION, EMULSION INTRAVENOUS at 13:43

## 2018-07-11 RX ADMIN — Medication 80 MG: at 13:43

## 2018-07-11 ASSESSMENT — PULMONARY FUNCTION TESTS
PIF_VALUE: 29
PIF_VALUE: 1
PIF_VALUE: 25
PIF_VALUE: 24
PIF_VALUE: 24
PIF_VALUE: 23
PIF_VALUE: 23
PIF_VALUE: 28
PIF_VALUE: 24
PIF_VALUE: 24
PIF_VALUE: 23
PIF_VALUE: 1
PIF_VALUE: 22
PIF_VALUE: 23
PIF_VALUE: 24
PIF_VALUE: 23
PIF_VALUE: 24
PIF_VALUE: 23
PIF_VALUE: 23
PIF_VALUE: 24
PIF_VALUE: 1
PIF_VALUE: 23
PIF_VALUE: 24
PIF_VALUE: 23
PIF_VALUE: 23
PIF_VALUE: 24
PIF_VALUE: 23
PIF_VALUE: 24
PIF_VALUE: 42
PIF_VALUE: 10
PIF_VALUE: 23
PIF_VALUE: 22
PIF_VALUE: 23
PIF_VALUE: 9
PIF_VALUE: 23
PIF_VALUE: 23
PIF_VALUE: 24
PIF_VALUE: 24
PIF_VALUE: 1
PIF_VALUE: 23
PIF_VALUE: 24
PIF_VALUE: 23
PIF_VALUE: 24
PIF_VALUE: 1
PIF_VALUE: 23
PIF_VALUE: 24
PIF_VALUE: 1
PIF_VALUE: 23
PIF_VALUE: 22
PIF_VALUE: 23
PIF_VALUE: 22
PIF_VALUE: 23
PIF_VALUE: 24
PIF_VALUE: 24
PIF_VALUE: 23
PIF_VALUE: 24
PIF_VALUE: 23
PIF_VALUE: 24
PIF_VALUE: 24
PIF_VALUE: 23
PIF_VALUE: 46
PIF_VALUE: 24
PIF_VALUE: 23
PIF_VALUE: 16
PIF_VALUE: 23
PIF_VALUE: 25
PIF_VALUE: 24
PIF_VALUE: 20
PIF_VALUE: 19
PIF_VALUE: 2
PIF_VALUE: 1
PIF_VALUE: 23
PIF_VALUE: 23
PIF_VALUE: 25
PIF_VALUE: 1
PIF_VALUE: 10
PIF_VALUE: 23
PIF_VALUE: 23
PIF_VALUE: 4
PIF_VALUE: 23
PIF_VALUE: 3
PIF_VALUE: 24
PIF_VALUE: 23
PIF_VALUE: 23
PIF_VALUE: 10
PIF_VALUE: 23
PIF_VALUE: 34
PIF_VALUE: 23
PIF_VALUE: 23
PIF_VALUE: 4
PIF_VALUE: 19
PIF_VALUE: 23
PIF_VALUE: 3
PIF_VALUE: 23
PIF_VALUE: 19
PIF_VALUE: 23
PIF_VALUE: 25
PIF_VALUE: 11
PIF_VALUE: 2
PIF_VALUE: 1
PIF_VALUE: 23
PIF_VALUE: 19
PIF_VALUE: 23
PIF_VALUE: 24
PIF_VALUE: 23
PIF_VALUE: 1
PIF_VALUE: 1
PIF_VALUE: 23
PIF_VALUE: 1
PIF_VALUE: 23
PIF_VALUE: 24
PIF_VALUE: 24
PIF_VALUE: 23
PIF_VALUE: 24
PIF_VALUE: 1
PIF_VALUE: 23
PIF_VALUE: 23
PIF_VALUE: 1
PIF_VALUE: 3
PIF_VALUE: 23
PIF_VALUE: 23
PIF_VALUE: 25
PIF_VALUE: 23
PIF_VALUE: 0
PIF_VALUE: 23
PIF_VALUE: 24
PIF_VALUE: 23
PIF_VALUE: 4
PIF_VALUE: 1
PIF_VALUE: 23
PIF_VALUE: 24
PIF_VALUE: 23
PIF_VALUE: 24
PIF_VALUE: 23
PIF_VALUE: 25
PIF_VALUE: 24
PIF_VALUE: 23
PIF_VALUE: 19
PIF_VALUE: 23
PIF_VALUE: 10
PIF_VALUE: 24
PIF_VALUE: 1
PIF_VALUE: 23
PIF_VALUE: 4

## 2018-07-11 ASSESSMENT — PAIN SCALES - GENERAL
PAINLEVEL_OUTOF10: 0
PAINLEVEL_OUTOF10: 5
PAINLEVEL_OUTOF10: 5
PAINLEVEL_OUTOF10: 3
PAINLEVEL_OUTOF10: 5

## 2018-07-11 ASSESSMENT — ENCOUNTER SYMPTOMS: SHORTNESS OF BREATH: 1

## 2018-07-11 NOTE — PROGRESS NOTES
Patient was involved with patient care when the  was rounding in the unit (9:30). Patient was out of the room when the  was rounding in the unit (14:30). Chaplains will remain available for further emotional and spiritual support as needed.

## 2018-07-11 NOTE — PLAN OF CARE
plan reviewed with patien. Patient verbalize understanding of the plan of care and contribute to goal setting.

## 2018-07-11 NOTE — PROGRESS NOTES
1650-Pt to PACU from OR post Pacemaker insertion by Dr Carly Mckenna. Bedside report received from [facility], CRNA and Cath Lab RNs. Pt connected to monitor, VS stable, respirations unlabored with 4L O2 via NC.  20g INT noted in RFA, infusing well; site soft, clean, dry, intact. Per Amari Yanez, pt had EJ in place in right neck that was removed in OR; pt dialysis pt so no IV fluids started at end of case. Surgical site noted to left chest, gauze and foam tape dressing in place, clean, dry, intact. Left arm sling in place. AV fistula noted in left forearm, thrill and bruit noted. Cap refill less than 3 seconds, skin pink, warm, dry. Left radial pulse weak, palpable. 1655-Pt voicing need to have BM at this time, bedpan obtained and pt assisted onto side by RNs. Moderate amount of loose, brown stool noted under pt. Pt cleaned and dried, soiled chux and linens removed and clean chux placed under pt. Pt states no longer needs bedpan. Pt resting easy, denies needs at this time. 1700-Bedside glucose checked:  97mg/dl at this time. 1710-Post op CXR order noted, radiology notified at this time. Pt resting easy, VS stable. 1715-[place] from radiology at bedside for post-op CXR.  1720-Xray completed. Pt tolerated well, sitting up in bed eating ice chips at this time. Pt denies pain or needs at this time; VS stable. 1725-Pt reassessed for readiness for discharge from PACU. Pt alert and oriented, VS stable, respirations unlabored on 4L O2 via NC. IV continues to infuse without difficulty; site soft, clean, dry, intact. Surgical dressing and left arm sling remain in place, clean, dry, intact. Report called to Ashley Ng RN on 3B; no questions or concerns voiced. Transport notified of need for transfer to [address] [place] [address].  1739-Pt leaving with transport team at this time, remains in stable condition. Chart on bed.

## 2018-07-11 NOTE — ANESTHESIA PRE PROCEDURE
Department of Anesthesiology  Preprocedure Note       Name:  Loni Capellan   Age:  60 y.o.  :  1960                                          MRN:  [id number]         Date:  2018      Surgeon: * No surgeons listed *    Procedure: * No procedures listed *    Medications prior to admission:   Prior to Admission medications    Medication Sig Start Date End Date Taking? Authorizing Provider   carvedilol (COREG) 6.25 MG tablet Take 1 tablet by mouth 2 times daily (with meals) 18  Yes Devendra Torres PA-C   clopidogrel (PLAVIX) 75 MG tablet Take 1 tablet by mouth daily 18  Yes Devendra Torres PA-C   nitroGLYCERIN (NITROSTAT) 0.4 MG SL tablet Place 1 tablet under the tongue every 5 minutes as needed for Chest pain up to max of 3 total doses. If no relief after 1 dose, call 911.   Do not take with erectile dysfunction meds 18  Yes Devendra Torres PA-C   lisinopril (PRINIVIL;ZESTRIL) 5 MG tablet Take 1 tablet by mouth daily 18  Yes Kyle Srivastava MD   FENOFIBRATE PO Take 145 mg by mouth    Yes Historical Provider, MD   aspirin 81 MG chewable tablet Take 1 tablet by mouth daily Resume 17  Yes Nicole Wheeler MD   pantoprazole (PROTONIX) 40 MG tablet Take 40 mg by mouth daily   Yes Historical Provider, MD   calcium carbonate (TUMS) 500 MG chewable tablet Take 5 tablets by mouth 3 times daily (with meals) And 3 tabs as needed with snacks   Yes Historical Provider, MD   FLUoxetine (PROZAC) 20 MG capsule Take 20 mg by mouth daily   Yes Historical Provider, MD   calcitRIOL (ROCALTROL) 0.25 MCG capsule Take 0.25 mcg by mouth daily    Yes Historical Provider, MD   insulin glargine (LANTUS) 100 UNIT/ML injection Inject 55 Units into the skin every morning (before breakfast)    Yes Historical Provider, MD   Multiple Vitamin (MULTI-VITAMIN PO) Take 1 tablet by mouth daily    Yes Historical Provider, MD   ezetimibe-simvastatin (VYTORIN) 10-40 MG per tablet Take 1 tablet by mouth Dose    glucose (GLUTOSE) 40 % oral gel 15 g  15 g Oral PRN Luciana Gomes MD        dextrose 50 % solution 12.5 g  12.5 g Intravenous PRN Luciana Gomes MD        glucagon (rDNA) injection 1 mg  1 mg Intramuscular PRN Luciana Gomes MD        dextrose 5 % solution  100 mL/hr Intravenous PRN Luciana Gomes MD           Allergies:     Allergies   Allergen Reactions    Vicodin [Hydrocodone-Acetaminophen] Nausea Only    Darvocet A500 [Propoxyphene N-Acetaminophen] Nausea And Vomiting    Demerol Hcl [Meperidine] Nausea And Vomiting       Problem List:    Patient Active Problem List   Diagnosis Code    SOB (shortness of breath) R06.02    Anemia in chronic kidney disease (CKD) N18.9, D63.1    HTN (hypertension) I10    Coronary artery disease involving native coronary artery of native heart with unstable angina pectoris ([facility]) I25.110    Type 2 diabetes mellitus with diabetic nephropathy, with long-term current use of insulin ([facility]) E11.21, Z79.4    Chronic back pain greater than 3 months duration M54.9, G89.29    Chronic pain of both ankles M25.571, G89.29, M25.572    Peripheral neuropathy ([facility]) G62.9    DM (diabetes mellitus), secondary, with renal complications ([facility]) S34.22    Peripheral neuropathy ([facility]) G62.9    Proliferative diabetic retinopathy ([facility]) E11.3599    Iron deficiency anemia due to chronic blood loss D50.0    ESRD (end stage renal disease) on dialysis ([facility]) N18.6, Z99.2    AVM (arteriovenous malformation) of colon without hemorrhage A80.83    Metabolic acidosis L81.5    Diabetic polyneuropathy associated with type 2 diabetes mellitus ([facility]) E11.42    Charcot's joint of foot in type 2 diabetes mellitus ([facility] E11.610    Proliferative diabetic retinopathy without macular edema associated with type 2 diabetes mellitus ([facility] [address] [facility] M19.7253    Secondary hyperparathyroidism of renal origin ([facility] N25.81    S/P AVR (aortic valve replacement) Z95.2    S/P CABG x 1 Z95.1    Hyperphosphatemia E83.39    Coronary artery disease involving coronary bypass graft of native heart without angina pectoris I25.810    Osteomyelitis of finger of right hand ([facility]) M86.9    S/P peripheral artery angioplasty: 10/30/2017: PTA of the left popliteal artery.  Z98.62    Peripheral vascular disease ([facility]) I73.9    Atheroembolism of both lower extremities ([facility]) I75.023    Chest pain R07.9    Chronic systolic congestive heart failure ([facility]) I50.22    Unstable angina ([facility]) I20.0    Symptomatic bradycardia R00.1    Heart block AV third degree ([facility]) I44.2       Past Medical History:        Diagnosis Date    Anemia     Arthritis     CAD (coronary artery disease) LAST ONE 02/19/2014    ANGIOPLASTY X 2-TOTAL 4 STENTS     Charcot ankle     LEFT, SCHEDULED FOR SURGERY 04/03/2014    CHF (congestive heart failure) ([facility])     Chronic ankle pain     Chronic back pain     Chronic venous hypertension w ulceration ([facility])     CKD (chronic kidney disease) stage 3, GFR 30-59 ml/min     ON DIALYSIS    DDD (degenerative disc disease)     Diabetic retinopathy ([facility])     DM (dermatomyositis)     DM (diabetes mellitus) ([address] DX 1978    ON INSULIN    Full dentures     Gastric bleed 2016    baki     GERD (gastroesophageal reflux disease)     H/O Bell's palsy     H/O Clostridium difficile infection febuary 2017    Hemodialysis patient ([address]     last dialysis, 3/26/2014, Mon Wed Fri at [facility] History of blood transfusion     History of cardiac cath 07/07/2018    Stent to Prox LAD     HTN (hypertension) DX 1979    Hyperkalemia     Hyperlipidemia     MRSA (methicillin resistant Staphylococcus aureus) 1/24/2014    blood hx from another facility    Murmur, cardiac     Nephrotic syndrome 8/21/2013    Obstructive sleep apnea     has been told he has but has never been tested    Osteomyelitis of finger of right hand ([address] 9/28/2017    Pancreatitis due to biliary obstruction     Peripheral neuropathy ([address]     of the feet from the diabetes    Peripheral neuropathy ([address]     Psychiatric problem     depression    Pulmonary edema     Renal failure     KIDNEY FUNCTION 12%    S/P peripheral artery angioplasty: 10/30/2017: PTA of the left popliteal artery. 10/30/2017    10/30/2017: PTA of the left popliteal artery.  Dr. Bello Moncada    SOB (shortness of breath)     Vision blurred     RIGHT    VRE (vancomycin resistant enterococcus) culture positive 03/2017    Wears glasses        Past Surgical History:        Procedure Laterality Date    ABLATION OF DYSRHYTHMIC FOCUS  07/21/2017    Atrial Flutter Ablation    APPENDECTOMY      CARDIAC SURGERY  2/19/2014    2 stents placed   [address]  2006    stents x 2    CARDIAC SURGERY  2003    stent x 1    CHOLECYSTECTOMY      COLONOSCOPY      DIALYSIS FISTULA CREATION Left 02/2014    ARM    ENDOSCOPY, COLON, DIAGNOSTIC      ENTEROSCOPY  9/8/2017    ENTEROSCOPY PUSH BIOPSY performed by Rand Baxter MD at [address] Bilateral     CATARACT EXTRACTION WITH IOL    EYE SURGERY Left 05/17/2013    VITRECTOMY    EYE SURGERY Bilateral     MULTIPLE LASER PROCEDURES    FINGER AMPUTATION Right 09/28/2017    FRACTURE SURGERY Right     ankle, surgery x 7    HAND SURGERY Right 8/18/2017    I & D RIGHT INDEX FINGER performed by Elizabeth Beatty MD at [facility] Bilateral 09/2013    SUBSEQUENT HEMORRHAGE    OTHER SURGICAL HISTORY      DIALYSIS CHEST PORT INSERTED AND REMOVED    RI AMPUTATE METACARPAL+FINGER Right 9/28/2017    RIGHT INDEX FINGER AMPUTATION performed by Elizabeth Beatty MD at [id number] [place] [address] [address] [place] [address] Left 9/8/2017    COLONOSCOPY performed by Rand Baxter MD at [facility]    RI EGD TRANSORAL BIOPSY SINGLE/MULTIPLE N/A 8/17/2017    EGD BIOPSY performed by Rand Baxter MD at [address] [place] [address]      cabbage harvest left leg, left arm fistula    VITRECTOMY Right 3/27/14       Social History: Results   Component Value Date    [address] POS 07/09/2018       Anesthesia Evaluation  Patient summary reviewed and Nursing notes reviewed history of anesthetic complications ( slow wake up): Airway: Mallampati: II  TM distance: >3 FB   Neck ROM: full  Mouth opening: > = 3 FB Dental:    (+) lower dentures and upper dentures      Pulmonary:   (+) shortness of breath:  sleep apnea:                             Cardiovascular:    (+) hypertension:, angina:, pacemaker ( being V paced now): pacemaker, CAD:, CABG/stent:, CHF:,       ECG reviewed      Echocardiogram reviewed                  Neuro/Psych:   (+) neuromuscular disease:, psychiatric history:            GI/Hepatic/Renal:   (+) GERD:, renal disease: dialysis,           Endo/Other:    (+) DiabetesType I DM, using insulin, : arthritis:., .          Pt had no PAT visit       Abdominal:       Abdomen: soft. Vascular: negative vascular ROS. Anesthesia Plan      general     ASA 5       Induction: intravenous. MIPS: Prophylactic antiemetics administered. Anesthetic plan and risks discussed with patient.       Plan discussed with attending and surgical team.              BRAEDEN German - CRNA   7/11/2018

## 2018-07-11 NOTE — PROGRESS NOTES
and cooperative with exam  HEENT:  Head: Normocephalic, no lesions, without obvious abnormality. Neck:   no adenopathy, no carotid bruit, no JVD, supple, symmetrical, trachea midline and thyroid not enlarged, symmetric, no tenderness/mass/nodules  Lungs:  clear to auscultation bilaterally  Heart:  regular rate and rhythm, S1, S2 normal, no murmur, click, rub or gallop  Abdomen:  soft, non-tender; bowel sounds normal; no masses,  no organomegaly  Extremities:  extremities normal, atraumatic, no cyanosis or edema  Neurologic:  Mental status: Alert, oriented, thought content appropriate  Psy:                 No evidence of depression. Mood is stable. Skin:                Warm and dry. No lesions or rashes noted. Muscles:         Hand  and leg strength are equal and strong bilaterally. Lab Data  CBC:   Recent Labs      07/09/18   1307  07/10/18   0459  07/11/18   0705   WBC  6.5  9.8  10.0   HGB  11.1*  11.1*  11.1*   PLT  166  181  154     BMP:  Recent Labs      07/09/18   1150  07/09/18   1944  07/10/18   0459  07/11/18   0705   NA  137   --   140  136   K  5.3*   --   4.9  4.3   CL  98   --   101  96*   CO2  18*   --   18*  22*   BUN  51*   --   62*  38*   CREATININE  8.7*   --   9.7*  7.3*   GLUCOSE  182*   --   79  95   CALCIUM  9.9  9.9   --   9.0  9.1   MG  2.3  2.1   --    --    PHOS  10.3*   --    --    --      TSH: No results for input(s): TSH in the last 72 hours. HgBa1c: No results for input(s): LABA1C in the last 72 hours. Hepatic: Recent Labs      07/09/18   1150   LABALBU  3.6   AST  21   ALT  10*   BILITOT  0.4   ALKPHOS  83     Troponin: No results for input(s): TROPONINI in the last 72 hours. BNP: No results for input(s): BNP in the last 72 hours. Lipids: No results for input(s): CHOL, HDL in the last 72 hours. Invalid input(s): LDLCALCU  INR: No results for input(s): INR in the last 72 hours. Assessment:    1. ESRD from DM and HTN  2. Dm  3. HTN  4. CAD  5.  Third

## 2018-07-12 ENCOUNTER — APPOINTMENT (OUTPATIENT)
Dept: GENERAL RADIOLOGY | Age: 58
DRG: 228 | End: 2018-07-12
Payer: MEDICARE

## 2018-07-12 ENCOUNTER — HOSPITAL ENCOUNTER (OUTPATIENT)
Dept: NURSING | Age: 58
End: 2018-07-12

## 2018-07-12 LAB
BLOOD CULTURE, ROUTINE: NORMAL
BLOOD CULTURE, ROUTINE: NORMAL
GLUCOSE BLD-MCNC: 144 MG/DL (ref 70–108)
GLUCOSE BLD-MCNC: 152 MG/DL (ref 70–108)
GLUCOSE BLD-MCNC: 158 MG/DL (ref 70–108)
GLUCOSE BLD-MCNC: 191 MG/DL (ref 70–108)

## 2018-07-12 PROCEDURE — 02H43JZ INSERTION OF PACEMAKER LEAD INTO CORONARY VEIN, PERCUTANEOUS APPROACH: ICD-10-PCS | Performed by: INTERNAL MEDICINE

## 2018-07-12 PROCEDURE — 6360000002 HC RX W HCPCS: Performed by: INTERNAL MEDICINE

## 2018-07-12 PROCEDURE — 0JH607Z INSERTION OF CARDIAC RESYNCHRONIZATION PACEMAKER PULSE GENERATOR INTO CHEST SUBCUTANEOUS TISSUE AND FASCIA, OPEN APPROACH: ICD-10-PCS | Performed by: INTERNAL MEDICINE

## 2018-07-12 PROCEDURE — 2580000003 HC RX 258: Performed by: INTERNAL MEDICINE

## 2018-07-12 PROCEDURE — 6370000000 HC RX 637 (ALT 250 FOR IP): Performed by: PHYSICIAN ASSISTANT

## 2018-07-12 PROCEDURE — 2140000000 HC CCU INTERMEDIATE R&B

## 2018-07-12 PROCEDURE — 99232 SBSQ HOSP IP/OBS MODERATE 35: CPT | Performed by: PHYSICIAN ASSISTANT

## 2018-07-12 PROCEDURE — 6370000000 HC RX 637 (ALT 250 FOR IP): Performed by: INTERNAL MEDICINE

## 2018-07-12 PROCEDURE — 99232 SBSQ HOSP IP/OBS MODERATE 35: CPT | Performed by: INTERNAL MEDICINE

## 2018-07-12 PROCEDURE — 93005 ELECTROCARDIOGRAM TRACING: CPT | Performed by: INTERNAL MEDICINE

## 2018-07-12 PROCEDURE — 02HK3JZ INSERTION OF PACEMAKER LEAD INTO RIGHT VENTRICLE, PERCUTANEOUS APPROACH: ICD-10-PCS | Performed by: INTERNAL MEDICINE

## 2018-07-12 PROCEDURE — 71046 X-RAY EXAM CHEST 2 VIEWS: CPT

## 2018-07-12 PROCEDURE — 6370000000 HC RX 637 (ALT 250 FOR IP): Performed by: FAMILY MEDICINE

## 2018-07-12 PROCEDURE — 02HK3NZ INSERTION OF INTRACARDIAC PACEMAKER INTO RIGHT VENTRICLE, PERCUTANEOUS APPROACH: ICD-10-PCS | Performed by: INTERNAL MEDICINE

## 2018-07-12 PROCEDURE — 02JA3ZZ INSPECTION OF HEART, PERCUTANEOUS APPROACH: ICD-10-PCS | Performed by: INTERNAL MEDICINE

## 2018-07-12 PROCEDURE — 99232 SBSQ HOSP IP/OBS MODERATE 35: CPT | Performed by: HOSPITALIST

## 2018-07-12 PROCEDURE — 82948 REAGENT STRIP/BLOOD GLUCOSE: CPT

## 2018-07-12 RX ORDER — CALCIUM CARBONATE 200(500)MG
500 TABLET,CHEWABLE ORAL 2 TIMES DAILY PRN
Status: DISCONTINUED | OUTPATIENT
Start: 2018-07-12 | End: 2018-07-14 | Stop reason: HOSPADM

## 2018-07-12 RX ORDER — ASPIRIN 81 MG/1
81 TABLET, CHEWABLE ORAL DAILY
COMMUNITY

## 2018-07-12 RX ADMIN — ONDANSETRON 4 MG: 2 INJECTION INTRAMUSCULAR; INTRAVENOUS at 22:20

## 2018-07-12 RX ADMIN — HEPARIN SODIUM 5000 UNITS: 5000 INJECTION, SOLUTION INTRAVENOUS; SUBCUTANEOUS at 23:43

## 2018-07-12 RX ADMIN — Medication 10 ML: at 22:20

## 2018-07-12 RX ADMIN — ACETAMINOPHEN 650 MG: 325 TABLET ORAL at 21:13

## 2018-07-12 RX ADMIN — ASPIRIN 81 MG 81 MG: 81 TABLET ORAL at 09:00

## 2018-07-12 RX ADMIN — Medication 10 ML: at 09:01

## 2018-07-12 RX ADMIN — SIMVASTATIN 40 MG: 40 TABLET, FILM COATED ORAL at 21:13

## 2018-07-12 RX ADMIN — HEPARIN SODIUM 5000 UNITS: 5000 INJECTION, SOLUTION INTRAVENOUS; SUBCUTANEOUS at 17:42

## 2018-07-12 RX ADMIN — FAMOTIDINE 20 MG: 20 TABLET ORAL at 09:00

## 2018-07-12 RX ADMIN — ANTACID TABLETS 500 MG: 500 TABLET, CHEWABLE ORAL at 22:18

## 2018-07-12 RX ADMIN — HEPARIN SODIUM 5000 UNITS: 5000 INJECTION, SOLUTION INTRAVENOUS; SUBCUTANEOUS at 06:12

## 2018-07-12 RX ADMIN — CLOPIDOGREL BISULFATE 75 MG: 75 TABLET ORAL at 09:00

## 2018-07-12 ASSESSMENT — PAIN SCALES - GENERAL
PAINLEVEL_OUTOF10: 6
PAINLEVEL_OUTOF10: 5

## 2018-07-12 ASSESSMENT — PAIN DESCRIPTION - PAIN TYPE
TYPE: CHRONIC PAIN
TYPE: CHRONIC PAIN

## 2018-07-12 ASSESSMENT — PAIN DESCRIPTION - LOCATION
LOCATION: BACK
LOCATION: BACK

## 2018-07-12 NOTE — PROGRESS NOTES
Acct: [de-identified]  Admit Date:  7/9/2018  Primary Cardiologist: Steve Jackson MD    Chief Complaint/Reason for Consultation:  Per Dr Jessie Kincaid consult note  \"This is a pleasant 60 y.o. male presents with hx of CKD, CAD, JESSENIA who presents as [facility] transfer for symptomatic bradycardia. Patient just had a stent placed by Dr. Robyn Goodwin. HR 20-30s. ECG showing SR, complete heart block, and ventricular escape. Atropine did not provide benefit. Patient obtunded due to sedation. LAD PCI 7/7/2018. EF 35-40%, mild MR, moderate global LV hypokinesis. No further history obtainable. From the cardiac cath, it appears he has a bioprosthetic AVR. \"    Subjective (Events in last 24 hours):   Pt with some mild discomfort at pacer site. Denies any SOB.       Objective:   BP (!) 114/58   Pulse 60   Temp 98.3 °F (36.8 °C) (Oral)   Resp 18   Ht 6' 1\" (1.854 m)   Wt 243 lb 9.6 oz (110.5 kg)   SpO2 96%   BMI 32.14 kg/m²        TELEMETRY: Paced    Physical Exam:  General Appearance: alert and oriented to person, place and time, in no acute distress  Cardiovascular: normal rate, regular rhythm, normal S1 and S2, no murmurs, rubs, clicks, or gallops, distal pulses intact, no carotid bruits, no JVD  Pulmonary/Chest: clear to auscultation bilaterally- no wheezes, rales or rhonchi, normal air movement, no respiratory distress  Abdomen: soft, non-tender, non-distended, normal bowel sounds, no masses Extremities: no cyanosis, clubbing or edema, pulse   Skin: warm and dry, no rash or erythema  Head: normocephalic and atraumatic  Eyes: pupils equal, round, and reactive to light  Neck: supple and non-tender without mass, no thyromegaly   Musculoskeletal: normal range of motion, no joint swelling, deformity or tenderness  Neurological: alert, oriented, normal speech, no focal findings or movement disorder noted  Pacer site:  No obvious bleeding or hematoma    Medications:    heparin (porcine)  5,000 Units Subcutaneous 3 times per day    simvastatin  40 mg Oral Nightly    insulin glargine  20 Units Subcutaneous Nightly    sodium chloride  250 mL Intravenous Once    clopidogrel  75 mg Oral Daily    aspirin  81 mg Oral Daily    sodium chloride flush  10 mL Intravenous 2 times per day    famotidine  20 mg Oral Daily         magnesium hydroxide 30 mL Daily PRN   ondansetron 4 mg Q6H PRN   sodium chloride flush 10 mL PRN   acetaminophen 650 mg Q6H PRN       Diagnostics:    Lab Data:    Cardiac Enzymes:  No results for input(s): CKTOTAL, CKMB, CKMBINDEX, TROPONINI in the last 72 hours.     CBC:   Lab Results   Component Value Date    WBC 10.0 07/11/2018    RBC 3.72 07/11/2018    HGB 11.1 07/11/2018    HCT 34.7 07/11/2018     07/11/2018       CMP:  Lab Results   Component Value Date     07/11/2018    K 4.3 07/11/2018    K 4.9 07/10/2018    CL 96 07/11/2018    CO2 22 07/11/2018    BUN 38 07/11/2018    CREATININE 7.3 07/11/2018    LABGLOM 8 07/11/2018    GLUCOSE 95 07/11/2018    CALCIUM 9.1 07/11/2018       Hepatic Function Panel:  Lab Results   Component Value Date    ALKPHOS 83 07/09/2018    ALT 10 07/09/2018    AST 21 07/09/2018    PROT 7.6 07/09/2018    BILITOT 0.4 07/09/2018    BILIDIR <0.2 09/06/2017    LABALBU 3.6 07/09/2018       Magnesium:    Lab Results   Component Value Date    MG 2.1 07/09/2018       PT/INR:    Lab Results   Component Value Date    PROTIME 13.6 02/25/2016    INR 1.08 05/14/2018       HgBA1c:    Lab Results   Component Value Date    LABA1C 4.6 03/26/2017       FLP:  Lab Results   Component Value Date    TRIG 160 12/06/2016    HDL 18 12/06/2016    LDLCALC 33 12/06/2016       TSH:    Lab Results   Component Value Date    TSH 3.570 05/14/2018         Assessment:  · S/P PPM  · ESRD  · H/P PCI  · S/P tissue AVR  · JESSENIA      Plan:  · OK to discharge from a cardiology standpoint  · Continue current cardiac meds  · F/U with Pacer clinic in 1 week  · F/U with Dr Mery Vickers as scheduled         Electronically signed by

## 2018-07-12 NOTE — DISCHARGE SUMMARY
times daily (with meals)     clopidogrel 75 MG tablet  Commonly known as:  PLAVIX  Take 1 tablet by mouth daily     FENOFIBRATE PO     FLUoxetine 20 MG capsule  Commonly known as:  PROZAC     insulin glargine 100 UNIT/ML injection vial  Commonly known as:  LANTUS     insulin lispro 100 UNIT/ML injection vial  Commonly known as:  HUMALOG     lisinopril 5 MG tablet  Commonly known as:  PRINIVIL;ZESTRIL  Take 1 tablet by mouth daily     LYRICA 50 MG capsule  Generic drug:  pregabalin     MULTI-VITAMIN PO     nitroGLYCERIN 0.4 MG SL tablet  Commonly known as:  NITROSTAT  Place 1 tablet under the tongue every 5 minutes as needed for Chest pain up to max of 3 total doses. If no relief after 1 dose, call 911. Do not take with erectile dysfunction meds     pantoprazole 40 MG tablet  Commonly known as:  PROTONIX     VYTORIN 10-40 MG per tablet  Generic drug:  ezetimibe-simvastatin            Exam:     Vitals:  Vitals:    07/13/18 1829 07/13/18 1933 07/14/18 0340 07/14/18 0830   BP: (!) 174/75 (!) 154/70 (!) 164/74 (!) 167/74   Pulse: 84 78 60 73   Resp: 18 18 18 18   Temp:  98.6 °F (37 °C) 97.7 °F (36.5 °C) 97.5 °F (36.4 °C)   TempSrc:  Oral Oral Oral   SpO2: 91% 95% 91% 94%   Weight:       Height:         Weight: Weight: 235 lb 3.7 oz (106.7 kg)     24 hour intake/output:    Intake/Output Summary (Last 24 hours) at 07/14/18 1020  Last data filed at 07/14/18 0340   Gross per 24 hour   Intake          1494.26 ml   Output             2700 ml   Net         -1205.74 ml         General appearance:  No apparent distress, appears stated age and cooperative. HEENT:  Normal cephalic, atraumatic without obvious deformity. Pupils equal, round, and reactive to light. Extra ocular muscles intact. Conjunctivae/corneas clear. Neck: Supple, with full range of motion. No jugular venous distention. Trachea midline. Respiratory:  Normal respiratory effort. Clear to auscultation, bilaterally without Rales/Wheezes/Rhonchi.   Cardiovascular:

## 2018-07-12 NOTE — FLOWSHEET NOTE
Pt was alone at the time of the visit. He said that he had pacemaker put in and has been feeling much better since then. He was hoping to go home today and wanted prayer and I prayed asking god to heal him.      07/12/18 1522   Encounter Summary   Services provided to: Patient   Referral/Consult From: Kristin Reeder Visiting No  (7/12 )   Complexity of Encounter Low   Length of Encounter 15 minutes   Routine   Type Initial   Assessment Approachable;Calm; Hopeful   Intervention [place];Prayer;Nurtured hope   Outcome Acceptance;Expressed gratitude;Engaged in conversation;Expressed feelings/needs/concerns

## 2018-07-12 NOTE — FLOWSHEET NOTE
Pacemaker booklet given and discussed verbally with patient. Acute Coronary Syndrome Folder given to patient which includes the following education:    1. Heart healthy Diet booklet  2. Reduce your risk of Heart attack paper  3. Mended Hearts info page and mended heart phamphlet  4. AMI zone management  5. Cardiac Rehab phamphlet  6. How to take your Nitroglycerine paper  7.  Resources for you and your family paper

## 2018-07-12 NOTE — PLAN OF CARE
will decrease    Outcome: Ongoing  Patient continues to have chronic pain due to Hx back pain. Tylenol given and patient satisfied with outcome so far this shift    Problem: Risk for Impaired Skin Integrity  Goal: Tissue integrity - skin and mucous membranes  Structural intactness and normal physiological function of skin and  mucous membranes. Outcome: Ongoing  Ongoing assessment & interventions provided throughout shift. Skin assessments provided. Encouraging patient to turn as needed. Applied waffle cushion for comfort      Problem: Nutrition  Goal: Optimal nutrition therapy  Outcome: Ongoing  Patient encouraged to increase fluids. Problem: Discharge Planning:  Goal: Participates in care planning  Participates in care planning  Outcome: Ongoing  Patient involved in plan of care. Patient understands importance of not moving LUE for optimal healing    Comments: Care plan reviewed with patient. Patient verbalizes understanding of the care plan and contributed to goal setting.

## 2018-07-12 NOTE — PROGRESS NOTES
[facility]   [facility]   [address]   [place] [employer]   [phone]   Nephrology Hospital progress note       7/12/2018, 12:20 PM        Pt Name:    Jessica Contreras  MRN:     [id number]     [place]:    1960  Admit Date:    7/9/2018 10:34 AM  Primary Care Physician:  Yonathan Ford MD   Primary Nephrologist:          Dr. Guille Mckeon number  [id number]    ISOLATION: yes     Admitting Chief Complaint:   weakness     History of Chief Complaint:  60 [address]rez 84 performs home hemodialysis via the [address] [facility] dialysis unit to treat ESRD from DM and HTN. He just had coronary stents inserted. He developed weakness and was taken to ER in [address] [facility] where he was found to be in third degree heart block. He was sent to [facility] and underwent insertion of a temporary pacemaker. Nephrology is treating:   ESRD requiring hemodialysis     Subjective: The patient was relaxing on the bedside. He feels and looks well and had no complaints. Pacemaker insertion went well. Lab Results   Component Value Date    LABGLOM 8 07/11/2018    LABGLOM 6 07/10/2018    LABGLOM 6 07/09/2018    LABGLOM 9 07/08/2018    LABGLOM 9 07/06/2018    LABGLOM 9 05/14/2018          Baseline eGFR    Admit eGFR    Current eGFR          Objective:    Diet: renal    VITALS:  /63   Pulse 59   Temp 98 °F (36.7 °C) (Oral)   Resp 18   Ht 6' 1\" (1.854 m)   Wt 243 lb 9.6 oz (110.5 kg)   SpO2 93%   BMI 32.14 kg/m²   24HR INTAKE/OUTPUT:      Intake/Output Summary (Last 24 hours) at 07/12/18 1220  Last data filed at 07/12/18 0347   Gross per 24 hour   Intake             2745 ml   Output                0 ml   Net             2745 ml     Admission weight: 243 lb 13.3 oz (110.6 kg)  Wt Readings from Last 3 Encounters:   07/12/18 243 lb 9.6 oz (110.5 kg)   07/08/18 235 lb 7.2 oz (106.8 kg)   05/15/18 240 lb (108.9 kg)     Body mass index is 32.14 kg/m². Physical examination    General:  Alert and appropriate.    HEENT:  Head: Normocephalic, no lesions, without obvious abnormality. Neck:   no adenopathy, no carotid bruit, no JVD, supple, symmetrical, trachea midline and thyroid not enlarged, symmetric, no tenderness/mass/nodules  Lungs:  clear to auscultation bilaterally  Heart:  regular rate and rhythm, S1, S2 normal, no murmur, click, rub or gallop  Abdomen:  soft, non-tender; bowel sounds normal; no masses,  no organomegaly  Extremities:  extremities normal, atraumatic, no cyanosis or edema  Neurologic:  Mental status: Alert, oriented, thought content appropriate  Psy:                 No evidence of depression. Mood is stable. Skin:                Warm and dry. No lesions or rashes noted. Muscles:         Hand  and leg strength are equal and strong bilaterally. Lab Data  CBC:   Recent Labs      07/09/18   1307  07/10/18   0459  07/11/18   0705   WBC  6.5  9.8  10.0   HGB  11.1*  11.1*  11.1*   PLT  166  181  154     BMP:  Recent Labs      07/09/18   1944  07/10/18   0459  07/11/18   0705   NA   --   140  136   K   --   4.9  4.3   CL   --   101  96*   CO2   --   18*  22*   BUN   --   62*  38*   CREATININE   --   9.7*  7.3*   GLUCOSE   --   79  95   CALCIUM   --   9.0  9.1   MG  2.1   --    --      TSH: No results for input(s): TSH in the last 72 hours. HgBa1c: No results for input(s): LABA1C in the last 72 hours. Hepatic: No results for input(s): LABALBU, AST, ALT, ALB, BILITOT, ALKPHOS in the last 72 hours. Troponin: No results for input(s): TROPONINI in the last 72 hours. BNP: No results for input(s): BNP in the last 72 hours. Lipids: No results for input(s): CHOL, HDL in the last 72 hours. Invalid input(s): LDLCALCU  INR: No results for input(s): INR in the last 72 hours. Assessment:    1. ESRD from DM and HTN  2. Dm  3. HTN  4. CAD  5. Third degree heart block     Plan: It is OK with nephrology for the primary care physician to discharge the patient when ready.   A follow up visit with nephrology is not necessary as we see hemodialysis patients  Every week while receiving hemodialysis. 1. Hillary  2. Cristobal Thornton, DO  [facility].

## 2018-07-12 NOTE — OP NOTE
[address] [place] [address]                                 OPERATIVE REPORT    PATIENT NAME: Ivan Dias                    :        1960  MED REC NO:   [id number]                           ROOM:       9506  ACCOUNT NO:   [de-identified]                           ADMIT DATE: 2018  PROVIDER:     Naz Dewitt MD    DATE OF PROCEDURE:  2018    INDICATIONS FOR PROCEDURE:  Complete heart block. PROCEDURE PERFORMED:  1. Attempted insertion of a permanent His bundle pacing lead which was  unsuccessful. 2.  Insertion of a biventricular pacemaker. HARDWARE IMPLANTED:  A Medtronic model Percepta Quad CRT-P MRI SureScan  model B299935, serial [id number]. LEADS IMPLANTED:  1. Right atrium:  A Medtronic CapSureFix Novus model Y7525466 cm, serial  #[id number].  2.  Right ventricle: A Medtronic CapSureFix Novus model B1046537 cm,  serial #[id number].  3.  Posterolateral coronary sinus vein:  A [employer] Quartet model  19567-44 cm, serial #[id number]. LEAD IMPLANTED BUT NOT USED:  A Medtronic model 3830-69 cm, serial  #[id number]    OPERATIVE PROCEDURE:  The patient was brought back to the [facility] OR in a  fasting, nonabsorptive, nonsedated state. Informed consent was signed on  the chart. An active time-out was performed with the participating staff  and physician. Device and lead availability was confirmed before beginning  the procedure. The patient was placed in the supine position on the  procedure table and appropriate monitoring devices were attached to the  patient. The left pectoral area was prepped and draped using the usual  sterile barrier OR technique. Local anesthesia was used to anesthetize the  incision site. A #10 blade scalpel was used to make an incision through  the cuticular layer. Continuation of the dissection down to the  prepectoral fascia was made with electrocautery.   Bleeding impedance 836 ohms. Henri parameters were programmed DDDR, lower rate 60, upper rate 130. CONCLUSION:  1. Unsuccessful attempt in insertion of a permanent His bundle pacing  lead. 2.  Successful insertion of a biventricular pacemaker.         Maggie Shankar MD    D: 07/11/2018 17:23:03       T: 07/11/2018 17:27:36     [email or web address]  Job#: [id number]     Doc#: [id number]    CC:

## 2018-07-13 ENCOUNTER — APPOINTMENT (OUTPATIENT)
Dept: GENERAL RADIOLOGY | Age: 58
DRG: 228 | End: 2018-07-13
Payer: MEDICARE

## 2018-07-13 ENCOUNTER — APPOINTMENT (OUTPATIENT)
Dept: CARDIAC CATH/INVASIVE PROCEDURES | Age: 58
DRG: 228 | End: 2018-07-13
Payer: MEDICARE

## 2018-07-13 ENCOUNTER — ANESTHESIA (OUTPATIENT)
Dept: CARDIAC CATH/INVASIVE PROCEDURES | Age: 58
DRG: 228 | End: 2018-07-13
Payer: MEDICARE

## 2018-07-13 ENCOUNTER — ANESTHESIA EVENT (OUTPATIENT)
Dept: CARDIAC CATH/INVASIVE PROCEDURES | Age: 58
DRG: 228 | End: 2018-07-13
Payer: MEDICARE

## 2018-07-13 VITALS — SYSTOLIC BLOOD PRESSURE: 155 MMHG | DIASTOLIC BLOOD PRESSURE: 79 MMHG | OXYGEN SATURATION: 100 % | TEMPERATURE: 98.1 F

## 2018-07-13 LAB
EKG ATRIAL RATE: 66 BPM
EKG P AXIS: 32 DEGREES
EKG P-R INTERVAL: 136 MS
EKG Q-T INTERVAL: 596 MS
EKG QRS DURATION: 148 MS
EKG QTC CALCULATION (BAZETT): 596 MS
EKG R AXIS: -94 DEGREES
EKG T AXIS: 86 DEGREES
EKG VENTRICULAR RATE: 60 BPM
GLUCOSE BLD-MCNC: 117 MG/DL (ref 70–108)
GLUCOSE BLD-MCNC: 126 MG/DL (ref 70–108)
GLUCOSE BLD-MCNC: 130 MG/DL (ref 70–108)
GLUCOSE BLD-MCNC: 145 MG/DL (ref 70–108)
GLUCOSE BLD-MCNC: 244 MG/DL (ref 70–108)

## 2018-07-13 PROCEDURE — 6360000002 HC RX W HCPCS: Performed by: NURSE ANESTHETIST, CERTIFIED REGISTERED

## 2018-07-13 PROCEDURE — 2500000003 HC RX 250 WO HCPCS: Performed by: REGISTERED NURSE

## 2018-07-13 PROCEDURE — 6360000002 HC RX W HCPCS: Performed by: INTERNAL MEDICINE

## 2018-07-13 PROCEDURE — 3700000001 HC ADD 15 MINUTES (ANESTHESIA)

## 2018-07-13 PROCEDURE — C1894 INTRO/SHEATH, NON-LASER: HCPCS

## 2018-07-13 PROCEDURE — 7100000001 HC PACU RECOVERY - ADDTL 15 MIN

## 2018-07-13 PROCEDURE — C1781 MESH (IMPLANTABLE): HCPCS

## 2018-07-13 PROCEDURE — 3700000000 HC ANESTHESIA ATTENDED CARE

## 2018-07-13 PROCEDURE — A4565 SLINGS: HCPCS

## 2018-07-13 PROCEDURE — 02PA3MZ REMOVAL OF CARDIAC LEAD FROM HEART, PERCUTANEOUS APPROACH: ICD-10-PCS | Performed by: INTERNAL MEDICINE

## 2018-07-13 PROCEDURE — 6370000000 HC RX 637 (ALT 250 FOR IP): Performed by: FAMILY MEDICINE

## 2018-07-13 PROCEDURE — 6360000002 HC RX W HCPCS: Performed by: FAMILY MEDICINE

## 2018-07-13 PROCEDURE — 6360000002 HC RX W HCPCS: Performed by: REGISTERED NURSE

## 2018-07-13 PROCEDURE — 2580000003 HC RX 258: Performed by: INTERNAL MEDICINE

## 2018-07-13 PROCEDURE — 33215 REPOSITION PACING-DEFIB LEAD: CPT

## 2018-07-13 PROCEDURE — 90935 HEMODIALYSIS ONE EVALUATION: CPT

## 2018-07-13 PROCEDURE — 02H63JZ INSERTION OF PACEMAKER LEAD INTO RIGHT ATRIUM, PERCUTANEOUS APPROACH: ICD-10-PCS | Performed by: INTERNAL MEDICINE

## 2018-07-13 PROCEDURE — 99232 SBSQ HOSP IP/OBS MODERATE 35: CPT | Performed by: INTERNAL MEDICINE

## 2018-07-13 PROCEDURE — 71045 X-RAY EXAM CHEST 1 VIEW: CPT

## 2018-07-13 PROCEDURE — 6370000000 HC RX 637 (ALT 250 FOR IP): Performed by: INTERNAL MEDICINE

## 2018-07-13 PROCEDURE — 82948 REAGENT STRIP/BLOOD GLUCOSE: CPT

## 2018-07-13 PROCEDURE — 33215 REPOSITION PACING-DEFIB LEAD: CPT | Performed by: INTERNAL MEDICINE

## 2018-07-13 PROCEDURE — 1200000000 HC SEMI PRIVATE

## 2018-07-13 PROCEDURE — 2500000003 HC RX 250 WO HCPCS: Performed by: NURSE ANESTHETIST, CERTIFIED REGISTERED

## 2018-07-13 PROCEDURE — 7100000000 HC PACU RECOVERY - FIRST 15 MIN

## 2018-07-13 PROCEDURE — 2580000003 HC RX 258: Performed by: NURSE ANESTHETIST, CERTIFIED REGISTERED

## 2018-07-13 PROCEDURE — 2709999900 HC NON-CHARGEABLE SUPPLY

## 2018-07-13 PROCEDURE — 6370000000 HC RX 637 (ALT 250 FOR IP): Performed by: PHYSICIAN ASSISTANT

## 2018-07-13 RX ORDER — FENTANYL CITRATE 50 UG/ML
INJECTION, SOLUTION INTRAMUSCULAR; INTRAVENOUS PRN
Status: DISCONTINUED | OUTPATIENT
Start: 2018-07-13 | End: 2018-07-13 | Stop reason: SDUPTHER

## 2018-07-13 RX ORDER — ONDANSETRON 2 MG/ML
4 INJECTION INTRAMUSCULAR; INTRAVENOUS EVERY 6 HOURS PRN
Status: DISCONTINUED | OUTPATIENT
Start: 2018-07-13 | End: 2018-07-13

## 2018-07-13 RX ORDER — ONDANSETRON 2 MG/ML
INJECTION INTRAMUSCULAR; INTRAVENOUS PRN
Status: DISCONTINUED | OUTPATIENT
Start: 2018-07-13 | End: 2018-07-13 | Stop reason: SDUPTHER

## 2018-07-13 RX ORDER — DEXAMETHASONE SODIUM PHOSPHATE 4 MG/ML
INJECTION, SOLUTION INTRA-ARTICULAR; INTRALESIONAL; INTRAMUSCULAR; INTRAVENOUS; SOFT TISSUE PRN
Status: DISCONTINUED | OUTPATIENT
Start: 2018-07-13 | End: 2018-07-13 | Stop reason: SDUPTHER

## 2018-07-13 RX ORDER — ROCURONIUM BROMIDE 10 MG/ML
INJECTION, SOLUTION INTRAVENOUS PRN
Status: DISCONTINUED | OUTPATIENT
Start: 2018-07-13 | End: 2018-07-13 | Stop reason: SDUPTHER

## 2018-07-13 RX ORDER — SODIUM CHLORIDE 9 MG/ML
INJECTION, SOLUTION INTRAVENOUS CONTINUOUS PRN
Status: DISCONTINUED | OUTPATIENT
Start: 2018-07-13 | End: 2018-07-13 | Stop reason: SDUPTHER

## 2018-07-13 RX ORDER — EPHEDRINE SULFATE 50 MG/ML
INJECTION INTRAVENOUS PRN
Status: DISCONTINUED | OUTPATIENT
Start: 2018-07-13 | End: 2018-07-13 | Stop reason: SDUPTHER

## 2018-07-13 RX ORDER — NEOSTIGMINE METHYLSULFATE 1 MG/ML
INJECTION, SOLUTION INTRAVENOUS PRN
Status: DISCONTINUED | OUTPATIENT
Start: 2018-07-13 | End: 2018-07-13 | Stop reason: SDUPTHER

## 2018-07-13 RX ORDER — LIDOCAINE HYDROCHLORIDE 10 MG/ML
INJECTION, SOLUTION INFILTRATION; PERINEURAL PRN
Status: DISCONTINUED | OUTPATIENT
Start: 2018-07-13 | End: 2018-07-13 | Stop reason: SDUPTHER

## 2018-07-13 RX ORDER — SUCCINYLCHOLINE CHLORIDE 20 MG/ML
INJECTION INTRAMUSCULAR; INTRAVENOUS PRN
Status: DISCONTINUED | OUTPATIENT
Start: 2018-07-13 | End: 2018-07-13 | Stop reason: SDUPTHER

## 2018-07-13 RX ORDER — PROPOFOL 10 MG/ML
INJECTION, EMULSION INTRAVENOUS PRN
Status: DISCONTINUED | OUTPATIENT
Start: 2018-07-13 | End: 2018-07-13 | Stop reason: SDUPTHER

## 2018-07-13 RX ORDER — HYDROCODONE BITARTRATE AND ACETAMINOPHEN 5; 325 MG/1; MG/1
1 TABLET ORAL EVERY 8 HOURS PRN
Status: DISCONTINUED | OUTPATIENT
Start: 2018-07-13 | End: 2018-07-14 | Stop reason: HOSPADM

## 2018-07-13 RX ORDER — MORPHINE SULFATE 2 MG/ML
1 INJECTION, SOLUTION INTRAMUSCULAR; INTRAVENOUS ONCE
Status: COMPLETED | OUTPATIENT
Start: 2018-07-13 | End: 2018-07-13

## 2018-07-13 RX ORDER — GLYCOPYRROLATE 1 MG/5 ML
SYRINGE (ML) INTRAVENOUS PRN
Status: DISCONTINUED | OUTPATIENT
Start: 2018-07-13 | End: 2018-07-13 | Stop reason: SDUPTHER

## 2018-07-13 RX ADMIN — ONDANSETRON HYDROCHLORIDE 4 MG: 4 INJECTION, SOLUTION INTRAMUSCULAR; INTRAVENOUS at 15:39

## 2018-07-13 RX ADMIN — Medication 10 ML: at 21:16

## 2018-07-13 RX ADMIN — PHENYLEPHRINE HYDROCHLORIDE 100 MCG: 10 INJECTION INTRAVENOUS at 14:40

## 2018-07-13 RX ADMIN — PHENYLEPHRINE HYDROCHLORIDE 100 MCG: 10 INJECTION INTRAVENOUS at 14:31

## 2018-07-13 RX ADMIN — CEFAZOLIN SODIUM 2 G: 10 INJECTION, POWDER, FOR SOLUTION INTRAVENOUS at 14:17

## 2018-07-13 RX ADMIN — ROCURONIUM BROMIDE 20 MG: 10 INJECTION INTRAVENOUS at 14:23

## 2018-07-13 RX ADMIN — DEXAMETHASONE SODIUM PHOSPHATE 4 MG: 4 INJECTION, SOLUTION INTRAMUSCULAR; INTRAVENOUS at 14:15

## 2018-07-13 RX ADMIN — FENTANYL CITRATE 50 MCG: 50 INJECTION INTRAMUSCULAR; INTRAVENOUS at 14:10

## 2018-07-13 RX ADMIN — PROPOFOL 50 MG: 10 INJECTION, EMULSION INTRAVENOUS at 15:38

## 2018-07-13 RX ADMIN — NEOSTIGMINE METHYLSULFATE 4 MG: 1 INJECTION, SOLUTION INTRAVENOUS at 15:39

## 2018-07-13 RX ADMIN — FENTANYL CITRATE 50 MCG: 50 INJECTION INTRAMUSCULAR; INTRAVENOUS at 14:15

## 2018-07-13 RX ADMIN — ROCURONIUM BROMIDE 10 MG: 10 INJECTION INTRAVENOUS at 14:50

## 2018-07-13 RX ADMIN — PHENYLEPHRINE HYDROCHLORIDE 50 MCG: 10 INJECTION INTRAVENOUS at 14:55

## 2018-07-13 RX ADMIN — CLOPIDOGREL BISULFATE 75 MG: 75 TABLET ORAL at 12:15

## 2018-07-13 RX ADMIN — ROCURONIUM BROMIDE 10 MG: 10 INJECTION INTRAVENOUS at 14:10

## 2018-07-13 RX ADMIN — EPHEDRINE SULFATE 10 MG: 50 INJECTION, SOLUTION INTRAVENOUS at 14:44

## 2018-07-13 RX ADMIN — PHENYLEPHRINE HYDROCHLORIDE 100 MCG: 10 INJECTION INTRAVENOUS at 14:37

## 2018-07-13 RX ADMIN — PROPOFOL 160 MG: 10 INJECTION, EMULSION INTRAVENOUS at 14:10

## 2018-07-13 RX ADMIN — HYDROCODONE BITARTRATE AND ACETAMINOPHEN 1 TABLET: 5; 325 TABLET ORAL at 23:41

## 2018-07-13 RX ADMIN — LIDOCAINE HYDROCHLORIDE 50 MG: 10 INJECTION, SOLUTION INFILTRATION; PERINEURAL at 14:10

## 2018-07-13 RX ADMIN — SODIUM CHLORIDE: 9 INJECTION, SOLUTION INTRAVENOUS at 14:06

## 2018-07-13 RX ADMIN — ANTACID TABLETS 500 MG: 500 TABLET, CHEWABLE ORAL at 19:30

## 2018-07-13 RX ADMIN — Medication 80 MG: at 14:10

## 2018-07-13 RX ADMIN — ONDANSETRON HYDROCHLORIDE 4 MG: 4 INJECTION, SOLUTION INTRAMUSCULAR; INTRAVENOUS at 14:15

## 2018-07-13 RX ADMIN — SIMVASTATIN 40 MG: 40 TABLET, FILM COATED ORAL at 19:30

## 2018-07-13 RX ADMIN — PROPOFOL 40 MG: 10 INJECTION, EMULSION INTRAVENOUS at 14:23

## 2018-07-13 RX ADMIN — INSULIN GLARGINE 20 UNITS: 100 INJECTION, SOLUTION SUBCUTANEOUS at 21:13

## 2018-07-13 RX ADMIN — Medication 10 ML: at 12:04

## 2018-07-13 RX ADMIN — Medication 10 ML: at 22:10

## 2018-07-13 RX ADMIN — ACETAMINOPHEN 650 MG: 325 TABLET ORAL at 19:30

## 2018-07-13 RX ADMIN — ASPIRIN 81 MG 81 MG: 81 TABLET ORAL at 12:15

## 2018-07-13 RX ADMIN — Medication 0.4 MG: at 15:39

## 2018-07-13 RX ADMIN — FAMOTIDINE 20 MG: 20 TABLET ORAL at 12:16

## 2018-07-13 RX ADMIN — MORPHINE SULFATE 1 MG: 2 INJECTION, SOLUTION INTRAMUSCULAR; INTRAVENOUS at 22:09

## 2018-07-13 RX ADMIN — PHENYLEPHRINE HYDROCHLORIDE 100 MCG: 10 INJECTION INTRAVENOUS at 14:29

## 2018-07-13 RX ADMIN — PHENYLEPHRINE HYDROCHLORIDE 50 MCG: 10 INJECTION INTRAVENOUS at 14:50

## 2018-07-13 RX ADMIN — EPHEDRINE SULFATE 10 MG: 50 INJECTION, SOLUTION INTRAVENOUS at 15:01

## 2018-07-13 RX ADMIN — Medication 2 G: at 22:15

## 2018-07-13 ASSESSMENT — PULMONARY FUNCTION TESTS
PIF_VALUE: 26
PIF_VALUE: 21
PIF_VALUE: 27
PIF_VALUE: 27
PIF_VALUE: 20
PIF_VALUE: 26
PIF_VALUE: 1
PIF_VALUE: 27
PIF_VALUE: 26
PIF_VALUE: 20
PIF_VALUE: 26
PIF_VALUE: 24
PIF_VALUE: 24
PIF_VALUE: 27
PIF_VALUE: 22
PIF_VALUE: 26
PIF_VALUE: 22
PIF_VALUE: 26
PIF_VALUE: 24
PIF_VALUE: 2
PIF_VALUE: 27
PIF_VALUE: 27
PIF_VALUE: 26
PIF_VALUE: 27
PIF_VALUE: 42
PIF_VALUE: 26
PIF_VALUE: 26
PIF_VALUE: 24
PIF_VALUE: 26
PIF_VALUE: 27
PIF_VALUE: 15
PIF_VALUE: 26
PIF_VALUE: 27
PIF_VALUE: 26
PIF_VALUE: 27
PIF_VALUE: 27
PIF_VALUE: 26
PIF_VALUE: 25
PIF_VALUE: 22
PIF_VALUE: 26
PIF_VALUE: 27
PIF_VALUE: 25
PIF_VALUE: 17
PIF_VALUE: 24
PIF_VALUE: 27
PIF_VALUE: 22
PIF_VALUE: 27
PIF_VALUE: 24
PIF_VALUE: 20
PIF_VALUE: 26
PIF_VALUE: 27
PIF_VALUE: 26
PIF_VALUE: 31
PIF_VALUE: 2
PIF_VALUE: 1
PIF_VALUE: 26
PIF_VALUE: 27
PIF_VALUE: 5
PIF_VALUE: 26
PIF_VALUE: 26
PIF_VALUE: 1
PIF_VALUE: 27
PIF_VALUE: 1
PIF_VALUE: 26
PIF_VALUE: 27
PIF_VALUE: 26
PIF_VALUE: 25
PIF_VALUE: 27
PIF_VALUE: 1
PIF_VALUE: 26
PIF_VALUE: 1
PIF_VALUE: 26
PIF_VALUE: 26
PIF_VALUE: 5
PIF_VALUE: 24
PIF_VALUE: 22
PIF_VALUE: 25
PIF_VALUE: 27
PIF_VALUE: 21
PIF_VALUE: 27
PIF_VALUE: 1
PIF_VALUE: 2
PIF_VALUE: 26
PIF_VALUE: 27
PIF_VALUE: 26
PIF_VALUE: 27
PIF_VALUE: 26
PIF_VALUE: 25
PIF_VALUE: 27
PIF_VALUE: 24
PIF_VALUE: 25
PIF_VALUE: 27

## 2018-07-13 ASSESSMENT — PAIN SCALES - GENERAL
PAINLEVEL_OUTOF10: 5
PAINLEVEL_OUTOF10: 0
PAINLEVEL_OUTOF10: 5
PAINLEVEL_OUTOF10: 4
PAINLEVEL_OUTOF10: 4
PAINLEVEL_OUTOF10: 7

## 2018-07-13 ASSESSMENT — ENCOUNTER SYMPTOMS: SHORTNESS OF BREATH: 1

## 2018-07-13 ASSESSMENT — PAIN DESCRIPTION - PAIN TYPE
TYPE: CHRONIC PAIN

## 2018-07-13 ASSESSMENT — PAIN DESCRIPTION - LOCATION
LOCATION: BACK

## 2018-07-13 NOTE — PROGRESS NOTES
Nutrition Assessment    Type and Reason for Visit: Reassess (PO monitor)    Nutrition Recommendations: Resume diet post-procedure as able. Malnutrition Assessment:  · Malnutrition Status: No malnutrition    Nutrition Diagnosis:   · Problem: Inadequate oral intake  · Etiology: related to Insufficient energy/nutrient consumption     Signs and symptoms:  as evidenced by NPO status due to medical condition    Nutrition Assessment:  · Subjective Assessment: Patient admit with symptomatic bradycardia, pacemaker placement 7/11, lead dislodged and plan for revision today thus NPO. History of ESRD and does hemodialysis at home 6 days per week, has been on dialysis for 7 years and reports understands diet. Stool: 4 in past 24 hours. On antibiotics and lantus; glucose 130. · Wound Type:  (pt has blister on finger on rt hand & open area by fingernail on left hand)  · Current Nutrition Therapies:  · Oral Diet Orders: NPO (for procedure)   · Oral Diet intake: % (had been on cardiac diet  the past 2 days)  · Anthropometric Measures:  · Ht: 6' 1\" (185.4 cm)   · Current Body Wt: 242 lb 9.6 oz (110 kg) (trace, non pitting edema)  · Admission Body Wt: 243 lb 13.3 oz (110.6 kg) (7/9 + 1 pitting edema. HD pt)  · Usual Body Wt: 235 lb 7.2 oz (106.8 kg) (7/6/18)  · Ideal Body Wt: 184 lb (83.5 kg),   · BMI Classification: BMI 30.0 - 34.9 Obese Class I  · Comparative Standards (Estimated Nutrition Needs):  · Estimated Daily Total Kcal: ~1990 kcals ( 18 kcals/kg on admit wt of 106.6 kg)  · Estimated Daily Protein (g): ~92 grams ( 1.1 gram protein/kg on IBW of 83.6 kg)   Monitor renal status. HD pt,     Estimated Intake vs Estimated Needs: Intake Less Than Needs (while NPO for procedure)    Nutrition Risk Level:  Moderate    Nutrition Interventions:    (Diet per Dr as pt able)  Continued Inpatient Monitoring, Education declined (Pt reports he understands renal diet)    Nutrition Evaluation:   · Evaluation: progressing towards

## 2018-07-13 NOTE — PROGRESS NOTES
[facility]   [facility]   [address]   [place]   [phone]   Nephrology Hospital progress note       7/13/2018, 4:06 PM        Pt Name:    Josemanuel Eaton  MRN:     [id number]     [place]:    1960  Admit Date:    7/9/2018 10:34 AM  Primary Care Physician:  Tia Porter MD   Primary Nephrologist:          Dr. Alfred Moore number  [id number]    ISOLATION: yes     Admitting Chief Complaint:   weakness     History of Chief Complaint:  60 [address] 84 performs home hemodialysis via the [address] [facility] dialysis unit to treat ESRD from DM and HTN. He just had coronary stents inserted. He developed weakness and was taken to ER in [address] [place] [address] where he was found to be in third degree heart block. He was sent to [facility] and underwent insertion of a temporary pacemaker. Nephrology is treating:   ESRD requiring hemodialysis     Subjective: The patient was seen in PACU. He underwent pacemaker insertion 07/12/18. He went back to procedure to reposition a lead. Staff reports no new concerns. Lab Results   Component Value Date    LABGLOM 8 07/11/2018    LABGLOM 6 07/10/2018    LABGLOM 6 07/09/2018    LABGLOM 9 07/08/2018    LABGLOM 9 07/06/2018    LABGLOM 9 05/14/2018          Baseline eGFR    Admit eGFR    Current eGFR          Objective:    Diet: renal    VITALS:  BP (!) 156/69   Pulse 76   Temp 98.1 °F (36.7 °C) (Oral)   Resp 16   Ht 6' 1\" (1.854 m)   Wt 235 lb 3.7 oz (106.7 kg)   SpO2 95%   BMI 31.03 kg/m²   24HR INTAKE/OUTPUT:      Intake/Output Summary (Last 24 hours) at 07/13/18 1606  Last data filed at 07/13/18 1542   Gross per 24 hour   Intake              940 ml   Output             2700 ml   Net            -1760 ml     Admission weight: 243 lb 13.3 oz (110.6 kg)  Wt Readings from Last 3 Encounters:   07/13/18 235 lb 3.7 oz (106.7 kg)   07/08/18 235 lb 7.2 oz (106.8 kg)   05/15/18 240 lb (108.9 kg)     Body mass index is 31.03 kg/m².      Physical examination    General: Alert but drowsy from sedation. HEENT:  Head: Normocephalic, no lesions, without obvious abnormality. Neck:   no adenopathy, no carotid bruit, no JVD, supple, symmetrical, trachea midline and thyroid not enlarged, symmetric, no tenderness/mass/nodules  Lungs:  clear to auscultation bilaterally  Heart:  regular rate and rhythm, S1, S2 normal, no murmur, click, rub or gallop  Abdomen:  soft, non-tender; bowel sounds normal; no masses,  no organomegaly  Extremities:  extremities normal, atraumatic, no cyanosis or edema  Neurologic:  Mental status: Alert, oriented, thought content appropriate  Psy:                 No evidence of depression. Mood is stable. Skin:                Warm and dry. No lesions or rashes noted. Muscles:         Hand  and leg strength are equal and strong bilaterally. Lab Data  CBC:   Recent Labs      07/11/18   0705   WBC  10.0   HGB  11.1*   PLT  154     BMP:  Recent Labs      07/11/18   0705   NA  136   K  4.3   CL  96*   CO2  22*   BUN  38*   CREATININE  7.3*   GLUCOSE  95   CALCIUM  9.1     TSH: No results for input(s): TSH in the last 72 hours. HgBa1c: No results for input(s): LABA1C in the last 72 hours. Hepatic: No results for input(s): LABALBU, AST, ALT, ALB, BILITOT, ALKPHOS in the last 72 hours. Troponin: No results for input(s): TROPONINI in the last 72 hours. BNP: No results for input(s): BNP in the last 72 hours. Lipids: No results for input(s): CHOL, HDL in the last 72 hours. Invalid input(s): LDLCALCU  INR: No results for input(s): INR in the last 72 hours. Assessment:    1. ESRD from DM and HTN  2. Dm  3. HTN  4. CAD  5. Third degree heart block     Plan: It is OK with nephrology for the primary care physician to discharge the patient when ready. A follow up visit with nephrology is not necessary as we see hemodialysis patients  Every week while receiving hemodialysis. 1. Hillary  2. Marie Thornton, DO  [facility]

## 2018-07-13 NOTE — ANESTHESIA PRE PROCEDURE
Historical Provider, MD   insulin lispro (HUMALOG) 100 UNIT/ML injection Inject 10-15 Units into the skin 3 times daily (before meals) Use scale if chem > 200   Yes Historical Provider, MD   pregabalin (LYRICA) 50 MG capsule Take 50 mg by mouth 2 times daily.    Yes Historical Provider, MD       Current medications:    Current Facility-Administered Medications   Medication Dose Route Frequency Provider Last Rate Last Dose    ceFAZolin (ANCEF) 2 g in dextrose 5 % 50 mL IVPB  2 g Intravenous Once Zana Ramsay MD        bacitracin 50,000 Units in sodium chloride 0.9 % 1,000 mL irrigation  50,000 Units Topical Once Zana Ramsay MD        calcium carbonate (TUMS) chewable tablet 500 mg  500 mg Oral BID PRN Lashae Pastor DO   500 mg at 07/12/18 2218    magnesium hydroxide (MILK OF MAGNESIA) 400 MG/5ML suspension 30 mL  30 mL Oral Daily PRN Zana Ramsay MD        [facility] 4 mg  4 mg Intravenous Q6H PRN Zana aRmsay MD   4 mg at 07/12/18 2220    simvastatin (ZOCOR) tablet 40 mg  40 mg Oral Nightly Heatehr Elena PA-C   40 mg at 07/12/18 2113    insulin glargine (LANTUS) injection vial 20 Units  20 Units Subcutaneous Nightly Sherryll Cockayne, MD   20 Units at 07/10/18 2054    0.9 % sodium chloride bolus  250 mL Intravenous Once Joey Mondragon DO        clopidogrel (PLAVIX) tablet 75 mg  75 mg Oral Daily Sherryll Cockayne, MD   75 mg at 07/13/18 1215    aspirin chewable tablet 81 mg  81 mg Oral Daily Sherryll Cockayne, MD   81 mg at 07/13/18 1215    sodium chloride flush 0.9 % injection 10 mL  10 mL Intravenous 2 times per day Sherryll Cockayne, MD   10 mL at 07/13/18 1204    sodium chloride flush 0.9 % injection 10 mL  10 mL Intravenous PRN Sherryll Cockayne, MD   10 mL at 07/12/18 2220    famotidine (PEPCID) tablet 20 mg  20 mg Oral Daily Sherryll Cockayne, MD   20 mg at 07/13/18 1216    acetaminophen (TYLENOL) tablet 650 mg  650 mg Oral Q6H PRN Sherryll Cockayne, MD   650 mg at 07/12/18 7782 Facility-Administered Medications Ordered in Other Encounters   Medication Dose Route Frequency Provider Last Rate Last Dose    glucose (GLUTOSE) 40 % oral gel 15 g  15 g Oral PRN Luciana Gomes MD        dextrose 50 % solution 12.5 g  12.5 g Intravenous PRN Luciana Gomes MD        glucagon (rDNA) injection 1 mg  1 mg Intramuscular PRN Luciana Gomes MD        dextrose 5 % solution  100 mL/hr Intravenous PRN Luciana Gomes MD           Allergies:     Allergies   Allergen Reactions    Vicodin [Hydrocodone-Acetaminophen] Nausea Only    Darvocet A500 [Propoxyphene N-Acetaminophen] Nausea And Vomiting    Demerol Hcl [Meperidine] Nausea And Vomiting       Problem List:    Patient Active Problem List   Diagnosis Code    SOB (shortness of breath) R06.02    Anemia in chronic kidney disease (CKD) N18.9, D63.1    HTN (hypertension) I10    Coronary artery disease involving native coronary artery of native heart with unstable angina pectoris ([facility]) I25.110    Type 2 diabetes mellitus with diabetic nephropathy, with long-term current use of insulin ([facility]) E11.21, Z79.4    Chronic back pain greater than 3 months duration M54.9, G89.29    Chronic pain of both ankles M25.571, G89.29, M25.572    Peripheral neuropathy (HCC) G62.9    Secondary DM with hypertension and ESRD on dialysis ([facility]) E13.22, I12.0, Z99.2, N18.6    Peripheral neuropathy ([facility]) G62.9    Proliferative diabetic retinopathy ([facility] E11.3599    Iron deficiency anemia due to chronic blood loss D50.0    ESRD (end stage renal disease) on dialysis ([facility]) N18.6, Z99.2    AVM (arteriovenous malformation) of colon without hemorrhage G62.99    Metabolic acidosis V87.4    Diabetic polyneuropathy associated with type 2 diabetes mellitus ([facility]) E11.42    Charcot's joint of foot in type 2 diabetes mellitus ([facility]) E11.610    Proliferative diabetic retinopathy without macular edema associated with type 2 diabetes mellitus ([facility] G15.6378    Secondary hyperparathyroidism of renal origin ([facility] N25.81    ESRD on hemodialysis ([facility] N18.6, Z99.2    S/P AVR (aortic valve replacement) Z95.2    S/P CABG x 1 Z95.1    Hyperphosphatemia E83.39    Coronary artery disease involving coronary bypass graft of native heart without angina pectoris I25.810    Osteomyelitis of finger of right hand ([facility]) M86.9    S/P peripheral artery angioplasty: 10/30/2017: PTA of the left popliteal artery.  Z98.62    Peripheral vascular disease ([facility]) I73.9    Atheroembolism of both lower extremities ([facility]) I75.023    Chest pain R07.9    Chronic systolic congestive heart failure ([facility]) I50.22    Unstable angina ([facility]) I20.0    Symptomatic bradycardia R00.1    Heart block AV third degree ([facility]) I44.2    S/P cardiac catheterization Z98.890    Encounter for hemodialysis for ESRD ([facility] N18.6, Z99.2    Anemia due to stage 5 chronic kidney disease ([facility]) N18.5, D63.1       Past Medical History:        Diagnosis Date    Anemia     Arthritis     CAD (coronary artery disease) LAST ONE 02/19/2014    ANGIOPLASTY X 2-TOTAL 4 STENTS     Charcot ankle     LEFT, SCHEDULED FOR SURGERY 04/03/2014    CHF (congestive heart failure) ([facility])     Chronic ankle pain     Chronic back pain     Chronic venous hypertension w ulceration ([facility])     CKD (chronic kidney disease) stage 3, GFR 30-59 ml/min     ON DIALYSIS    DDD (degenerative disc disease)     Diabetic retinopathy ([facility])     DM (dermatomyositis)     [facility]di[facility] mellitus) [facility]    ON INSULIN    Full dentures     Gastric bleed 2016    baki     GERD (gastroesophageal reflux disease)     H/O Bell's palsy     H/O Clostridium difficile infection febuary 2017    Hemodialysis patient [facility])     last dialysis, 3/26/2014, Mon Wed Fri at [facility] [place] History of blood transfusion     History of cardiac cath 07/07/2018    Stent to Prox LAD     HTN (hypertension) DX 1979    Hyperkalemia     Hyperlipidemia     MRSA (methicillin resistant Staphylococcus aureus) 1/24/2014    blood hx from another facility    Murmur, cardiac     Nephrotic syndrome 8/21/2013    Obstructive sleep apnea     has been told he has but has never been tested    Osteomyelitis of finger of right hand ([facility] 9/28/2017    Pancreatitis due to biliary obstruction     Peripheral neuropathy (HCC)     of the feet from the diabetes    Peripheral neuropathy ([facility]     Psychiatric problem     depression    Pulmonary edema     Renal failure     KIDNEY FUNCTION 12%    S/P peripheral artery angioplasty: 10/30/2017: PTA of the left popliteal artery. 10/30/2017    10/30/2017: PTA of the left popliteal artery.  Dr. Deepak Blankenship    SOB (shortness of breath)     Vision blurred     RIGHT    VRE (vancomycin resistant enterococcus) culture positive 03/2017    Wears glasses        Past Surgical History:        Procedure Laterality Date    ABLATION OF DYSRHYTHMIC FOCUS  07/21/2017    Atrial Flutter Ablation    APPENDECTOMY      CARDIAC SURGERY  2/19/2014    2 stents placed   [address]  2006    stents x 2    CARDIAC SURGERY  2003    stent x 1    CHOLECYSTECTOMY      COLONOSCOPY      DIALYSIS FISTULA CREATION Left 02/2014    ARM    ENDOSCOPY, COLON, DIAGNOSTIC      ENTEROSCOPY  9/8/2017    ENTEROSCOPY PUSH BIOPSY performed by Antoine Meyers MD at [address] [facility]    EYE SURGERY Bilateral     CATARACT EXTRACTION WITH IOL    EYE SURGERY Left 05/17/2013    VITRECTOMY    EYE SURGERY Bilateral     MULTIPLE LASER PROCEDURES    FINGER AMPUTATION Right 09/28/2017    FRACTURE SURGERY Right     ankle, surgery x 7    HAND SURGERY Right 8/18/2017    I & D RIGHT INDEX FINGER performed by Laron Avila MD at [facility] Bilateral 09/2013    SUBSEQUENT HEMORRHAGE    OTHER SURGICAL HISTORY      DIALYSIS CHEST PORT INSERTED AND REMOVED    MS AMPUTATE METACARPAL+FINGER Right 9/28/2017    RIGHT INDEX FINGER AMPUTATION performed by Laron Avila MD at [address] [place] [address] COLON CA SCRN NOT  [address] IND Left 9/8/2017    COLONOSCOPY performed by Pradeep Malloy MD at [facility]    ND EGD TRANSORAL BIOPSY SINGLE/MULTIPLE N/A 8/17/2017    EGD BIOPSY performed by Pradeep Malloy MD at [address] [place] [address]      cabbage harvest left leg, left arm fistula    VITRECTOMY Right 3/27/14       Social History:    Social History   Substance Use Topics    Smoking status: Never Smoker    Smokeless tobacco: Never Used    Alcohol use Yes      Comment: 1 can of beer every other day                                Counseling given: No      Vital Signs (Current):   Vitals:    07/13/18 0456 07/13/18 0509 07/13/18 1035 07/13/18 1200   BP: (!) 142/65  (!) 166/79 (!) 156/69   Pulse: 73  73 76   Resp: 18   16   Temp: 36.7 °C (98 °F)  36.5 °C (97.7 °F) 36.7 °C (98.1 °F)   TempSrc: Oral   Oral   SpO2:    95%   Weight:  242 lb 9.6 oz (110 kg) 235 lb 3.7 oz (106.7 kg)    Height:                                                  BP Readings from Last 3 Encounters:   07/13/18 (!) 156/69   07/11/18 136/63   07/10/18 126/76       NPO Status:                                                                                 BMI:   Wt Readings from Last 3 Encounters:   07/13/18 235 lb 3.7 oz (106.7 kg)   07/08/18 235 lb 7.2 oz (106.8 kg)   05/15/18 240 lb (108.9 kg)     Body mass index is 31.03 kg/m².     CBC:   Lab Results   Component Value Date    WBC 10.0 07/11/2018    RBC 3.72 07/11/2018    HGB 11.1 07/11/2018    HCT 34.7 07/11/2018    MCV 93.3 07/11/2018    RDW 16.3 05/14/2018     07/11/2018       CMP:   Lab Results   Component Value Date     07/11/2018    K 4.3 07/11/2018    K 4.9 07/10/2018    CL 96 07/11/2018    CO2 22 07/11/2018    BUN 38 07/11/2018    CREATININE 7.3 07/11/2018    LABGLOM 8 07/11/2018    GLUCOSE 95 07/11/2018    PROT 7.6 07/09/2018    CALCIUM 9.1 07/11/2018    BILITOT 0.4 07/09/2018    ALKPHOS 83 07/09/2018    AST 21 07/09/2018    ALT 10 07/09/2018       POC Tests:

## 2018-07-13 NOTE — FLOWSHEET NOTE
07/13/18 1035   Vital Signs   BP (!) 166/79   Temp 97.7 °F (36.5 °C)   Pulse 73   Weight 235 lb 3.7 oz (106.7 kg)   Weight Method Standing scale   Percent Weight Change -3.04   Post-Hemodialysis Assessment   Post-Treatment Procedures Blood returned; Access bleeding time < 10 minutes   Total Liters Processed (l/min) 66.9 l/min   Dialyzer Clearance Lightly streaked   Duration of Treatment (minutes) 195 minutes   Hemodialysis Intake (ml) 400 ml   Hemodialysis Output (ml) 2700 ml   NET Removed (ml) 2400 ml   Tolerated Treatment Good   3.15 hour treatment completed. Patient extremely grumpy about treatment run today. Treatment cut by 15 mins. 2.3L of fluid removed. stable treatment. Gauze dressing dry and intact upon discharge from unit. Please have dressing on fistula removed after 6 hours. Report called to primary floor nurse. Charting to be scanned into EMR.

## 2018-07-13 NOTE — BRIEF OP NOTE
Brief Postoperative Note    Luis Alberto Vee  YOB: 1960  [id number]    Pre-operative Diagnosis: RA lead dislodgement    Post-operative Diagnosis: Same    Procedure: RA lead repositioning    Anesthesia: General    Surgeons/Assistants: Navi Matt    Estimated Blood Loss: less than 50     Complications: None    Specimens: Was Not Obtained    Findings: Successful removal and reinsertion of RA lead                  Insertion of a Tyrex pouch    Electronically signed by Brina Angeles MD on 7/13/2018 at 4:06 PM

## 2018-07-13 NOTE — ANESTHESIA POSTPROCEDURE EVALUATION
Department of Anesthesiology  Postprocedure Note    Patient: Grey Andersen  MRN: [id number]  YOB: 1960  Date of evaluation: 7/13/2018  Time:  4:48 PM     Procedure Summary     Date:  07/13/18 Room / Location:  [facility]    Anesthesia Start:  1406 Anesthesia Stop:  [phone]    Procedure:  STR PACEMAKER W/ ANESTHESIA Diagnosis:      Scheduled Providers:  Danielle Bernstein MD Responsible Provider:  Shanti Andersen MD    Anesthesia Type:  general ASA Status:  4          Anesthesia Type: general    Rosalia Phase I: Rosalia Score: 8    Rosalia Phase II:      Last vitals: Reviewed and per EMR flowsheets. Anesthesia Post Evaluation   [facility] [facility]  POST-ANESTHESIA NOTE       Name:  Grey Andersen                                         Age:  60 y.o.   MRN:  [id number]      Last Vitals:  BP (!) 157/82   Pulse 72   Temp 97.9 °F (36.6 °C) (Temporal)   Resp 11   Ht 6' 1\" (1.854 m)   Wt 235 lb 3.7 oz (106.7 kg)   SpO2 98%   BMI 31.03 kg/m²   Patient Vitals for the past 4 hrs:   BP Temp Temp src Pulse Resp SpO2   07/13/18 1635 - - - 72 11 98 %   07/13/18 1630 (!) 157/82 - - 71 12 98 %   07/13/18 1625 - - - - - 97 %   07/13/18 1620 - - - - - 100 %   07/13/18 1600 - 97.9 °F (36.6 °C) Temporal - - -       Level of Consciousness:  Awake    Respiratory:  Stable    Oxygen Saturation:  Stable    Cardiovascular:  Stable    Hydration:  Adequate    PONV:  Stable    Post-op Pain:  Adequate analgesia    Post-op Assessment:  No apparent anesthetic complications    Additional Follow-Up / Treatment / Comment:  None    Shanti Andersen MD  July 13, 2018   4:48 PM

## 2018-07-14 ENCOUNTER — APPOINTMENT (OUTPATIENT)
Dept: GENERAL RADIOLOGY | Age: 58
DRG: 228 | End: 2018-07-14
Payer: MEDICARE

## 2018-07-14 VITALS
OXYGEN SATURATION: 93 % | TEMPERATURE: 97.8 F | RESPIRATION RATE: 16 BRPM | WEIGHT: 235.23 LBS | SYSTOLIC BLOOD PRESSURE: 163 MMHG | HEIGHT: 73 IN | HEART RATE: 63 BPM | DIASTOLIC BLOOD PRESSURE: 75 MMHG | BODY MASS INDEX: 31.18 KG/M2

## 2018-07-14 LAB
EKG ATRIAL RATE: 65 BPM
EKG P AXIS: 39 DEGREES
EKG P-R INTERVAL: 126 MS
EKG Q-T INTERVAL: 600 MS
EKG QRS DURATION: 212 MS
EKG QTC CALCULATION (BAZETT): 624 MS
EKG R AXIS: -111 DEGREES
EKG T AXIS: 41 DEGREES
EKG VENTRICULAR RATE: 65 BPM
GLUCOSE BLD-MCNC: 119 MG/DL (ref 70–108)
GLUCOSE BLD-MCNC: 124 MG/DL (ref 70–108)
MRSA SCREEN: ABNORMAL
ORGANISM: ABNORMAL

## 2018-07-14 PROCEDURE — 82948 REAGENT STRIP/BLOOD GLUCOSE: CPT

## 2018-07-14 PROCEDURE — 93005 ELECTROCARDIOGRAM TRACING: CPT | Performed by: INTERNAL MEDICINE

## 2018-07-14 PROCEDURE — 6370000000 HC RX 637 (ALT 250 FOR IP): Performed by: INTERNAL MEDICINE

## 2018-07-14 PROCEDURE — 99239 HOSP IP/OBS DSCHRG MGMT >30: CPT | Performed by: HOSPITALIST

## 2018-07-14 PROCEDURE — 71046 X-RAY EXAM CHEST 2 VIEWS: CPT

## 2018-07-14 PROCEDURE — 99232 SBSQ HOSP IP/OBS MODERATE 35: CPT | Performed by: INTERNAL MEDICINE

## 2018-07-14 PROCEDURE — 2580000003 HC RX 258: Performed by: INTERNAL MEDICINE

## 2018-07-14 RX ORDER — LOSARTAN POTASSIUM 50 MG/1
50 TABLET ORAL DAILY
Status: DISCONTINUED | OUTPATIENT
Start: 2018-07-14 | End: 2018-07-14 | Stop reason: HOSPADM

## 2018-07-14 RX ADMIN — Medication 10 ML: at 08:44

## 2018-07-14 RX ADMIN — LOSARTAN POTASSIUM 50 MG: 50 TABLET, FILM COATED ORAL at 08:42

## 2018-07-14 RX ADMIN — CLOPIDOGREL BISULFATE 75 MG: 75 TABLET ORAL at 08:42

## 2018-07-14 RX ADMIN — FAMOTIDINE 20 MG: 20 TABLET ORAL at 08:42

## 2018-07-14 RX ADMIN — ASPIRIN 81 MG 81 MG: 81 TABLET ORAL at 08:42

## 2018-07-14 NOTE — PROGRESS NOTES
Renal Progress Note    Assessment and Plan:    1. ESRD on hemodialysis   2. Hypertension uncontrolled   3. Bradycardia S/P pace maker placement   4. Deconditioing   5. Normocytic anemia stable   6. DM 2  PLAN:  Reviewed labs   Reviewed medications   Add losartan 50 mg po daily for better BP control  Will follow    Patient Active Problem List:     SOB (shortness of breath)     Anemia in chronic kidney disease (CKD)     HTN (hypertension)     Coronary artery disease involving native coronary artery of native heart with unstable angina pectoris ([address]     Type 2 diabetes mellitus with diabetic nephropathy, with long-term current use of insulin (HCC)     Chronic back pain greater than 3 months duration     Chronic pain of both ankles     Peripheral neuropathy ([address]     Secondary DM with hypertension and ESRD on dialysis ([address]     Peripheral neuropathy ([address]     Proliferative diabetic retinopathy ([address]     Iron deficiency anemia due to chronic blood loss     ESRD (end stage renal disease) on dialysis ([address]     AVM (arteriovenous malformation) of colon without hemorrhage     Metabolic acidosis     Diabetic polyneuropathy associated with type 2 diabetes mellitus ([address]     Charcot's joint of foot in type 2 diabetes mellitus ([address]     Proliferative diabetic retinopathy without macular edema associated with type 2 diabetes mellitus ([address]     Secondary hyperparathyroidism of renal origin ([address]     ESRD on hemodialysis ([address]     S/P AVR (aortic valve replacement)     S/P CABG x 1     Hyperphosphatemia     Coronary artery disease involving coronary bypass graft of native heart without angina pectoris     Osteomyelitis of finger of right hand (HCC)     S/P peripheral artery angioplasty: 10/30/2017: PTA of the left popliteal artery.      Peripheral vascular disease (HCC)     Atheroembolism of both lower extremities (HCC)     Chest pain     Chronic systolic congestive heart failure (HCC)     Unstable angina (HCC)     Symptomatic hours) at 07/14/18 0554  Last data filed at 07/14/18 0340   Gross per 24 hour   Intake          1494.26 ml   Output             2700 ml   Net         -1205.74 ml       Constitutional:  Alert, awake, no apparent distress   Skin:normal   HEENT:Pupils are reactive . Throat is clear   Neck:supple with no thyromegaly  Cardiovascular:  S1, S2 without murmur  Respiratory:  Clear  Abdomen: +bs, soft, non tender   Ext: No LE edema  Musculoskeletal:Intact  Neuro:Alert,awake and oriented with no deficit      Electronically signed by Sabrina Bateman MD on 7/14/2018 at 5:54 AM

## 2018-07-14 NOTE — OP NOTE
[address] [place] [address]                                 OPERATIVE REPORT    PATIENT NAME: Radha Ramesh                    :        1960  MED REC NO:   [id number]                           ROOM:       9609  ACCOUNT NO:   [de-identified]                           ADMIT DATE: 2018  PROVIDER:     Herb Eng MD    DATE OF PROCEDURE:  2018    PROCEDURE:  Right atrial lead dislodgement. PROCEDURE PERFORMED:  Right atrial lead repositioning. HARDWARE RETAINED:  A Medtronic [place], model [id number], serial [id number]  implanted on 2018. LEADS RETAINED:  1. Right atrium:  A Medtronic CapSureFix Novus model [id number] cm, serial  [id number] initially implanted on 2018 and repositioned on  2018. 2.  Right ventricle: A Medtronic CapSureFix Novus model [id number] cm,  serial [id number], implanted on 2018. 3.  Posterolateral coronary sinus vein:  A [employer], model  [id number], model 88694-25 cm implanted on 2018. OPERATIVE PROCEDURE:  The patient was brought back to the [facility] OR in a  fasting, nonabsorptive, nonsedated state. Informed consent was signed on  the chart. An active time-out was performed with the participating staff  and physician. Lead availability was confirmed before beginning the  procedure. The patient was placed in the supine position on the procedure  table and appropriate monitoring devices were attached to the patient. The  patient's device was interrogated and rate response was turned off. The  Steri-Strips were removed and an adhesive removal was applied to the site. The left pectoral area was then prepped and draped using the usual sterile  barrier OR technique. Local anesthesia was used to anesthetize the  previous implant site two days earlier. Using a #10 blade scalpel, I made  an incision through the cuticular layer.   All suture

## 2018-07-14 NOTE — PROGRESS NOTES
Hospitalist Progress Note    Patient:  Bere Villafuerte      Unit/Bed:3B-27/027-A    YOB: 1960    MRN: [id number]       Acct: [de-identified]     PCP: Marcia Bazan MD    Date of Admission: 7/9/2018    Chief Complaint: Syncope    Hospital Course: Admited for 3rd degree AV block. Had pacer. Subjective: No new concern      Medications:  Reviewed    Infusion Medications   Scheduled Medications    losartan  50 mg Oral Daily    bacitracin in 0.9 % sodium chloride irrigation  50,000 Units Topical Once    simvastatin  40 mg Oral Nightly    insulin glargine  20 Units Subcutaneous Nightly    clopidogrel  75 mg Oral Daily    aspirin  81 mg Oral Daily    sodium chloride flush  10 mL Intravenous 2 times per day    famotidine  20 mg Oral Daily     PRN Meds: HYDROcodone 5 mg - acetaminophen, calcium carbonate, sodium chloride flush, acetaminophen      Intake/Output Summary (Last 24 hours) at 07/14/18 1016  Last data filed at 07/14/18 0340   Gross per 24 hour   Intake          1494.26 ml   Output             2700 ml   Net         -1205.74 ml       Diet:  DIET CARDIAC; Exam:  BP (!) 167/74   Pulse 73   Temp 97.5 °F (36.4 °C) (Oral)   Resp 18   Ht 6' 1\" (1.854 m)   Wt 235 lb 3.7 oz (106.7 kg)   SpO2 94%   BMI 31.03 kg/m²     General appearance: No apparent distress, appears stated age and cooperative. HEENT: Pupils equal, round, and reactive to light. Conjunctivae/corneas clear. Neck: Supple, with full range of motion. No jugular venous distention. Trachea midline. Respiratory:  Normal respiratory effort. Clear to auscultation, bilaterally without Rales/Wheezes/Rhonchi. Cardiovascular: Regular rate and rhythm with normal S1/S2 without murmurs, rubs or gallops. Abdomen: Soft, non-tender, non-distended with normal bowel sounds. Musculoskeletal: passive and active ROM x 4 extremities. Skin: Skin color, texture, turgor normal.  No rashes or lesions.   Neurologic:  Neurovascularly intact  Symptomatic bradycardia [R00.1] 07/09/2018    Heart block AV third degree ([address] [I44.2]      Dislodged leads      - lead re-position per cardiology        Electronically signed by Jutsino Higuera MD on 7/14/2018 at 10:16 AM

## 2018-07-14 NOTE — PROGRESS NOTES
vial  Commonly known as:  HUMALOG     lisinopril 5 MG tablet  Commonly known as:  PRINIVIL;ZESTRIL  Take 1 tablet by mouth daily     LYRICA 50 MG capsule  Generic drug:  pregabalin     MULTI-VITAMIN PO     nitroGLYCERIN 0.4 MG SL tablet  Commonly known as:  NITROSTAT  Place 1 tablet under the tongue every 5 minutes as needed for Chest pain up to max of 3 total doses. If no relief after 1 dose, call 911. Do not take with erectile dysfunction meds     pantoprazole 40 MG tablet  Commonly known as:  PROTONIX     VYTORIN 10-40 MG per tablet  Generic drug:  ezetimibe-simvastatin            4. ACTIVITY: Follow discharge activity and wound care instructions. 5. DIET:  Resume previous diet.

## 2018-07-19 ENCOUNTER — TELEPHONE (OUTPATIENT)
Dept: CARDIOLOGY CLINIC | Age: 58
End: 2018-07-19

## 2018-07-19 ENCOUNTER — NURSE ONLY (OUTPATIENT)
Dept: CARDIOLOGY CLINIC | Age: 58
End: 2018-07-19
Payer: MEDICARE

## 2018-07-19 DIAGNOSIS — Z45.018 ADJUSTMENT AND MANAGEMENT OF CARDIAC PACEMAKER: ICD-10-CM

## 2018-07-19 PROCEDURE — 93281 PM DEVICE PROGR EVAL MULTI: CPT | Performed by: INTERNAL MEDICINE

## 2018-07-19 RX ORDER — NITROGLYCERIN 0.4 MG/1
0.4 TABLET SUBLINGUAL EVERY 5 MIN PRN
Qty: 25 TABLET | Refills: 3 | Status: SHIPPED | OUTPATIENT
Start: 2018-07-19 | End: 2018-08-02

## 2018-08-06 RX ORDER — NITROGLYCERIN 0.4 MG/1
0.4 TABLET SUBLINGUAL EVERY 5 MIN PRN
Qty: 25 TABLET | Refills: 3 | Status: SHIPPED | OUTPATIENT
Start: 2018-08-06

## 2018-08-13 ENCOUNTER — OFFICE VISIT (OUTPATIENT)
Dept: CARDIOLOGY CLINIC | Age: 58
End: 2018-08-13
Payer: MEDICARE

## 2018-08-13 VITALS
BODY MASS INDEX: 32.74 KG/M2 | HEART RATE: 80 BPM | DIASTOLIC BLOOD PRESSURE: 56 MMHG | WEIGHT: 247 LBS | HEIGHT: 73 IN | SYSTOLIC BLOOD PRESSURE: 112 MMHG

## 2018-08-13 DIAGNOSIS — Z99.2 ESRD (END STAGE RENAL DISEASE) ON DIALYSIS (HCC): ICD-10-CM

## 2018-08-13 DIAGNOSIS — Z95.0 PACEMAKER: Primary | ICD-10-CM

## 2018-08-13 DIAGNOSIS — N18.6 ESRD (END STAGE RENAL DISEASE) ON DIALYSIS (HCC): ICD-10-CM

## 2018-08-13 DIAGNOSIS — Z95.2 S/P AVR: ICD-10-CM

## 2018-08-13 DIAGNOSIS — I10 ESSENTIAL HYPERTENSION: ICD-10-CM

## 2018-08-13 DIAGNOSIS — Z95.1 S/P CABG X 1: ICD-10-CM

## 2018-08-13 PROCEDURE — G8427 DOCREV CUR MEDS BY ELIG CLIN: HCPCS | Performed by: PHYSICIAN ASSISTANT

## 2018-08-13 PROCEDURE — 3017F COLORECTAL CA SCREEN DOC REV: CPT | Performed by: PHYSICIAN ASSISTANT

## 2018-08-13 PROCEDURE — 1036F TOBACCO NON-USER: CPT | Performed by: PHYSICIAN ASSISTANT

## 2018-08-13 PROCEDURE — G8598 ASA/ANTIPLAT THER USED: HCPCS | Performed by: PHYSICIAN ASSISTANT

## 2018-08-13 PROCEDURE — G8417 CALC BMI ABV UP PARAM F/U: HCPCS | Performed by: PHYSICIAN ASSISTANT

## 2018-08-13 PROCEDURE — 99213 OFFICE O/P EST LOW 20 MIN: CPT | Performed by: PHYSICIAN ASSISTANT

## 2018-08-13 PROCEDURE — 1111F DSCHRG MED/CURRENT MED MERGE: CPT | Performed by: PHYSICIAN ASSISTANT

## 2018-08-13 NOTE — PROGRESS NOTES
LAD     HTN (hypertension) DX 1979    Hyperkalemia     Hyperlipidemia     MRSA (methicillin resistant Staphylococcus aureus) 1/24/2014    blood hx from another facility    Murmur, cardiac     Nephrotic syndrome 8/21/2013    Obstructive sleep apnea     has been told he has but has never been tested    Osteomyelitis of finger of right hand ([facility] [address] 9/28/2017    Pancreatitis due to biliary obstruction     Peripheral neuropathy     of the feet from the diabetes    Peripheral neuropathy     Psychiatric problem     depression    Pulmonary edema     Renal failure     KIDNEY FUNCTION 12%    S/P peripheral artery angioplasty: 10/30/2017: PTA of the left popliteal artery. 10/30/2017    10/30/2017: PTA of the left popliteal artery.  Dr. Porsha Gonzalez    SOB (shortness of breath)     Vision blurred     RIGHT    VRE (vancomycin resistant enterococcus) culture positive 03/2017    Wears glasses        Allergies   Allergen Reactions    Vicodin [Hydrocodone-Acetaminophen] Nausea Only    Darvocet A500 [Propoxyphene N-Acetaminophen] Nausea And Vomiting    Demerol Hcl [Meperidine] Nausea And Vomiting       Current Outpatient Prescriptions   Medication Sig Dispense Refill    nitroGLYCERIN (NITROSTAT) 0.4 MG SL tablet Place 1 tablet under the tongue every 5 minutes as needed for Chest pain 25 tablet 3    aspirin 81 MG chewable tablet Take 81 mg by mouth daily      carvedilol (COREG) 6.25 MG tablet Take 1 tablet by mouth 2 times daily (with meals) 60 tablet 3    clopidogrel (PLAVIX) 75 MG tablet Take 1 tablet by mouth daily 30 tablet 5    lisinopril (PRINIVIL;ZESTRIL) 5 MG tablet Take 1 tablet by mouth daily 30 tablet 0    FENOFIBRATE PO Take 145 mg by mouth daily       pantoprazole (PROTONIX) 40 MG tablet Take 40 mg by mouth daily      calcium carbonate (TUMS) 500 MG chewable tablet Take 5 tablets by mouth 3 times daily (with meals) And 3 tabs as needed with snacks      FLUoxetine (PROZAC) 20 MG capsule Take 20 mg by No murmur, rubs, or gallops  Abdomen:   Soft, non tender, no organomegalies, positive bowel sounds  Extremities:   No edema, no cyanosis, good peripheral pulses  Neurological:   Awake, alert, oriented. No obvious focal deficits  Musculoskeletal:  No obvious deformities             Diagnosis Orders   1. Pacemaker     2. Essential hypertension     3. S/P AVR     4. S/P CABG x 1     5. ESRD (end stage renal disease) on dialysis [facility])           Continue Dr Anjelica Bonner current treatment plan    Patient is scheduled for a JAVIER later this month with Dr. Ish Murray. Continue same current medications and with constant vigilance to changes in symptoms and also any potential side effects. Return for care if become worse or seek medical attention immediately. The patient is educated on symptoms of heart disease that include chest pain and passing out, dizziness, etc and to report them if there is any change or go to emergency room.            Follow up with Dr Ish Murray as scheduled or sooner if needed  (Please note that portions of this note were completed with a voice recognition program.  Efforts were made to edit the dictation but occasionally words are mis-transcribed.)      Macy Kraus PA-C

## 2018-08-14 ENCOUNTER — APPOINTMENT (OUTPATIENT)
Dept: GENERAL RADIOLOGY | Age: 58
DRG: 871 | End: 2018-08-14
Payer: MEDICARE

## 2018-08-14 ENCOUNTER — HOSPITAL ENCOUNTER (INPATIENT)
Age: 58
LOS: 11 days | Discharge: OTHER INSTITUTION | DRG: 871 | End: 2018-08-25
Attending: FAMILY MEDICINE | Admitting: FAMILY MEDICINE
Payer: MEDICARE

## 2018-08-14 DIAGNOSIS — M79.89 PAIN AND SWELLING OF UPPER EXTREMITY, LEFT: ICD-10-CM

## 2018-08-14 DIAGNOSIS — M79.602 PAIN AND SWELLING OF UPPER EXTREMITY, LEFT: ICD-10-CM

## 2018-08-14 DIAGNOSIS — M79.89 LEFT UPPER EXTREMITY SWELLING: ICD-10-CM

## 2018-08-14 DIAGNOSIS — A41.9 SEPTICEMIA (HCC): Primary | ICD-10-CM

## 2018-08-14 LAB
ALBUMIN SERPL-MCNC: 4.2 G/DL (ref 3.5–5.1)
ALP BLD-CCNC: 82 U/L (ref 38–126)
ALT SERPL-CCNC: 10 U/L (ref 11–66)
ANION GAP SERPL CALCULATED.3IONS-SCNC: 21 MEQ/L (ref 8–16)
APTT: 35.9 SECONDS (ref 22–38)
AST SERPL-CCNC: 23 U/L (ref 5–40)
BASOPHILS # BLD: 0.1 %
BASOPHILS ABSOLUTE: 0 THOU/MM3 (ref 0–0.1)
BILIRUB SERPL-MCNC: 1 MG/DL (ref 0.3–1.2)
BUN BLDV-MCNC: 47 MG/DL (ref 7–22)
CALCIUM SERPL-MCNC: 8.9 MG/DL (ref 8.5–10.5)
CHLORIDE BLD-SCNC: 95 MEQ/L (ref 98–111)
CO2: 21 MEQ/L (ref 23–33)
CREAT SERPL-MCNC: 7 MG/DL (ref 0.4–1.2)
EKG ATRIAL RATE: 90 BPM
EKG P AXIS: 27 DEGREES
EKG P-R INTERVAL: 130 MS
EKG Q-T INTERVAL: 504 MS
EKG QRS DURATION: 200 MS
EKG QTC CALCULATION (BAZETT): 616 MS
EKG R AXIS: -154 DEGREES
EKG T AXIS: 23 DEGREES
EKG VENTRICULAR RATE: 90 BPM
EOSINOPHIL # BLD: 0 %
EOSINOPHILS ABSOLUTE: 0 THOU/MM3 (ref 0–0.4)
ERYTHROCYTE [DISTWIDTH] IN BLOOD BY AUTOMATED COUNT: 16.3 % (ref 11.5–14.5)
ERYTHROCYTE [DISTWIDTH] IN BLOOD BY AUTOMATED COUNT: 57.9 FL (ref 35–45)
GFR SERPL CREATININE-BSD FRML MDRD: 8 ML/MIN/1.73M2
GLUCOSE BLD-MCNC: 140 MG/DL (ref 70–108)
HCT VFR BLD CALC: 35 % (ref 42–52)
HEMOGLOBIN: 11 GM/DL (ref 14–18)
IMMATURE GRANS (ABS): 0.05 THOU/MM3 (ref 0–0.07)
IMMATURE GRANULOCYTES: 0.4 %
INR BLD: 1.46 (ref 0.85–1.13)
LACTIC ACID, SEPSIS: 1.1 MMOL/L (ref 0.5–1.9)
LACTIC ACID, SEPSIS: 1.4 MMOL/L (ref 0.5–1.9)
LYMPHOCYTES # BLD: 1.5 %
LYMPHOCYTES ABSOLUTE: 0.2 THOU/MM3 (ref 1–4.8)
MCH RBC QN AUTO: 30.1 PG (ref 26–33)
MCHC RBC AUTO-ENTMCNC: 31.4 GM/DL (ref 32.2–35.5)
MCV RBC AUTO: 95.9 FL (ref 80–94)
MONOCYTES # BLD: 4.7 %
MONOCYTES ABSOLUTE: 0.5 THOU/MM3 (ref 0.4–1.3)
NUCLEATED RED BLOOD CELLS: 0 /100 WBC
OSMOLALITY CALCULATION: 288.4 MOSMOL/KG (ref 275–300)
PLATELET # BLD: 146 THOU/MM3 (ref 130–400)
PMV BLD AUTO: 10.8 FL (ref 9.4–12.4)
POTASSIUM REFLEX MAGNESIUM: 4.3 MEQ/L (ref 3.5–5.2)
PROCALCITONIN: 0.61 NG/ML (ref 0.01–0.09)
RBC # BLD: 3.65 MILL/MM3 (ref 4.7–6.1)
SEG NEUTROPHILS: 93.3 %
SEGMENTED NEUTROPHILS ABSOLUTE COUNT: 10.7 THOU/MM3 (ref 1.8–7.7)
SODIUM BLD-SCNC: 137 MEQ/L (ref 135–145)
TOTAL PROTEIN: 8.7 G/DL (ref 6.1–8)
TROPONIN T: 0.12 NG/ML
TSH SERPL DL<=0.05 MIU/L-ACNC: 2.76 UIU/ML (ref 0.4–4.2)
WBC # BLD: 11.5 THOU/MM3 (ref 4.8–10.8)

## 2018-08-14 PROCEDURE — 99285 EMERGENCY DEPT VISIT HI MDM: CPT

## 2018-08-14 PROCEDURE — 84443 ASSAY THYROID STIM HORMONE: CPT

## 2018-08-14 PROCEDURE — 84145 PROCALCITONIN (PCT): CPT

## 2018-08-14 PROCEDURE — 99223 1ST HOSP IP/OBS HIGH 75: CPT | Performed by: FAMILY MEDICINE

## 2018-08-14 PROCEDURE — 1200000003 HC TELEMETRY R&B

## 2018-08-14 PROCEDURE — 87077 CULTURE AEROBIC IDENTIFY: CPT

## 2018-08-14 PROCEDURE — 85025 COMPLETE CBC W/AUTO DIFF WBC: CPT

## 2018-08-14 PROCEDURE — 6360000002 HC RX W HCPCS: Performed by: FAMILY MEDICINE

## 2018-08-14 PROCEDURE — 80053 COMPREHEN METABOLIC PANEL: CPT

## 2018-08-14 PROCEDURE — 85610 PROTHROMBIN TIME: CPT

## 2018-08-14 PROCEDURE — 87801 DETECT AGNT MULT DNA AMPLI: CPT

## 2018-08-14 PROCEDURE — 96367 TX/PROPH/DG ADDL SEQ IV INF: CPT

## 2018-08-14 PROCEDURE — 2580000003 HC RX 258: Performed by: FAMILY MEDICINE

## 2018-08-14 PROCEDURE — 6370000000 HC RX 637 (ALT 250 FOR IP): Performed by: FAMILY MEDICINE

## 2018-08-14 PROCEDURE — 84484 ASSAY OF TROPONIN QUANT: CPT

## 2018-08-14 PROCEDURE — 83880 ASSAY OF NATRIURETIC PEPTIDE: CPT

## 2018-08-14 PROCEDURE — 87186 SC STD MICRODIL/AGAR DIL: CPT

## 2018-08-14 PROCEDURE — 96365 THER/PROPH/DIAG IV INF INIT: CPT

## 2018-08-14 PROCEDURE — 87147 CULTURE TYPE IMMUNOLOGIC: CPT

## 2018-08-14 PROCEDURE — 83605 ASSAY OF LACTIC ACID: CPT

## 2018-08-14 PROCEDURE — 71045 X-RAY EXAM CHEST 1 VIEW: CPT

## 2018-08-14 PROCEDURE — 93005 ELECTROCARDIOGRAM TRACING: CPT | Performed by: FAMILY MEDICINE

## 2018-08-14 PROCEDURE — 87040 BLOOD CULTURE FOR BACTERIA: CPT

## 2018-08-14 PROCEDURE — 85730 THROMBOPLASTIN TIME PARTIAL: CPT

## 2018-08-14 PROCEDURE — 71046 X-RAY EXAM CHEST 2 VIEWS: CPT

## 2018-08-14 PROCEDURE — 36415 COLL VENOUS BLD VENIPUNCTURE: CPT

## 2018-08-14 RX ORDER — ACETAMINOPHEN 325 MG/1
650 TABLET ORAL ONCE
Status: DISCONTINUED | OUTPATIENT
Start: 2018-08-14 | End: 2018-08-14

## 2018-08-14 RX ORDER — SODIUM CHLORIDE 0.9 % (FLUSH) 0.9 %
10 SYRINGE (ML) INJECTION EVERY 12 HOURS SCHEDULED
Status: DISCONTINUED | OUTPATIENT
Start: 2018-08-14 | End: 2018-08-15 | Stop reason: SDUPTHER

## 2018-08-14 RX ORDER — ACETAMINOPHEN 500 MG
1000 TABLET ORAL ONCE
Status: COMPLETED | OUTPATIENT
Start: 2018-08-14 | End: 2018-08-14

## 2018-08-14 RX ORDER — SODIUM CHLORIDE 0.9 % (FLUSH) 0.9 %
10 SYRINGE (ML) INJECTION PRN
Status: DISCONTINUED | OUTPATIENT
Start: 2018-08-14 | End: 2018-08-15 | Stop reason: SDUPTHER

## 2018-08-14 RX ADMIN — ACETAMINOPHEN 1000 MG: 500 TABLET, FILM COATED ORAL at 21:23

## 2018-08-14 RX ADMIN — CEFEPIME HYDROCHLORIDE 2 G: 2 INJECTION, POWDER, FOR SOLUTION INTRAVENOUS at 21:23

## 2018-08-14 RX ADMIN — VANCOMYCIN HYDROCHLORIDE 1500 MG: 5 INJECTION, POWDER, LYOPHILIZED, FOR SOLUTION INTRAVENOUS at 22:18

## 2018-08-14 ASSESSMENT — PAIN SCALES - GENERAL: PAINLEVEL_OUTOF10: 0

## 2018-08-14 ASSESSMENT — ENCOUNTER SYMPTOMS
WHEEZING: 0
DIARRHEA: 0
VOMITING: 0
ABDOMINAL PAIN: 0
RHINORRHEA: 0
SORE THROAT: 0
COUGH: 0
BACK PAIN: 0
EYE REDNESS: 0
SHORTNESS OF BREATH: 0
EYE DISCHARGE: 0
NAUSEA: 0

## 2018-08-15 ENCOUNTER — APPOINTMENT (OUTPATIENT)
Dept: GENERAL RADIOLOGY | Age: 58
DRG: 871 | End: 2018-08-15
Payer: MEDICARE

## 2018-08-15 PROBLEM — I99.8 ISCHEMIA OF FINGER: Status: ACTIVE | Noted: 2018-08-15

## 2018-08-15 LAB
ACINETOBACTER BAUMANNII FILM ARRAY: NOT DETECTED
ANION GAP SERPL CALCULATED.3IONS-SCNC: 20 MEQ/L (ref 8–16)
AVERAGE GLUCOSE: 138 MG/DL (ref 70–126)
BASOPHILS # BLD: 0.1 %
BASOPHILS ABSOLUTE: 0 THOU/MM3 (ref 0–0.1)
BOTTLE TYPE: ABNORMAL
BUN BLDV-MCNC: 53 MG/DL (ref 7–22)
C-REACTIVE PROTEIN: 8.77 MG/DL (ref 0–1)
CALCIUM SERPL-MCNC: 8.3 MG/DL (ref 8.5–10.5)
CANDIDA ALBICANS FILM ARRAY: NOT DETECTED
CANDIDA GLABRATA FILM ARRAY: NOT DETECTED
CANDIDA KRUSEI FILM ARRAY: NOT DETECTED
CANDIDA PARAPSILOSIS FILM ARRAY: NOT DETECTED
CANDIDA TROPICALIS FILM ARRAY: NOT DETECTED
CARBAPENEM RESITANT FILM ARRAY: ABNORMAL
CHLORIDE BLD-SCNC: 93 MEQ/L (ref 98–111)
CO2: 20 MEQ/L (ref 23–33)
CREAT SERPL-MCNC: 7.4 MG/DL (ref 0.4–1.2)
ENTERBACTER CLOACAE FILM ARRAY: NOT DETECTED
ENTERBACTERIACEAE FILM ARRAY: NOT DETECTED
ENTEROCOCCUS FILM ARRAY: NOT DETECTED
EOSINOPHIL # BLD: 0 %
EOSINOPHILS ABSOLUTE: 0 THOU/MM3 (ref 0–0.4)
ERYTHROCYTE [DISTWIDTH] IN BLOOD BY AUTOMATED COUNT: 16.4 % (ref 11.5–14.5)
ERYTHROCYTE [DISTWIDTH] IN BLOOD BY AUTOMATED COUNT: 57.1 FL (ref 35–45)
ESCHERICHIA COLI FILM ARRAY: NOT DETECTED
GFR SERPL CREATININE-BSD FRML MDRD: 8 ML/MIN/1.73M2
GLUCOSE BLD-MCNC: 103 MG/DL (ref 70–108)
GLUCOSE BLD-MCNC: 136 MG/DL (ref 70–108)
GLUCOSE BLD-MCNC: 141 MG/DL (ref 70–108)
GLUCOSE BLD-MCNC: 172 MG/DL (ref 70–108)
GLUCOSE BLD-MCNC: 260 MG/DL (ref 70–108)
HAEMOPHILUS INFLUENZA FILM ARRAY: NOT DETECTED
HBA1C MFR BLD: 6.6 % (ref 4.4–6.4)
HCT VFR BLD CALC: 33.5 % (ref 42–52)
HEMOGLOBIN: 10.5 GM/DL (ref 14–18)
IMMATURE GRANS (ABS): 0.13 THOU/MM3 (ref 0–0.07)
IMMATURE GRANULOCYTES: 0.9 %
KLEBSIELLA OXYTOCA FILM ARRAY: NOT DETECTED
KLEBSIELLA PNEUMONIAE FILM ARRAY: NOT DETECTED
LISTERIA MONOCYTOGENES FILM ARRAY: NOT DETECTED
LYMPHOCYTES # BLD: 1.3 %
LYMPHOCYTES ABSOLUTE: 0.2 THOU/MM3 (ref 1–4.8)
MCH RBC QN AUTO: 29.7 PG (ref 26–33)
MCHC RBC AUTO-ENTMCNC: 31.3 GM/DL (ref 32.2–35.5)
MCV RBC AUTO: 94.9 FL (ref 80–94)
METHICILLIN RESISTANT FILM ARRAY: DETECTED
MONOCYTES # BLD: 6.3 %
MONOCYTES ABSOLUTE: 0.9 THOU/MM3 (ref 0.4–1.3)
NEISSERIA MENIGITIDIS FILM ARRAY: NOT DETECTED
NUCLEATED RED BLOOD CELLS: 0 /100 WBC
PLATELET # BLD: 130 THOU/MM3 (ref 130–400)
PMV BLD AUTO: 10.7 FL (ref 9.4–12.4)
POTASSIUM REFLEX MAGNESIUM: 4.1 MEQ/L (ref 3.5–5.2)
PRO-BNP: ABNORMAL PG/ML (ref 0–900)
PROTEUS FILM ARRAY: NOT DETECTED
PSEUDOMONAS AERUGINOSA FILM ARRAY: NOT DETECTED
RBC # BLD: 3.53 MILL/MM3 (ref 4.7–6.1)
SEDIMENTATION RATE, ERYTHROCYTE: 47 MM/HR (ref 0–10)
SEG NEUTROPHILS: 91.4 %
SEGMENTED NEUTROPHILS ABSOLUTE COUNT: 13.3 THOU/MM3 (ref 1.8–7.7)
SERRATIA MARCESCENS FILM ARRAY: NOT DETECTED
SODIUM BLD-SCNC: 133 MEQ/L (ref 135–145)
SOURCE OF BLOOD CULTURE: ABNORMAL
STAPH AUREUS FILM ARRAY: DETECTED
STAPHYLOCOCCUS FILM ARRAY: DETECTED
STREP AGALACTIAE FILM ARRAY: NOT DETECTED
STREP PNEUMONIAE FILM ARRAY: NOT DETECTED
STREP PYOCGENES FILM ARRAY: NOT DETECTED
STREPTOCOCCUS FILM ARRAY: NOT DETECTED
TROPONIN T: 0.12 NG/ML
TROPONIN T: 0.13 NG/ML
VANCOMYCIN RESISTANT FILM ARRAY: ABNORMAL
WBC # BLD: 14.5 THOU/MM3 (ref 4.8–10.8)

## 2018-08-15 PROCEDURE — 86140 C-REACTIVE PROTEIN: CPT

## 2018-08-15 PROCEDURE — 73130 X-RAY EXAM OF HAND: CPT

## 2018-08-15 PROCEDURE — 80048 BASIC METABOLIC PNL TOTAL CA: CPT

## 2018-08-15 PROCEDURE — 93010 ELECTROCARDIOGRAM REPORT: CPT | Performed by: NUCLEAR MEDICINE

## 2018-08-15 PROCEDURE — 1200000003 HC TELEMETRY R&B

## 2018-08-15 PROCEDURE — 83036 HEMOGLOBIN GLYCOSYLATED A1C: CPT

## 2018-08-15 PROCEDURE — 99223 1ST HOSP IP/OBS HIGH 75: CPT | Performed by: NURSE PRACTITIONER

## 2018-08-15 PROCEDURE — 84484 ASSAY OF TROPONIN QUANT: CPT

## 2018-08-15 PROCEDURE — 2580000003 HC RX 258: Performed by: FAMILY MEDICINE

## 2018-08-15 PROCEDURE — 6370000000 HC RX 637 (ALT 250 FOR IP): Performed by: FAMILY MEDICINE

## 2018-08-15 PROCEDURE — 99233 SBSQ HOSP IP/OBS HIGH 50: CPT | Performed by: INTERNAL MEDICINE

## 2018-08-15 PROCEDURE — 82948 REAGENT STRIP/BLOOD GLUCOSE: CPT

## 2018-08-15 PROCEDURE — 2580000003 HC RX 258: Performed by: INTERNAL MEDICINE

## 2018-08-15 PROCEDURE — 2580000003 HC RX 258: Performed by: NURSE PRACTITIONER

## 2018-08-15 PROCEDURE — 85651 RBC SED RATE NONAUTOMATED: CPT

## 2018-08-15 PROCEDURE — 36415 COLL VENOUS BLD VENIPUNCTURE: CPT

## 2018-08-15 PROCEDURE — 85025 COMPLETE CBC W/AUTO DIFF WBC: CPT

## 2018-08-15 PROCEDURE — 6370000000 HC RX 637 (ALT 250 FOR IP): Performed by: INTERNAL MEDICINE

## 2018-08-15 RX ORDER — ACETAMINOPHEN 650 MG/1
650 SUPPOSITORY RECTAL EVERY 6 HOURS PRN
Status: DISCONTINUED | OUTPATIENT
Start: 2018-08-15 | End: 2018-08-25 | Stop reason: HOSPADM

## 2018-08-15 RX ORDER — PREGABALIN 50 MG/1
50 CAPSULE ORAL 2 TIMES DAILY
Status: DISCONTINUED | OUTPATIENT
Start: 2018-08-15 | End: 2018-08-25 | Stop reason: HOSPADM

## 2018-08-15 RX ORDER — KETOROLAC TROMETHAMINE 30 MG/ML
15 INJECTION, SOLUTION INTRAMUSCULAR; INTRAVENOUS ONCE
Status: DISCONTINUED | OUTPATIENT
Start: 2018-08-15 | End: 2018-08-15 | Stop reason: SINTOL

## 2018-08-15 RX ORDER — CALCIUM CARBONATE 200(500)MG
5 TABLET,CHEWABLE ORAL
Status: DISCONTINUED | OUTPATIENT
Start: 2018-08-15 | End: 2018-08-25 | Stop reason: HOSPADM

## 2018-08-15 RX ORDER — ACETAMINOPHEN 325 MG/1
650 TABLET ORAL EVERY 6 HOURS PRN
Status: DISCONTINUED | OUTPATIENT
Start: 2018-08-15 | End: 2018-08-25 | Stop reason: HOSPADM

## 2018-08-15 RX ORDER — INSULIN GLARGINE 100 [IU]/ML
55 INJECTION, SOLUTION SUBCUTANEOUS
Status: DISCONTINUED | OUTPATIENT
Start: 2018-08-15 | End: 2018-08-25 | Stop reason: HOSPADM

## 2018-08-15 RX ORDER — DEXTROSE MONOHYDRATE 25 G/50ML
12.5 INJECTION, SOLUTION INTRAVENOUS PRN
Status: DISCONTINUED | OUTPATIENT
Start: 2018-08-15 | End: 2018-08-25 | Stop reason: HOSPADM

## 2018-08-15 RX ORDER — ASPIRIN 81 MG/1
81 TABLET, CHEWABLE ORAL DAILY
Status: DISCONTINUED | OUTPATIENT
Start: 2018-08-15 | End: 2018-08-25 | Stop reason: HOSPADM

## 2018-08-15 RX ORDER — 0.9 % SODIUM CHLORIDE 0.9 %
500 INTRAVENOUS SOLUTION INTRAVENOUS ONCE
Status: COMPLETED | OUTPATIENT
Start: 2018-08-15 | End: 2018-08-16

## 2018-08-15 RX ORDER — 0.9 % SODIUM CHLORIDE 0.9 %
500 INTRAVENOUS SOLUTION INTRAVENOUS ONCE
Status: COMPLETED | OUTPATIENT
Start: 2018-08-15 | End: 2018-08-15

## 2018-08-15 RX ORDER — CLOPIDOGREL BISULFATE 75 MG/1
75 TABLET ORAL DAILY
Status: DISCONTINUED | OUTPATIENT
Start: 2018-08-15 | End: 2018-08-25 | Stop reason: HOSPADM

## 2018-08-15 RX ORDER — FENOFIBRATE 160 MG/1
145 TABLET ORAL DAILY
Status: DISCONTINUED | OUTPATIENT
Start: 2018-08-15 | End: 2018-08-25 | Stop reason: HOSPADM

## 2018-08-15 RX ORDER — SODIUM CHLORIDE 0.9 % (FLUSH) 0.9 %
10 SYRINGE (ML) INJECTION PRN
Status: DISCONTINUED | OUTPATIENT
Start: 2018-08-15 | End: 2018-08-25 | Stop reason: HOSPADM

## 2018-08-15 RX ORDER — SODIUM CHLORIDE 0.9 % (FLUSH) 0.9 %
10 SYRINGE (ML) INJECTION EVERY 12 HOURS SCHEDULED
Status: DISCONTINUED | OUTPATIENT
Start: 2018-08-15 | End: 2018-08-25 | Stop reason: HOSPADM

## 2018-08-15 RX ORDER — MIDODRINE HYDROCHLORIDE 5 MG/1
5 TABLET ORAL
Status: DISCONTINUED | OUTPATIENT
Start: 2018-08-15 | End: 2018-08-19

## 2018-08-15 RX ORDER — DEXTROSE MONOHYDRATE 50 MG/ML
100 INJECTION, SOLUTION INTRAVENOUS PRN
Status: DISCONTINUED | OUTPATIENT
Start: 2018-08-15 | End: 2018-08-25 | Stop reason: HOSPADM

## 2018-08-15 RX ORDER — CALCITRIOL 0.25 UG/1
0.25 CAPSULE, LIQUID FILLED ORAL DAILY
Status: DISCONTINUED | OUTPATIENT
Start: 2018-08-15 | End: 2018-08-25 | Stop reason: HOSPADM

## 2018-08-15 RX ORDER — PANTOPRAZOLE SODIUM 40 MG/1
40 TABLET, DELAYED RELEASE ORAL
Status: DISCONTINUED | OUTPATIENT
Start: 2018-08-15 | End: 2018-08-25 | Stop reason: HOSPADM

## 2018-08-15 RX ORDER — LISINOPRIL 5 MG/1
5 TABLET ORAL DAILY
Status: DISCONTINUED | OUTPATIENT
Start: 2018-08-15 | End: 2018-08-15

## 2018-08-15 RX ORDER — CARVEDILOL 6.25 MG/1
6.25 TABLET ORAL 2 TIMES DAILY WITH MEALS
Status: DISCONTINUED | OUTPATIENT
Start: 2018-08-15 | End: 2018-08-25 | Stop reason: HOSPADM

## 2018-08-15 RX ORDER — FLUOXETINE HYDROCHLORIDE 20 MG/1
20 CAPSULE ORAL DAILY
Status: DISCONTINUED | OUTPATIENT
Start: 2018-08-15 | End: 2018-08-25 | Stop reason: HOSPADM

## 2018-08-15 RX ORDER — HEPARIN SODIUM 5000 [USP'U]/ML
5000 INJECTION, SOLUTION INTRAVENOUS; SUBCUTANEOUS EVERY 12 HOURS
Status: DISCONTINUED | OUTPATIENT
Start: 2018-08-15 | End: 2018-08-21

## 2018-08-15 RX ORDER — NITROGLYCERIN 0.4 MG/1
0.4 TABLET SUBLINGUAL EVERY 5 MIN PRN
Status: DISCONTINUED | OUTPATIENT
Start: 2018-08-15 | End: 2018-08-25 | Stop reason: HOSPADM

## 2018-08-15 RX ORDER — IBUPROFEN 600 MG/1
600 TABLET ORAL ONCE
Status: DISCONTINUED | OUTPATIENT
Start: 2018-08-15 | End: 2018-08-15

## 2018-08-15 RX ORDER — NICOTINE POLACRILEX 4 MG
15 LOZENGE BUCCAL PRN
Status: DISCONTINUED | OUTPATIENT
Start: 2018-08-15 | End: 2018-08-25 | Stop reason: HOSPADM

## 2018-08-15 RX ADMIN — PREGABALIN 50 MG: 50 CAPSULE ORAL at 21:04

## 2018-08-15 RX ADMIN — ASPIRIN 81 MG CHEWABLE TABLET 81 MG: 81 TABLET CHEWABLE at 09:47

## 2018-08-15 RX ADMIN — CALCITRIOL 0.25 MCG: 0.25 CAPSULE ORAL at 09:47

## 2018-08-15 RX ADMIN — ACETAMINOPHEN 650 MG: 650 SUPPOSITORY RECTAL at 03:09

## 2018-08-15 RX ADMIN — SODIUM CHLORIDE 250 ML: 9 INJECTION, SOLUTION INTRAVENOUS at 11:20

## 2018-08-15 RX ADMIN — Medication 3 UNITS: at 17:14

## 2018-08-15 RX ADMIN — Medication 10 ML: at 21:04

## 2018-08-15 RX ADMIN — INSULIN GLARGINE 55 UNITS: 100 INJECTION, SOLUTION SUBCUTANEOUS at 09:49

## 2018-08-15 RX ADMIN — CLOPIDOGREL BISULFATE 75 MG: 75 TABLET ORAL at 09:47

## 2018-08-15 RX ADMIN — FENOFIBRATE 160 MG: 160 TABLET ORAL at 09:47

## 2018-08-15 RX ADMIN — ANTACID TABLETS 2500 MG: 500 TABLET, CHEWABLE ORAL at 11:27

## 2018-08-15 RX ADMIN — Medication 1 UNITS: at 11:23

## 2018-08-15 RX ADMIN — FLUOXETINE HYDROCHLORIDE 20 MG: 20 CAPSULE ORAL at 09:47

## 2018-08-15 RX ADMIN — Medication 10 ML: at 09:49

## 2018-08-15 RX ADMIN — ANTACID TABLETS 2500 MG: 500 TABLET, CHEWABLE ORAL at 09:47

## 2018-08-15 RX ADMIN — ACETAMINOPHEN 650 MG: 325 TABLET ORAL at 15:11

## 2018-08-15 RX ADMIN — MIDODRINE HYDROCHLORIDE 5 MG: 5 TABLET ORAL at 17:23

## 2018-08-15 RX ADMIN — SODIUM CHLORIDE 500 ML: 9 INJECTION, SOLUTION INTRAVENOUS at 23:36

## 2018-08-15 RX ADMIN — PREGABALIN 50 MG: 50 CAPSULE ORAL at 09:47

## 2018-08-15 ASSESSMENT — PAIN DESCRIPTION - ONSET: ONSET: GRADUAL

## 2018-08-15 ASSESSMENT — PAIN DESCRIPTION - PAIN TYPE: TYPE: ACUTE PAIN

## 2018-08-15 ASSESSMENT — PAIN DESCRIPTION - LOCATION: LOCATION: HEAD

## 2018-08-15 ASSESSMENT — PAIN SCALES - GENERAL
PAINLEVEL_OUTOF10: 0
PAINLEVEL_OUTOF10: 5
PAINLEVEL_OUTOF10: 0

## 2018-08-15 ASSESSMENT — PAIN DESCRIPTION - FREQUENCY: FREQUENCY: CONTINUOUS

## 2018-08-15 ASSESSMENT — PAIN DESCRIPTION - DESCRIPTORS: DESCRIPTORS: HEADACHE

## 2018-08-15 NOTE — ED NOTES
Pt reassessed. Resting/sleeping on cot, respirations even and unlabored. Waiting for transport to inpatient room. SR up x2, call light in reach, telemetry continued, will continue to monitor.      Jaskaran Valero RN  08/15/18 [id number]

## 2018-08-15 NOTE — H&P
H/O Clostridium difficile infection febuary 2017    Hemodialysis patient [facility])     last dialysis, 3/26/2014, Mon Wed Fri at [facility] in [place] History of blood transfusion     History of cardiac cath 07/07/2018    Stent to Prox LAD     HTN (hypertension) DX 1979    Hyperkalemia     Hyperlipidemia     MRSA (methicillin resistant Staphylococcus aureus) 1/24/2014    blood hx from another facility    Murmur, cardiac     Nephrotic syndrome 8/21/2013    Obstructive sleep apnea     has been told he has but has never been tested    Osteomyelitis of finger of right hand ([address] 9/28/2017    Pancreatitis due to biliary obstruction     Peripheral neuropathy     of the feet from the diabetes    Peripheral neuropathy     Psychiatric problem     depression    Pulmonary edema     Renal failure     KIDNEY FUNCTION 12%    S/P peripheral artery angioplasty: 10/30/2017: PTA of the left popliteal artery. 10/30/2017    10/30/2017: PTA of the left popliteal artery.  Dr. Kendrick Heading    SOB (shortness of breath)     Vision blurred     RIGHT    VRE (vancomycin resistant enterococcus) culture positive 03/2017    Wears glasses        Past Surgical History:        Procedure Laterality Date    ABLATION OF DYSRHYTHMIC FOCUS  07/21/2017    Atrial Flutter Ablation    APPENDECTOMY      CARDIAC SURGERY  2/19/2014    2 stents placed   [address]  2006    stents x 2    CARDIAC SURGERY  2003    stent x 1    CHOLECYSTECTOMY      COLONOSCOPY      DIALYSIS FISTULA CREATION Left 02/2014    ARM    ENDOSCOPY, COLON, DIAGNOSTIC      ENTEROSCOPY  9/8/2017    ENTEROSCOPY PUSH BIOPSY performed by Mikey Rodas MD at [facility]    EYE SURGERY Bilateral     CATARACT EXTRACTION WITH IOL    EYE SURGERY Left 05/17/2013    VITRECTOMY    EYE SURGERY Bilateral     MULTIPLE LASER PROCEDURES    FINGER AMPUTATION Right 09/28/2017    FRACTURE SURGERY Right     ankle, surgery x 7    HAND SURGERY Right 8/18/2017    I & D RIGHT INDEX Segs Absolute 10.7 (H) 1.8 - 7.7 thou/mm3    Lymphocytes # 0.2 (L) 1.0 - 4.8 thou/mm3    Monocytes # 0.5 0.4 - 1.3 thou/mm3    Eosinophils # 0.0 0.0 - 0.4 thou/mm3    Basophils # 0.0 0.0 - 0.1 thou/mm3    Immature Grans (Abs) 0.05 0.00 - 0.07 thou/mm3    nRBC 0 /100 wbc   Comprehensive Metabolic Panel w/ Reflex to MG    Collection Time: 08/14/18  8:54 PM   Result Value Ref Range    Glucose 140 (H) 70 - 108 mg/dL    CREATININE 7.0 (HH) 0.4 - 1.2 mg/dL    BUN 47 (H) 7 - 22 mg/dL    Sodium 137 135 - 145 meq/L    Potassium reflex Magnesium 4.3 3.5 - 5.2 meq/L    Chloride 95 (L) 98 - 111 meq/L    CO2 21 (L) 23 - 33 meq/L    Calcium 8.9 8.5 - 10.5 mg/dL    AST 23 5 - 40 U/L    Alkaline Phosphatase 82 38 - 126 U/L    Total Protein 8.7 (H) 6.1 - 8.0 g/dL    Alb 4.2 3.5 - 5.1 g/dL    Total Bilirubin 1.0 0.3 - 1.2 mg/dL    ALT 10 (L) 11 - 66 U/L   APTT    Collection Time: 08/14/18  8:54 PM   Result Value Ref Range    aPTT 35.9 22.0 - 38.0 seconds   Troponin    Collection Time: 08/14/18  8:54 PM   Result Value Ref Range    Troponin T 0.124 (A) ng/ml   Brain natriuretic peptide    Collection Time: 08/14/18  8:54 PM   Result Value Ref Range    Pro-BNP Diluting 0.0 - 900.0 pg/mL   Protime-INR    Collection Time: 08/14/18  8:54 PM   Result Value Ref Range    INR 1.46 (H) 0.85 - 1.13   TSH without Reflex    Collection Time: 08/14/18  8:54 PM   Result Value Ref Range    TSH 2.760 0.400 - 4.20 uIU/mL   Procalcitonin    Collection Time: 08/14/18  8:54 PM   Result Value Ref Range    Procalcitonin 0.61 (H) 0.01 - 0.09 ng/mL   Anion Gap    Collection Time: 08/14/18  8:54 PM   Result Value Ref Range    Anion Gap 21.0 (H) 8.0 - 16.0 meq/L   Osmolality    Collection Time: 08/14/18  8:54 PM   Result Value Ref Range    Osmolality Calc 288.4 275.0 - 300 mOsmol/kg   Glomerular Filtration Rate, Estimated    Collection Time: 08/14/18  8:54 PM   Result Value Ref Range    Est, Glom Filt Rate 8 (A) ml/min/1.73m2       Radiology:     Xr Chest

## 2018-08-15 NOTE — ED NOTES
Upon first presentation to pt, pt resting on cot, respirations even and unlabored. Pt placed on 4 L of oxygen via NC at this time. Report taken from [address] [place] as documented. Rectal temp taken. Pt states \"this is getting annoying, this is the last time you are taking this. \"  Updated on POC and admission status, no complaints. SR up x2, call light in reach, telemetry continued, will continue to monitor.        Palak Renee RN  08/15/18 [id number]

## 2018-08-15 NOTE — PROGRESS NOTES
Pharmacy Note  Vancomycin Consult    Erika Benoit is a 60 y.o. male started on Vancomycin for Sepsis; consult received from Dr. Oziel Khan  to manage therapy. Also receiving the following antibiotics: Cefepime. Patient Active Problem List   Diagnosis    SOB (shortness of breath)    Anemia in chronic kidney disease (CKD)    HTN (hypertension)    Coronary artery disease involving native coronary artery of native heart with unstable angina pectoris (HCC)    Type 2 diabetes mellitus with diabetic nephropathy, with long-term current use of insulin (HCC)    Chronic back pain greater than 3 months duration    Chronic pain of both ankles    Peripheral neuropathy    Secondary DM with hypertension and ESRD on dialysis ([address]    Peripheral neuropathy ([address]    Proliferative diabetic retinopathy ([address]    Iron deficiency anemia due to chronic blood loss    ESRD (end stage renal disease) on dialysis ([address]    AVM (arteriovenous malformation) of colon without hemorrhage    Metabolic acidosis    Diabetic polyneuropathy associated with type 2 diabetes mellitus ([address]    Charcot's joint of foot in type 2 diabetes mellitus (HCC)    Proliferative diabetic retinopathy without macular edema associated with type 2 diabetes mellitus ([address]    Secondary hyperparathyroidism of renal origin ([address]    ESRD on hemodialysis ([address]    S/P AVR (aortic valve replacement)    S/P CABG x 1    Hyperphosphatemia    Coronary artery disease involving coronary bypass graft of native heart without angina pectoris    Osteomyelitis of finger of right hand ([address]    S/P peripheral artery angioplasty: 10/30/2017: PTA of the left popliteal artery.     Peripheral vascular disease (HCC)    Atheroembolism of both lower extremities (HCC)    Chest pain    Chronic systolic congestive heart failure (HCC)    Unstable angina (HCC)    Symptomatic bradycardia    Heart block AV third degree ([facility])    S/P cardiac catheterization    Encounter [facility] for ESRD [facility] [address]    Anemia due to stage 5 chronic kidney disease ([facility] [address]    Displacement of atrial pacemaker leads    Adjustment and management of cardiac pacemaker    [facility] to und[facility]    Ischemia of finger       Allergies:  Vicodin [hydrocodone-acetaminophen]; Darvocet a500 [propoxyphene n-acetaminophen]; and Demerol hcl [meperidine]     Temp max: 104.4    Recent Labs      08/14/18   2054  08/15/18   0405   BUN  47*  53*       Recent Labs      08/14/18   2054  08/15/18   0405   CREATININE  7.0*  7.4*       Recent Labs      08/14/18   2054  08/15/18   0405   WBC  11.5*  14.5*         Intake/Output Summary (Last 24 hours) at 08/15/18 1424  Last data filed at 08/15/18 1324   Gross per 24 hour   Intake 500 ml   Output 0 ml   Net 500 ml       Culture Date Source Results   8/14/18 blood Gram (+) cocci in clusters       Ht Readings from Last 1 Encounters:   08/13/18 6' 1\" (1.854 m)        Wt Readings from Last 1 Encounters:   08/14/18 246 lb 14.6 oz (112 kg)         Body mass index is 32.58 kg/m². CrCl: Hemodialysis patient (home 6 days/week)- confirmed with nephrologist that HD will start inpatient on a [facility] schedule      Assessment/Plan:  Patient received ~15 mg/kg in ER 8/14/18 @ 2230. Confirmed with nephrologist that inpatient HD will begin on a [facility] schedule starting tomorrow 8/16/18. Will not redose today and will order pre-hemodialysis level for tomorrow 8/16/18 in AM. Pharmacy will redose based on levels. Thank you for the consult. Will continue to follow.     Adilia Hammond, PharmD, [facility]  8/15/2018  2:29 PM

## 2018-08-15 NOTE — PROGRESS NOTES
Encounter for hemodialysis for ESRD ([facility] [address]    Anemia due to stage 5 chronic kidney disease ([facility] [address]    Displacement of atrial pacemaker leads    Adjustment and management of cardiac pacemaker    Sepsis due to undetermined organism ([facility] [address]    Ischemia of finger       Allergies:  Vicodin [hydrocodone-acetaminophen]; Darvocet a500 [propoxyphene n-acetaminophen]; and Demerol hcl [meperidine]     Temp max: 104.4    Recent Labs      08/14/18   2054  08/15/18   0405   BUN  47*  53*       Recent Labs      08/14/18   2054  08/15/18   0405   CREATININE  7.0*  7.4*       Recent Labs      08/14/18   2054  08/15/18   0405   WBC  11.5*  14.5*         Intake/Output Summary (Last 24 hours) at 08/15/18 1419  Last data filed at 08/15/18 1324   Gross per 24 hour   Intake 500 ml   Output 0 ml   Net 500 ml           Ht Readings from Last 1 Encounters:   08/13/18 6' 1\" (1.854 m)        Wt Readings from Last 1 Encounters:   08/14/18 246 lb 14.6 oz (112 kg)         Body mass index is 32.58 kg/m². CrCl: Hemodialysis patient (home 6 days/week)- confirmed with nephrologist that HD will start inpatient on a [facility]note 3[facility] schedule      Assessment/Plan:  Will initiate Cefepime 1 gram IV every 24 hours. Give after dialysis days. Thank you for the consult. Will continue to follow.     Bryant Brannon, PharmD, [facility]  8/15/2018  2:23 PM

## 2018-08-15 NOTE — ED NOTES
tx to [facility] 15 floor called     Germania Mckinney, EMT-P  08/15/18 [id number]

## 2018-08-15 NOTE — FLOWSHEET NOTE
08/15/18 0225   Provider Notification   Reason for Communication Critical Value (comment)  (temp of 104.4->70,000 BNP)   Provider Name Dr. Raul Go   Provider Notification Physician   Method of Communication Call   Response Waiting for response   Notification Time 0225     Message left for Dr. Raul Go regarding patient's rectal temp of 104.4 . Also to inform Dr. Raul Go of patient's BNP level.

## 2018-08-15 NOTE — ED TRIAGE NOTES
Pt presents to ER via squad with fever that started yesterday. Pt receives home hemodialysis given by his son Monday through Friday throughout the night. Wife in room states son took classes and has been doing home hemo for about a year. Pt see's Dr Jazmine Taylor. Pt is alert and oriented at this time. Fistula does feel warm but does not appear to be reddened or infected.

## 2018-08-15 NOTE — PROGRESS NOTES
**This report has been created using voice recognition software. It may contain minor errors which are inherent in voice recognition technology. **      Final report electronically signed by Dr Se Castañeda on 8/14/2018 8:53 PM          Diet: DIET RENAL;    DVT prophylaxis: [] Lovenox                                 [] SCDs                                 [x] SQ Heparin                                 [] Encourage ambulation           [] Already on Anticoagulation     Disposition:    [x] Home       [] TCU       [] Rehab       [] Psych       [] SNF       [] [place]       [] Other-    Code Status: Full Code    PT/OT Eval Status:  Once more stable    Assessment/Plan:    Anticipated Discharge in :  1 week      Sepsis secondary to MRSA? Source, might need hemodialysis catheter removed, on empiric antibiotics cefepime and vancomycin, infectious disease consultant     left index finger ? ostemoyelitis vs calciphylaxis  , Orthopedic consulted    Diabetes mellitus type 2, diabetic nephropathy, peripheral neuropathy, long-term insulin, diabetic retinopathy  Lab Results   Component Value Date    LABA1C 6.6 (H) 08/15/2018     No results found for: EAG  On Lantus and sliding scale insulin, Lyrica    End-stage renal disease on hemodialysis:  Due for hemodialysis today, nephrology on board, has chronic edema in the legs    Secondary hyperparathyroidism of renal origin: Calcitriol    Coronary artery disease status post CABG status post stent, without angina:   OnAspirin, Plavix, Coreg, fenofibrate    Peripheral artery disease status post angioplasty: Plavix      Bioprosthetic aortic valve: Plavix    Chronic systolic congestive heart failure EF of 35-40%:  Evidence of volume overload continue Coreg    Paroxysmal atrial fibrillation: In sinus rhythm, continue Coreg    Myxomatic  degeneration of mitral valve, With mild mitral stenosis and mild mitral regurgitation    Anemia of chronic kidney disease- continue iron

## 2018-08-15 NOTE — CONSULTS
Topics Concern    Not on file     Social History Narrative    ** Merged History Encounter **            Home Meds:  Prior to Admission medications    Medication Sig Start Date End Date Taking? Authorizing Provider   nitroGLYCERIN (NITROSTAT) 0.4 MG SL tablet Place 1 tablet under the tongue every 5 minutes as needed for Chest pain 8/6/18   Dianna Valentino PA-C   aspirin 81 MG chewable tablet Take 81 mg by mouth daily    Historical Provider, MD   carvedilol (COREG) 6.25 MG tablet Take 1 tablet by mouth 2 times daily (with meals) 7/8/18   Dianna Valentino PA-C   clopidogrel (PLAVIX) 75 MG tablet Take 1 tablet by mouth daily 7/9/18   Dianna Valentino PA-C   lisinopril (PRINIVIL;ZESTRIL) 5 MG tablet Take 1 tablet by mouth daily 7/9/18   Juanita Devries MD   FENOFIBRATE PO Take 145 mg by mouth daily     Historical Provider, MD   pantoprazole (PROTONIX) 40 MG tablet Take 40 mg by mouth daily    Historical Provider, MD   calcium carbonate (TUMS) 500 MG chewable tablet Take 5 tablets by mouth 3 times daily (with meals) And 3 tabs as needed with snacks    Historical Provider, MD   FLUoxetine (PROZAC) 20 MG capsule Take 20 mg by mouth daily    Historical Provider, MD   calcitRIOL (ROCALTROL) 0.25 MCG capsule Take 0.25 mcg by mouth daily     Historical Provider, MD   insulin glargine (LANTUS) 100 UNIT/ML injection Inject 55 Units into the skin every morning (before breakfast)     Historical Provider, MD   Multiple Vitamin (MULTI-VITAMIN PO) Take 1 tablet by mouth daily     Historical Provider, MD   insulin lispro (HUMALOG) 100 UNIT/ML injection Inject 10-15 Units into the skin 3 times daily (before meals) Use scale if chem > 200    Historical Provider, MD   pregabalin (LYRICA) 50 MG capsule Take 50 mg by mouth 2 times daily. Historical Provider, MD       Review of Systems:  Source of data: Patient  Constitutional: Reports fever, chills, malaise, fatigue.    HEENT: Denies visual changes, runny nose, sore throat, 14.6 oz (112 kg)   08/13/18 247 lb (112 kg)   07/13/18 235 lb 3.7 oz (106.7 kg)     Body mass index is 32.58 kg/m². Physical Examination:  VITALS:  BP (!) 99/50 Comment: after fluid bolus  Pulse 65   Temp 98.7 °F (37.1 °C) (Oral)   Resp 17   Wt 246 lb 14.6 oz (112 kg)   SpO2 94%   BMI 32.58 kg/m²   Weight:   Wt Readings from Last 3 Encounters:   08/14/18 246 lb 14.6 oz (112 kg)   08/13/18 247 lb (112 kg)   07/13/18 235 lb 3.7 oz (106.7 kg)     Constitutional: alert and cooperative with exam, appears ill and fatigued, no distress  Head: NC/AT   Eyes: no icteric sclera, no pallor conjunctiva, no discharge seen from either eye  Oral: dry oral mucus membranes  Ears: normal external appearance of left and right ear  Nose: no active drainage  Neck: No jugular venous distention, appears symmetric, good ROM  Lungs: Air entry B/L, no crackles or rales, no use of accessory muscles, symmetrical chest wall expansion  Heart: regular rate and rhythm, S1, S2, no murmurs rubs or gallops. Extremities: Left hand 2/3 digits with black eschared wounds noted, no drainage. Bilateral 2+ pitting/chronic appearing LE edema. Left lower arm with AVF + bruit/thrill   GI: soft, non-tender, no guarding  Skin: no rash seen on exposed extremities  Musculo: moves all extremities  Neuro: no slurred speech, no facial drooping, symmetric strength  Psychiatric: Awake, alert, Oriented    Impression and Plan:    1. ESRD on HD, 6 nights a week 4.5 hours nightly via home hemo. - Currently no SOB, on room air, hypotensive  - Lytes, acid/base are fairly stable. - Renal diet. - CXR appears hazy compared to previous, maybe some pulmonary congestion, off oxygen at this time.   - BMP in am to reevaluate. 2. Hypotension, suspect due to sepsis, s/p 500 mL fluid bolus. - Will hold hemodialysis for now and follow up in am.     3. Leukocytosis, sepsis, MRSA + BC both bottles. On Cefepime and Vanco per primary service.    4. Finger wounds, ortho following. 5. Anemia of CKD, Hg 10.5. Not on LD prior to admission, will monitor. 6. SHPTH, continue calcitriol, calcium level stable at 8.3.   7. DM, on insulin  8. Complete heart block, recent pacemaker implantation 7/13/18. 9. Charcot foot. Discussed with Dr. Anthony Robin. All labs, radiology and medications were reviewed and discussed with the patient and RN. Thank you for the consult. Please feel free to call me if you have any questions.      Alexandro Dukes, BRAEDEN, CNP  8/15/2018  1:46 PM  [facility]

## 2018-08-15 NOTE — ED PROVIDER NOTES
Constitutional: Positive for appetite change, fatigue and fever. Negative for chills. HENT: Negative for congestion, ear pain, rhinorrhea and sore throat. Eyes: Negative for discharge, redness and visual disturbance. Respiratory: Negative for cough, shortness of breath and wheezing. Cardiovascular: Negative for chest pain, palpitations and leg swelling. Gastrointestinal: Negative for abdominal pain, diarrhea, nausea and vomiting. Genitourinary: Negative for decreased urine volume, difficulty urinating, dysuria and hematuria. Musculoskeletal: Negative for arthralgias, back pain, joint swelling and neck pain. Skin: Positive for wound. Negative for pallor and rash. Allergic/Immunologic: Negative for environmental allergies. Neurological: Negative for dizziness, syncope, weakness, light-headedness, numbness and headaches. Hematological: Negative for adenopathy. Psychiatric/Behavioral: Negative for confusion and suicidal ideas. The patient is not nervous/anxious. PAST MEDICAL HISTORY    has a past medical history of Anemia; Arthritis; CAD (coronary artery disease); Charcot ankle; CHF (congestive heart failure) ([address] Chronic ankle pain; Chronic back pain; Chronic venous hypertension w ulceration (HCC); CKD (chronic kidney disease) stage 3, GFR 30-59 ml/min; DDD (degenerative disc disease); Diabetic retinopathy ([address] DM (dermatomyositis); DM (diabetes mellitus) ([address] Full dentures; Gastric bleed; GERD (gastroesophageal reflux disease); H/O Bell's palsy; H/O Clostridium difficile infection; Hemodialysis patient [facility]); History of blood transfusion; History of cardiac cath; HTN (hypertension); Hyperkalemia; Hyperlipidemia; MRSA (methicillin resistant Staphylococcus aureus); Murmur, cardiac; Nephrotic syndrome; Obstructive sleep apnea; Osteomyelitis of finger of right hand ([address]  Pancreatitis due to biliary obstruction; Peripheral neuropathy; Peripheral neuropathy; Psychiatric problem; Pulmonary edema; Renal failure; S/P peripheral artery angioplasty: 10/30/2017: PTA of the left popliteal artery.; SOB (shortness of breath); Vision blurred; VRE (vancomycin resistant enterococcus) culture positive; and Wears glasses. SURGICAL HISTORY      has a past surgical history that includes fracture surgery (Right); Cholecystectomy; Colonoscopy; Endoscopy, colon, diagnostic; other surgical history; Dialysis fistula creation (Left, 02/2014); Kidney biopsy (Bilateral, 09/2013); eye surgery (Bilateral); eye surgery (Left, 05/17/2013); eye surgery (Bilateral); Cardiac surgery (2/19/2014); Cardiac surgery (2006); Cardiac surgery (2003); vitrectomy (Right, 3/27/14); Appendectomy; ablation of dysrhythmic focus (07/21/2017); vascular surgery; pr egd transoral biopsy single/multiple (N/A, 8/17/2017); Hand surgery (Right, 8/18/2017); pr colon ca scrn not hi rsk ind (Left, 9/8/2017); Enteroscopy (9/8/2017); pr amputate metacarpal+finger (Right, 9/28/2017); and Finger amputation (Right, 09/28/2017).     CURRENT MEDICATIONS       Previous Medications    ASPIRIN 81 MG CHEWABLE TABLET    Take 81 mg by mouth daily    CALCITRIOL (ROCALTROL) 0.25 MCG CAPSULE    Take 0.25 mcg by mouth daily     CALCIUM CARBONATE (TUMS) 500 MG CHEWABLE TABLET    Take 5 tablets by mouth 3 times daily (with meals) And 3 tabs as needed with snacks    CARVEDILOL (COREG) 6.25 MG TABLET    Take 1 tablet by mouth 2 times daily (with meals)    CLOPIDOGREL (PLAVIX) 75 MG TABLET    Take 1 tablet by mouth daily    FENOFIBRATE PO    Take 145 mg by mouth daily     FLUOXETINE (PROZAC) 20 MG CAPSULE    Take 20 mg by mouth daily    INSULIN GLARGINE (LANTUS) 100 UNIT/ML INJECTION    Inject 55 Units into the skin every morning (before breakfast)     INSULIN LISPRO (HUMALOG) 100 UNIT/ML INJECTION    Inject 10-15 Units into the skin 3 times daily (before meals) Use scale if chem > 200    LISINOPRIL (PRINIVIL;ZESTRIL) 5 MG TABLET    Take 1 tablet by mouth daily Effort normal and breath sounds normal. No respiratory distress. He has no decreased breath sounds. He has no wheezes. He has no rhonchi. He has no rales. Abdominal: Soft. Bowel sounds are normal. He exhibits no distension. There is no tenderness. There is no rebound, no guarding and no CVA tenderness. Musculoskeletal: Normal range of motion. He exhibits no edema. Neurological: He is alert and oriented to person, place, and time. He exhibits normal muscle tone. Coordination normal.   Skin: Skin is warm and dry. Lesion noted. No rash noted. He is not diaphoretic. Chronic wound on the 2nd toe of right foot. Nursing note and vitals reviewed. DIFFERENTIAL DIAGNOSIS:     High fever most likely bacteremia or sepsis. The source unclear from history at this time. DIAGNOSTIC RESULTS     EKG: All EKG's are interpreted by the Emergency Department Physician who either signs or Co-signs this chart in the absence of a cardiologist.  EKG interpreted by Madina Mckeon MD:    [place]. Rate: 90 bpm  SC interval: 130 ms  QRS duration: 200 ms  QTc: 616 ms  P-R-T axes: 27, 206, 23  Suspect unspecified pacemaker failure, atrial-sensed ventricular-paced rhythm, abnormal ECG. RADIOLOGY: non-plain film images(s) such as CT, Ultrasound and MRI are read by the radiologist.    XR CHEST PORTABLE   Final Result   Low lung volumes with mild pulmonary vascular congestion. **This report has been created using voice recognition software. It may contain minor errors which are inherent in voice recognition technology. **      Final report electronically signed by Dr Dominick Perry on 8/14/2018 8:53 PM          LABS:   Labs Reviewed   CBC WITH AUTO DIFFERENTIAL - Abnormal; Notable for the following:        Result Value    WBC 11.5 (*)     RBC 3.65 (*)     Hemoglobin 11.0 (*)     Hematocrit 35.0 (*)     MCV 95.9 (*)     MCHC 31.4 (*)     RDW-CV 16.3 (*)     RDW-SD 57.9 (*)     Segs Absolute 10.7 (*)     Lymphocytes # 0.2 (*) All other components within normal limits   COMPREHENSIVE METABOLIC PANEL W/ REFLEX TO MG FOR LOW K - Abnormal; Notable for the following:     Glucose 140 (*)     CREATININE 7.0 (*)     BUN 47 (*)     Chloride 95 (*)     CO2 21 (*)     Total Protein 8.7 (*)     ALT 10 (*)     All other components within normal limits   TROPONIN - Abnormal; Notable for the following:     Troponin T 0.124 (*)     All other components within normal limits   PROTIME-INR - Abnormal; Notable for the following:     INR 1.46 (*)     All other components within normal limits   PROCALCITONIN - Abnormal; Notable for the following:     Procalcitonin 0.61 (*)     All other components within normal limits   ANION GAP - Abnormal; Notable for the following: Anion Gap 21.0 (*)     All other components within normal limits   GLOMERULAR FILTRATION RATE, ESTIMATED - Abnormal; Notable for the following:     Est, Glom Filt Rate 8 (*)     All other components within normal limits   CULTURE BLOOD #1   CULTURE BLOOD #2   LACTATE, SEPSIS   LACTATE, SEPSIS   APTT   BRAIN NATRIURETIC PEPTIDE   TSH WITHOUT REFLEX   OSMOLALITY   URINE RT REFLEX TO CULTURE       EMERGENCY DEPARTMENT COURSE:   Vitals:    Vitals:    08/14/18 2126 08/14/18 2219 08/14/18 2259 08/14/18 2319   BP: 137/70 (!) 148/74 134/65    Pulse: 87 88 84 85   Resp: 22 18 18 20   Temp:  103 °F (39.4 °C)  102.9 °F (39.4 °C)   TempSrc:  Rectal  Rectal   SpO2: 95% 94% 95% 94%   Weight:           MDM:    Patient presents to the emergency department with a high fever of 10 4°F.  His exam is normal except for chronic illnesses with peripheral vascular disease and a toe wound. WBC count is 11.5 hemoglobin of 11.0. Elevated BUN/creatinine due to chronic kidney disease patient is a dialysis patient. Pro calcitonin is 0.61. Elevated troponin most likely due to kidney disease. Chest x-ray is normal except a low volumes with mild pulmonary vascular congestion.   Here in the  emergency Department patient

## 2018-08-16 LAB
ANION GAP SERPL CALCULATED.3IONS-SCNC: 24 MEQ/L (ref 8–16)
BACTERIA: ABNORMAL /HPF
BILIRUBIN URINE: ABNORMAL
BLOOD CULTURE, ROUTINE: ABNORMAL
BLOOD CULTURE, ROUTINE: ABNORMAL
BLOOD, URINE: ABNORMAL
BUN BLDV-MCNC: 71 MG/DL (ref 7–22)
CALCIUM SERPL-MCNC: 8.5 MG/DL (ref 8.5–10.5)
CASTS 2: ABNORMAL /LPF
CASTS UA: ABNORMAL /LPF
CHARACTER, URINE: ABNORMAL
CHLORIDE BLD-SCNC: 91 MEQ/L (ref 98–111)
CO2: 17 MEQ/L (ref 23–33)
COLOR: YELLOW
CREAT SERPL-MCNC: 9.1 MG/DL (ref 0.4–1.2)
CRYSTALS, UA: ABNORMAL
EPITHELIAL CELLS, UA: ABNORMAL /HPF
ERYTHROCYTE [DISTWIDTH] IN BLOOD BY AUTOMATED COUNT: 16.3 % (ref 11.5–14.5)
ERYTHROCYTE [DISTWIDTH] IN BLOOD BY AUTOMATED COUNT: 57.1 FL (ref 35–45)
GFR SERPL CREATININE-BSD FRML MDRD: 6 ML/MIN/1.73M2
GLUCOSE BLD-MCNC: 105 MG/DL (ref 70–108)
GLUCOSE BLD-MCNC: 115 MG/DL (ref 70–108)
GLUCOSE BLD-MCNC: 74 MG/DL (ref 70–108)
GLUCOSE BLD-MCNC: 76 MG/DL (ref 70–108)
GLUCOSE BLD-MCNC: 77 MG/DL (ref 70–108)
GLUCOSE URINE: 250 MG/DL
HBV SURFACE AB TITR SER: POSITIVE {TITER}
HCT VFR BLD CALC: 32.1 % (ref 42–52)
HEMOGLOBIN: 10.1 GM/DL (ref 14–18)
HEPATITIS B SURFACE ANTIGEN: NEGATIVE
ICTOTEST: NEGATIVE
KETONES, URINE: ABNORMAL
LEUKOCYTE ESTERASE, URINE: ABNORMAL
MCH RBC QN AUTO: 30 PG (ref 26–33)
MCHC RBC AUTO-ENTMCNC: 31.5 GM/DL (ref 32.2–35.5)
MCV RBC AUTO: 95.3 FL (ref 80–94)
MISCELLANEOUS 2: ABNORMAL
NITRITE, URINE: NEGATIVE
ORGANISM: ABNORMAL
ORGANISM: ABNORMAL
PH UA: 5
PLATELET # BLD: 109 THOU/MM3 (ref 130–400)
PMV BLD AUTO: 11.1 FL (ref 9.4–12.4)
POTASSIUM SERPL-SCNC: 4.2 MEQ/L (ref 3.5–5.2)
PROCALCITONIN: 3.64 NG/ML (ref 0.01–0.09)
PROTEIN UA: >= 1000
RBC # BLD: 3.37 MILL/MM3 (ref 4.7–6.1)
RBC URINE: ABNORMAL /HPF
RENAL EPITHELIAL, UA: ABNORMAL
SODIUM BLD-SCNC: 132 MEQ/L (ref 135–145)
SPECIFIC GRAVITY, URINE: 1.03 (ref 1–1.03)
UROBILINOGEN, URINE: 0.2 EU/DL
VANCOMYCIN RANDOM: 15.6 UG/ML (ref 0.1–39.9)
WBC # BLD: 5.7 THOU/MM3 (ref 4.8–10.8)
WBC UA: > 100 /HPF
YEAST: ABNORMAL

## 2018-08-16 PROCEDURE — 6370000000 HC RX 637 (ALT 250 FOR IP): Performed by: FAMILY MEDICINE

## 2018-08-16 PROCEDURE — 90935 HEMODIALYSIS ONE EVALUATION: CPT

## 2018-08-16 PROCEDURE — 80202 ASSAY OF VANCOMYCIN: CPT

## 2018-08-16 PROCEDURE — 99223 1ST HOSP IP/OBS HIGH 75: CPT | Performed by: INTERNAL MEDICINE

## 2018-08-16 PROCEDURE — 86706 HEP B SURFACE ANTIBODY: CPT

## 2018-08-16 PROCEDURE — 82948 REAGENT STRIP/BLOOD GLUCOSE: CPT

## 2018-08-16 PROCEDURE — 99232 SBSQ HOSP IP/OBS MODERATE 35: CPT | Performed by: INTERNAL MEDICINE

## 2018-08-16 PROCEDURE — 87340 HEPATITIS B SURFACE AG IA: CPT

## 2018-08-16 PROCEDURE — 87184 SC STD DISK METHOD PER PLATE: CPT

## 2018-08-16 PROCEDURE — 2580000003 HC RX 258: Performed by: PHARMACIST

## 2018-08-16 PROCEDURE — 36415 COLL VENOUS BLD VENIPUNCTURE: CPT

## 2018-08-16 PROCEDURE — 84145 PROCALCITONIN (PCT): CPT

## 2018-08-16 PROCEDURE — 87186 SC STD MICRODIL/AGAR DIL: CPT

## 2018-08-16 PROCEDURE — 1200000003 HC TELEMETRY R&B

## 2018-08-16 PROCEDURE — 80048 BASIC METABOLIC PNL TOTAL CA: CPT

## 2018-08-16 PROCEDURE — 87077 CULTURE AEROBIC IDENTIFY: CPT

## 2018-08-16 PROCEDURE — 81001 URINALYSIS AUTO W/SCOPE: CPT

## 2018-08-16 PROCEDURE — 5A1D70Z PERFORMANCE OF URINARY FILTRATION, INTERMITTENT, LESS THAN 6 HOURS PER DAY: ICD-10-PCS | Performed by: INTERNAL MEDICINE

## 2018-08-16 PROCEDURE — 87086 URINE CULTURE/COLONY COUNT: CPT

## 2018-08-16 PROCEDURE — 6370000000 HC RX 637 (ALT 250 FOR IP): Performed by: INTERNAL MEDICINE

## 2018-08-16 PROCEDURE — 2580000003 HC RX 258: Performed by: INTERNAL MEDICINE

## 2018-08-16 PROCEDURE — 85027 COMPLETE CBC AUTOMATED: CPT

## 2018-08-16 PROCEDURE — 2580000003 HC RX 258: Performed by: FAMILY MEDICINE

## 2018-08-16 PROCEDURE — 6360000002 HC RX W HCPCS: Performed by: PHARMACIST

## 2018-08-16 RX ORDER — SODIUM CHLORIDE 9 MG/ML
INJECTION, SOLUTION INTRAVENOUS CONTINUOUS
Status: DISCONTINUED | OUTPATIENT
Start: 2018-08-16 | End: 2018-08-17

## 2018-08-16 RX ADMIN — MIDODRINE HYDROCHLORIDE 5 MG: 5 TABLET ORAL at 13:14

## 2018-08-16 RX ADMIN — CLOPIDOGREL BISULFATE 75 MG: 75 TABLET ORAL at 10:26

## 2018-08-16 RX ADMIN — PREGABALIN 50 MG: 50 CAPSULE ORAL at 10:26

## 2018-08-16 RX ADMIN — PANTOPRAZOLE SODIUM 40 MG: 40 TABLET, DELAYED RELEASE ORAL at 05:11

## 2018-08-16 RX ADMIN — VANCOMYCIN HYDROCHLORIDE 1000 MG: 1 INJECTION, POWDER, LYOPHILIZED, FOR SOLUTION INTRAVENOUS at 18:40

## 2018-08-16 RX ADMIN — CARVEDILOL 6.25 MG: 6.25 TABLET, FILM COATED ORAL at 10:26

## 2018-08-16 RX ADMIN — SODIUM CHLORIDE: 9 INJECTION, SOLUTION INTRAVENOUS at 10:28

## 2018-08-16 RX ADMIN — ANTACID TABLETS 2500 MG: 500 TABLET, CHEWABLE ORAL at 13:14

## 2018-08-16 RX ADMIN — FLUOXETINE HYDROCHLORIDE 20 MG: 20 CAPSULE ORAL at 10:26

## 2018-08-16 RX ADMIN — MIDODRINE HYDROCHLORIDE 5 MG: 5 TABLET ORAL at 10:26

## 2018-08-16 RX ADMIN — PREGABALIN 50 MG: 50 CAPSULE ORAL at 21:26

## 2018-08-16 RX ADMIN — CARVEDILOL 6.25 MG: 6.25 TABLET, FILM COATED ORAL at 18:40

## 2018-08-16 RX ADMIN — INSULIN GLARGINE 55 UNITS: 100 INJECTION, SOLUTION SUBCUTANEOUS at 10:28

## 2018-08-16 RX ADMIN — ASPIRIN 81 MG CHEWABLE TABLET 81 MG: 81 TABLET CHEWABLE at 10:26

## 2018-08-16 RX ADMIN — FENOFIBRATE 160 MG: 160 TABLET ORAL at 10:26

## 2018-08-16 RX ADMIN — CALCITRIOL 0.25 MCG: 0.25 CAPSULE ORAL at 10:26

## 2018-08-16 RX ADMIN — ANTACID TABLETS 2500 MG: 500 TABLET, CHEWABLE ORAL at 18:40

## 2018-08-16 RX ADMIN — Medication 10 ML: at 10:28

## 2018-08-16 RX ADMIN — ANTACID TABLETS 2500 MG: 500 TABLET, CHEWABLE ORAL at 10:27

## 2018-08-16 ASSESSMENT — PAIN SCALES - GENERAL: PAINLEVEL_OUTOF10: 0

## 2018-08-16 NOTE — FLOWSHEET NOTE
08/16/18 7271   Encounter Summary   Services provided to: Patient   Continue Visiting Yes  (8/16 V)   Volunteer Visit Yes  Cyndy Cyrus)   Spiritual/Hinduism   Type Spiritual support

## 2018-08-16 NOTE — CONSULTS
Inpatient consult to Cardiology  Consult performed by: April Nugent ordered by: Vilma CHAVES          febrile illness related to MRSA infection.   Staph sepsis  CAD s/p multiple stent  AVR tissue    Plan  Victor Manuel schedule    [phone]    Margareth Loyd MD

## 2018-08-16 NOTE — FLOWSHEET NOTE
1  Aster VENTURA with ortho in to see pt and examined hands and feet. Informed pt of doing xray of left hand. Pt voices understanding. States he had the same thing on right index finger and had infection in it and had it removed.

## 2018-08-16 NOTE — CONSULTS
[address] [place] [address]                                   CONSULTATION    PATIENT NAME: Katheryn Knox                    :        1960  MED REC NO:   [id number]                           ROOM:       0015  ACCOUNT NO:   [de-identified]                           ADMIT DATE: 2018  PROVIDER:     Jessica Carrion. PREMA Ly Busin2018    REASON OF CONSULTATION:  Evaluation for JAVIER in a 34-year-old gentleman with  a tissue aortic valve replacement and coronary artery disease, admitted  with febrile illness and found to have a positive blood culture for  staphylococcus. PRIMARY CARDIOLOGIST:  Nava Samayoa MD.    HISTORY OF PRESENT ILLNESS:  This is a 34-year-old  gentleman,  presented with fever of two days duration and is known to have end-stage  renal disease, on hemodialysis, and had fistula on his left upper arm. So  basically after presentation, admitted with febrile illness and blood  culture revealed evidence of gram-positive cocci, staphylococcus MRSA. So  the Infectious Disease wanted to have his valve to be evaluated to rule out  any endocarditis, hence Cardiology evaluation was sought. The patient has no chest pain now, no shortness of breath, no palpitations. REVIEW OF SYSTEMS:  Negative except the above-mentioned ones. PAST MEDICAL HISTORY:  Coronary artery disease, status post previous  angioplasty and status post recent bypass when he had aortic valve  replacement, congestive heart failure, end-stage renal disease, on  hemodialysis; gastroesophageal reflux disease, hypertension,  hyperlipidemia, history of MRSA, history of peripheral artery disease,  status post PTA of the left popliteal artery. Pacemaker placement recently  for complete heart block.     PAST SURGICAL HISTORY:  Vascular surgery, renal biopsy, hand surgery,  fracture surgery, eye surgery, dialysis fistula

## 2018-08-16 NOTE — PROGRESS NOTES
patient was relaxing in the bedside chair talking with clergy. He feels much improved. He denied N/V/C/D/SOB/CP. He is to undergo wound evaluation and JAVIER today. Lab Results   Component Value Date    LABGLOM 6 08/16/2018    LABGLOM 8 08/15/2018    LABGLOM 8 08/14/2018    LABGLOM 8 07/11/2018    LABGLOM 6 07/10/2018    LABGLOM 6 07/09/2018          Baseline eGFR    Admit eGFR    Current eGFR          Objective:    Diet: renal    VITALS:  /74   Pulse 66   Temp 98 °F (36.7 °C) (Oral)   Resp 16   Wt 245 lb 4.8 oz (111.3 kg)   SpO2 95%   BMI 32.36 kg/m²   24HR INTAKE/OUTPUT:    Intake/Output Summary (Last 24 hours) at 08/16/18 1123  Last data filed at 08/16/18 0311   Gross per 24 hour   Intake          2460.22 ml   Output                0 ml   Net          2460.22 ml     Admission weight: 246 lb 14.6 oz (112 kg)  Wt Readings from Last 3 Encounters:   08/16/18 245 lb 4.8 oz (111.3 kg)   08/13/18 247 lb (112 kg)   07/13/18 235 lb 3.7 oz (106.7 kg)     Body mass index is 32.36 kg/m². Physical examination    General:  Alert and cooperative with exam  HEENT:  Head: Normocephalic, no lesions, without obvious abnormality. Neck:   no adenopathy, no carotid bruit, no JVD, supple, symmetrical, trachea midline and thyroid not enlarged, symmetric, no tenderness/mass/nodules  Lungs:  clear to auscultation bilaterally  Heart:  regular rate and rhythm, S1, S2 normal, no murmur, click, rub or gallop  Abdomen:  soft, non-tender; bowel sounds normal; no masses,  no organomegaly  Extremities:  extremities normal, atraumatic, no cyanosis or edema  Neurologic:  Mental status: Alert, oriented, thought content appropriate  Psy:                 No evidence of depression. Mood is stable. Skin:                Warm and dry. No lesions or rashes noted. Muscles:         Hand  and leg strength are equal and strong bilaterally.               Lab Data  CBC:   Recent Labs      08/14/18   2054  08/15/18   0405  08/16/18   0601

## 2018-08-16 NOTE — PLAN OF CARE
Problem: Pain Control  Goal: Maintain pain level at or below patient's acceptable level (or 5 if patient is unable to determine acceptable level)  Outcome: Met This Shift  Pain Assessment: 0-10  Pain Level: 0   Pain goal:  0  Is pain goal met at this time? Yes  Pain Intervention(s): Medication (see eMar)  Additional interventions to be implemented: none      Problem: Cardiovascular  Goal: No DVT, peripheral vascular complications  Outcome: Ongoing  No signs or symptoms of DVT. Patient refusing his sub Q heparin injections. Goal: Hemodynamic stability  Outcome: Ongoing  Vital signs have remained stable and within normal limits. Vitals being monitored every 4 hours and as needed. Patient's BP low in the 90's tonight-doctors aware. Continuous cardiac monitor in place. Heart rhythm is A-V paced. Patient has a permeant pacemaker. Problem: Respiratory  Goal: No pulmonary complications  Outcome: Ongoing  Patient on room air with oxygen saturations above 92%. No SOB noted at rest or with ambulation. Lungs are clear in the upper lobes and clear/diminished in the bases. Goal: O2 Sat > 90%  Outcome: Met This Shift  Patient on room air with oxygen saturations above 92%. Problem:   Goal: Adequate urinary output  Outcome: Ongoing  Patient is a hemodialysis. Patient has not had hemodialysis since before admission. Patient produces very little urine. Problem: Nutrition  Goal: Optimal nutrition therapy  Outcome: Met This Shift  Patient on a renal diet and tolerating diet well. No nausea or vomiting. Problem: Skin Integrity/Risk  Goal: No skin breakdown during hospitalization  Outcome: Ongoing  No new areas of skin breakdown noted. Skin is warm and dry. Patient has multiple skin integrity issues(see doc flowsheets for list). Patient able to turn and reposition himself independently.     Problem: Musculor/Skeletal Functional Status  Goal: Absence of falls  Outcome: Ongoing  Patient has remained free from falls

## 2018-08-16 NOTE — PLAN OF CARE
Problem: Pain Control  Goal: Maintain pain level at or below patient's acceptable level (or 5 if patient is unable to determine acceptable level)  Patient able to verbalize pain on a scale of 0-10. PRN meds available and appropriate pain level established. Problem: Cardiovascular  Goal: No DVT, peripheral vascular complications  Outcome: Ongoing  Patient currently refusing heprin, will continue to educate    Problem: Respiratory  Goal: No pulmonary complications  Outcome: Ongoing  Patient O2 sats 96% on room air    Problem: Musculor/Skeletal Functional Status  Goal: Absence of falls  Outcome: Ongoing  Patient continues to use bed alarms and call lights for fall prevention. No falls this shift    Comments: Care plan reviewed with patient . Patient  verbalize understanding of the plan of care and contribute to goal setting.

## 2018-08-16 NOTE — CONSULTS
[address] [place] [address]                                   CONSULTATION    PATIENT NAME: Zach Ernst                    :        1960  MED REC NO:   [id number]                           ROOM:       0015  ACCOUNT NO:   [de-identified]                           ADMIT DATE: 2018  PROVIDER:     Carli Vick. [address] [facility].    Ayush Monaegarten:  08/15/2018    HISTORY OF PRESENT ILLNESS:  He is one of my patients. Asked to see him  because he has a positive blood culture. He is currently admitted to  hospital after he had a fever of two days' duration. He has end-stage  renal disease. He is on hemodialysis at home. He has a fistula on his  left upper arm. He reports that he had necrotic skin on his left index  finger. He had also multiple scabbed wounds on his fingers, all related to  his renal disease. He had remote history of amputation of his right index  finger with similar complaints. He had valve replacement in August,  bioprosthetic aortic valve. He had no history of endocarditis. His  fistula has been working very well. There was not any purulent drainage on  his left index finger. He has been seen by Orthopedics to evaluate the  fingers. PAST MEDICAL HISTORY:  Significant for history of osteoarthritis, coronary  artery disease, heart angioplasty and stents. He had history of Charcot's  foot, congestive heart failure, history of chronic end-stage renal disease,  degenerative joint disease, diabetic retinopathy, diabetes, history of  peptic ulcer, GERD, history of C. difficile, hypertension, hyperlipidemia,  history of MRSA infection, history of pancreatitis, neuropathy, and  depression.     PAST SURGICAL HISTORY:  Includes ablation, appendectomy, cardiac stent,  cholecystectomy, dialysis fistula creation, vitrectomy, finger amputation,  ankle surgery, aortic valve replacement on 2017, and heart  catheterization. ALLERGIES:  Include VICODIN. MEDICATIONS:  Include Tylenol, aspirin, calcitriol, cefepime, Plavix,  fenofibrate, insulin, and vancomycin. REVIEW OF SYSTEMS:  Noncontributory. PHYSICAL EXAMINATION:  VITAL SIGNS:  Temperature maximum was 102.7, respirations 16, pulse 72, and  blood pressure 105/53. HEENT:  He has slightly pale conjunctivae. Anicteric sclerae. CHEST:  Bilateral air entry. CARDIOVASCULAR SYSTEM:  Regular with systolic murmur, base of the heart. ABDOMEN:  Soft. EXTREMITIES:  He has amputated right index finger. He has ulcerated finger  on his left index finger. He has also similar ulceration on the second and  third digits of his left hand. CNS:  He is conscious and oriented to person, place, and time. DIAGNOSTICS:  WBC of 14.5, hemoglobin 10.5, hematocrit 33.5, and platelets  of 673. Sodium of 133, potassium 4.1, chloride 92, bicarb 20, BUN is 53,  and creatinine 7.4.  C-reactive protein was 8.77. Blood culture with  [employer] is showing MRSA. Both blood cultures are again growing  gram-positive cocci in clusters, likely MRSA. IMPRESSION:  A 77-year-old male patient admitted with febrile illness  related to MRSA infection. The patient had history of end-stage renal  disease. His fistula is functioning; however, he has bioprosthetic aortic  valve. Need to rule out endocarditis. We will make arrangement for JAVIER. We will continue IV vancomycin. We will stop cefepime. We will continue  to follow the patient. Discussed with the patient on treatment plan. He  has ischemic fingers likely related to medial calcification of his  arteries. Unfortunately, this is an ongoing disease process. Unfortunately, he would not have good treatment. Orthopedics is following  his fingers. We will continue to follow the patient.         Lucinda Welch M.D.    D: 08/15/2018 16:21:02       T: 08/15/2018 18:16:20     [email or web address]  Job#: [id number]     Doc#:

## 2018-08-16 NOTE — PROGRESS NOTES
Hospitalist Progress Note    Patient:  Josemanuel Jaimeswood      Unit/Bed:6K-15/015-A    YOB: 1960    MRN: [id number]       Acct: [de-identified]     PCP: Tia Porter MD    Date of Admission: 8/14/2018    Chief Complaint:  Fever 104 at home    Patient noticed to have fever at home 104 F, had some nausea and dry heaves but no vomiting. Does not have any cough or abdominal pain or diarrhea. Patient does home hemodialysis 6 days a week at home. Approximately 2 weeks ago he did notice some small ulcer on the left index finger and has been blackish in discoloration and has been becoming little big. Has pain on palpation only. In the past he had amputation of the right index finger partial because of infection and was referred to orthopedic specialist and has an appointment on the 13th of next month. Also has superficial ulceration in the right second toe. Vitals in the ER temp of 104, respiratory rate 22, pulse rate 93, blood pressure 153/72  Labs BUN 47 creatinine 7, pro-calcitonin 0.61, BNP more than 70,000, Troponin 0.12  WBC 11.5 and neutrophils of 93%, INR 1.46    Has history of getting hypotensive especially after dialysis in the past  Patient was recently in hospital from 7/9/ - 7/14/2018 and had a pacemaker placed secondary to third-degree AV block  And HR in 20's and syncope and had a dislodged pacemaker lead which was repositioned and also had a drug-eluting stent placed to LAD    Hospital Course:   Patient  Had blood cultures drawn and was started on empiric antibiotics. The source of Fever was not clear. Orthopedics were consulted for evaluation of the index finger. Blood cultures positive for gram-positive cocci.   Rapid biofire PCR suggests MRSA, Patient's blood pressure dropped to the 85/ 47 and was given 500 ml bolus on 8/15 and ID consulted, will get 2d echo, on cefepime and Vancomycin  - will need JAVIER, As patient has a bioprosthetic valve and calcification and mixed somatic

## 2018-08-16 NOTE — PROGRESS NOTES
Subcutaneous Q12H    vancomycin (VANCOCIN) intermittent dosing (placeholder)   Other RX Placeholder    insulin lispro  0-6 Units Subcutaneous TID WC    insulin lispro  0-3 Units Subcutaneous Nightly    midodrine  5 mg Oral TID       sodium chloride 50 mL/hr at 08/16/18 1028    dextrose       nitroGLYCERIN, sodium chloride flush, acetaminophen, acetaminophen, glucose, dextrose, glucagon (rDNA), dextrose      LABS:     CBC:   Recent Labs      08/14/18   2054  08/15/18   0405  08/16/18   0508   WBC  11.5*  14.5*  5.7   HGB  11.0*  10.5*  10.1*   PLT  146  130  109*     BMP:  Recent Labs      08/14/18   2054  08/15/18   0405  08/16/18   0508   NA  137  133*  132*   K  4.3  4.1  4.2   CL  95*  93*  91*   CO2  21*  20*  17*   BUN  47*  53*  71*   CREATININE  7.0*  7.4*  9.1*   GLUCOSE  140*  141*  74     Calcium:  Recent Labs      08/16/18   0508   CALCIUM  8.5     Ionized Calcium:No results for input(s): IONCA in the last 72 hours. Magnesium:No results for input(s): MG in the last 72 hours. Phosphorus:No results for input(s): PHOS in the last 72 hours. BNP:No results for input(s): BNP in the last 72 hours. Glucose:  Recent Labs      08/15/18   1948  08/16/18   0632  08/16/18   1105   POCGLU  103  76  105     HgbA1C:   Recent Labs      08/15/18   0405   LABA1C  6.6*     INR:   Recent Labs      08/14/18 2054   INR  1.46*     Hepatic: Recent Labs      08/14/18 2054   ALKPHOS  82   ALT  10*   AST  23   PROT  8.7*   BILITOT  1.0   LABALBU  4.2     Amylase and Lipase:No results for input(s): LACTA, AMYLASE in the last 72 hours. Lactic Acid: No results for input(s): LACTA in the last 72 hours. Troponin: No results for input(s): CKTOTAL, CKMB, TROPONINI in the last 72 hours. BNP: No results for input(s): BNP in the last 72 hours.     CULTURES:   UA: Recent Labs      08/16/18   0325   PHUR  5.0   COLORU  YELLOW   PROTEINU  >= 1000   BLOODU  MODERATE*   RBCUA  5-10   WBCUA  > 100   BACTERIA  NONE   NITRU

## 2018-08-16 NOTE — PROGRESS NOTES
Pharmacy Vancomycin Consult     Vancomycin Day: 3  Current Dosing: intermittent     Date Time Vancomycin dose Vancomycin Random   08/14/18 2218 1500 mg     8/16/2018 0508  15.6 mcg/ml   8/16/2018  HD    8/16/2018 ~1800 1000 mg                            Temp max:  98.0    Recent Labs      08/15/18   0405  08/16/18   0508   BUN  53*  71*       Recent Labs      08/15/18   0405  08/16/18   0508   CREATININE  7.4*  9.1*       Recent Labs      08/15/18   0405  08/16/18   0508   WBC  14.5*  5.7         Intake/Output Summary (Last 24 hours) at 08/16/18 0957  Last data filed at 08/16/18 0311   Gross per 24 hour   Intake 2460.22 ml   Output 0 ml   Net 2460.22 ml     Cultures/Sensitivities:  Date Source Result   8/14/18 Blood #1  Staph coag +; MRSA per [facility]fire   8/14/18 Blood #2 Staph coag +   8/16/18 Urine  No result     Ht Readings from Last 1 Encounters:   08/13/18 6' 1\" (1.854 m)        Wt Readings from Last 1 Encounters:   08/16/18 245 lb 4.8 oz (111.3 kg)         Body mass index is 32.36 kg/m². CrCl cannot be calculated (Unknown ideal weight.). Trough: 15.6 mcg/ml    Assessment/Plan:   Give Vancomycin 1000 mg IV after dialysis today.       Mable Bailon PharmD 8/16/2018 2:13 PM

## 2018-08-17 ENCOUNTER — ANESTHESIA (OUTPATIENT)
Dept: ENDOSCOPY | Age: 58
DRG: 871 | End: 2018-08-17
Payer: MEDICARE

## 2018-08-17 ENCOUNTER — ANESTHESIA EVENT (OUTPATIENT)
Dept: ENDOSCOPY | Age: 58
DRG: 871 | End: 2018-08-17
Payer: MEDICARE

## 2018-08-17 ENCOUNTER — APPOINTMENT (OUTPATIENT)
Dept: INTERVENTIONAL RADIOLOGY/VASCULAR | Age: 58
DRG: 871 | End: 2018-08-17
Payer: MEDICARE

## 2018-08-17 VITALS
SYSTOLIC BLOOD PRESSURE: 119 MMHG | DIASTOLIC BLOOD PRESSURE: 55 MMHG | RESPIRATION RATE: 17 BRPM | OXYGEN SATURATION: 98 %

## 2018-08-17 LAB
ANION GAP SERPL CALCULATED.3IONS-SCNC: 18 MEQ/L (ref 8–16)
BUN BLDV-MCNC: 50 MG/DL (ref 7–22)
CALCIUM SERPL-MCNC: 8.7 MG/DL (ref 8.5–10.5)
CHLORIDE BLD-SCNC: 93 MEQ/L (ref 98–111)
CO2: 20 MEQ/L (ref 23–33)
CREAT SERPL-MCNC: 7.6 MG/DL (ref 0.4–1.2)
GFR SERPL CREATININE-BSD FRML MDRD: 7 ML/MIN/1.73M2
GLUCOSE BLD-MCNC: 132 MG/DL (ref 70–108)
GLUCOSE BLD-MCNC: 53 MG/DL (ref 70–108)
GLUCOSE BLD-MCNC: 66 MG/DL (ref 70–108)
GLUCOSE BLD-MCNC: 90 MG/DL (ref 70–108)
GLUCOSE BLD-MCNC: 94 MG/DL (ref 70–108)
LV EF: 43 %
LVEF MODALITY: NORMAL
POTASSIUM SERPL-SCNC: 3.7 MEQ/L (ref 3.5–5.2)
SODIUM BLD-SCNC: 131 MEQ/L (ref 135–145)

## 2018-08-17 PROCEDURE — 99233 SBSQ HOSP IP/OBS HIGH 50: CPT | Performed by: INTERNAL MEDICINE

## 2018-08-17 PROCEDURE — 5A1D70Z PERFORMANCE OF URINARY FILTRATION, INTERMITTENT, LESS THAN 6 HOURS PER DAY: ICD-10-PCS | Performed by: INTERNAL MEDICINE

## 2018-08-17 PROCEDURE — 93931 UPPER EXTREMITY STUDY: CPT

## 2018-08-17 PROCEDURE — 90935 HEMODIALYSIS ONE EVALUATION: CPT

## 2018-08-17 PROCEDURE — 90935 HEMODIALYSIS ONE EVALUATION: CPT | Performed by: NURSE PRACTITIONER

## 2018-08-17 PROCEDURE — 1200000003 HC TELEMETRY R&B

## 2018-08-17 PROCEDURE — 6370000000 HC RX 637 (ALT 250 FOR IP): Performed by: FAMILY MEDICINE

## 2018-08-17 PROCEDURE — 3700000001 HC ADD 15 MINUTES (ANESTHESIA): Performed by: INTERNAL MEDICINE

## 2018-08-17 PROCEDURE — 3700000000 HC ANESTHESIA ATTENDED CARE: Performed by: INTERNAL MEDICINE

## 2018-08-17 PROCEDURE — 2709999900 HC NON-CHARGEABLE SUPPLY: Performed by: INTERNAL MEDICINE

## 2018-08-17 PROCEDURE — 80048 BASIC METABOLIC PNL TOTAL CA: CPT

## 2018-08-17 PROCEDURE — 6370000000 HC RX 637 (ALT 250 FOR IP)

## 2018-08-17 PROCEDURE — 6360000002 HC RX W HCPCS: Performed by: NURSE ANESTHETIST, CERTIFIED REGISTERED

## 2018-08-17 PROCEDURE — 93312 ECHO TRANSESOPHAGEAL: CPT

## 2018-08-17 PROCEDURE — 82948 REAGENT STRIP/BLOOD GLUCOSE: CPT

## 2018-08-17 PROCEDURE — 2500000003 HC RX 250 WO HCPCS: Performed by: NURSE ANESTHETIST, CERTIFIED REGISTERED

## 2018-08-17 PROCEDURE — 7100000001 HC PACU RECOVERY - ADDTL 15 MIN: Performed by: INTERNAL MEDICINE

## 2018-08-17 PROCEDURE — 6370000000 HC RX 637 (ALT 250 FOR IP): Performed by: INTERNAL MEDICINE

## 2018-08-17 PROCEDURE — 93325 DOPPLER ECHO COLOR FLOW MAPG: CPT

## 2018-08-17 PROCEDURE — B246ZZ4 ULTRASONOGRAPHY OF RIGHT AND LEFT HEART, TRANSESOPHAGEAL: ICD-10-PCS | Performed by: INTERNAL MEDICINE

## 2018-08-17 PROCEDURE — 7100000000 HC PACU RECOVERY - FIRST 15 MIN: Performed by: INTERNAL MEDICINE

## 2018-08-17 PROCEDURE — 2580000003 HC RX 258: Performed by: NURSE PRACTITIONER

## 2018-08-17 PROCEDURE — 36415 COLL VENOUS BLD VENIPUNCTURE: CPT

## 2018-08-17 PROCEDURE — 2580000003 HC RX 258: Performed by: NURSE ANESTHETIST, CERTIFIED REGISTERED

## 2018-08-17 PROCEDURE — 93320 DOPPLER ECHO COMPLETE: CPT

## 2018-08-17 RX ORDER — SODIUM CHLORIDE 9 MG/ML
INJECTION, SOLUTION INTRAVENOUS CONTINUOUS
Status: DISCONTINUED | OUTPATIENT
Start: 2018-08-17 | End: 2018-08-17

## 2018-08-17 RX ORDER — SODIUM CHLORIDE 0.9 % (FLUSH) 0.9 %
10 SYRINGE (ML) INJECTION EVERY 12 HOURS SCHEDULED
Status: DISCONTINUED | OUTPATIENT
Start: 2018-08-17 | End: 2018-08-25 | Stop reason: HOSPADM

## 2018-08-17 RX ORDER — SODIUM CHLORIDE 9 MG/ML
INJECTION, SOLUTION INTRAVENOUS CONTINUOUS PRN
Status: DISCONTINUED | OUTPATIENT
Start: 2018-08-17 | End: 2018-08-17 | Stop reason: SDUPTHER

## 2018-08-17 RX ORDER — SODIUM CHLORIDE 0.9 % (FLUSH) 0.9 %
10 SYRINGE (ML) INJECTION PRN
Status: DISCONTINUED | OUTPATIENT
Start: 2018-08-17 | End: 2018-08-25 | Stop reason: HOSPADM

## 2018-08-17 RX ORDER — INSULIN GLARGINE 100 [IU]/ML
20 INJECTION, SOLUTION SUBCUTANEOUS ONCE
Status: DISCONTINUED | OUTPATIENT
Start: 2018-08-17 | End: 2018-08-25 | Stop reason: HOSPADM

## 2018-08-17 RX ORDER — PROPOFOL 10 MG/ML
INJECTION, EMULSION INTRAVENOUS PRN
Status: DISCONTINUED | OUTPATIENT
Start: 2018-08-17 | End: 2018-08-17 | Stop reason: SDUPTHER

## 2018-08-17 RX ORDER — LIDOCAINE HYDROCHLORIDE 20 MG/ML
INJECTION, SOLUTION EPIDURAL; INFILTRATION; INTRACAUDAL; PERINEURAL PRN
Status: DISCONTINUED | OUTPATIENT
Start: 2018-08-17 | End: 2018-08-17 | Stop reason: SDUPTHER

## 2018-08-17 RX ADMIN — PREGABALIN 50 MG: 50 CAPSULE ORAL at 21:25

## 2018-08-17 RX ADMIN — LIDOCAINE HYDROCHLORIDE 30 MG: 20 INJECTION, SOLUTION EPIDURAL; INFILTRATION; INTRACAUDAL; PERINEURAL at 13:32

## 2018-08-17 RX ADMIN — CARVEDILOL 6.25 MG: 6.25 TABLET, FILM COATED ORAL at 16:05

## 2018-08-17 RX ADMIN — PROPOFOL 100 MG: 10 INJECTION, EMULSION INTRAVENOUS at 13:37

## 2018-08-17 RX ADMIN — PANTOPRAZOLE SODIUM 40 MG: 40 TABLET, DELAYED RELEASE ORAL at 05:17

## 2018-08-17 RX ADMIN — ANTACID TABLETS 2500 MG: 500 TABLET, CHEWABLE ORAL at 16:05

## 2018-08-17 RX ADMIN — Medication 10 ML: at 21:22

## 2018-08-17 RX ADMIN — PROPOFOL 50 MG: 10 INJECTION, EMULSION INTRAVENOUS at 13:31

## 2018-08-17 RX ADMIN — LIDOCAINE HYDROCHLORIDE 20 MG: 20 INJECTION, SOLUTION EPIDURAL; INFILTRATION; INTRACAUDAL; PERINEURAL at 13:30

## 2018-08-17 RX ADMIN — MIDODRINE HYDROCHLORIDE 5 MG: 5 TABLET ORAL at 16:05

## 2018-08-17 RX ADMIN — SODIUM CHLORIDE: 9 INJECTION, SOLUTION INTRAVENOUS at 13:11

## 2018-08-17 RX ADMIN — PROPOFOL 50 MG: 10 INJECTION, EMULSION INTRAVENOUS at 13:30

## 2018-08-17 ASSESSMENT — ENCOUNTER SYMPTOMS: SHORTNESS OF BREATH: 1

## 2018-08-17 ASSESSMENT — PAIN - FUNCTIONAL ASSESSMENT: PAIN_FUNCTIONAL_ASSESSMENT: 0-10

## 2018-08-17 ASSESSMENT — PAIN SCALES - GENERAL
PAINLEVEL_OUTOF10: 0

## 2018-08-17 NOTE — PROGRESS NOTES
degenerations    - Continue to hold home medications lisinopril, Will add midodrine    8/16- Blood pressure on the low side, gentle hydration 50 mL per hour normal saline, discussed with nephrology for hemodialysis today, cardiology planning for JAVIER as soon as possible, appreciate ID input, planning for antibiotics for 4 weeks because of the prosthetic valve, Will repeat cultures in 1-2 days, Continue vancomycin, stop cefepime,X-ray of the finger does not show any osteomyelitis, Suspicion for ischemia secondary to Vessel calcification    8/17- JAVIER done today, awaiting for official report, no evidence of endocarditis, ulcers on the left hand suspicion for ischemic, as there on the tip of the fingers on the second third and fourth, discussed with ID, plan to do arterial Doppler first to see if any focal disease, and later based on that evaluate if patient having any vascular steal syndrome, will follow with nephrology,   -Repeat blood cultures tomorrow, blood pressure better    Subjective:    Patient still continues to have mild discomfort on the fingertips otherwise denies any complaints of nausea vomiting or abdominal pain, tolerating dialysis well, had a bowel movement      Medications:  Reviewed    Infusion Medications    sodium chloride      dextrose       Scheduled Medications    sodium chloride flush  10 mL Intravenous 2 times per day    insulin glargine  20 Units Subcutaneous Once    aspirin  81 mg Oral Daily    calcitRIOL  0.25 mcg Oral Daily    calcium carbonate  5 tablet Oral TID WC    carvedilol  6.25 mg Oral BID WC    clopidogrel  75 mg Oral Daily    fenofibrate  160 mg Oral Daily    FLUoxetine  20 mg Oral Daily    insulin glargine  55 Units Subcutaneous QAM AC    insulin lispro  10 Units Subcutaneous TID AC    pantoprazole  40 mg Oral QAM AC    pregabalin  50 mg Oral BID    sodium chloride flush  10 mL Intravenous 2 times per day    heparin (porcine)  5,000 Units Subcutaneous Q12H    Term Care Facility       [] Other-    Code Status: Full Code    PT/OT Eval Status:  Once more stable    Assessment/Plan:    Anticipated Discharge in :  1 week        Sepsis secondary to MRSA? Source, might need hemodialysis catheter removed, on empiric antibiotics cefepime and vancomycin,  -Continue vancomycin and stop cefepime, appreciate ID input  -JAVIER -  Done on 8/17, Awaiting official report, repeat cultures on 8/18,     Hypotension - 500 ml given 2 times, BP improved, stopped lisinopril , on gentle hydration  -Blood pressure improved, stop hydration    Left second third and fourth finger ulcerations- appears to be more of vasculitic ulcers, check arterial Doppler, and evaluate for vascular steal syndrome based on it    Diabetes mellitus type 2, diabetic nephropathy, peripheral neuropathy, long-term insulin, diabetic retinopathy  Lab Results   Component Value Date    LABA1C 6.6 (H) 08/15/2018     No results found for: EAG  On Lantus and sliding scale insulin, Lyrica    End-stage renal disease on hemodialysis:  Due for hemodialysis today, nephrology on board, has chronic edema in the legs    Secondary hyperparathyroidism of renal origin: Calcitriol    Coronary artery disease status post CABG status post stent, without angina:   OnAspirin, Plavix, Coreg, fenofibrate    Peripheral artery disease status post angioplasty: Plavix      Bioprosthetic aortic valve: Plavix    Chronic systolic congestive heart failure EF of 35-40%:  Evidence of volume overload continue Coreg    Paroxysmal atrial fibrillation: In sinus rhythm, continue Coreg    Myxomatic  degeneration of mitral valve, With mild mitral stenosis and mild mitral regurgitation    Anemia of chronic kidney disease- continue iron     Cardiac pacemaker history of third-degree AV block- in NSR    h xof AVM of colon    History of osteomyelitis of right hand finger status post partial amputation        Electronically signed by Pacheco Gates MD on 8/17/2018 at 5:38

## 2018-08-17 NOTE — ANESTHESIA PRE PROCEDURE
by mouth 2 times daily.     Historical Provider, MD       Current medications:    Current Facility-Administered Medications   Medication Dose Route Frequency Provider Last Rate Last Dose    sodium chloride flush 0.9 % injection 10 mL  10 mL Intravenous 2 times per day BRAEDEN Morfin CNP        sodium chloride flush 0.9 % injection 10 mL  10 mL Intravenous PRN BRAEDEN Morfin CNP        insulin glargine (LANTUS) injection vial 20 Units  20 Units Subcutaneous Once Carrol Tan MD        0.9 % sodium chloride infusion   Intravenous Continuous Napolean BRAEDEN Post CNP        aspirin chewable tablet 81 mg  81 mg Oral Daily Finn Chow MD   81 mg at 08/16/18 1026    calcitRIOL (ROCALTROL) capsule 0.25 mcg  0.25 mcg Oral Daily Shelley Christina MD   0.25 mcg at 08/16/18 1026    calcium carbonate (TUMS) chewable tablet 2,500 mg  5 tablet Oral TID  Shelley Christina MD   2,500 mg at 08/16/18 1840    carvedilol (COREG) tablet 6.25 mg  6.25 mg Oral BID  Shelley Christina MD   6.25 mg at 08/16/18 1840    clopidogrel (PLAVIX) tablet 75 mg  75 mg Oral Daily Shelley Christina MD   75 mg at 08/16/18 1026    fenofibrate tablet 160 mg  160 mg Oral Daily Shelley Christina MD   160 mg at 08/16/18 1026    FLUoxetine (PROZAC) capsule 20 mg  20 mg Oral Daily Shelley Christina MD   20 mg at 08/16/18 1026    insulin glargine (LANTUS) injection vial 55 Units  55 Units Subcutaneous QAM  Sugey Bennett MD   55 Units at 08/16/18 1028    insulin lispro (HUMALOG) injection vial 10 Units  10 Units Subcutaneous TID  Shelley Christina MD        nitroGLYCERIN (NITROSTAT) SL tablet 0.4 mg  0.4 mg Sublingual Q5 Min PRN Finn Chow MD        pantoprazole (PROTONIX) tablet 40 mg  40 mg Oral QAM  Shelley Christina MD   40 mg at 08/17/18 0517    pregabalin (LYRICA) capsule 50 mg  50 mg Oral BID Finn Chow MD   50 mg at 08/16/18 2126    sodium duration M54.9, G89.29    Chronic pain of both ankles M25.571, G89.29, M25.572    Peripheral neuropathy G62.9    Secondary DM with hypertension and ESRD on dialysis ([facility]) E13.22, I12.0, Z99.2, N18.6    Peripheral neuropathy ([facility]) G62.9    Proliferative diabetic retinopathy ([facility] E11.3599    Iron deficiency anemia due to chronic blood loss D50.0    ESRD (end stage renal disease) on dialysis ([facility]) N18.6, Z99.2    AVM (arteriovenous malformation) of colon without hemorrhage S17.75    Metabolic acidosis S77.8    Diabetic polyneuropathy associated with type 2 diabetes mellitus ([facility]) E11.42    Charcot's joint of foot in type 2 diabetes mellitus ([facility] [address] E11.610    Proliferative diabetic retinopathy without macular edema associated with type 2 diabetes mellitus ([facility] [address] S32.2115    Secondary hyperparathyroidism of renal origin ([facility] [facility] N25.81    Arterial hypotension I95.9    ESRD on hemodialysis ([facility]) N18.6, Z99.2    S/P AVR (aortic valve replacement) Z95.2    S/P CABG x 1 Z95.1    Hyperphosphatemia E83.39    Coronary artery disease involving coronary bypass graft of native heart without angina pectoris I25.810    Osteomyelitis of finger of right hand ([facility]) M86.9    S/P peripheral artery angioplasty: 10/30/2017: PTA of the left popliteal artery.  Z98.62    Peripheral vascular disease ([facility]) I73.9    Atheroembolism of both lower extremities ([facility]) I75.023    Chest pain R07.9    Chronic systolic congestive heart failure ([facility]) I50.22    Unstable angina ([facility]) I20.0    Symptomatic bradycardia R00.1    Heart block AV third degree ([facility]) I44.2    S/P cardiac catheterization Z98.890    Encounter for hemodialysis for ESRD ([facility] N18.6, Z99.2    Anemia due to stage 5 chronic kidney disease ([facility] N18.5, D63.1    Displacement of atrial pacemaker leads T82.120A    Adjustment and management of cardiac pacemaker Z45.018    Sepsis due to undetermined organism ([facility] A41.9    Ischemia of finger I99.8    Septicemia Answered      Vital Signs (Current):   Vitals:    08/16/18 2122 08/17/18 0420 08/17/18 0845 08/17/18 1001   BP: (!) 124/58 130/68 135/71    Pulse: 80 74 64    Resp: 20 19     Temp: 37.3 °C (99.1 °F) 36.3 °C (97.4 °F) 36.6 °C (97.8 °F)    TempSrc: Oral Oral     SpO2: 94% 92%     Weight:  246 lb 14.4 oz (112 kg) 236 lb 8.9 oz (107.3 kg)    Height:    6' 1\" (1.854 m)                                              BP Readings from Last 3 Encounters:   08/17/18 135/71   08/13/18 (!) 112/56   07/14/18 (!) 163/75       NPO Status:                                                                                 BMI:   Wt Readings from Last 3 Encounters:   08/17/18 236 lb 8.9 oz (107.3 kg)   08/13/18 247 lb (112 kg)   07/13/18 235 lb 3.7 oz (106.7 kg)     Body mass index is 31.21 kg/m².     CBC:   Lab Results   Component Value Date    WBC 5.7 08/16/2018    RBC 3.37 08/16/2018    HGB 10.1 08/16/2018    HCT 32.1 08/16/2018    MCV 95.3 08/16/2018    RDW 16.3 05/14/2018     08/16/2018       CMP:   Lab Results   Component Value Date     08/17/2018    K 3.7 08/17/2018    K 4.1 08/15/2018    CL 93 08/17/2018    CO2 20 08/17/2018    BUN 50 08/17/2018    CREATININE 7.6 08/17/2018    LABGLOM 7 08/17/2018    GLUCOSE 66 08/17/2018    PROT 8.7 08/14/2018    CALCIUM 8.7 08/17/2018    BILITOT 1.0 08/14/2018    ALKPHOS 82 08/14/2018    AST 23 08/14/2018    ALT 10 08/14/2018       POC Tests:   Recent Labs      08/17/18   0629   POCGLU  94       Coags:   Lab Results   Component Value Date    PROTIME 13.6 02/25/2016    INR 1.46 08/14/2018    APTT 35.9 08/14/2018       HCG (If Applicable): No results found for: PREGTESTUR, PREGSERUM, HCG, HCGQUANT     ABGs: No results found for: PHART, PO2ART, FJX1ROO, TJN9NYB, BEART, G8JFBOHG     Type & Screen (If Applicable):  Lab Results   Component Value Date    [address] POS 07/09/2018       Anesthesia Evaluation  Patient summary reviewed  Airway: Mallampati: II  TM distance: >3 FB   Neck ROM: limited  Mouth opening: > = 3 FB Dental:    (+) edentulous      Pulmonary:normal exam    (+) shortness of breath: no interval change,  sleep apnea: on noncompliant,                             Cardiovascular:    (+) hypertension: no interval change, angina: no interval change, pacemaker: pacemaker, CAD: no interval change, CABG/stent: no interval change, CHF: no interval change,         Rhythm: regular  Rate: normal                    Neuro/Psych:   (+) neuromuscular disease:, psychiatric history:            GI/Hepatic/Renal:   (+) GERD:, renal disease: ESRD and dialysis,           Endo/Other:    (+) DiabetesType II DM, , : arthritis:., .                 Abdominal:           Vascular:                                      Anesthesia Plan      MAC     ASA 4       Induction: intravenous. Anesthetic plan and risks discussed with patient. Plan discussed with attending.                   Loreda Phoenix, APRN - CRNA   8/17/2018

## 2018-08-17 NOTE — PROGRESS NOTES
Renal Progress Note    Patient - Hector Juarez   MRN -  [id number]   Acct # - [de-identified]      - 1960    60 y.o. Admit Date: 2018  Hospital Day: 3  Location: -15/015-A  Date of evaluation -  2018    Subjective:   CC: fever  Denies sob or cough. Hemodialysis  with 1900 Ultrafiltration   Pt seen during hemodialysis today, Hemodynamically stable, Goal Ultrafiltration 1000 ml. 3 K bath    Objective:   VITALS:  /71   Pulse 64   Temp 97.8 °F (36.6 °C)   Resp 19   Wt 236 lb 8.9 oz (107.3 kg)   SpO2 92%   BMI 31.21 kg/m²    Patient Vitals for the past 24 hrs:   BP Temp Temp src Pulse Resp SpO2 Weight   18 0845 135/71 97.8 °F (36.6 °C) - 64 - - 236 lb 8.9 oz (107.3 kg)   18 0420 130/68 97.4 °F (36.3 °C) Oral 74 19 92 % 246 lb 14.4 oz (112 kg)   18 2122 (!) 124/58 99.1 °F (37.3 °C) Oral 80 20 94 % -   18 1830 (!) 149/65 98.5 °F (36.9 °C) Oral 75 (!) 76 96 % -   18 1800 (!) 125/53 97 °F (36.1 °C) - 70 - - -   18 1415 (!) 103/56 97.7 °F (36.5 °C) - 61 - - 245 lb 2.4 oz (111.2 kg)   18 1202 (!) 92/46 97.9 °F (36.6 °C) Oral 67 16 95 % -     2 L/min  Patient Vitals for the past 96 hrs (Last 3 readings):   Weight   18 0845 236 lb 8.9 oz (107.3 kg)   18 0420 246 lb 14.4 oz (112 kg)   18 1415 245 lb 2.4 oz (111.2 kg)       Intake/Output Summary (Last 24 hours) at 18 1000  Last data filed at 18 0420   Gross per 24 hour   Intake          2137.54 ml   Output             1900 ml   Net           237.54 ml       EXAM:  CONSTITUTIONAL:  No acute distress. Lying comfortable in bed. Pleasant  HEENT:  Head is normocephalic, Extraocular movement intact. Neck is supple. Voice is clear. CARDIOVASCULAR:  S1, S2  regular rate and rhythm. RESPIRATORY: Clear to ausculation bilaterally. Equal breath sounds. No wheezes.  No shortness of breath noted at rest.  ABDOMEN: soft, non tender  NEUROLOGICAL: Patient is alert and oriented to person,

## 2018-08-18 LAB
ANION GAP SERPL CALCULATED.3IONS-SCNC: 18 MEQ/L (ref 8–16)
BUN BLDV-MCNC: 34 MG/DL (ref 7–22)
CALCIUM SERPL-MCNC: 8.6 MG/DL (ref 8.5–10.5)
CHLORIDE BLD-SCNC: 91 MEQ/L (ref 98–111)
CO2: 24 MEQ/L (ref 23–33)
CREAT SERPL-MCNC: 6 MG/DL (ref 0.4–1.2)
ERYTHROCYTE [DISTWIDTH] IN BLOOD BY AUTOMATED COUNT: 16.1 % (ref 11.5–14.5)
ERYTHROCYTE [DISTWIDTH] IN BLOOD BY AUTOMATED COUNT: 54.6 FL (ref 35–45)
GFR SERPL CREATININE-BSD FRML MDRD: 10 ML/MIN/1.73M2
GLUCOSE BLD-MCNC: 122 MG/DL (ref 70–108)
GLUCOSE BLD-MCNC: 125 MG/DL (ref 70–108)
GLUCOSE BLD-MCNC: 128 MG/DL (ref 70–108)
GLUCOSE BLD-MCNC: 129 MG/DL (ref 70–108)
GLUCOSE BLD-MCNC: 98 MG/DL (ref 70–108)
HCT VFR BLD CALC: 30.7 % (ref 42–52)
HEMOGLOBIN: 9.6 GM/DL (ref 14–18)
MCH RBC QN AUTO: 29.1 PG (ref 26–33)
MCHC RBC AUTO-ENTMCNC: 31.3 GM/DL (ref 32.2–35.5)
MCV RBC AUTO: 93 FL (ref 80–94)
PLATELET # BLD: 126 THOU/MM3 (ref 130–400)
PMV BLD AUTO: 11 FL (ref 9.4–12.4)
POTASSIUM SERPL-SCNC: 4 MEQ/L (ref 3.5–5.2)
PROCALCITONIN: 1.99 NG/ML (ref 0.01–0.09)
RBC # BLD: 3.3 MILL/MM3 (ref 4.7–6.1)
SODIUM BLD-SCNC: 133 MEQ/L (ref 135–145)
WBC # BLD: 4.3 THOU/MM3 (ref 4.8–10.8)

## 2018-08-18 PROCEDURE — 6370000000 HC RX 637 (ALT 250 FOR IP): Performed by: NURSE PRACTITIONER

## 2018-08-18 PROCEDURE — 99233 SBSQ HOSP IP/OBS HIGH 50: CPT | Performed by: INTERNAL MEDICINE

## 2018-08-18 PROCEDURE — 2580000003 HC RX 258: Performed by: NURSE PRACTITIONER

## 2018-08-18 PROCEDURE — 82948 REAGENT STRIP/BLOOD GLUCOSE: CPT

## 2018-08-18 PROCEDURE — 84145 PROCALCITONIN (PCT): CPT

## 2018-08-18 PROCEDURE — 87040 BLOOD CULTURE FOR BACTERIA: CPT

## 2018-08-18 PROCEDURE — 36415 COLL VENOUS BLD VENIPUNCTURE: CPT

## 2018-08-18 PROCEDURE — 6370000000 HC RX 637 (ALT 250 FOR IP): Performed by: INTERNAL MEDICINE

## 2018-08-18 PROCEDURE — 1200000003 HC TELEMETRY R&B

## 2018-08-18 PROCEDURE — 85027 COMPLETE CBC AUTOMATED: CPT

## 2018-08-18 PROCEDURE — 6370000000 HC RX 637 (ALT 250 FOR IP): Performed by: FAMILY MEDICINE

## 2018-08-18 PROCEDURE — 99232 SBSQ HOSP IP/OBS MODERATE 35: CPT | Performed by: INTERNAL MEDICINE

## 2018-08-18 PROCEDURE — 6360000002 HC RX W HCPCS: Performed by: FAMILY MEDICINE

## 2018-08-18 PROCEDURE — 80048 BASIC METABOLIC PNL TOTAL CA: CPT

## 2018-08-18 RX ADMIN — FENOFIBRATE 160 MG: 160 TABLET ORAL at 09:30

## 2018-08-18 RX ADMIN — ANTACID TABLETS 2500 MG: 500 TABLET, CHEWABLE ORAL at 09:31

## 2018-08-18 RX ADMIN — FLUOXETINE HYDROCHLORIDE 20 MG: 20 CAPSULE ORAL at 09:30

## 2018-08-18 RX ADMIN — PANTOPRAZOLE SODIUM 40 MG: 40 TABLET, DELAYED RELEASE ORAL at 06:45

## 2018-08-18 RX ADMIN — Medication 10 UNITS: at 12:21

## 2018-08-18 RX ADMIN — CALCITRIOL 0.25 MCG: 0.25 CAPSULE ORAL at 09:30

## 2018-08-18 RX ADMIN — PREGABALIN 50 MG: 50 CAPSULE ORAL at 09:30

## 2018-08-18 RX ADMIN — INSULIN GLARGINE 55 UNITS: 100 INJECTION, SOLUTION SUBCUTANEOUS at 09:36

## 2018-08-18 RX ADMIN — Medication 3.3 MG: at 03:17

## 2018-08-18 RX ADMIN — Medication 10 ML: at 19:41

## 2018-08-18 RX ADMIN — CARVEDILOL 6.25 MG: 6.25 TABLET, FILM COATED ORAL at 09:30

## 2018-08-18 RX ADMIN — MIDODRINE HYDROCHLORIDE 5 MG: 5 TABLET ORAL at 09:30

## 2018-08-18 RX ADMIN — MIDODRINE HYDROCHLORIDE 5 MG: 5 TABLET ORAL at 12:21

## 2018-08-18 RX ADMIN — Medication 10 ML: at 09:31

## 2018-08-18 RX ADMIN — MIDODRINE HYDROCHLORIDE 5 MG: 5 TABLET ORAL at 16:12

## 2018-08-18 RX ADMIN — PREGABALIN 50 MG: 50 CAPSULE ORAL at 19:41

## 2018-08-18 RX ADMIN — ASPIRIN 81 MG CHEWABLE TABLET 81 MG: 81 TABLET CHEWABLE at 09:31

## 2018-08-18 RX ADMIN — CLOPIDOGREL BISULFATE 75 MG: 75 TABLET ORAL at 09:30

## 2018-08-18 RX ADMIN — ANTACID TABLETS 2500 MG: 500 TABLET, CHEWABLE ORAL at 16:12

## 2018-08-18 RX ADMIN — CARVEDILOL 6.25 MG: 6.25 TABLET, FILM COATED ORAL at 16:12

## 2018-08-18 ASSESSMENT — PAIN SCALES - GENERAL
PAINLEVEL_OUTOF10: 2
PAINLEVEL_OUTOF10: 2
PAINLEVEL_OUTOF10: 0

## 2018-08-18 ASSESSMENT — PAIN DESCRIPTION - PAIN TYPE: TYPE: CHRONIC PAIN

## 2018-08-18 ASSESSMENT — PAIN DESCRIPTION - LOCATION: LOCATION: HAND

## 2018-08-18 NOTE — FLOWSHEET NOTE
08/18/18 1336   Provider Notification   Reason for Communication Evaluate  (consult to vascular surgery )   Provider Name Noel Milton   Method of Communication Secure Message   Response Waiting for response   Notification Time

## 2018-08-18 NOTE — PROGRESS NOTES
Progress note: Infectious diseases    Patient - Sd Runner,  Age - 60 y.o.    - 1960      Room Number - 6K-15/015-A   MRN -  [id number]   [facility]  Date of Admission -  2018  8:17 PM    SUBJECTIVE:   No new issues  Arterial doppler study noted of the upper extremity  JAVIER report noted has calcified ?vegetation on the ventricular side of the mitral valve  OBJECTIVE   VITALS    height is 6' 1\" (1.854 m) and weight is 240 lb 4.8 oz (109 kg). His oral temperature is 98.1 °F (36.7 °C). His blood pressure is 106/52 (abnormal) and his pulse is 61. His respiration is 22 and oxygen saturation is 92%. Wt Readings from Last 3 Encounters:   18 240 lb 4.8 oz (109 kg)   18 247 lb (112 kg)   18 235 lb 3.7 oz (106.7 kg)       I/O (24 Hours)    Intake/Output Summary (Last 24 hours) at 18 1315  Last data filed at 18 1355   Gross per 24 hour   Intake              200 ml   Output                0 ml   Net              200 ml       General Appearance  Awake, alert, oriented,  Chronically sick looking.   HEENT - normocephalic, atraumatic, slighlty pale conjunctiva,  anicteric sclera  Neck - Supple, no mass  Lungs -  Bilateral  air entry, no rhonchi, no wheeze  Cardiovascular - Heart sounds are normal. Systolic murmur at the base of the heart   Abdomen - soft, not distended, nontender,   Neurologic -oriented  Skin - No bruising or bleeding  Extremities - No edema, no cyanosis, clubbing  Has fistula on the left upper arm  Ischemic digits    MEDICATIONS:      sodium chloride flush  10 mL Intravenous 2 times per day    insulin glargine  20 Units Subcutaneous Once    aspirin  81 mg Oral Daily    calcitRIOL  0.25 mcg Oral Daily    calcium carbonate  5 tablet Oral TID     carvedilol  6.25 mg Oral BID     clopidogrel  75 mg Oral Daily    fenofibrate  160 mg Oral Daily    FLUoxetine hours. CULTURES:   UA:   Recent Labs      08/16/18   0325   PHUR  5.0   COLORU  YELLOW   PROTEINU  >= 1000   BLOODU  MODERATE*   RBCUA  5-10   WBCUA  > 100   BACTERIA  NONE   NITRU  NEGATIVE   GLUCOSEU  250*   BILIRUBINUR  SMALL*   UROBILINOGEN  0.2   KETUA  TRACE*     Micro:   Lab Results   Component Value Date    [facility]  08/14/2018     This is a MRSA (Methicillin Resistant Staphylococcus  aureus)isolate. Isolates of MRSA (ORSA) Methicillin (Oxacillin) Resistant  Staphylococcus aureus (coagulase positive) require patient  be placed in CONTACT isolation. Methicillin(Oxacillin)resistant strains of staphylococci  (MRSA)or(MRSE)should be considered resistant to all classes  of cephalosporins, penems and beta-lactams. In the treatment of gram positive infections, GENTAMICIN  should be CONSIDERED a SYNERGYSTIC agent ONLY.            IMAGING:         Problem list of patient:     Patient Active Problem List   Diagnosis Code    SOB (shortness of breath) R06.02    Anemia in chronic kidney disease (CKD) N18.9, D63.1    HTN (hypertension) I10    Coronary artery disease involving native coronary artery of native heart with unstable angina pectoris ([facility]) I25.110    Type 2 diabetes mellitus with diabetic nephropathy, with long-term current use of insulin ([facility]) E11.21, Z79.4    Chronic back pain greater than 3 months duration M54.9, G89.29    Chronic pain of both ankles M25.571, G89.29, M25.572    Peripheral neuropathy G62.9    Secondary DM with hypertension and ESRD on dialysis ([facility]) E13.22, I12.0, Z99.2, N18.6    Peripheral neuropathy ([address] G62.9    Proliferative diabetic retinopathy ([address] E11.3599    Iron deficiency anemia due to chronic blood loss D50.0    ESRD (end stage renal disease) on dialysis ([facility]) N18.6, Z99.2    AVM (arteriovenous malformation) of colon without hemorrhage Q36.56    Metabolic acidosis T77.6    Diabetic polyneuropathy associated with type 2 diabetes mellitus ([address] E11.42    Charcot's joint of foot in type 2 diabetes mellitus [facility] E11.610    Proliferative diabetic retinopathy without macular edema associated with type 2 diabetes mellitus ([facility] E11.3599    Secondary hyperparathyroidism of renal origin ([facility] N25.81    Arterial hypotension I95.9    ESRD on hemodialysis ([facility]) N18.6, Z99.2    S/P AVR (aortic valve replacement) Z95.2    S/P CABG x 1 Z95.1    Hyperphosphatemia E83.39    Coronary artery disease involving coronary bypass graft of native heart without angina pectoris I25.810    Osteomyelitis of finger of right hand ([facility]) M86.9    S/P peripheral artery angioplasty: 10/30/2017: PTA of the left popliteal artery.  Z98.62    Peripheral vascular disease ([facility]) I73.9    Atheroembolism of both lower extremities ([facility]) I75.023    Chest pain R07.9    Chronic systolic congestive heart failure ([facility]) I50.22    Unstable angina ([facility]) I20.0    Symptomatic bradycardia R00.1    Heart block AV third degree ([facility]) I44.2    S/P cardiac catheterization Z98.890    Encounter for hemodialysis for ESRD ([facility] N18.6, Z99.2    Anemia due to stage 5 chronic kidney disease ([facility]) N18.5, D63.1    Displacement of atrial pacemaker leads T82.120A    Adjustment and management of cardiac pacemaker Z45.018    Sepsis due to undetermined organism ([facility] A41.9    Ischemia of finger I99.8    Septicemia ([facility]) A41.9    Aortic valve prosthesis present Z95.2    Sepsis due to methicillin resistant Staphylococcus aureus (MRSA) ([facility]) A41.02    Diabetic peripheral neuropathy associated with type 2 diabetes mellitus ([facility]) E11.42    Paroxysmal atrial fibrillation ([facility]) I48.0    Open wound of left index finger without damage to nail S61.201A    Ischemic ulcer of finger, limited to breakdown of skin ([facility] L98.491         ASSESSMENT/PLAN   ESRD on HD  MRSA bacteremia:will treat him for endocaridtes  Hx of aortic valve replacement  Ischemic digits due to vessel calcification: arterial doppler shows diminished blood

## 2018-08-18 NOTE — PROGRESS NOTES
degenerations    - Continue to hold home medications lisinopril, Will add midodrine    8/16- Blood pressure on the low side, gentle hydration 50 mL per hour normal saline, discussed with nephrology for hemodialysis today, cardiology planning for JAVIER as soon as possible, appreciate ID input, planning for antibiotics for 4 weeks because of the prosthetic valve, Will repeat cultures in 1-2 days, Continue vancomycin, stop cefepime,X-ray of the finger does not show any osteomyelitis, Suspicion for ischemia secondary to Vessel calcification    8/17- JAVIER done today, awaiting for official report, no evidence of endocarditis, ulcers on the left hand suspicion for ischemic, as there on the tip of the fingers on the second third and fourth, discussed with ID, plan to do arterial Doppler first to see if any focal disease, and later based on that evaluate if patient having any vascular steal syndrome, will follow with nephrology,   -Repeat blood cultures tomorrow, blood pressure better    Subjective:    Patient still continues to have mild discomfort on the fingertips otherwise denies any complaints of nausea vomiting or abdominal pain, tolerating dialysis well, had a bowel movement      Medications:  Reviewed    Infusion Medications    dextrose       Scheduled Medications    sodium chloride flush  10 mL Intravenous 2 times per day    insulin glargine  20 Units Subcutaneous Once    aspirin  81 mg Oral Daily    calcitRIOL  0.25 mcg Oral Daily    calcium carbonate  5 tablet Oral TID WC    carvedilol  6.25 mg Oral BID WC    clopidogrel  75 mg Oral Daily    fenofibrate  160 mg Oral Daily    FLUoxetine  20 mg Oral Daily    insulin glargine  55 Units Subcutaneous QAM AC    insulin lispro  10 Units Subcutaneous TID AC    pantoprazole  40 mg Oral QAM AC    pregabalin  50 mg Oral BID    sodium chloride flush  10 mL Intravenous 2 times per day    heparin (porcine)  5,000 Units Subcutaneous Q12H    vancomycin (VANCOCIN) intermittent dosing (placeholder)   Other RX Placeholder    insulin lispro  0-6 Units Subcutaneous TID WC    midodrine  5 mg Oral TID WC     PRN Meds: menthol, sodium chloride flush, nitroGLYCERIN, sodium chloride flush, acetaminophen, acetaminophen, glucose, dextrose, glucagon (rDNA), dextrose      Intake/Output Summary (Last 24 hours) at 08/18/18 1209  Last data filed at 08/17/18 1355   Gross per 24 hour   Intake              600 ml   Output              400 ml   Net              200 ml       Diet:  DIET RENAL;    Exam:  BP (!) 106/52   Pulse 61   Temp 98.1 °F (36.7 °C) (Oral)   Resp 22   Ht 6' 1\" (1.854 m)   Wt 240 lb 4.8 oz (109 kg)   SpO2 92%   BMI 31.70 kg/m²       Physical Examination:   General appearance - alert, awake  and in no distress, Resting in bed during hemodialysis  HEENT: Normocephalic, Atraumatic, pupils reactive, mucous membranes moist  Chest - moving equally to respiration,symmetric air entry, clear to auscultation  Heart - normal rate, regular rhythm, normal S1, S2, no murmurs,  Abdomen - soft, nontender, distended, no masses or organomegaly, BS+  Neurological - alert, oriented, normal speech, sensations intact and able to move all extremities   Extremities - peripheral pulses poor , 1+ b/l pedal edema,  Capillary refill less than 3 sec, left index finger - dark  Discolored dried scab like lesion on lateral side of index finger,   Small ulceration on the third and fourth finger distally,  right 2nd toe- superficial ulceration   Skin - normal coloration and turgor, no rashes  Left fore arm long av fistula  History of surgery on right ankle      Labs:   Recent Labs      08/16/18   0508  08/18/18   0555   WBC  5.7  4.3*   HGB  10.1*  9.6*   HCT  32.1*  30.7*   PLT  109*  126*     Recent Labs      08/16/18   0508  08/17/18   0550  08/18/18   0555   NA  132*  131*  133*   K  4.2  3.7  4.0   CL  91*  93*  91*   CO2  17*  20*  24   BUN  71*  50*  34*   CREATININE  9.1*  7.6*  6.0*   CALCIUM

## 2018-08-18 NOTE — PROGRESS NOTES
[facility]    Renal Inpatient Follow-Up note  8/18/2018 3:10 PM      Pt Name:   Carlos Bales  YOB: 1960  Attending:   Rola Patel MD  Room No :  6K-15/015-A    Carlos Bales is being seen by nephrology for End-stage renal disease     Interval History : seen and examined the patient  No CP / SOB. Sleeping comfortably. Scheduled Medications :   sodium chloride flush  10 mL Intravenous 2 times per day    insulin glargine  20 Units Subcutaneous Once    aspirin  81 mg Oral Daily    calcitRIOL  0.25 mcg Oral Daily    calcium carbonate  5 tablet Oral TID WC    carvedilol  6.25 mg Oral BID WC    clopidogrel  75 mg Oral Daily    fenofibrate  160 mg Oral Daily    FLUoxetine  20 mg Oral Daily    insulin glargine  55 Units Subcutaneous QAM AC    insulin lispro  10 Units Subcutaneous TID AC    pantoprazole  40 mg Oral QAM AC    pregabalin  50 mg Oral BID    sodium chloride flush  10 mL Intravenous 2 times per day    heparin (porcine)  5,000 Units Subcutaneous Q12H    vancomycin (VANCOCIN) intermittent dosing (placeholder)   Other RX Placeholder    insulin lispro  0-6 Units Subcutaneous TID WC    midodrine  5 mg Oral TID WC      dextrose         Vitals :  BP (!) 106/52   Pulse 61   Temp 98.1 °F (36.7 °C) (Oral)   Resp 22   Ht 6' 1\" (1.854 m)   Wt 240 lb 4.8 oz (109 kg)   SpO2 92%   BMI 31.70 kg/m²     24HR INTAKE/OUTPUT:      Intake/Output Summary (Last 24 hours) at 08/18/18 1510  Last data filed at 08/18/18 1140   Gross per 24 hour   Intake              595 ml   Output                0 ml   Net              595 ml     Last 3 weights  Wt Readings from Last 3 Encounters:   08/18/18 240 lb 4.8 oz (109 kg)   08/13/18 247 lb (112 kg)   07/13/18 235 lb 3.7 oz (106.7 kg)        Physical Exam :  General Appearance:  Well developed.  No distress  Mouth/Throat:  Oral mucosa moist  Neck:  Supple, no JVD  Lungs:  Breath sounds: clear  Heart[de-identified]  S1,S2 heard  Abdomen: Soft, non - tender  Musculoskeletal:  Edema - Mild edema noted bilaterally     Last 3 CBC  Recent Labs      08/16/18   0508  08/18/18   0555   WBC  5.7  4.3*   RBC  3.37*  3.30*   HGB  10.1*  9.6*   HCT  32.1*  30.7*   PLT  109*  126*     Last 3 CMP  Recent Labs      08/16/18   0508  08/17/18   0550  08/18/18   0555   NA  132*  131*  133*   K  4.2  3.7  4.0   CL  91*  93*  91*   CO2  17*  20*  24   BUN  71*  50*  34*   CREATININE  9.1*  7.6*  6.0*   CALCIUM  8.5  8.7  8.6        Assessment :   ESRD on hemodialysis, currently volume status appears reasonable no acute 8 for dialysis  Chronic hypotension on Midodrin  Sepsis with MRSA status post JAVIER  Chronic systolic congestive heart failure reasonably well compensated at this time  Mild hyponatremia adjust with dialysis  Metabolic acidosis much improved  Anemia of chronic kidney disease  Secondary hyperparathyroidism  Diabetes mellitus  Medications reviewed       SINDY Culver D.  [facility].

## 2018-08-19 PROBLEM — I33.0 ENDOCARDITIS DUE TO STAPHYLOCOCCUS: Status: ACTIVE | Noted: 2018-08-19

## 2018-08-19 PROBLEM — B95.8 ENDOCARDITIS DUE TO STAPHYLOCOCCUS: Status: ACTIVE | Noted: 2018-08-19

## 2018-08-19 LAB
GLUCOSE BLD-MCNC: 112 MG/DL (ref 70–108)
GLUCOSE BLD-MCNC: 115 MG/DL (ref 70–108)
GLUCOSE BLD-MCNC: 137 MG/DL (ref 70–108)
GLUCOSE BLD-MCNC: 94 MG/DL (ref 70–108)
ORGANISM: ABNORMAL
URINE CULTURE REFLEX: ABNORMAL

## 2018-08-19 PROCEDURE — 1200000003 HC TELEMETRY R&B

## 2018-08-19 PROCEDURE — 99232 SBSQ HOSP IP/OBS MODERATE 35: CPT | Performed by: INTERNAL MEDICINE

## 2018-08-19 PROCEDURE — 2580000003 HC RX 258: Performed by: NURSE PRACTITIONER

## 2018-08-19 PROCEDURE — 6370000000 HC RX 637 (ALT 250 FOR IP): Performed by: INTERNAL MEDICINE

## 2018-08-19 PROCEDURE — 99233 SBSQ HOSP IP/OBS HIGH 50: CPT | Performed by: INTERNAL MEDICINE

## 2018-08-19 PROCEDURE — 6370000000 HC RX 637 (ALT 250 FOR IP): Performed by: FAMILY MEDICINE

## 2018-08-19 PROCEDURE — 82948 REAGENT STRIP/BLOOD GLUCOSE: CPT

## 2018-08-19 RX ORDER — LOPERAMIDE HYDROCHLORIDE 2 MG/1
2 CAPSULE ORAL 4 TIMES DAILY PRN
Status: DISCONTINUED | OUTPATIENT
Start: 2018-08-19 | End: 2018-08-25 | Stop reason: HOSPADM

## 2018-08-19 RX ORDER — MIDODRINE HYDROCHLORIDE 5 MG/1
5 TABLET ORAL
Status: DISCONTINUED | OUTPATIENT
Start: 2018-08-19 | End: 2018-08-25 | Stop reason: HOSPADM

## 2018-08-19 RX ADMIN — Medication 10 ML: at 09:02

## 2018-08-19 RX ADMIN — MIDODRINE HYDROCHLORIDE 5 MG: 5 TABLET ORAL at 16:27

## 2018-08-19 RX ADMIN — INSULIN GLARGINE 55 UNITS: 100 INJECTION, SOLUTION SUBCUTANEOUS at 09:02

## 2018-08-19 RX ADMIN — ASPIRIN 81 MG CHEWABLE TABLET 81 MG: 81 TABLET CHEWABLE at 09:07

## 2018-08-19 RX ADMIN — MIDODRINE HYDROCHLORIDE 5 MG: 5 TABLET ORAL at 11:33

## 2018-08-19 RX ADMIN — ANTACID TABLETS 2500 MG: 500 TABLET, CHEWABLE ORAL at 11:34

## 2018-08-19 RX ADMIN — PANTOPRAZOLE SODIUM 40 MG: 40 TABLET, DELAYED RELEASE ORAL at 06:37

## 2018-08-19 RX ADMIN — Medication 10 ML: at 20:57

## 2018-08-19 RX ADMIN — LOPERAMIDE HYDROCHLORIDE 2 MG: 2 CAPSULE ORAL at 11:34

## 2018-08-19 RX ADMIN — CLOPIDOGREL BISULFATE 75 MG: 75 TABLET ORAL at 09:01

## 2018-08-19 RX ADMIN — FLUOXETINE HYDROCHLORIDE 20 MG: 20 CAPSULE ORAL at 09:01

## 2018-08-19 RX ADMIN — PREGABALIN 50 MG: 50 CAPSULE ORAL at 09:01

## 2018-08-19 RX ADMIN — CARVEDILOL 6.25 MG: 6.25 TABLET, FILM COATED ORAL at 16:27

## 2018-08-19 RX ADMIN — ANTACID TABLETS 2500 MG: 500 TABLET, CHEWABLE ORAL at 09:02

## 2018-08-19 RX ADMIN — ANTACID TABLETS 2500 MG: 500 TABLET, CHEWABLE ORAL at 16:27

## 2018-08-19 RX ADMIN — FENOFIBRATE 160 MG: 160 TABLET ORAL at 09:01

## 2018-08-19 RX ADMIN — CALCITRIOL 0.25 MCG: 0.25 CAPSULE ORAL at 09:01

## 2018-08-19 RX ADMIN — LOPERAMIDE HYDROCHLORIDE 2 MG: 2 CAPSULE ORAL at 16:26

## 2018-08-19 RX ADMIN — CARVEDILOL 6.25 MG: 6.25 TABLET, FILM COATED ORAL at 09:01

## 2018-08-19 RX ADMIN — PREGABALIN 50 MG: 50 CAPSULE ORAL at 20:57

## 2018-08-19 ASSESSMENT — PAIN SCALES - GENERAL
PAINLEVEL_OUTOF10: 0

## 2018-08-19 NOTE — PROGRESS NOTES
[facility]    Renal Inpatient Follow-Up note  8/19/2018 1:47 PM      Pt Name:   Grey Andersen  YOB: 1960  Attending:   Giovanny Guerrero MD  Room No :  6K-15/015-A    Grey Andersen is being seen by nephrology for End-stage renal disease     Interval History : seen and examined the patient  No CP / SOB. No distress      Scheduled Medications :   midodrine  5 mg Oral TID WC    sodium chloride flush  10 mL Intravenous 2 times per day    insulin glargine  20 Units Subcutaneous Once    aspirin  81 mg Oral Daily    calcitRIOL  0.25 mcg Oral Daily    calcium carbonate  5 tablet Oral TID WC    carvedilol  6.25 mg Oral BID WC    clopidogrel  75 mg Oral Daily    fenofibrate  160 mg Oral Daily    FLUoxetine  20 mg Oral Daily    insulin glargine  55 Units Subcutaneous QAM AC    insulin lispro  10 Units Subcutaneous TID AC    pantoprazole  40 mg Oral QAM AC    pregabalin  50 mg Oral BID    sodium chloride flush  10 mL Intravenous 2 times per day    heparin (porcine)  5,000 Units Subcutaneous Q12H    vancomycin (VANCOCIN) intermittent dosing (placeholder)   Other RX Placeholder    insulin lispro  0-6 Units Subcutaneous TID WC      dextrose         Vitals :  BP (!) 109/56   Pulse 65   Temp 98.4 °F (36.9 °C) (Oral)   Resp 16   Ht 6' 1\" (1.854 m)   Wt 253 lb 1.6 oz (114.8 kg)   SpO2 95%   BMI 33.39 kg/m²     24HR INTAKE/OUTPUT:      Intake/Output Summary (Last 24 hours) at 08/19/18 1347  Last data filed at 08/19/18 0901   Gross per 24 hour   Intake               10 ml   Output                0 ml   Net               10 ml     Last 3 weights  Wt Readings from Last 3 Encounters:   08/19/18 253 lb 1.6 oz (114.8 kg)   08/13/18 247 lb (112 kg)   07/13/18 235 lb 3.7 oz (106.7 kg)        Physical Exam :  General Appearance:  Well developed.  No distress  Mouth/Throat:  Oral mucosa moist  Neck:  Supple, no JVD  Lungs:  Breath sounds: clear  Heart[de-identified]  S1,S2 heard  Abdomen:  Soft,

## 2018-08-19 NOTE — PLAN OF CARE
Mitch Box is a 3 month old male presenting with rash on neck and inside of arms that has been ongoing for over a month.  Prescribed nystatin cream 7/11/18. Rash same.  Mother reports rash has become pruritic within last 5 days.  Applying aquaphor to rest of arms and back.  Afebrile.     Medications verified, no changes.  Denies known Latex allergy or symptoms of Latex sensitivity.  There are no preventive care reminders to display for this patient.    Patient would like communication of their results via:        Cell Phone:   Telephone Information:   Mobile [phone]     Okay to leave a message containing results? Yes, mother's mobile.     Problem: Falls - Risk of:  Goal: Will remain free from falls  Will remain free from falls    Outcome: Ongoing  Pt free from falls this shift. Nonskid socks on, bed and chair alarm on, 2/4 side rails up, uses call light appropriately. Will continue to monitor. Problem: Pain Control  Goal: Maintain pain level at or below patient's acceptable level (or 5 if patient is unable to determine acceptable level)  Outcome: Ongoing  Pt denied pain this shift. Will continue to monitor and manage when appropriate. Problem: Cardiovascular  Goal: No DVT, peripheral vascular complications  Outcome: Ongoing  Lower extremity pulses difficult to locate due to edema. Can find pulses with Doppler. Will continue to monitor. Goal: Hemodynamic stability  Outcome: Ongoing  Vitals:    08/19/18 1132   BP: (!) 109/56   Pulse: 65   Resp: 16   Temp: 98.4 °F (36.9 °C)   SpO2: 95%     Pt's vital signs stable at this time. BP is being controlled with medication. Will continue to monitor. Problem: Respiratory  Goal: No pulmonary complications  Outcome: Ongoing  Pt's lungs clear in upper lobes and diminished in lower lobes. Oxygen sat 95% on room air. Free from cough. Will continue to monitor. Goal: O2 Sat > 90%  Outcome: Ongoing  Oxygen sat 95% on room air. Will continue to monitor. Problem:   Goal: Adequate urinary output  Outcome: Ongoing  Hemodialysis pt. Pt makes little urine. Will continue to monitor. Problem: Nutrition  Goal: Optimal nutrition therapy  Outcome: Ongoing  Pt ate % of breakfast and lunch this shift. Will continue to monitor. Problem: Skin Integrity/Risk  Goal: No skin breakdown during hospitalization  Outcome: Ongoing  No new skin breakdown noted this shift. Pt turns self and keeps skin dry. Will continue to monitor. Problem: Musculor/Skeletal Functional Status  Goal: Absence of falls  Outcome: Ongoing  Pt free from falls this shift.  Nonskid socks on, bed and chair alarm on, 2/4 side rails up, uses

## 2018-08-19 NOTE — PROGRESS NOTES
Hospitalist Progress Note    Patient:  Hernandez Shah      Unit/Bed:6K-15/015-A    YOB: 1960    MRN: [id number]       Acct: [de-identified]     PCP: Selena Davila MD    Date of Admission: 8/14/2018    Chief Complaint: [address] Course:  Pt with ESRD, presented with fever; bioprosthetic aortic valve one year ago. Blood cult pos for MRSA, JAVIER suggests possible vegetations. He also has some ischemic fingers.       Subjective:  Pt has no c/o         Medications:  Reviewed    Infusion Medications    dextrose       Scheduled Medications    midodrine  5 mg Oral TID WC    sodium chloride flush  10 mL Intravenous 2 times per day    insulin glargine  20 Units Subcutaneous Once    aspirin  81 mg Oral Daily    calcitRIOL  0.25 mcg Oral Daily    calcium carbonate  5 tablet Oral TID WC    carvedilol  6.25 mg Oral BID WC    clopidogrel  75 mg Oral Daily    fenofibrate  160 mg Oral Daily    FLUoxetine  20 mg Oral Daily    insulin glargine  55 Units Subcutaneous QAM AC    insulin lispro  10 Units Subcutaneous TID AC    pantoprazole  40 mg Oral QAM AC    pregabalin  50 mg Oral BID    sodium chloride flush  10 mL Intravenous 2 times per day    heparin (porcine)  5,000 Units Subcutaneous Q12H    vancomycin (VANCOCIN) intermittent dosing (placeholder)   Other RX Placeholder    insulin lispro  0-6 Units Subcutaneous TID WC     PRN Meds: menthol, sodium chloride flush, nitroGLYCERIN, sodium chloride flush, acetaminophen, acetaminophen, glucose, dextrose, glucagon (rDNA), dextrose      Intake/Output Summary (Last 24 hours) at 08/19/18 0824  Last data filed at 08/18/18 1140   Gross per 24 hour   Intake              595 ml   Output                0 ml   Net              595 ml       Diet:  DIET RENAL;    Exam:  /64   Pulse 60   Temp 97.7 °F (36.5 °C) (Oral)   Resp 16   Ht 6' 1\" (1.854 m)   Wt 253 lb 1.6 oz (114.8 kg)   SpO2 93%   BMI 33.39 kg/m²     General appearance: No apparent distress, appears stated age and cooperative. HEENT: Pupils equal, round, and reactive to light. Conjunctivae/corneas clear. Neck: Supple, with full range of motion. No jugular venous distention. Trachea midline. Respiratory:  Normal respiratory effort. Clear to auscultation, bilaterally without Rales/Wheezes/Rhonchi. Cardiovascular: Regular rate and rhythm with MAUREEN at URSB  Abdomen: Soft, non-tender, non-distended with normal bowel sounds. Musculoskeletal:  Distal finger has black area; other finger less so. Skin: Skin color, texture, turgor normal.  No rashes or lesions. Neurologic:  Neurovascularly intact without any focal sensory/motor deficits. Cranial nerves: II-XII intact, grossly non-focal.  Psychiatric: Alert and oriented, thought content appropriate, normal insight  Capillary Refill: Brisk,< 3 seconds   Peripheral Pulses: +2 palpable, equal bilaterally       Labs:   Recent Labs      08/18/18   0555   WBC  4.3*   HGB  9.6*   HCT  30.7*   PLT  126*     Recent Labs      08/17/18   0550  08/18/18   0555   NA  131*  133*   K  3.7  4.0   CL  93*  91*   CO2  20*  24   BUN  50*  34*   CREATININE  7.6*  6.0*   CALCIUM  8.7  8.6     No results for input(s): AST, ALT, BILIDIR, BILITOT, ALKPHOS in the last 72 hours. No results for input(s): INR in the last 72 hours. No results for input(s): Vipul Reasoner in the last 72 hours. Urinalysis:      Lab Results   Component Value Date    NITRU NEGATIVE 08/16/2018    WBCUA > 100 08/16/2018    BACTERIA NONE 08/16/2018    RBCUA 5-10 08/16/2018    BLOODU MODERATE 08/16/2018    GLUCOSEU 250 08/16/2018       Radiology:  VL DUP UPPER EXTREMITY ARTERIES LEFT   Final Result   1. Absent flow within the distal left ulnar artery of indeterminate age. The proximal left ulnar artery also demonstrates diminished velocities suggesting stenosis of greater than 50%. 2. There is a patent appearing radial artery distally.  However, there is turbulence with

## 2018-08-20 LAB
GLUCOSE BLD-MCNC: 112 MG/DL (ref 70–108)
GLUCOSE BLD-MCNC: 157 MG/DL (ref 70–108)
GLUCOSE BLD-MCNC: 61 MG/DL (ref 70–108)
GLUCOSE BLD-MCNC: 71 MG/DL (ref 70–108)
VANCOMYCIN RANDOM: 15 UG/ML (ref 0.1–39.9)

## 2018-08-20 PROCEDURE — 6370000000 HC RX 637 (ALT 250 FOR IP): Performed by: FAMILY MEDICINE

## 2018-08-20 PROCEDURE — 90935 HEMODIALYSIS ONE EVALUATION: CPT

## 2018-08-20 PROCEDURE — 80202 ASSAY OF VANCOMYCIN: CPT

## 2018-08-20 PROCEDURE — 5A1D70Z PERFORMANCE OF URINARY FILTRATION, INTERMITTENT, LESS THAN 6 HOURS PER DAY: ICD-10-PCS | Performed by: INTERNAL MEDICINE

## 2018-08-20 PROCEDURE — 36415 COLL VENOUS BLD VENIPUNCTURE: CPT

## 2018-08-20 PROCEDURE — 1200000003 HC TELEMETRY R&B

## 2018-08-20 PROCEDURE — 82948 REAGENT STRIP/BLOOD GLUCOSE: CPT

## 2018-08-20 PROCEDURE — 90935 HEMODIALYSIS ONE EVALUATION: CPT | Performed by: INTERNAL MEDICINE

## 2018-08-20 PROCEDURE — 6370000000 HC RX 637 (ALT 250 FOR IP): Performed by: INTERNAL MEDICINE

## 2018-08-20 PROCEDURE — 6360000002 HC RX W HCPCS: Performed by: INTERNAL MEDICINE

## 2018-08-20 PROCEDURE — 6370000000 HC RX 637 (ALT 250 FOR IP): Performed by: THORACIC SURGERY (CARDIOTHORACIC VASCULAR SURGERY)

## 2018-08-20 PROCEDURE — 2580000003 HC RX 258: Performed by: INTERNAL MEDICINE

## 2018-08-20 PROCEDURE — 2580000003 HC RX 258: Performed by: NURSE PRACTITIONER

## 2018-08-20 RX ORDER — VERAPAMIL HYDROCHLORIDE 240 MG/1
240 TABLET, FILM COATED, EXTENDED RELEASE ORAL NIGHTLY
Status: DISCONTINUED | OUTPATIENT
Start: 2018-08-20 | End: 2018-08-25

## 2018-08-20 RX ADMIN — CARVEDILOL 6.25 MG: 6.25 TABLET, FILM COATED ORAL at 16:36

## 2018-08-20 RX ADMIN — PREGABALIN 50 MG: 50 CAPSULE ORAL at 20:23

## 2018-08-20 RX ADMIN — ANTACID TABLETS 2500 MG: 500 TABLET, CHEWABLE ORAL at 16:36

## 2018-08-20 RX ADMIN — VANCOMYCIN HYDROCHLORIDE 1000 MG: 1 INJECTION, POWDER, LYOPHILIZED, FOR SOLUTION INTRAVENOUS at 18:01

## 2018-08-20 RX ADMIN — CALCITRIOL 0.25 MCG: 0.25 CAPSULE ORAL at 08:48

## 2018-08-20 RX ADMIN — FLUOXETINE HYDROCHLORIDE 20 MG: 20 CAPSULE ORAL at 08:48

## 2018-08-20 RX ADMIN — VERAPAMIL HYDROCHLORIDE 240 MG: 240 TABLET, FILM COATED, EXTENDED RELEASE ORAL at 23:36

## 2018-08-20 RX ADMIN — CLOPIDOGREL BISULFATE 75 MG: 75 TABLET ORAL at 08:48

## 2018-08-20 RX ADMIN — PANTOPRAZOLE SODIUM 40 MG: 40 TABLET, DELAYED RELEASE ORAL at 06:35

## 2018-08-20 RX ADMIN — ANTACID TABLETS 2500 MG: 500 TABLET, CHEWABLE ORAL at 14:23

## 2018-08-20 RX ADMIN — MIDODRINE HYDROCHLORIDE 5 MG: 5 TABLET ORAL at 16:36

## 2018-08-20 RX ADMIN — FENOFIBRATE 160 MG: 160 TABLET ORAL at 08:48

## 2018-08-20 RX ADMIN — MIDODRINE HYDROCHLORIDE 5 MG: 5 TABLET ORAL at 08:49

## 2018-08-20 RX ADMIN — ASPIRIN 81 MG CHEWABLE TABLET 81 MG: 81 TABLET CHEWABLE at 08:48

## 2018-08-20 RX ADMIN — Medication 1 UNITS: at 16:36

## 2018-08-20 RX ADMIN — LOPERAMIDE HYDROCHLORIDE 2 MG: 2 CAPSULE ORAL at 07:33

## 2018-08-20 RX ADMIN — PREGABALIN 50 MG: 50 CAPSULE ORAL at 08:48

## 2018-08-20 RX ADMIN — CARVEDILOL 6.25 MG: 6.25 TABLET, FILM COATED ORAL at 06:37

## 2018-08-20 RX ADMIN — Medication 10 ML: at 08:49

## 2018-08-20 ASSESSMENT — PAIN SCALES - GENERAL
PAINLEVEL_OUTOF10: 0

## 2018-08-20 NOTE — PROGRESS NOTES
mg Oral Daily    fenofibrate  160 mg Oral Daily    FLUoxetine  20 mg Oral Daily    insulin glargine  55 Units Subcutaneous QAM AC    insulin lispro  10 Units Subcutaneous TID AC    pantoprazole  40 mg Oral QAM AC    pregabalin  50 mg Oral BID    sodium chloride flush  10 mL Intravenous 2 times per day    heparin (porcine)  5,000 Units Subcutaneous Q12H    vancomycin (VANCOCIN) intermittent dosing (placeholder)   Other RX Placeholder    insulin lispro  0-6 Units Subcutaneous TID WC      dextrose       loperamide, menthol, sodium chloride flush, nitroGLYCERIN, sodium chloride flush, acetaminophen, acetaminophen, glucose, dextrose, glucagon (rDNA), dextrose      LABS:     CBC:   Recent Labs      08/18/18   0555   WBC  4.3*   HGB  9.6*   PLT  126*     BMP:    Recent Labs      08/18/18   0555   NA  133*   K  4.0   CL  91*   CO2  24   BUN  34*   CREATININE  6.0*   GLUCOSE  125*     Calcium:  Recent Labs      08/18/18   0555   CALCIUM  8.6     Ionized Calcium:No results for input(s): IONCA in the last 72 hours. Magnesium:No results for input(s): MG in the last 72 hours. Phosphorus:No results for input(s): PHOS in the last 72 hours. BNP:No results for input(s): BNP in the last 72 hours. Glucose:  Recent Labs      08/19/18 2002 08/20/18   0641  08/20/18   1412   POCGLU  112*  71  61*       CULTURES:   UA:   No results for input(s): SPECGRAV, PHUR, COLORU, CLARITYU, MUCUS, PROTEINU, BLOODU, RBCUA, WBCUA, BACTERIA, NITRU, GLUCOSEU, BILIRUBINUR, UROBILINOGEN, KETUA, LABCAST, LABCASTTY, AMORPHOS in the last 72 hours.     Invalid input(s): CRYSTALS  Micro:   Lab Results   Component Value Date    BC No growth-preliminary 08/18/2018         IMAGING:         Problem list of patient:     Patient Active Problem List   Diagnosis Code    SOB (shortness of breath) R06.02    Anemia in chronic kidney disease (CKD) N18.9, D63.1    HTN (hypertension) I10    Coronary artery disease involving native coronary artery of native heart with unstable angina pectoris ([facility]) I25.110    Type 2 diabetes mellitus with diabetic nephropathy, with long-term current use of insulin ([facility]) E11.21, Z79.4    Chronic back pain greater than 3 months duration M54.9, G89.29    Chronic pain of both ankles M25.571, G89.29, M25.572    Peripheral neuropathy G62.9    Secondary DM with hypertension and ESRD on dialysis ([facility]) E13.22, I12.0, Z99.2, N18.6    Peripheral neuropathy ([facility]) G62.9    Proliferative diabetic retinopathy ([facility] E11.3599    Iron deficiency anemia due to chronic blood loss D50.0    ESRD (end stage renal disease) on dialysis ([facility]) N18.6, Z99.2    AVM (arteriovenous malformation) of colon without hemorrhage F17.77    Metabolic acidosis K32.8    Diabetic polyneuropathy associated with type 2 diabetes mellitus ([facility]) E11.42    Charcot's joint of foot in type 2 diabetes mellitus ([facility] [facility] E11.610    Proliferative diabetic retinopathy without macular edema associated with type 2 diabetes mellitus ([facility] [address] [facility] T77.7747    Secondary hyperparathyroidism of renal origin ([facility] [facility] N25.81    Arterial hypotension I95.9    ESRD on hemodialysis ([facility]) N18.6, Z99.2    S/P AVR (aortic valve replacement) Z95.2    S/P CABG x 1 Z95.1    Hyperphosphatemia E83.39    Coronary artery disease involving coronary bypass graft of native heart without angina pectoris I25.810    Osteomyelitis of finger of right hand ([facility]) M86.9    S/P peripheral artery angioplasty: 10/30/2017: PTA of the left popliteal artery.  Z98.62    Peripheral vascular disease ([facility]) I73.9    Atheroembolism of both lower extremities ([facility]) I75.023    Chest pain R07.9    Chronic systolic congestive heart failure ([facility]) I50.22    Unstable angina ([facility]) I20.0    Symptomatic bradycardia R00.1    Heart block AV third degree ([facility]) I44.2    S/P cardiac catheterization Z98.890    Encounter for hemodialysis for ESRD ([facility] N18.6, Z99.2    Anemia due to stage 5 chronic kidney disease ([facility] [address] [facility] N18.5,

## 2018-08-20 NOTE — PROGRESS NOTES
08/16/2018    GLUCOSEU 250 08/16/2018         DVT prophylaxis: [] Lovenox                                 [] SCDs                                 [x] SQ Heparin                                 [] Encourage ambulation           [] Already on Anticoagulation     Disposition:    [x] Home       [] TCU       [] Rehab       [] Psych       [] SNF       [] [place]       [] Other-    Code Status: Full Code    PT/OT Eval Status:  Once more stable    Assessment/Plan:    1. Sepsis secondary to MRSA? Source unknown Cont IV vancomycin,3 times per week   2. ? Vegetation of JAVIER, ? Bacterial endocarditis, cont IV abx for 3 weeks   3. Left second third and fourth finger ulcerations- appears to be more of vasculitic ulcers, Stenosis noted on the L ulnar artery, pending Vascular surgery consult   4. Diabetes mellitus type 2, with complications, cont Lantus and sliding scale insulin, Lyrica for peripheral neuropahty   5. End-stage renal disease on hemodialysis:Cont the same   6. Secondary hyperparathyroidism of renal origin:Cont Calcitriol  7. Coronary artery disease status post CABG and angioplasty Cont. Aspirin, Plavix, Coreg, fenofibrate  8. H/o Bioprosthetic aortic valve: stable on Plavix,   9.  Chronic systolic congestive heart failure EF of 35-40%:  continue Coreg and current management  10 Paroxysmal atrial fibrillation: In sinus rhythm now, continue Coreg      Anticipated Discharge in : 2-3 days        Electronically signed by Thang Roblero MD on 8/20/2018 at 1:50 PM

## 2018-08-20 NOTE — PLAN OF CARE
Problem: Falls - Risk of:  Goal: Will remain free from falls  Will remain free from falls    Outcome: Ongoing  Pt free from falls this shift. Bed alarm on, nonskid socks on, 2/4 side rails up, uses call light for assistance. Will continue to monitor. Problem: Pain Control  Goal: Maintain pain level at or below patient's acceptable level (or 5 if patient is unable to determine acceptable level)  Outcome: Ongoing  Pt denied pain this shift. Will continue to monitor and manage appropriately. Problem: Cardiovascular  Goal: No DVT, peripheral vascular complications  Outcome: Ongoing  Weak pedal and posttibial pulses, need to use doppler to assess. Edema in BLE. Will continue to monitor. Goal: Hemodynamic stability  Outcome: Ongoing  Vitals:    08/20/18 0952   BP: 119/65   Pulse: 62   Resp:    Temp: 98.3 °F (36.8 °C)   SpO2:      Pt's vitals stable at this time. Will continue to monitor. Problem: Respiratory  Goal: No pulmonary complications  Outcome: Ongoing  Lung sounds clear, O2 sat 96% on room air, airway clear, no accessory muscle use. Will continue to monitor. Goal: O2 Sat > 90%  Outcome: Ongoing  O2 sat 96% on room air. Will continue to monitor. Problem:   Goal: Adequate urinary output  Outcome: Ongoing  Hemodialysis pt. Makes little urine. Will continue to monitor. Problem: Nutrition  Goal: Optimal nutrition therapy  Outcome: Ongoing  Pt ate % of breakfast this AM. Will continue to monitor. Problem: Skin Integrity/Risk  Goal: No skin breakdown during hospitalization  Outcome: Ongoing  No new skin breakdown noted this shift. Will continue to monitor. Problem: Musculor/Skeletal Functional Status  Goal: Absence of falls  Outcome: Ongoing  Pt free from falls this shift. Bed alarm on, nonskid socks on, 2/4 side rails up, uses call light for assistance. Will continue to monitor.     Problem: Discharge Planning:  Goal: Discharged to appropriate level of care  Discharged to appropriate level

## 2018-08-20 NOTE — PROGRESS NOTES
Dr Christian Rosas paged because pt upset that he is still here and wants to know what is holding up his discharge. I told the pt we are waiting on Vascular's input. Dr Christian Rosas said to order VL upper extremities STAT (specifically of bilat palmar arches) and to call him with the results. Night shift RN Ramón Rose. Notified. Dr Christian Rosas also ordered Verapamil 240mg nightly.

## 2018-08-20 NOTE — FLOWSHEET NOTE
Pt was alone at the time of the visit. He said that he had infection in the blood. He was hopeful and wanted healing prayer and I prayed asking God to heal him.      08/20/18 1810   Encounter Summary   Services provided to: Patient   Referral/Consult From: Kristin   Continue Visiting Yes  (8/20)   Complexity of Encounter Low   Length of Encounter 15 minutes   Spiritual/Jain   Type Spiritual support   Assessment Approachable;Calm; Hopeful   Intervention Prayer;Nurtured hope; Active listening   Outcome Connection/belonging;Expressed gratitude;Engaged in conversation;Expressed feelings/needs/concerns

## 2018-08-20 NOTE — PROGRESS NOTES
Pharmacy Vancomycin Consult     Vancomycin Day: 7  Current Dosing: intermittent  Date Time Vancomycin dose Vancomycin Random   8/14/2018 22:18 1500 mg     8/16/2018 5:08   15.6 mcg/ml   8/16/2018   Hemodialysis     8/16/2018 18:04 1000 mg     8/17/2018   Hemodialysis                           Temp max:  afebrile    Recent Labs      08/18/18   0555   BUN  34*       Recent Labs      08/18/18   0555   CREATININE  6.0*       Recent Labs      08/18/18   0555   WBC  4.3*         Intake/Output Summary (Last 24 hours) at 08/20/18 0934  Last data filed at 08/20/18 0848   Gross per 24 hour   Intake 1280 ml   Output 0 ml   Net 1280 ml     Culture Date Source Results   8/14/2018 Blood x 2 MRSA   8/16/2018 Urine Proteus mirabilis (ESBL ) < 10,000 CFU/mL   8/18/2018 Blood x 2 NGTD       Ht Readings from Last 1 Encounters:   08/17/18 6' 1\" (1.854 m)        Wt Readings from Last 1 Encounters:   08/20/18 255 lb 4.8 oz (115.8 kg)         Body mass index is 33.68 kg/m². Pre-hemodialysis level: 10-25    Assessment/Plan:   Will continue with vancomycin 1000 mg IV every MWF after hemodialysis. Will plan on checking level at the end of the week to make sure not rising.  No levels ordered at this time.     Anitha Lopez, PharmD, BCPS  8/20/2018  9:41 AM

## 2018-08-20 NOTE — PROGRESS NOTES
[facility]   [facility]   [address]   [address] [facility]   [phone]   Nephrology Hospital progress note       8/20/2018, 11:52 AM        Pt Name:    Bere Villafuerte  MRN:     [id number]     [place]:    1960  Admit Date:    8/14/2018  8:17 PM  Primary Care Physician:  Marcia Bazan MD   Primary Nephrologist:          Dr. Sol Chen number  [id number]    ISOLATION: yes     Admitting Chief Complaint:   weakness     History of Chief Complaint:  The patient is a 60y.o. year old  obese male with a PMH that includes ESRD on HD at home 6 days weekly, DM, CAD, HTN, PAD, charcot foot, who was admitted after developing a fever at home. He was recently discharged from [facility], and had a biventricular pacemaker placed for complete heart block. He apparently had wounds on his finger for at least two weeks, that developed into blisters then burst and became black, which he was planned to see Dr. Sandy López for as O/P. He also had a wound on his foot for which he was to follow with Dr. Tammie Dave. He states he lost his appetite yesterday, had some nausea, and developed a fever of 104 at home, He presented to the ED for evaluation on 8/14/18.      His last HD was Monday night. He dialyzes 4.5 hours 6 nights per week. He denies any difficulty with his HD treatments. He states his dry weight is 108 and recently he has been 109.2 kg. He does report he has had some low BP in the 90's lately at home also.      Upon arrival he was found to have WBC 11.5, blood cultures with film array MRSA positive in both bottles. Lactic acid initially 1.4. He was admitted and started on Cefepime and Vancomycin. His WBC has risen to 14.5 and lactic today was 1.1. His BP was 85/47 and he had a 500 mL bolus today, his last BP was 99/50.      He denies light headedness, dizziness, chest pain, shortness of breath, vomiting, diarrhea. Nephrology is treating:   ESRD requiring hemodialysis     Subjective:   The

## 2018-08-21 ENCOUNTER — APPOINTMENT (OUTPATIENT)
Dept: INTERVENTIONAL RADIOLOGY/VASCULAR | Age: 58
DRG: 871 | End: 2018-08-21
Payer: MEDICARE

## 2018-08-21 ENCOUNTER — TELEPHONE (OUTPATIENT)
Dept: CARDIOLOGY CLINIC | Age: 58
End: 2018-08-21

## 2018-08-21 LAB
GLUCOSE BLD-MCNC: 121 MG/DL (ref 70–108)
GLUCOSE BLD-MCNC: 163 MG/DL (ref 70–108)
GLUCOSE BLD-MCNC: 81 MG/DL (ref 70–108)
GLUCOSE BLD-MCNC: 95 MG/DL (ref 70–108)

## 2018-08-21 PROCEDURE — 93930 UPPER EXTREMITY STUDY: CPT

## 2018-08-21 PROCEDURE — 82948 REAGENT STRIP/BLOOD GLUCOSE: CPT

## 2018-08-21 PROCEDURE — APPSS60 APP SPLIT SHARED TIME 46-60 MINUTES: Performed by: PHYSICIAN ASSISTANT

## 2018-08-21 PROCEDURE — 1200000003 HC TELEMETRY R&B

## 2018-08-21 PROCEDURE — 6370000000 HC RX 637 (ALT 250 FOR IP): Performed by: FAMILY MEDICINE

## 2018-08-21 PROCEDURE — 6370000000 HC RX 637 (ALT 250 FOR IP): Performed by: INTERNAL MEDICINE

## 2018-08-21 PROCEDURE — 99232 SBSQ HOSP IP/OBS MODERATE 35: CPT | Performed by: INTERNAL MEDICINE

## 2018-08-21 PROCEDURE — 6370000000 HC RX 637 (ALT 250 FOR IP): Performed by: THORACIC SURGERY (CARDIOTHORACIC VASCULAR SURGERY)

## 2018-08-21 RX ADMIN — MIDODRINE HYDROCHLORIDE 5 MG: 5 TABLET ORAL at 12:07

## 2018-08-21 RX ADMIN — Medication 1 UNITS: at 12:02

## 2018-08-21 RX ADMIN — FENOFIBRATE 160 MG: 160 TABLET ORAL at 10:42

## 2018-08-21 RX ADMIN — FLUOXETINE HYDROCHLORIDE 20 MG: 20 CAPSULE ORAL at 12:21

## 2018-08-21 RX ADMIN — PREGABALIN 50 MG: 50 CAPSULE ORAL at 21:49

## 2018-08-21 RX ADMIN — FENOFIBRATE 160 MG: 160 TABLET ORAL at 12:12

## 2018-08-21 RX ADMIN — MIDODRINE HYDROCHLORIDE 5 MG: 5 TABLET ORAL at 16:22

## 2018-08-21 RX ADMIN — LOPERAMIDE HYDROCHLORIDE 2 MG: 2 CAPSULE ORAL at 17:53

## 2018-08-21 RX ADMIN — VERAPAMIL HYDROCHLORIDE 240 MG: 240 TABLET, FILM COATED, EXTENDED RELEASE ORAL at 21:49

## 2018-08-21 RX ADMIN — PANTOPRAZOLE SODIUM 40 MG: 40 TABLET, DELAYED RELEASE ORAL at 05:40

## 2018-08-21 RX ADMIN — CLOPIDOGREL BISULFATE 75 MG: 75 TABLET ORAL at 10:42

## 2018-08-21 RX ADMIN — INSULIN GLARGINE 55 UNITS: 100 INJECTION, SOLUTION SUBCUTANEOUS at 12:02

## 2018-08-21 RX ADMIN — PREGABALIN 50 MG: 50 CAPSULE ORAL at 10:42

## 2018-08-21 RX ADMIN — LOPERAMIDE HYDROCHLORIDE 2 MG: 2 CAPSULE ORAL at 10:43

## 2018-08-21 RX ADMIN — CALCITRIOL 0.25 MCG: 0.25 CAPSULE ORAL at 10:42

## 2018-08-21 RX ADMIN — ANTACID TABLETS 2500 MG: 500 TABLET, CHEWABLE ORAL at 16:22

## 2018-08-21 RX ADMIN — MIDODRINE HYDROCHLORIDE 5 MG: 5 TABLET ORAL at 10:42

## 2018-08-21 RX ADMIN — ASPIRIN 81 MG CHEWABLE TABLET 81 MG: 81 TABLET CHEWABLE at 10:43

## 2018-08-21 ASSESSMENT — ENCOUNTER SYMPTOMS
STRIDOR: 0
DOUBLE VISION: 0
COUGH: 0
HEARTBURN: 0
SHORTNESS OF BREATH: 0
PHOTOPHOBIA: 0
SINUS PAIN: 0
SORE THROAT: 0
NAUSEA: 0
BLURRED VISION: 0
ORTHOPNEA: 0
CONSTIPATION: 0
VOMITING: 0
BACK PAIN: 0
SPUTUM PRODUCTION: 0
ABDOMINAL PAIN: 0
DIARRHEA: 0
WHEEZING: 0
HEMOPTYSIS: 0
EYE REDNESS: 0
BLOOD IN STOOL: 0
EYE PAIN: 0
EYE DISCHARGE: 0

## 2018-08-21 ASSESSMENT — PAIN SCALES - GENERAL
PAINLEVEL_OUTOF10: 0

## 2018-08-21 NOTE — PROGRESS NOTES
[facility]   [facility]   [address]   [place]   [phone]   Nephrology Hospital progress note       8/21/2018, 7:06 AM        Pt Name:    Sara Pinto  MRN:     [id number]     [place]:    1960  Admit Date:    8/14/2018  8:17 PM  Primary Care Physician:  Lilibeth Raza MD   Primary Nephrologist:          Dr. Kimberly Flannery number  [id number]    ISOLATION: yes     Admitting Chief Complaint:   weakness     History of Chief Complaint:  The patient is a 60y.o. year old  obese male with a PMH that includes ESRD on HD at home 6 days weekly, DM, CAD, HTN, PAD, charcot foot, who was admitted after developing a fever at home. He was recently discharged from [facility], and had a biventricular pacemaker placed for complete heart block. He apparently had wounds on his finger for at least two weeks, that developed into blisters then burst and became black, which he was planned to see Dr. Lazara Santo for as O/P. He also had a wound on his foot for which he was to follow with Dr. Chadd Malloy. He states he lost his appetite yesterday, had some nausea, and developed a fever of 104 at home, He presented to the ED for evaluation on 8/14/18.      His last HD was Monday night. He dialyzes 4.5 hours 6 nights per week. He denies any difficulty with his HD treatments. He states his dry weight is 108 and recently he has been 109.2 kg. He does report he has had some low BP in the 90's lately at home also.      Upon arrival he was found to have WBC 11.5, blood cultures with film array MRSA positive in both bottles. Lactic acid initially 1.4. He was admitted and started on Cefepime and Vancomycin. His WBC has risen to 14.5 and lactic today was 1.1. His BP was 85/47 and he had a 500 mL bolus today, his last BP was 99/50.      He denies light headedness, dizziness, chest pain, shortness of breath, vomiting, diarrhea. Nephrology is treating:   ESRD requiring hemodialysis     Subjective:   The

## 2018-08-21 NOTE — PLAN OF CARE
breakdown. Problem: Discharge Planning:  Goal: Discharged to appropriate level of care  Discharged to appropriate level of care   Outcome: Ongoing  Patient from home. Plans to return home at discharge. Plan to receive vanc three times weekly at the dialysis clinic in [facility] outpatient. Discharge pending vascular surgeon recommendations. Will continue to monitor for discharge needs. Comments: Patient participated in plan of care and contributed to goal setting.

## 2018-08-21 NOTE — FLOWSHEET NOTE
08/21/18 1054   Provider Notification   Reason for Communication Evaluate   Provider Name Dr. Carla Browne   Provider Notification Physician   Method of Communication Call   Response Waiting for response   Notification Time [phone]     Left message with office regarding results of arterial duplex.

## 2018-08-21 NOTE — CONSULTS
CT/CV Surgery Progress Note    8/21/2018 12:12 PM  Surgeon:  Dr. Jayna Trevizo     Reason for Consult: Evaluation and treatment of bilateral ischemic digits. HPI: Mr. Stephanie Corrales is a 61 yo male with a very extensive PMH including PVD, hyperlipidemia, HTN, pacemaker, CKD requiring HD 6 days a week (fistula in left arm), s/p AVR and CABG x 1 by Dr. Benjamin Medley on 03/13/17, and s/p transmetatarsal amputation of the R index finger in 2017 due to osteomyelitis. The pt originally presented to [facility] ED on 08/14/18 for evaluation of fever. Positive blood culture for staphylococcus. Dr. Lynne Al ID on board and currently treating the pt for MRSA bacteremia/endocarditis. JAVIER on 08/17/18 reviewed- Severe MR noted/ highly calcified mobile density arising from posterior mitral leaflet/echogenic mobile density arising from mid part of the anterior leaflet on the ventricular aspect. An arterial doppler showed diminished blood flow so we were then consulted for further evaluation and treatment. We ordered a bilateral duplex of the upper extremity which showed bilateral occlusions of the the radial and ulnar arteries. \"On the right there is no flow identified within the radial artery or the ulnar artery. Palmar arch images could not be obtained. On the left, there is no flow within the radial artery or the ulnar artery. Palmar arch images could not be obtained. There is turbulent flow within a segment of the left radial artery near the fistula, though flow cannot be documented above or below this. \"    Subjective:  Mr. Stephanie Corrales  Is resting comfortably in bedside chair, alert, and in no acute distress. See physical exam section for pictures of bilateral hands. Pt reports wounds on finger began as blisters then popped and turned black. Wounds have been present for 2 weeks.      Vital Signs: /61   Pulse 57   Temp 98.4 °F (36.9 °C) (Oral)   Resp 18   Ht 6' 1\" (1.854 m)   Wt 254 lb 4.8 oz (115.3 kg)   SpO2 96%   BMI 33.55 kg/m²    Temp pain, palpitations, orthopnea, claudication, leg swelling and PND. Gastrointestinal: Negative for abdominal pain, blood in stool, constipation, diarrhea, heartburn, melena, nausea and vomiting. Genitourinary: Negative for dysuria, flank pain, frequency, hematuria and urgency. Musculoskeletal: Negative for back pain, falls, joint pain, myalgias and neck pain. Skin: Negative for itching and rash. Neurological: Negative for dizziness, tingling, tremors, sensory change, speech change, focal weakness, seizures, loss of consciousness, weakness and headaches. Endo/Heme/Allergies: Negative for environmental allergies and polydipsia. Does not bruise/bleed easily. Psychiatric/Behavioral: Negative. Exam:   General Appearance: alert ,conversing, in no acute distress  Cardiovascular: normal rate, regular rhythm, normal S1 and S2, no murmurs, rubs, clicks, or gallops  Pulmonary/Chest: clear to auscultation bilaterally- no wheezes, rales or rhonchi, normal air movement, no respiratory distress  Abdomen: soft, non-tender, non-distended, normal bowel sounds, no bruits. Extremities: See pictures.    Pulses: Radial pulses noted  Skin: warm and dry, no rash or erythema  Head: normocephalic and atraumatic  Eyes: pupils equal, round, and reactive to light  Neck: supple and non-tender without mass, no thyromegaly, no JVD   Musculoskeletal: normal range of motion, no joint swelling, deformity or tenderness  Neurological: alert, oriented, normal speech, no focal findings or movement disorder noted                            Assessment:     Patient Active Problem List   Diagnosis    SOB (shortness of breath)    Anemia in chronic kidney disease (CKD)    HTN (hypertension)    Coronary artery disease involving native coronary artery of native heart with unstable angina pectoris (HCC)    Type 2 diabetes mellitus with diabetic nephropathy, with long-term current use of insulin (HCC)    Chronic back pain greater than 3 ([facility] [address]    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 Clinically stable  2. Spoke with IR-bilateral arteriogram of upper extremeties planned for 8/22/18 at 0800   3. JAVIER results noted. Severe MR. Previous AVR/CABGx1 by Dr. Teresa Osorio. 4. All imaging was thoroughly reviewed. The plan of care was discussed in detail with Dr. Eve Castellanos.      James Garza PA-C

## 2018-08-21 NOTE — PROGRESS NOTES
Talked with pt's wife, Ms. Carie Gosselin, about getting his pacemaker checked while in the hospital. He was to have his pacemaker checked by home reader but is unable to due to hospitalization. Message left with pacemaker clinic about appointment.

## 2018-08-21 NOTE — FLOWSHEET NOTE
08/21/18 1326   Provider Notification   Reason for Communication Evaluate   Provider Name Keegan Metzger   Provider Notification Physician Assistant   Method of Communication Call   Response Other (Comment)   Notification Time 1326     Keegan Metzger called to say that Dr. Chad Montaño is not in today but will have Dr. Shannan Felipe will look over the case as soon as possible.

## 2018-08-21 NOTE — PROGRESS NOTES
Progress note: Infectious diseases    Patient - Jacy Thurston,  Age - 60 y.o.    - 1960      Room Number - 6K-15/015-A   MRN -  [id number]   [facility]  Date of Admission -  2018  8:17 PM    SUBJECTIVE:   No new issues  OBJECTIVE   VITALS    height is 6' 1\" (1.854 m) and weight is 254 lb 4.8 oz (115.3 kg). His oral temperature is 98.4 °F (36.9 °C). His blood pressure is 123/61 and his pulse is 57. His respiration is 18 and oxygen saturation is 96%. Wt Readings from Last 3 Encounters:   18 254 lb 4.8 oz (115.3 kg)   18 247 lb (112 kg)   18 235 lb 3.7 oz (106.7 kg)       I/O (24 Hours)    Intake/Output Summary (Last 24 hours) at 18 1243  Last data filed at 18 0424   Gross per 24 hour   Intake             1392 ml   Output             2900 ml   Net            -1508 ml       General Appearance  Awake, alert, oriented,  Chronically sick looking.   HEENT - normocephalic, atraumatic, slighlty pale conjunctiva,  anicteric sclera  Neck - Supple, no mass  Lungs -  Bilateral  air entry, no rhonchi, no wheeze  Cardiovascular - Heart sounds are normal. Systolic murmur at the base of the heart   Abdomen - soft, not distended, nontender,   Neurologic -oriented  Skin - No bruising or bleeding  Extremities - No edema, no cyanosis, clubbing  Has fistula on the left upper arm  Ischemic digits,unchanged    MEDICATIONS:      vancomycin  1,000 mg Intravenous Q MWF    verapamil  240 mg Oral Nightly    midodrine  5 mg Oral TID WC    sodium chloride flush  10 mL Intravenous 2 times per day    insulin glargine  20 Units Subcutaneous Once    aspirin  81 mg Oral Daily    calcitRIOL  0.25 mcg Oral Daily    calcium carbonate  5 tablet Oral TID WC    carvedilol  6.25 mg Oral BID WC    clopidogrel  75 mg Oral Daily    fenofibrate  160 mg Oral Daily    FLUoxetine  20 mg Oral Daily    of finger I99.8    Septicemia ([address] A41.9    Aortic valve prosthesis present Z95.2    Sepsis due to methicillin resistant Staphylococcus aureus (MRSA) ([facility]) A41.02    Diabetic peripheral neuropathy associated with type 2 diabetes mellitus ([facility]) E11.42    Paroxysmal atrial fibrillation ([facility]) I48.0    Open wound of left index finger without damage to nail S61.201A    Ischemic ulcer of finger, limited to breakdown of skin ([facility] [address] L98.491    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

## 2018-08-21 NOTE — PLAN OF CARE
Problem: Falls - Risk of:  Goal: Will remain free from falls  Will remain free from falls    Outcome: Ongoing  Pt free from falls this shift. Chair alarm on, nonskid socks on, uses call light appropriately, clear pathway. Will continue to monitor. Problem: Pain Control  Goal: Maintain pain level at or below patient's acceptable level (or 5 if patient is unable to determine acceptable level)  Outcome: Ongoing  Pt denied pain this shift. Will continue to monitor and manage appropriately. Problem: Cardiovascular  Goal: No DVT, peripheral vascular complications  Outcome: Ongoing  Pt has faint pulses in BUE and BLE. Going for BUE angiogram tomorrow 8/22. Will continue to monitor. Goal: Hemodynamic stability  Outcome: Ongoing  Vitals:    08/21/18 1501   BP: 111/64   Pulse: 59   Resp: 18   Temp: 98.3 °F (36.8 °C)   SpO2: 97%     Vital signs stable at this time. Will continue to monitor. Problem: Respiratory  Goal: No pulmonary complications  Outcome: Ongoing  Lung sounds clear in upper lobes bilaterally, diminished in lower lobes bilaterally. O2 sat above 95 on room air. Maintains clear airway. Will continue to monitor. Goal: O2 Sat > 90%  Outcome: Ongoing  O2 sat above 95% on room air. Will continue to monitor. Problem:   Goal: Adequate urinary output  Outcome: Ongoing  Pt produces little urine, HD pt. Will continue to monitor. Problem: Nutrition  Goal: Optimal nutrition therapy  Outcome: Ongoing  Pt ate 100% of breakfast and lunch. Will continue to monitor nutritional status. Problem: Skin Integrity/Risk  Goal: No skin breakdown during hospitalization  Outcome: Ongoing  No new skin breakdown noted this shift. Will have angiogram in AM for ischemic digits. Will continue to monitor. Problem: Musculor/Skeletal Functional Status  Goal: Absence of falls  Outcome: Ongoing      Problem: Pain:  Goal: Pain level will decrease  Pain level will decrease   Outcome: Ongoing  Pt denied pain this shift.  Will continue to monitor and manage pain appropriately. Goal: Control of acute pain  Control of acute pain   Outcome: Ongoing  Pt denied pain this shift. Will continue to monitor pain and manage appropriately. Goal: Control of chronic pain  Control of chronic pain   Outcome: Ongoing  Pt denied pain this shift. Will continue to monitor and manage pain appropriately. Comments: Care plan reviewed with patient. Patient verbalize understanding of the plan of care and contribute to goal setting.

## 2018-08-22 ENCOUNTER — HOSPITAL ENCOUNTER (OUTPATIENT)
Dept: NURSING | Age: 58
End: 2018-08-22

## 2018-08-22 ENCOUNTER — APPOINTMENT (OUTPATIENT)
Dept: INTERVENTIONAL RADIOLOGY/VASCULAR | Age: 58
DRG: 871 | End: 2018-08-22
Payer: MEDICARE

## 2018-08-22 LAB
GLUCOSE BLD-MCNC: 102 MG/DL (ref 70–108)
GLUCOSE BLD-MCNC: 104 MG/DL (ref 70–108)
GLUCOSE BLD-MCNC: 106 MG/DL (ref 70–108)
GLUCOSE BLD-MCNC: 111 MG/DL (ref 70–108)
GLUCOSE BLD-MCNC: 113 MG/DL (ref 70–108)
GLUCOSE BLD-MCNC: 148 MG/DL (ref 70–108)
GLUCOSE BLD-MCNC: 51 MG/DL (ref 70–108)
GLUCOSE BLD-MCNC: 66 MG/DL (ref 70–108)
GLUCOSE BLD-MCNC: 70 MG/DL (ref 70–108)
GLUCOSE BLD-MCNC: 72 MG/DL (ref 70–108)
GLUCOSE BLD-MCNC: 82 MG/DL (ref 70–108)
GLUCOSE BLD-MCNC: 83 MG/DL (ref 70–108)
GLUCOSE BLD-MCNC: 83 MG/DL (ref 70–108)
GLUCOSE BLD-MCNC: 84 MG/DL (ref 70–108)

## 2018-08-22 PROCEDURE — 6370000000 HC RX 637 (ALT 250 FOR IP): Performed by: INTERNAL MEDICINE

## 2018-08-22 PROCEDURE — 2500000003 HC RX 250 WO HCPCS: Performed by: FAMILY MEDICINE

## 2018-08-22 PROCEDURE — 90935 HEMODIALYSIS ONE EVALUATION: CPT

## 2018-08-22 PROCEDURE — 36221 PLACE CATH THORACIC AORTA: CPT

## 2018-08-22 PROCEDURE — 36217 PLACE CATHETER IN ARTERY: CPT

## 2018-08-22 PROCEDURE — 2580000003 HC RX 258: Performed by: RADIOLOGY

## 2018-08-22 PROCEDURE — 2709999900 HC NON-CHARGEABLE SUPPLY

## 2018-08-22 PROCEDURE — C1887 CATHETER, GUIDING: HCPCS

## 2018-08-22 PROCEDURE — 6360000002 HC RX W HCPCS: Performed by: INTERNAL MEDICINE

## 2018-08-22 PROCEDURE — 82948 REAGENT STRIP/BLOOD GLUCOSE: CPT

## 2018-08-22 PROCEDURE — C1769 GUIDE WIRE: HCPCS

## 2018-08-22 PROCEDURE — 2500000003 HC RX 250 WO HCPCS

## 2018-08-22 PROCEDURE — 2580000003 HC RX 258: Performed by: FAMILY MEDICINE

## 2018-08-22 PROCEDURE — C1894 INTRO/SHEATH, NON-LASER: HCPCS

## 2018-08-22 PROCEDURE — 6360000002 HC RX W HCPCS: Performed by: RADIOLOGY

## 2018-08-22 PROCEDURE — 75774 ARTERY X-RAY EACH VESSEL: CPT

## 2018-08-22 PROCEDURE — 2780000010 HC IMPLANT OTHER

## 2018-08-22 PROCEDURE — 6360000004 HC RX CONTRAST MEDICATION: Performed by: RADIOLOGY

## 2018-08-22 PROCEDURE — 75716 ARTERY X-RAYS ARMS/LEGS: CPT

## 2018-08-22 PROCEDURE — 6370000000 HC RX 637 (ALT 250 FOR IP): Performed by: THORACIC SURGERY (CARDIOTHORACIC VASCULAR SURGERY)

## 2018-08-22 PROCEDURE — 99232 SBSQ HOSP IP/OBS MODERATE 35: CPT | Performed by: INTERNAL MEDICINE

## 2018-08-22 PROCEDURE — 2580000003 HC RX 258: Performed by: INTERNAL MEDICINE

## 2018-08-22 PROCEDURE — 6360000002 HC RX W HCPCS

## 2018-08-22 PROCEDURE — 5A1D70Z PERFORMANCE OF URINARY FILTRATION, INTERMITTENT, LESS THAN 6 HOURS PER DAY: ICD-10-PCS | Performed by: INTERNAL MEDICINE

## 2018-08-22 PROCEDURE — B31K1ZZ FLUOROSCOPY OF BILATERAL UPPER EXTREMITY ARTERIES USING LOW OSMOLAR CONTRAST: ICD-10-PCS | Performed by: RADIOLOGY

## 2018-08-22 PROCEDURE — 1200000003 HC TELEMETRY R&B

## 2018-08-22 PROCEDURE — 6370000000 HC RX 637 (ALT 250 FOR IP): Performed by: RADIOLOGY

## 2018-08-22 PROCEDURE — 6370000000 HC RX 637 (ALT 250 FOR IP): Performed by: FAMILY MEDICINE

## 2018-08-22 PROCEDURE — 90935 HEMODIALYSIS ONE EVALUATION: CPT | Performed by: INTERNAL MEDICINE

## 2018-08-22 PROCEDURE — C1760 CLOSURE DEV, VASC: HCPCS

## 2018-08-22 PROCEDURE — 6370000000 HC RX 637 (ALT 250 FOR IP)

## 2018-08-22 PROCEDURE — 2580000003 HC RX 258: Performed by: NURSE PRACTITIONER

## 2018-08-22 RX ORDER — ONDANSETRON 2 MG/ML
8 INJECTION INTRAMUSCULAR; INTRAVENOUS EVERY 6 HOURS PRN
Status: DISCONTINUED | OUTPATIENT
Start: 2018-08-22 | End: 2018-08-22

## 2018-08-22 RX ORDER — DEXTROSE MONOHYDRATE 25 G/50ML
25 INJECTION, SOLUTION INTRAVENOUS ONCE
Status: DISCONTINUED | OUTPATIENT
Start: 2018-08-22 | End: 2018-08-22

## 2018-08-22 RX ORDER — MIDAZOLAM HYDROCHLORIDE 1 MG/ML
1 INJECTION INTRAMUSCULAR; INTRAVENOUS ONCE
Status: COMPLETED | OUTPATIENT
Start: 2018-08-22 | End: 2018-08-22

## 2018-08-22 RX ORDER — DEXTROSE MONOHYDRATE 25 G/50ML
25 INJECTION, SOLUTION INTRAVENOUS ONCE
Status: COMPLETED | OUTPATIENT
Start: 2018-08-22 | End: 2018-08-22

## 2018-08-22 RX ORDER — HEPARIN SODIUM 5000 [USP'U]/ML
5000 INJECTION, SOLUTION INTRAVENOUS; SUBCUTANEOUS EVERY 8 HOURS SCHEDULED
Status: DISCONTINUED | OUTPATIENT
Start: 2018-08-22 | End: 2018-08-25 | Stop reason: HOSPADM

## 2018-08-22 RX ORDER — ONDANSETRON 2 MG/ML
4 INJECTION INTRAMUSCULAR; INTRAVENOUS EVERY 6 HOURS PRN
Status: DISCONTINUED | OUTPATIENT
Start: 2018-08-22 | End: 2018-08-22

## 2018-08-22 RX ORDER — SODIUM CHLORIDE 450 MG/100ML
INJECTION, SOLUTION INTRAVENOUS CONTINUOUS
Status: DISCONTINUED | OUTPATIENT
Start: 2018-08-22 | End: 2018-08-25 | Stop reason: HOSPADM

## 2018-08-22 RX ORDER — ONDANSETRON 2 MG/ML
4 INJECTION INTRAMUSCULAR; INTRAVENOUS EVERY 4 HOURS PRN
Status: DISCONTINUED | OUTPATIENT
Start: 2018-08-22 | End: 2018-08-25 | Stop reason: HOSPADM

## 2018-08-22 RX ORDER — DIAPER,BRIEF,INFANT-TODD,DISP
EACH MISCELLANEOUS ONCE
Status: COMPLETED | OUTPATIENT
Start: 2018-08-22 | End: 2018-08-22

## 2018-08-22 RX ADMIN — IOPAMIDOL 165 ML: 612 INJECTION, SOLUTION INTRAVENOUS at 10:00

## 2018-08-22 RX ADMIN — CLOPIDOGREL BISULFATE 75 MG: 75 TABLET ORAL at 11:41

## 2018-08-22 RX ADMIN — HYDROMORPHONE HYDROCHLORIDE 1 MG: 1 INJECTION, SOLUTION INTRAMUSCULAR; INTRAVENOUS; SUBCUTANEOUS at 09:08

## 2018-08-22 RX ADMIN — CALCITRIOL 0.25 MCG: 0.25 CAPSULE ORAL at 11:41

## 2018-08-22 RX ADMIN — HYDROMORPHONE HYDROCHLORIDE 0.5 MG: 1 INJECTION, SOLUTION INTRAMUSCULAR; INTRAVENOUS; SUBCUTANEOUS at 09:13

## 2018-08-22 RX ADMIN — DEXTROSE MONOHYDRATE 12.5 G: 25 INJECTION, SOLUTION INTRAVENOUS at 09:51

## 2018-08-22 RX ADMIN — FLUOXETINE HYDROCHLORIDE 20 MG: 20 CAPSULE ORAL at 11:40

## 2018-08-22 RX ADMIN — Medication 15 G: at 05:16

## 2018-08-22 RX ADMIN — DEXTROSE MONOHYDRATE 12.5 G: 25 INJECTION, SOLUTION INTRAVENOUS at 11:41

## 2018-08-22 RX ADMIN — Medication 10 ML: at 21:30

## 2018-08-22 RX ADMIN — ASPIRIN 81 MG CHEWABLE TABLET 81 MG: 81 TABLET CHEWABLE at 11:41

## 2018-08-22 RX ADMIN — CARVEDILOL 6.25 MG: 6.25 TABLET, FILM COATED ORAL at 17:58

## 2018-08-22 RX ADMIN — PREGABALIN 50 MG: 50 CAPSULE ORAL at 11:40

## 2018-08-22 RX ADMIN — VERAPAMIL HYDROCHLORIDE 240 MG: 240 TABLET, FILM COATED, EXTENDED RELEASE ORAL at 21:55

## 2018-08-22 RX ADMIN — BACITRACIN ZINC 1 G: 500 OINTMENT TOPICAL at 10:27

## 2018-08-22 RX ADMIN — VANCOMYCIN HYDROCHLORIDE 1000 MG: 1 INJECTION, POWDER, LYOPHILIZED, FOR SOLUTION INTRAVENOUS at 17:59

## 2018-08-22 RX ADMIN — Medication 10 ML: at 11:45

## 2018-08-22 RX ADMIN — PANTOPRAZOLE SODIUM 40 MG: 40 TABLET, DELAYED RELEASE ORAL at 05:12

## 2018-08-22 RX ADMIN — SODIUM CHLORIDE: 4.5 INJECTION, SOLUTION INTRAVENOUS at 08:51

## 2018-08-22 RX ADMIN — MIDAZOLAM 0.5 MG: 1 INJECTION INTRAMUSCULAR; INTRAVENOUS at 09:13

## 2018-08-22 RX ADMIN — FENOFIBRATE 160 MG: 160 TABLET ORAL at 11:40

## 2018-08-22 RX ADMIN — MIDAZOLAM 1 MG: 1 INJECTION INTRAMUSCULAR; INTRAVENOUS at 09:08

## 2018-08-22 RX ADMIN — PREGABALIN 50 MG: 50 CAPSULE ORAL at 21:55

## 2018-08-22 RX ADMIN — MIDODRINE HYDROCHLORIDE 5 MG: 5 TABLET ORAL at 11:41

## 2018-08-22 RX ADMIN — MIDODRINE HYDROCHLORIDE 5 MG: 5 TABLET ORAL at 17:58

## 2018-08-22 ASSESSMENT — PAIN SCALES - GENERAL
PAINLEVEL_OUTOF10: 0

## 2018-08-22 NOTE — PROGRESS NOTES
[facility]   [facility]   [address]   [facility], [address]   [phone]   Nephrology Hospital progress note       8/22/2018, 4:49 PM        Pt Name:    Shavonne Charles  MRN:     [id number]     Daveurt:    1960  Admit Date:    8/14/2018  8:17 PM  Primary Care Physician:  Alexa Shoemaker MD   Primary Nephrologist:          Dr. Daisha Davis number  [id number]    ISOLATION: yes     Admitting Chief Complaint:   weakness     History of Chief Complaint:  The patient is a 60y.o. year old  obese male with a PMH that includes ESRD on HD at home 6 days weekly, DM, CAD, HTN, PAD, charcot foot, who was admitted after developing a fever at home. He was recently discharged from [facility], and had a biventricular pacemaker placed for complete heart block. He apparently had wounds on his finger for at least two weeks, that developed into blisters then burst and became black, which he was planned to see Dr. Bruce Amanda for as O/P. He also had a wound on his foot for which he was to follow with Dr. Mingo Guerra. He states he lost his appetite yesterday, had some nausea, and developed a fever of 104 at home, He presented to the ED for evaluation on 8/14/18.      His last HD was Monday night. He dialyzes 4.5 hours 6 nights per week. He denies any difficulty with his HD treatments. He states his dry weight is 108 and recently he has been 109.2 kg. He does report he has had some low BP in the 90's lately at home also.      Upon arrival he was found to have WBC 11.5, blood cultures with film array MRSA positive in both bottles. Lactic acid initially 1.4. He was admitted and started on Cefepime and Vancomycin. His WBC has risen to 14.5 and lactic today was 1.1. His BP was 85/47 and he had a 500 mL bolus today, his last BP was 99/50.      He denies light headedness, dizziness, chest pain, shortness of breath, vomiting, diarrhea. Nephrology is treating:   ESRD requiring hemodialysis     Subjective:   The

## 2018-08-22 NOTE — PROGRESS NOTES
CLINICAL INFORMATION: 18-year-old male with fever for 2 days. Hemodialysis patient. COMPARISON: Chest x-ray 7/14/2018. TECHNIQUE: AP upright view of the chest. FINDINGS: A cardiac device is again seen projecting over the left hemithorax. Metallic wire sutures remain in the sternum. There are low lung volumes. Cardiac silhouette is normal in size. There is no significant pleural effusion or pneumothorax. There is pulmonary vascular congestion. The trachea is at midline. Low lung volumes with mild pulmonary vascular congestion. **This report has been created using voice recognition software. It may contain minor errors which are inherent in voice recognition technology. ** Final report electronically signed by Dr Bianca Ott on 8/14/2018 8:53 PM    Vl Dup Upper Extremity Arteries Bilateral    Result Date: 8/21/2018  PROCEDURE: VL DUP UPPER EXTREMITY ARTERIES BILATERAL CLINICAL INFORMATION: poor circulation,  . Bilateral hand numbness and ulcers COMPARISON: 8/17/2018 FINDINGS: Evaluation of each hilum or arch was specifically requested. On the right there is no flow identified within the radial artery or the ulnar artery. Palmar arch images could not be obtained. Waveforms were obtained from the first digit and the fifth digit, likely from collateral flow. Each shows a parvus tardus waveform. On the left, there is no flow within the radial artery or the ulnar artery. Palmar arch images could not be obtained. There is turbulent flow within a segment of the left radial artery near the fistula, though flow cannot be documented above or below this. Waveforms were obtained of the first and fifth digit. First digit shows a parvus tardus waveform. Fifth digit deformity is not nearly as reduced in amplitude. Bilaterally, there is occlusion of the radial and ulnar arteries. **This report has been created using voice recognition software.  It may contain minor errors which are inherent in voice recognition technology. ** Final report electronically signed by Dr. Maycol Lyles on 8/21/2018 10:04 AM            ASSESSMENT     1. 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 2. Ischemic ulceration of fingers  3. Proteus mirabilis in urine  4. Chronic co-morbidities  1. ESRD on hemodialysis  2. Diabetes mellitus Type 2 complicated by peripheral neuropathy  3. Coronary artery disease  4. Anemia in chronic kidney disease  5. Paroxysymal atrial fibrillation  6. Aortic valve replacement  7. GERD  8. Hyperlipidemia  9. Obstructive sleep apnea      PLAN     1. Plan for continued IV outpatient antibiotics for 6 weeks. 2. Bilateral arteriogram of upper extremities secondary to signs of septic emboli. 3. JAVIER results show severe mitral regurgitation- unclear if mitral valve will need repair. Signs of endocarditis present as well. Densities 0.9 x 0.3 cm and 1 x 0.3 cm. Cardiothoracic Surgery following. Will review recommendations. 4. Significance of Proteus mirabilis in urine unknown. Will discuss with Dr. Gunner Hernandez. Urine likely concentrated in the setting of ESRD on hemodialysis. 5. Continue to monitor  6. Hold heparin for now. Currently on clopidogrel.     DVT prophylaxis: [] Lovenox                                 [] SCDs                                 [x] SQ Heparin                                 [] Encourage ambulation           [] Already on Anticoagulation     Disposition:    [x] Home       [] TCU       [] Rehab       [] Psych       [] SNF       [] [place]       [] Other-    Anticipated Discharge in : 1 to 2 days    Code Status: Full Code    Electronically signed by Terrell Sutton MD on 8/21/2018 at 8:23 PM

## 2018-08-22 NOTE — CARE COORDINATION
8/17/18 8:36 AM     JAVIER today, cardiology and ID following result. Remains on IV ATB. Nephrology managing HD. BS low today, meds addressed per hospitalist.  Planning at least 3-4 weeks of atb at discharge. Will check with dialysis to see if they can give in in dialysate. 10:19 AM    Infection does not appear to be related to HD, therefore cannot have atb given in dialysis. Patient does not have payor source for HI. Will discuss outpatient nursing vs ECF when he returns from HD.     12:00 PM   Spoke with Ronnie's wife, Lina Stanford. Discussed likely need for IV ATB at discharge, as well as options of ECF vs outpatient nursing. Lina Dopp states they will prefer outpatient if atb is only once daily. Patient lives in [place] and is would be easier if atbs can be arranged at [facility].  Will follow for orders and plans.
8/20/18 9:37 AM     Spoke with Abhishek PARDO at [facility]. She states they can give the vanc three times weekly at the dialysis clinic. She would like a callback when discharge date is known ([phone]). Vascular surgery consult pending, Madhuriadriana Duenas states she has called the consult the past 2 days (ordered on the 18th). Await further plans.
8/21/18 11:01 AM     Doppler studies show bilat occlusions of radial and ulnar arteries. Reema PARDO has paged Dr. Ryan Bettencourt with those results. Okay for discharge from nephrology perspective. Will receive IV atb in dialysis three times weekly.
8/22/18 10:45 AM     Bilat upper extremity arteriograms completed. Await if [facility] has any further plans, otherwise possible discharge? Will have vanc given during OP dialysis in [place].
QAM AC    pregabalin  50 mg Oral BID    sodium chloride flush  10 mL Intravenous 2 times per day    heparin (porcine)  5,000 Units Subcutaneous Q12H    vancomycin (VANCOCIN) intermittent dosing (placeholder)   Other RX Placeholder    insulin lispro  0-6 Units Subcutaneous TID WC    insulin lispro  0-3 Units Subcutaneous Nightly     Continuous Infusions:   dextrose        Pertinent Info/Orders/Treatment Plan:  Hospitalist following. Nephro and Ortho consults. Left hand xray ordered. Na+ 133, Creatinine 7.4, bun 53. (+) troponin. CRP 8.77. Wbc 14.5. IV cefepime. IV vanc. Diet: DIET RENAL;   DVT Prophylaxis: Heparin  Smoking status:  reports that he has never smoked. He has never used smokeless tobacco.   Influenza Vaccination Screening Completed: n/a  Pneumonia Vaccination Screening Completed: yes  PCP: Lasha Dinh MD  Readmission: no  Readmission Risk Score: 29%    Discharge Plan:Lives with wife, has declined [facility] services in past. No family present at this time and patient has been confused. Will plan to meet with family once they arrive. Home dialysis with [facility].       Evaluation: no

## 2018-08-22 NOTE — PROGRESS NOTES
Hospitalist Progress Note    Patient:  Shannon Madrid  YOB: 1960  MRN: [id number]   PCP: Nader Segura MD         Acct: [de-identified]  Unit/Bed: [facility]15015    Date of Admission: 8/14/2018      Chief Complaint     Bacteremia with endocarditis    SUBJECTIVE     The patient is a 60 y.o. Brad Inch yo male who was admitted for bacteremia. No acute issues. Denies chest pain or shortness of breath. CTA of bilateral upper extremities demonstrates complete occlusion of the bilateral ulnar and radial arteries. Awaiting Cardiothoracic input. Otherwise no acute issues. Patient just came back from hemodialysis. Hypoglycemic today, however was NPO for procedure, then had nausea so could not eat. OBJECTIVE     Medications:  Reviewed      Ins and outs:      Intake/Output Summary (Last 24 hours) at 08/22/18 1837  Last data filed at 08/22/18 1648   Gross per 24 hour   Intake              800 ml   Output             1314 ml   Net             -514 ml       Physical Examination     BP (!) 120/59   Pulse 64   Temp 97.5 °F (36.4 °C) (Oral)   Resp 18   Ht 6' 1\" (1.854 m)   Wt 243 lb 13.3 oz (110.6 kg)   SpO2 96%   BMI 32.17 kg/m²     General appearance: No apparent distress, appears stated age and cooperative. Sitting in chair surrounded by family members  HEENT: Extraocular motion intact. Neck: Supple, with full range of motion. No jugular venous distention. Trachea midline. Respiratory:  Normal respiratory effort. Clear to auscultation, bilaterally without Rales/Wheezes/Rhonchi. Cardiovascular: S3 gallop. 3/6 MAUREEN. No rubs appreciated. Abdomen: Soft, non-tender, non-distended with normal bowel sounds. Musculoskeletal: passive and active ROM x 4 extremities. End finger of the right hand amputated. Neurologic: No focal sensory/motor deficits.  Cranial nerves: II-XII intact, grossly non-focal.  Psychiatric: Alert and oriented, thought content appropriate, normal insight  Skin: Fistula at left upper extremity. Open wound/ulcer at the distal end of multiple fingers at the left upper extremity. Labs     No results for input(s): WBC, HGB, HCT, PLT in the last 72 hours. No results for input(s): NA, K, CL, CO2, BUN, CREATININE, CALCIUM, PHOS in the last 72 hours. Invalid input(s): MAGNES  No results for input(s): AST, ALT, BILIDIR, BILITOT, ALKPHOS in the last 72 hours. No results for input(s): INR in the last 72 hours. No results for input(s): Magdalena Pines in the last 72 hours. Urinalysis:      Lab Results   Component Value Date    NITRU NEGATIVE 08/16/2018    WBCUA > 100 08/16/2018    BACTERIA NONE 08/16/2018    RBCUA 5-10 08/16/2018    BLOODU MODERATE 08/16/2018    GLUCOSEU 250 08/16/2018       Diagnostic imaging/procedures     Xr Hand Left (min 3 Views)    Result Date: 8/15/2018  PROCEDURE: XR HAND LEFT (MIN 3 VIEWS) CLINICAL INFORMATION: second and third digit ulcers,  COMPARISON: No prior study. TECHNIQUE:  Left hand 3 views  FINDINGS: There is severe arterial calcinosis noted within the visualized left wrist and hand. There are more focal areas of indeterminate soft tissue calcification noted along the radial aspect of the left fourth digit as well as the ulnar aspects of the left second and third digits. No osseous erosions are identified. No acute fracture is seen. 1. Marked arterial calcinosis is noted within the visualized vasculature of the left hand and wrist. This may be related to sequela from underlying diabetes. Correlate clinically. 2. Indeterminate soft tissue calcifications are noted along the ulnar aspects of the mid left second and third digits as well as the radial aspect of the mid left fourth digit. 3. No acute osseous findings are demonstrated. **This report has been created using voice recognition software. It may contain minor errors which are inherent in voice recognition technology. ** Final report electronically signed by Dr. Maribel Hart on 8/15/2018 9:36 extremity. **This report has been created using voice recognition software. It may contain minor errors which are inherent in voice recognition technology. ** Final report electronically signed by Dr. Christiana Davis on 8/18/2018 7:58 AM    Xr Chest Portable    Result Date: 8/14/2018  PROCEDURE: XR CHEST PORTABLE CLINICAL INFORMATION: 70-year-old male with fever for 2 days. Hemodialysis patient. COMPARISON: Chest x-ray 7/14/2018. TECHNIQUE: AP upright view of the chest. FINDINGS: A cardiac device is again seen projecting over the left hemithorax. Metallic wire sutures remain in the sternum. There are low lung volumes. Cardiac silhouette is normal in size. There is no significant pleural effusion or pneumothorax. There is pulmonary vascular congestion. The trachea is at midline. Low lung volumes with mild pulmonary vascular congestion. **This report has been created using voice recognition software. It may contain minor errors which are inherent in voice recognition technology. ** Final report electronically signed by Dr Nancy Bush on 8/14/2018 8:53 PM    Vl Dup Upper Extremity Arteries Bilateral    Result Date: 8/21/2018  PROCEDURE: VL DUP UPPER EXTREMITY ARTERIES BILATERAL CLINICAL INFORMATION: poor circulation,  . Bilateral hand numbness and ulcers COMPARISON: 8/17/2018 FINDINGS: Evaluation of each hilum or arch was specifically requested. On the right there is no flow identified within the radial artery or the ulnar artery. Palmar arch images could not be obtained. Waveforms were obtained from the first digit and the fifth digit, likely from collateral flow. Each shows a parvus tardus waveform. On the left, there is no flow within the radial artery or the ulnar artery. Palmar arch images could not be obtained. There is turbulent flow within a segment of the left radial artery near the fistula, though flow cannot be documented above or below this. Waveforms were obtained of the first and fifth digit.  First digit shows a parvus tardus waveform. Fifth digit deformity is not nearly as reduced in amplitude. Bilaterally, there is occlusion of the radial and ulnar arteries. **This report has been created using voice recognition software. It may contain minor errors which are inherent in voice recognition technology. ** Final report electronically signed by Dr. Streeter Later on 8/21/2018 10:04 AM            ASSESSMENT     1. 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 2. Ischemic ulceration of fingers, with findings of occlusion of the bilateral ulnar and radial arteries. 3. Proteus mirabilis in urine  4. Chronic co-morbidities  1. ESRD on hemodialysis  2. Diabetes mellitus Type 2 complicated by peripheral neuropathy  3. Coronary artery disease  4. Anemia in chronic kidney disease  5. Paroxysymal atrial fibrillation  6. Aortic valve replacement  7. GERD  8. Hyperlipidemia  9. Obstructive sleep apnea      PLAN     1. Plan for continued IV outpatient antibiotics for at least  6 weeks. 2. JAVIER results show severe mitral regurgitation- unclear if mitral valve will need replacement . Signs of endocarditis present as well. Densities 0.9 x 0.3 cm and 1 x 0.3 cm. Cardiothoracic Surgery following. 3. Awaiting recommendations from Cardiothoracic Surgery regarding the occlusion of bilateral ulnar and radial arteries and whether there will be surgical intervention for mitral valve endocarditis. 4. No need to treat Proteus mirabilis as discussed with Dr. Bambi Swan as patient wsa asymptomatic. 5. Currently on clopidogrel. Was holding subcutaneous heparin in case patient needed to go to surgery, however will be restarted tonight.     DVT prophylaxis: [] Lovenox                                 [] SCDs                                 [x] SQ Heparin                                 [] Encourage ambulation           [] Already on Anticoagulation     Disposition:    [x] Home       [] TCU       [] Rehab       [] Psych       [] SNF       [] [facility]       [] Other-    Anticipated Discharge in : 1 to 2 days    Code Status: Full Code    Electronically signed by Kel Diez MD on 8/22/2018 at 6:37 PM

## 2018-08-22 NOTE — PROGRESS NOTES
[address] [address]  Sedation/Analgesia History & Physical    Pt Name: Gin Camargo  MRN: [id number]  YOB: 1960  Provider Performing Procedure: Christian Davis MD  Primary Care Physician: Albert Epstein MD    PRE-PROCEDURE   DNR-CCA/DNR-CC []Yes [x]No  Brief History/Pre-Procedure Diagnosis: Bilateral hand pain and numbness          MEDICAL HISTORY  []CAD/Valve  []Liver Disease  []Lung Disease []Diabetes  []Hypertension []Renal Disease  []Additional information:       has a past medical history of Anemia; Arthritis; CAD (coronary artery disease); Charcot ankle; CHF (congestive heart failure) ([facility] [address] Chronic ankle pain; Chronic back pain; Chronic venous hypertension w ulceration (HCC); CKD (chronic kidney disease) stage 3, GFR 30-59 ml/min; DDD (degenerative disc disease); Diabetic retinopathy ([facility] [address] DM (dermatomyositis); DM (diabetes mellitus) ([facility] [address] Full dentures; Gastric bleed; GERD (gastroesophageal reflux disease); H/O Bell's palsy; H/O Clostridium difficile infection; Hemodialysis patient [facility]); History of blood transfusion; History of cardiac cath; HTN (hypertension); Hyperkalemia; Hyperlipidemia; MRSA (methicillin resistant Staphylococcus aureus); Murmur, cardiac; Nephrotic syndrome; Obstructive sleep apnea; Osteomyelitis of finger of right hand ([facility] [address] Pancreatitis due to biliary obstruction; Peripheral neuropathy; Peripheral neuropathy; Psychiatric problem; Pulmonary edema; Renal failure; S/P peripheral artery angioplasty: 10/30/2017: PTA of the left popliteal artery.; SOB (shortness of breath); Vision blurred; VRE (vancomycin resistant enterococcus) culture positive; and Wears glasses. SURGICAL HISTORY   has a past surgical history that includes fracture surgery (Right); Cholecystectomy; Colonoscopy; Endoscopy, colon, diagnostic; other surgical history; Dialysis fistula creation (Left, 02/2014);  Kidney biopsy (Bilateral, 09/2013); eye surgery (Bilateral); eye surgery (Left, acetaminophen (TYLENOL) tablet 650 mg, 650 mg, Oral, Q6H PRN, Jeet Gordillo MD, 650 mg at 08/15/18 1511    acetaminophen (TYLENOL) suppository 650 mg, 650 mg, Rectal, Q6H PRN, Jeet Gordillo MD, 650 mg at 08/15/18 0309    vancomycin (VANCOCIN) intermittent dosing (placeholder), , Other, RX Placeholder, Jeet Gordillo MD    glucose (GLUTOSE) 40 % oral gel 15 g, 15 g, Oral, PRN, Jeet Gordillo MD, 15 g at 08/22/18 0516    dextrose 50 % solution 12.5 g, 12.5 g, Intravenous, PRN, Jeet Gordillo MD    glucagon (rDNA) injection 1 mg, 1 mg, Intramuscular, PRN, Jeet Gordillo MD    dextrose 5 % solution, 100 mL/hr, Intravenous, PRN, Jeet Gordillo MD    insulin lispro (HUMALOG) injection vial 0-6 Units, 0-6 Units, Subcutaneous, TID WC, Jeet Gordillo MD, 1 Units at 08/21/18 1202  Prior to Admission medications    Medication Sig Start Date End Date Taking? Authorizing Provider   vancomycin (VANCOCIN) infusion Infuse 1,000 mg intravenously three times a week for 18 doses Compound per protocol.  8/20/18 9/29/18 Yes Lorrie Brandt MD   nitroGLYCERIN (NITROSTAT) 0.4 MG SL tablet Place 1 tablet under the tongue every 5 minutes as needed for Chest pain 8/6/18   Maryana Quinteros PA-C   aspirin 81 MG chewable tablet Take 81 mg by mouth daily    Historical Provider, MD   carvedilol (COREG) 6.25 MG tablet Take 1 tablet by mouth 2 times daily (with meals) 7/8/18   Maryana Quinteros PA-C   clopidogrel (PLAVIX) 75 MG tablet Take 1 tablet by mouth daily 7/9/18   Maryana Quinteros PA-C   lisinopril (PRINIVIL;ZESTRIL) 5 MG tablet Take 1 tablet by mouth daily 7/9/18   Cade Palacios MD   FENOFIBRATE PO Take 145 mg by mouth daily     Historical Provider, MD   pantoprazole (PROTONIX) 40 MG tablet Take 40 mg by mouth daily    Historical Provider, MD   calcium carbonate (TUMS) 500 MG chewable tablet Take 5 tablets by mouth 3 times daily (with meals) And 3 tabs as

## 2018-08-22 NOTE — PROGRESS NOTES
4225 Patient received in IR for procedure; no family present. Pt arrived to IR with no IV access. 0830 This procedure has been fully reviewed with the patient and written informed consent has been obtained. 0850 IV started to right hand, #22 gauge. 4456 Patient prepped for procedure. 3541 Dr. Alfred Painting and Shila Lopez in spoke to patient. 5129 Procedure started with Vane Fung and Shila Lopez.  8111 Pt noted to be diaphoretic. Obtaining Accucheck machine from PET/CT. 0930 Blood sugar 78; Dr Alfred Painting notified and orders received. 800 [facility] notified of STAT order for D50 to be sent to radiology. 0950 D50 received from pharmacy. Administered 12.5mg IV push (see MAR). 1025 Procedure completed; patient tolerated well. Angio seal device deployed to right femoral artery. 1027 Bacitracin oint, gauze and op site to right femoral site; area soft to touch with no bleeding noted. 1042 Patient on bed; comfort ensured. Right femoral dressing remains dry and intact with area soft. 1045 Report called to 6K. Patient taken to [facility] via bed. Right femoral dressing remains dry and intact with area soft.

## 2018-08-22 NOTE — PLAN OF CARE
provided. Will continue to monitor for skin breakdown. Patient with multiple small black scabs to finger tips on bilateral hands and on toes to bilateral feet. Patient with poor circulation and bianka red BLE. Dr. Mark Anthony Young following. Problem: Discharge Planning:  Goal: Discharged to appropriate level of care  Discharged to appropriate level of care   Outcome: Ongoing  Patient from home. Plans to return home at discharge. Plan to receive vanc three times weekly at the dialysis clinic in [facility] outpatient. Discharge pending vascular surgeon recommendations. Plan for arteriogram at 0800. Will continue to monitor for discharge needs. Comments: Patient participated in plan of care and contributed to goal setting.

## 2018-08-23 LAB
BLOOD CULTURE, ROUTINE: NORMAL
BLOOD CULTURE, ROUTINE: NORMAL
GLUCOSE BLD-MCNC: 105 MG/DL (ref 70–108)
GLUCOSE BLD-MCNC: 119 MG/DL (ref 70–108)
GLUCOSE BLD-MCNC: 135 MG/DL (ref 70–108)
GLUCOSE BLD-MCNC: 206 MG/DL (ref 70–108)

## 2018-08-23 PROCEDURE — 6370000000 HC RX 637 (ALT 250 FOR IP): Performed by: INTERNAL MEDICINE

## 2018-08-23 PROCEDURE — 2580000003 HC RX 258: Performed by: NURSE PRACTITIONER

## 2018-08-23 PROCEDURE — 6370000000 HC RX 637 (ALT 250 FOR IP): Performed by: FAMILY MEDICINE

## 2018-08-23 PROCEDURE — 82948 REAGENT STRIP/BLOOD GLUCOSE: CPT

## 2018-08-23 PROCEDURE — 99223 1ST HOSP IP/OBS HIGH 75: CPT | Performed by: THORACIC SURGERY (CARDIOTHORACIC VASCULAR SURGERY)

## 2018-08-23 PROCEDURE — 99232 SBSQ HOSP IP/OBS MODERATE 35: CPT | Performed by: INTERNAL MEDICINE

## 2018-08-23 PROCEDURE — 2580000003 HC RX 258: Performed by: FAMILY MEDICINE

## 2018-08-23 PROCEDURE — 6370000000 HC RX 637 (ALT 250 FOR IP): Performed by: THORACIC SURGERY (CARDIOTHORACIC VASCULAR SURGERY)

## 2018-08-23 PROCEDURE — 1200000003 HC TELEMETRY R&B

## 2018-08-23 PROCEDURE — 6360000002 HC RX W HCPCS: Performed by: THORACIC SURGERY (CARDIOTHORACIC VASCULAR SURGERY)

## 2018-08-23 RX ORDER — SODIUM CHLORIDE 450 MG/100ML
INJECTION, SOLUTION INTRAVENOUS CONTINUOUS
Status: DISCONTINUED | OUTPATIENT
Start: 2018-08-23 | End: 2018-08-25 | Stop reason: HOSPADM

## 2018-08-23 RX ORDER — MIDAZOLAM HYDROCHLORIDE 1 MG/ML
1 INJECTION INTRAMUSCULAR; INTRAVENOUS ONCE
Status: CANCELLED | OUTPATIENT
Start: 2018-08-23 | End: 2018-08-23

## 2018-08-23 RX ORDER — MIDAZOLAM HYDROCHLORIDE 1 MG/ML
1 INJECTION INTRAMUSCULAR; INTRAVENOUS ONCE
Status: COMPLETED | OUTPATIENT
Start: 2018-08-24 | End: 2018-08-24

## 2018-08-23 RX ORDER — ONDANSETRON 4 MG/1
4 TABLET, FILM COATED ORAL EVERY 8 HOURS PRN
Status: DISCONTINUED | OUTPATIENT
Start: 2018-08-23 | End: 2018-08-25 | Stop reason: HOSPADM

## 2018-08-23 RX ORDER — SODIUM CHLORIDE 450 MG/100ML
INJECTION, SOLUTION INTRAVENOUS CONTINUOUS
Status: CANCELLED | OUTPATIENT
Start: 2018-08-23 | End: 2019-01-01

## 2018-08-23 RX ADMIN — CARVEDILOL 6.25 MG: 6.25 TABLET, FILM COATED ORAL at 16:22

## 2018-08-23 RX ADMIN — PREGABALIN 50 MG: 50 CAPSULE ORAL at 20:35

## 2018-08-23 RX ADMIN — Medication 10 ML: at 20:38

## 2018-08-23 RX ADMIN — Medication 2 UNITS: at 12:29

## 2018-08-23 RX ADMIN — FENOFIBRATE 160 MG: 160 TABLET ORAL at 08:41

## 2018-08-23 RX ADMIN — INSULIN GLARGINE 55 UNITS: 100 INJECTION, SOLUTION SUBCUTANEOUS at 08:42

## 2018-08-23 RX ADMIN — ANTACID TABLETS 2500 MG: 500 TABLET, CHEWABLE ORAL at 11:47

## 2018-08-23 RX ADMIN — ANTACID TABLETS 2500 MG: 500 TABLET, CHEWABLE ORAL at 08:41

## 2018-08-23 RX ADMIN — ONDANSETRON HYDROCHLORIDE 4 MG: 4 TABLET, FILM COATED ORAL at 20:10

## 2018-08-23 RX ADMIN — ASPIRIN 81 MG CHEWABLE TABLET 81 MG: 81 TABLET CHEWABLE at 08:41

## 2018-08-23 RX ADMIN — MIDODRINE HYDROCHLORIDE 5 MG: 5 TABLET ORAL at 08:41

## 2018-08-23 RX ADMIN — PREGABALIN 50 MG: 50 CAPSULE ORAL at 08:41

## 2018-08-23 RX ADMIN — Medication 10 ML: at 08:42

## 2018-08-23 RX ADMIN — ANTACID TABLETS 2500 MG: 500 TABLET, CHEWABLE ORAL at 16:21

## 2018-08-23 RX ADMIN — FLUOXETINE HYDROCHLORIDE 20 MG: 20 CAPSULE ORAL at 08:41

## 2018-08-23 RX ADMIN — CALCITRIOL 0.25 MCG: 0.25 CAPSULE ORAL at 08:41

## 2018-08-23 RX ADMIN — MIDODRINE HYDROCHLORIDE 5 MG: 5 TABLET ORAL at 16:22

## 2018-08-23 RX ADMIN — MIDODRINE HYDROCHLORIDE 5 MG: 5 TABLET ORAL at 12:01

## 2018-08-23 RX ADMIN — CLOPIDOGREL BISULFATE 75 MG: 75 TABLET ORAL at 08:41

## 2018-08-23 RX ADMIN — VERAPAMIL HYDROCHLORIDE 240 MG: 240 TABLET, FILM COATED, EXTENDED RELEASE ORAL at 20:35

## 2018-08-23 RX ADMIN — PANTOPRAZOLE SODIUM 40 MG: 40 TABLET, DELAYED RELEASE ORAL at 06:35

## 2018-08-23 ASSESSMENT — ENCOUNTER SYMPTOMS
COUGH: 0
SHORTNESS OF BREATH: 1
NAUSEA: 1
BLURRED VISION: 0
ABDOMINAL PAIN: 0

## 2018-08-23 NOTE — PROGRESS NOTES
Hospitalist Progress Note    Patient:  Bere Villafuerte  YOB: 1960  MRN: [id number]   PCP: Marcia Bazan MD         Acct: [de-identified]  Unit[facility]Bed: [facility]/015-A    Date of Admission: 8/14/2018      Chief Complaint     Bacteremia with endocarditis    SUBJECTIVE     The patient is a 60 y.o. Andra Tung yo male who was admitted for bacteremia. Patient reports that he has increased swelling and pain at the left upper extremity. Pain up to 5/10. Per Nephrology a fistulogram was ordered. OBJECTIVE     Medications:  Reviewed      Ins and outs:      Intake/Output Summary (Last 24 hours) at 08/23/18 1308  Last data filed at 08/23/18 0431   Gross per 24 hour   Intake              730 ml   Output             1314 ml   Net             -584 ml       Physical Examination     BP (!) 110/54   Pulse 60   Temp 98.6 °F (37 °C)   Resp 20   Ht 6' 1\" (1.854 m)   Wt 258 lb 12.8 oz (117.4 kg)   SpO2 96%   BMI 34.14 kg/m²     General appearance: No apparent distress, appears stated age and cooperative. Sitting in chair. HEENT: Extraocular motion intact. Neck: Supple, with full range of motion. No jugular venous distention. Trachea midline. Respiratory:  Normal respiratory effort. Clear to auscultation, bilaterally without Rales/Wheezes/Rhonchi. Cardiovascular: S3 gallop. 3/6 MAUREEN. No rubs appreciated. Abdomen: Soft, non-tender, non-distended with normal bowel sounds. Musculoskeletal: passive and active ROM x 4 extremities. Second finger of the right hand amputated. Neurologic: No focal sensory/motor deficits. Cranial nerves: II-XII intact, grossly non-focal.  Psychiatric: Alert and oriented, thought content appropriate, normal insight  Skin: Fistula at left upper extremity. Open wound/ulcer at the distal end of multiple fingers at the left upper extremity. Labs     No results for input(s): WBC, HGB, HCT, PLT in the last 72 hours.   No results for input(s): NA, K, CL, CO2, BUN, CREATININE, CALCIUM, Grayscale and color flow ultrasound images were obtained along with spectral waveform imaging through the left upper extremity. Peak systolic velocities were measured. FINDINGS: The proximal left subclavian artery demonstrates biphasic waveform morphology with a peak systolic velocity of 021 cm/s. No significant drop or elevation of peak systolic velocity is demonstrated to the level of the distal brachial artery to suggest significant flow-limiting stenosis. The radial artery demonstrates spectral broadening likely related to the proximity of the patient's fistula. The proximal ulna demonstrates a peak systolic velocity of 26 cm/s with monophasic waveform morphology. The distal left ulnar artery does not demonstrate vascularity. This is of indeterminate age. LEFT ARTERY (PSV cm/sec) PROX SUBCLAVIAN ------>111 PSV cm/sec MID SUBCLAVIAN --------->85 PSV cm/sec DIST SUBCLAVIAN ------->93 PSV cm/sec AXILLARY ------------------->103 PSV cm/sec PROX BRACHIAL --------->89 PSV cm/sec MID BRACHIAL ------------>108 PSV cm/sec DIST BRACHIAL ---------->70 PSV cm/sec PROX RADIAL ------------>96 PSV cm/sec DIST RADIAL -------------->98 cm/sec PROX ULNAR ------------->26 PSV cm/sec DIST ULNAR --------------->0 PSV cm/sec     1. Absent flow within the distal left ulnar artery of indeterminate age. The proximal left ulnar artery also demonstrates diminished velocities suggesting stenosis of greater than 50%. 2. There is a patent appearing radial artery distally. However, there is turbulence with structural widening of the waveform likely due to the proximity of the patient's fistula. 3. No other significant flow-limiting stenosis is suggested sonographically within the left upper extremity. **This report has been created using voice recognition software. It may contain minor errors which are inherent in voice recognition technology. ** Final report electronically signed by Dr. Ish Rincon on 8/18/2018 7:58 AM    Xr Chest Portable    Result Date: 8/14/2018  PROCEDURE: XR CHEST PORTABLE CLINICAL INFORMATION: 50-year-old male with fever for 2 days. Hemodialysis patient. COMPARISON: Chest x-ray 7/14/2018. TECHNIQUE: AP upright view of the chest. FINDINGS: A cardiac device is again seen projecting over the left hemithorax. Metallic wire sutures remain in the sternum. There are low lung volumes. Cardiac silhouette is normal in size. There is no significant pleural effusion or pneumothorax. There is pulmonary vascular congestion. The trachea is at midline. Low lung volumes with mild pulmonary vascular congestion. **This report has been created using voice recognition software. It may contain minor errors which are inherent in voice recognition technology. ** Final report electronically signed by Dr Luther Bedoya on 8/14/2018 8:53 PM    Vl Dup Upper Extremity Arteries Bilateral    Result Date: 8/21/2018  PROCEDURE: VL DUP UPPER EXTREMITY ARTERIES BILATERAL CLINICAL INFORMATION: poor circulation,  . Bilateral hand numbness and ulcers COMPARISON: 8/17/2018 FINDINGS: Evaluation of each hilum or arch was specifically requested. On the right there is no flow identified within the radial artery or the ulnar artery. Palmar arch images could not be obtained. Waveforms were obtained from the first digit and the fifth digit, likely from collateral flow. Each shows a parvus tardus waveform. On the left, there is no flow within the radial artery or the ulnar artery. Palmar arch images could not be obtained. There is turbulent flow within a segment of the left radial artery near the fistula, though flow cannot be documented above or below this. Waveforms were obtained of the first and fifth digit. First digit shows a parvus tardus waveform. Fifth digit deformity is not nearly as reduced in amplitude. Bilaterally, there is occlusion of the radial and ulnar arteries. **This report has been created using voice recognition software.  It may message sent and awaiting response. 4. No need to treat Proteus mirabilis as discussed with Dr. Patrica Phalen as patient was asymptomatic.     5. Will await results of left upper extremity fistulogram.     DVT prophylaxis: [] Lovenox                                 [] SCDs                                 [x] SQ Heparin                                 [] Encourage ambulation           [] Already on Anticoagulation     Disposition:    [x] Home       [] TCU       [] Rehab       [] Psych       [] SNF       [] [facility]       [] Other-    Anticipated Discharge in : Discharge plan dependent on Cardiothoracic Surgery    Code Status: Full Code    Electronically signed by Jenn Collier MD on 8/23/2018 at 1:08 PM

## 2018-08-23 NOTE — PLAN OF CARE
Problem: Falls - Risk of:  Goal: Will remain free from falls  Will remain free from falls    Outcome: Ongoing  Falling star program. Bed alarm on zone 2. Call light within reach. Side rails up x2. Encouraged to use call system. Pathway clear. Belongings in reach. Problem: Pain Control  Goal: Maintain pain level at or below patient's acceptable level (or 5 if patient is unable to determine acceptable level)  Outcome: Ongoing  Patient free from pain this shift. Pain rated on 0-10 pain rating scale. Will continue to reassess. Problem: Cardiovascular  Goal: Hemodynamic stability  Outcome: Ongoing  Vitals:    08/22/18 2145   BP: (!) 117/56   Pulse: 64   Resp: 18   Temp: 97.6 °F (36.4 °C)   SpO2: 94%       Problem: Respiratory  Goal: No pulmonary complications  Outcome: Ongoing  Lungs sound clear. Pt able to cough and clear secretions on own. Encouraged coughing and deep breathing    Problem: Nutrition  Goal: Optimal nutrition therapy  Outcome: Ongoing  Will consume >50% of each meal this shift to maintain optimal nutrition    Problem: Skin Integrity/Risk  Goal: No skin breakdown during hospitalization  Outcome: Ongoing  Will consume >50% of each meal this shift to maintain optimal nutrition    Problem: Discharge Planning:  Goal: Discharged to appropriate level of care  Discharged to appropriate level of care   Outcome: Ongoing  Care plan reviewed with patient. Will review and discuss care plan with family when available    Comments: Care plan reviewed with patient.  Will review and discuss care plan with family when available

## 2018-08-23 NOTE — CONSULTS
Hyperlipidemia     MRSA (methicillin resistant Staphylococcus aureus) 1/24/2014    blood hx from another facility    Murmur, cardiac     Nephrotic syndrome 8/21/2013    Obstructive sleep apnea     has been told he has but has never been tested    Osteomyelitis of finger of right hand ([address] 9/28/2017    Pancreatitis due to biliary obstruction     Peripheral neuropathy     of the feet from the diabetes    Peripheral neuropathy     Psychiatric problem     depression    Pulmonary edema     Renal failure     KIDNEY FUNCTION 12%    S/P peripheral artery angioplasty: 10/30/2017: PTA of the left popliteal artery. 10/30/2017    10/30/2017: PTA of the left popliteal artery.  Dr. Tomas Thompson    SOB (shortness of breath)     Vision blurred     RIGHT    VRE (vancomycin resistant enterococcus) culture positive 03/2017    Wears glasses        Past Surgical History:          Procedure Laterality Date    ABLATION OF DYSRHYTHMIC FOCUS  07/21/2017    Atrial Flutter Ablation    APPENDECTOMY      CARDIAC SURGERY  2/19/2014    2 stents placed   [address]  2006    stents x 2    CARDIAC SURGERY  2003    stent x 1    CHOLECYSTECTOMY      COLONOSCOPY      DIALYSIS FISTULA CREATION Left 02/2014    ARM    ENDOSCOPY, COLON, DIAGNOSTIC      ENTEROSCOPY  9/8/2017    ENTEROSCOPY PUSH BIOPSY performed by Dwayne Velazquez MD at [facility]    EYE SURGERY Bilateral     CATARACT EXTRACTION WITH IOL    EYE SURGERY Left 05/17/2013    VITRECTOMY    EYE SURGERY Bilateral     MULTIPLE LASER PROCEDURES    FINGER AMPUTATION Right 09/28/2017    FRACTURE SURGERY Right     ankle, surgery x 7    HAND SURGERY Right 8/18/2017    I & D RIGHT INDEX FINGER performed by Jennifer Hennessy MD at [facility] Bilateral 09/2013    SUBSEQUENT HEMORRHAGE    OTHER SURGICAL HISTORY      DIALYSIS CHEST PORT INSERTED AND REMOVED    MN AMPUTATE METACARPAL+FINGER Right 9/28/2017    RIGHT INDEX FINGER AMPUTATION performed by Denia Veloz MD [place] at [address] [facility] NOT  [address] IND Left 9/8/2017    COLONOSCOPY performed by Irma Guevara MD at [facility]    VT EGD TRANSORAL BIOPSY SINGLE/MULTIPLE N/A 8/17/2017    EGD BIOPSY performed by Irma Guevara MD at [facility] OFFICE/OUTPT VISIT,PROCEDURE ONLY N/A 8/17/2018    TRANSESOPHAGEAL ECHOCARDIOGRAM performed by Navi Woody MD at [address] [place] [address] harvest left leg, left arm fistula    VITRECTOMY Right 3/27/14       Medications Prior to Admission:      Prior to Admission medications    Medication Sig Start Date End Date Taking? Authorizing Provider   vancomycin (VANCOCIN) infusion Infuse 1,000 mg intravenously three times a week for 18 doses Compound per protocol.  8/20/18 9/29/18 Yes Guicho Rascon MD   nitroGLYCERIN (NITROSTAT) 0.4 MG SL tablet Place 1 tablet under the tongue every 5 minutes as needed for Chest pain 8/6/18   Harry Bravo PA-C   aspirin 81 MG chewable tablet Take 81 mg by mouth daily    Historical Provider, MD   carvedilol (COREG) 6.25 MG tablet Take 1 tablet by mouth 2 times daily (with meals) 7/8/18   Harry Bravo PA-C   clopidogrel (PLAVIX) 75 MG tablet Take 1 tablet by mouth daily 7/9/18   Harry Bravo PA-C   lisinopril (PRINIVIL;ZESTRIL) 5 MG tablet Take 1 tablet by mouth daily 7/9/18   Chang Somers MD   FENOFIBRATE PO Take 145 mg by mouth daily     Historical Provider, MD   pantoprazole (PROTONIX) 40 MG tablet Take 40 mg by mouth daily    Historical Provider, MD   calcium carbonate (TUMS) 500 MG chewable tablet Take 5 tablets by mouth 3 times daily (with meals) And 3 tabs as needed with snacks    Historical Provider, MD   FLUoxetine (PROZAC) 20 MG capsule Take 20 mg by mouth daily    Historical Provider, MD   calcitRIOL (ROCALTROL) 0.25 MCG capsule Take 0.25 mcg by mouth daily     Historical Provider, MD   insulin glargine (LANTUS) 100 UNIT/ML injection Inject 55 Units into the skin every morning (before breakfast)     Historical Provider, MD   Multiple Vitamin (MULTI-VITAMIN PO) Take 1 tablet by mouth daily     Historical Provider, MD   insulin lispro (HUMALOG) 100 UNIT/ML injection Inject 10-15 Units into the skin 3 times daily (before meals) Use scale if chem > 200    Historical Provider, MD   pregabalin (LYRICA) 50 MG capsule Take 50 mg by mouth 2 times daily. Historical Provider, MD       Allergies:  Vicodin [hydrocodone-acetaminophen]; Darvocet a500 [propoxyphene n-acetaminophen]; and Demerol hcl [meperidine]    Social History:      TOBACCO:   reports that he has never smoked. He has never used smokeless tobacco.  ETOH:   reports that he drinks alcohol. History   Drug Use No         Family History:      Family History   Problem Relation Age of Onset    Cancer Mother         SKIN    Diabetes Mother         IDDM    Heart Disease Mother     High Blood Pressure Mother     Other Father         COPD, CHRONIC BRONCHITIS    Cancer Father         PROSTATE    Diabetes Father         NIDDM       REVIEW OF SYSTEMS:      Review of Systems   Constitutional: Positive for chills, fever and malaise/fatigue. HENT: Negative for hearing loss. Eyes: Negative for blurred vision. Respiratory: Positive for shortness of breath. Negative for cough. Cardiovascular: Negative for chest pain and leg swelling. Gastrointestinal: Positive for nausea. Negative for abdominal pain. Genitourinary: Negative for dysuria. Musculoskeletal: Positive for myalgias. Skin: Negative for rash. Neurological: Positive for weakness. Negative for dizziness. Endo/Heme/Allergies: Bruises/bleeds easily. Psychiatric/Behavioral: Negative for depression.        PHYSICAL EXAM:    BP (!) 110/54   Pulse 60   Temp 98.6 °F (37 °C)   Resp 20   Ht 6' 1\" (1.854 m)   Wt 258 lb 12.8 oz (117.4 kg)   SpO2 96%   BMI 34.14 kg/m²     General appearance:  No apparent distress, appears stated age and Maritza Carson, RDCS,                                                  RDMS, RVT                                  Interpreting       Syeda Godwin MD                               Physician     Procedure    Type of Study      TTE procedure:ECHOCARDIOGRAM COMPLETE 2D W DOPPLER W COLOR. Procedure Date  Date: 06/07/2018 Start: 07:52 AM    Study Location: Echo Lab  Technical Quality: Adequate visualization    Indications:Dyspnea / Shortness of breath. Additional Medical History:congestive heart failure, diabetes, hypertension,  anemia, coronary artery disease, chronic kidney disease, AVM, cardiomegaly,  aortic valve replacement-tissue valve, coronary arterybypass grafting    Patient Status: Routine    Height: 73 inches Weight: 240.01 pounds BSA: 2.33 m^2 BMI: 31.66 kg/m^2    BP: 161/79 mmHg    Allergies    - Vicodin. - Darvocet. - Demerol. Conclusions      Summary   Normally functioning bioprosthetic valve in aortic position. Ejection fraction is visually estimated at 35-40%. There was moderate global hypokinesis of the left ventricle. Left Ventricular size is Mildly increased . Severely dilated left atrium. Myxomatotic degeneration of mitral valve. Calcification of the mitral valve noted. Decreased mitral valve mobility noted. Mild mitral stenosis. Mild mitral regurgitation is present. cant exclude a small density on the posterior leaflet vs heavy   calcification and degenerated leaflets      Signature      ----------------------------------------------------------------   Electronically signed by Syeda Godwin MD (Interpreting   physician) on 06/07/2018 at 06:19 PM   ----------------------------------------------------------------      Findings      Mitral Valve   Myxomatotic degeneration of mitral valve. Calcification of the mitral valve noted. Decreased mitral valve mobility noted. Mild mitral stenosis. Mild mitral regurgitation is present.    cant exclude Peak A-Wave: 138 AV Peak Gradient: 26.21 LVOT Peak Gradient: 4 mmHgLVOT   cm/s                mmHg                    Mean Gradient: 2 mmHg   MV E/A Ratio: 1.41  AV Mean Velocity: 175   MV Peak Gradient:   cm/s                    TV Peak E-Wave: 84.4 cm/s   15.21 mmHg          AV Mean Gradient: 15    TV Peak A-Wave: 42.9 cm/s   MV Mean Gradient: 7 mmHg   mmHg                AV VTI: 53.2 cm         TV Peak Gradient: 2.85 mmHg   MV Mean Velocity:   AV Area                 TR Velocity:279 cm/s   106 cm/s            (Continuity):1.3 cm^2   TR Gradient:31.14 mmHg   MV Deceleration                             PV Peak Velocity: 106 cm/s   Time: 239 msec      LVOT VTI: 22.1 cm       PV Peak Gradient: 4.49 mmHg   MV P1/2t: 74 msec   IVRT: 92 msec   MVA by PHT:2.97   cm^2   MV Area             AV DVI (VTI): 0.42AV   (continuity): 1.16  DVI (Vmax):0.41   cm^2      MR Velocity: 644   cm/s     [email or web address]  [id number]       Radiology:         Code Status: Full Code      ASSESSMENT:    Active Hospital Problems    Diagnosis Date Noted    ESRD (end stage renal disease) on dialysis ([facility] [N18.6, Z99.2]      Priority: High    Ulnar artery stenosis (HCC) [I70.208]     Radial artery stenosis ([facility] [I77.1]     MRSA bacteremia [R78.81]     Proteus infection [A49.8]     Gastroesophageal reflux disease [K21.9]     Other hyperlipidemia [E78.4]     Sepsis due to undetermined organism ([facility] [A41.9]     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 [I33.0, B95.8] 08/19/2018    Ischemic ulcer of finger, limited to breakdown of skin ([facility] [L98.491]     Septicemia ([facility] [A41.9]     History of aortic valve replacement [Z95.2]     Sepsis due to methicillin resistant Staphylococcus aureus (MRSA) ([facility] [A41.02]     Diabetic peripheral neuropathy associated with type 2 diabetes mellitus ([facility] [E11.42]     [facility] [I48.0]     Open wound of left index finger without damage to nail [S61.201A]     Ischemia of finger [I99.8] 08/15/2018    Type 2 diabetes mellitus with diabetic mononeuropathy, with long-term current use of insulin (HCC) [E11.41, Z79.4]     Coronary artery disease involving native heart without angina pectoris [I25.10]     Anemia in chronic kidney disease (CKD) [N18.9, D63.1]        PLAN:  60year old male, medically complex, s/p AVR/CABG, with moderate-severe MR due to endocarditis, clinically stable without overt evidence of left heart failure. Surgical MVR is high risk, due to 1) redo status in context of pre-existing severe pericarditis encountered at primary open heart procedure, 2) cardiomyopathy, 3) with aortic valve prosthesis in place, difficult access to anterior mitral trigone for suture placement in MVR, and 4) severe mitral annular calcification (MAC) and attendant risk of atrioventricular dissociation at MVR. Considering all of these factors, estimate mortality for sMVR at 40-50%. However, given circumferential MAC, patient should be a candidate for transapical TAVR into the mitral position. This is a highly specialized procedure, for which I recommend transfer to [facility] for further evaluation. Issues discussed in detail with the patient, who understands and has no further questions. Time spent with patient: 80 minutes, of which more than 50% was spent counseling/coordinating the patient's care.     Electronically signed by Haven Pak MD on 8/23/2018 at 2:28 PM

## 2018-08-23 NOTE — PROGRESS NOTES
Upper extremity angiograms reviewed. Both's hands alive only from anterior interosseous microscopic collaterals. Right radial and ulnar arteries totally closed by elbow with calcific arterial rosary seen, palmar arch supplied by anterior interosseous collaterals. Left radial artery open to AV fistula with high grade stenosis between the radial artery and the cephalic vein fistula, fistula appears open cephalad. Left radial artery totally occludes just distal to fistula. Left ulnar artery completely closed proximally. Partial palmar arch seen via microscopic anterior interosseous branches. Definitely absolutely positively there are no surgical targets for possible hand revascularization on either side. Recommend selective amputation as needed if tissue loss on either hand occurs. Explained Mr. Filiberto Ross, his wife and son about the angiogram findings and the severe significance. Mr. Filiberto Ross appeared to understand all of his questions and his wife's were answered to their satisfaction.   At present both hands warm pink with spontaneous capillary refill

## 2018-08-23 NOTE — PROGRESS NOTES
patient was relaxing in the bedside chair. His nausea has resolved. He noticed a sudden onset of non painful swelling of his left arm. Lab Results   Component Value Date    LABGLOM 10 08/18/2018    LABGLOM 7 08/17/2018    LABGLOM 6 08/16/2018    LABGLOM 8 08/15/2018    LABGLOM 8 08/14/2018    LABGLOM 8 07/11/2018          Baseline eGFR    Admit eGFR    Current eGFR          Objective:    Diet: renal    VITALS:  BP (!) 96/51   Pulse 59   Temp 97.5 °F (36.4 °C) (Oral)   Resp 20   Ht 6' 1\" (1.854 m)   Wt 258 lb 12.8 oz (117.4 kg)   SpO2 93%   BMI 34.14 kg/m²   24HR INTAKE/OUTPUT:      Intake/Output Summary (Last 24 hours) at 08/23/18 1142  Last data filed at 08/23/18 0431   Gross per 24 hour   Intake              730 ml   Output             1314 ml   Net             -584 ml     Admission weight: 246 lb 14.6 oz (112 kg)  Wt Readings from Last 3 Encounters:   08/23/18 258 lb 12.8 oz (117.4 kg)   08/13/18 247 lb (112 kg)   07/13/18 235 lb 3.7 oz (106.7 kg)     Body mass index is 34.14 kg/m². Physical examination    General:  Alert and appropriate. HEENT:  Head: Normocephalic, no lesions, without obvious abnormality. Neck:   no adenopathy, no carotid bruit, no JVD, supple, symmetrical, trachea midline and thyroid not enlarged, symmetric, no tenderness/mass/nodules  Lungs:  clear to auscultation bilaterally  Heart:  regular rate and rhythm, S1, S2 normal, no murmur, click, rub or gallop  Abdomen:  soft, non-tender; bowel sounds normal; no masses,  no organomegaly  Extremities:  extremities normal, atraumatic, no cyanosis or edema  Neurologic:  Mental status: Alert, oriented, thought content appropriate  Psy:                 No evidence of depression. Mood is stable. Skin:                Warm and dry. No lesions or rashes noted. Muscles:         Hand  and leg strength are equal and strong bilaterally.               Lab Data  CBC:   No results for input(s): WBC, HGB, PLT in the last 72

## 2018-08-23 NOTE — PLAN OF CARE
Problem: Falls - Risk of:  Goal: Will remain free from falls  Will remain free from falls    Outcome: Ongoing  Remains free from falls. Call light in reach and able to make needs known. Up with assist and walker. Gait steady    Problem: Pain Control  Goal: Maintain pain level at or below patient's acceptable level (or 5 if patient is unable to determine acceptable level)  Outcome: Ongoing  Denies having pain. Problem: Cardiovascular  Goal: No DVT, peripheral vascular complications  Outcome: Ongoing  Patient c/o left arm edema. Patient states edema in left arm is new. Encouraged to elevate arm. Dr Kim Bragg aware of edema. Refused heparin    Problem: Respiratory  Goal: No pulmonary complications  Outcome: Ongoing  resp easy and nonlabored. Lung sounds clear. Pulse ox remains above 90% on room air    Problem:   Goal: Adequate urinary output  Outcome: Ongoing  Patient getting hemodialysis with minimal voiding     Problem: Nutrition  Goal: Optimal nutrition therapy  Outcome: Ongoing  Tolerating carb counting diet without nausea or vomiting. Problem: Skin Integrity/Risk  Goal: No skin breakdown during hospitalization  Outcome: Ongoing  Black areas on fingertips on left hand index fingers and next 2 fingers. Wound remains on left big toe and 2nd toe on right foot. No new skin issues noted. Legs elevated while in chair    Problem: Musculor/Skeletal Functional Status  Goal: Absence of falls  Outcome: Ongoing  Remains free from falls. up with assist and walker    Problem: Discharge Planning:  Goal: Discharged to appropriate level of care  Discharged to appropriate level of care   Outcome: Ongoing  Plans to go home at discharge. No anticipated needs voiced. Problem: Serum Glucose Level - Abnormal:  Goal: Ability to maintain appropriate glucose levels has stabilized  Ability to maintain appropriate glucose levels has stabilized   Outcome: Ongoing  Am chem 119. Refused humalog with meals. lantus given.     Comments: Care plan reviewed with patient. Patient verbalize understanding of the plan of care and contribute to goal setting.

## 2018-08-23 NOTE — PROGRESS NOTES
Progress note: Infectious diseases    Patient - Carlos Bales,  Age - 60 y.o.    - 1960      Room Number - 6K-15/015-A   MRN -  [id number]   Acct # - [de-identified]  Date of Admission -  2018  8:17 PM    SUBJECTIVE:   No new issues. plan for arteriogram tomorrow  OBJECTIVE   VITALS    height is 6' 1\" (1.854 m) and weight is 258 lb 12.8 oz (117.4 kg). His temperature is 98.6 °F (37 °C). His blood pressure is 110/54 (abnormal) and his pulse is 60. His respiration is 20 and oxygen saturation is 96%. Wt Readings from Last 3 Encounters:   18 258 lb 12.8 oz (117.4 kg)   18 247 lb (112 kg)   18 235 lb 3.7 oz (106.7 kg)       I/O (24 Hours)    Intake/Output Summary (Last 24 hours) at 18 1520  Last data filed at 18 0431   Gross per 24 hour   Intake              730 ml   Output             1314 ml   Net             -584 ml       General Appearance asleep  Chronically sick looking.   HEENT - normocephalic, atraumatic, slighlty pale conjunctiva,  anicteric sclera  Neck - Supple, no mass  Lungs -  Bilateral  air entry, no rhonchi, no wheeze  Cardiovascular - Heart sounds are normal. Systolic murmur at the base of the heart   Abdomen - soft, not distended, nontender,   Neurologic -oriented  Skin - No bruising or bleeding  Extremities - No edema, no cyanosis, clubbing  Has fistula on the left upper arm  Ischemic digits,left hand is swollen     MEDICATIONS:      heparin (porcine)  5,000 Units Subcutaneous 3 times per day    vancomycin  1,000 mg Intravenous Q MWF    verapamil  240 mg Oral Nightly    midodrine  5 mg Oral TID WC    sodium chloride flush  10 mL Intravenous 2 times per day    insulin glargine  20 Units Subcutaneous Once    aspirin  81 mg Oral Daily    calcitRIOL  0.25 mcg Oral Daily    calcium carbonate  5 tablet Oral TID WC    carvedilol  6.25 mg Oral BID WC    clopidogrel diabetes mellitus with diabetic mononeuropathy, with long-term current use of insulin ([facility]) E11.41, Z79.4    Chronic back pain greater than 3 months duration M54.9, G89.29    Chronic pain of both ankles M25.571, G89.29, M25.572    Peripheral neuropathy G62.9    Secondary DM with hypertension and ESRD on dialysis ([facility]) E13.22, I12.0, Z99.2, N18.6    Peripheral neuropathy ([facility]) G62.9    Proliferative diabetic retinopathy ([facility] E11.3599    Iron deficiency anemia due to chronic blood loss D50.0    ESRD (end stage renal disease) on dialysis ([facility]) N18.6, Z99.2    AVM (arteriovenous malformation) of colon without hemorrhage I57.01    Metabolic acidosis B01.8    Diabetic polyneuropathy associated with type 2 diabetes mellitus ([facility]) E11.42    Charcot's joint of foot in type 2 diabetes mellitus ([facility] E11.610    Proliferative diabetic retinopathy without macular edema associated with type 2 diabetes mellitus ([facility] I46.7553    Secondary hyperparathyroidism of renal origin ([facility] N25.81    Obstructive sleep apnea G47.33    Arterial hypotension I95.9    ESRD on hemodialysis ([facility]) N18.6, Z99.2    S/P AVR (aortic valve replacement) Z95.2    S/P CABG x 1 Z95.1    Hyperphosphatemia E83.39    Coronary artery disease involving coronary bypass graft of native heart without angina pectoris I25.810    Osteomyelitis of finger of right hand ([facility]) M86.9    S/P peripheral artery angioplasty: 10/30/2017: PTA of the left popliteal artery.  Z98.62    Peripheral vascular disease ([facility]) I73.9    Atheroembolism of both lower extremities ([facility]) I75.023    Chest pain R07.9    Chronic systolic congestive heart failure ([facility]) I50.22    Unstable angina ([facility]) I20.0    Symptomatic bradycardia R00.1    Heart block AV third degree ([facility]) I44.2    S/P cardiac catheterization Z98.890    Encounter for hemodialysis for ESRD ([facility] N18.6, Z99.2    Anemia due to stage 5 chronic kidney disease ([facility]) N18.5, D63.1    Displacement of atrial pacemaker leads T82.120A    Adjustment and management of cardiac pacemaker Z45.018    Ischemia of finger I99.8    Septicemia ([facility]) A41.9    History of aortic valve replacement Z95.2    Sepsis due to methicillin resistant Staphylococcus aureus (MRSA) ([facility]) A41.02    Diabetic peripheral neuropathy associated with type 2 diabetes mellitus ([facility]) E11.42    Paroxysmal atrial fibrillation ([facility]) I48.0    Open wound of left index finger without damage to nail S61.201A    Ischemic ulcer of finger, limited to breakdown of skin ([address] L98.491    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

## 2018-08-24 ENCOUNTER — APPOINTMENT (OUTPATIENT)
Dept: INTERVENTIONAL RADIOLOGY/VASCULAR | Age: 58
DRG: 871 | End: 2018-08-24
Payer: MEDICARE

## 2018-08-24 LAB
GLUCOSE BLD-MCNC: 193 MG/DL (ref 70–108)
GLUCOSE BLD-MCNC: 71 MG/DL (ref 70–108)
GLUCOSE BLD-MCNC: 81 MG/DL (ref 70–108)
GLUCOSE BLD-MCNC: 92 MG/DL (ref 70–108)
GLUCOSE BLD-MCNC: 99 MG/DL (ref 70–108)
VANCOMYCIN RANDOM: 19 UG/ML (ref 0.1–39.9)

## 2018-08-24 PROCEDURE — 2709999900 HC NON-CHARGEABLE SUPPLY

## 2018-08-24 PROCEDURE — 2580000003 HC RX 258: Performed by: INTERNAL MEDICINE

## 2018-08-24 PROCEDURE — 6360000004 HC RX CONTRAST MEDICATION: Performed by: RADIOLOGY

## 2018-08-24 PROCEDURE — 6360000002 HC RX W HCPCS: Performed by: INTERNAL MEDICINE

## 2018-08-24 PROCEDURE — 2580000003 HC RX 258: Performed by: RADIOLOGY

## 2018-08-24 PROCEDURE — 90935 HEMODIALYSIS ONE EVALUATION: CPT

## 2018-08-24 PROCEDURE — 36215 PLACE CATHETER IN ARTERY: CPT

## 2018-08-24 PROCEDURE — 6370000000 HC RX 637 (ALT 250 FOR IP): Performed by: FAMILY MEDICINE

## 2018-08-24 PROCEDURE — 6360000002 HC RX W HCPCS: Performed by: RADIOLOGY

## 2018-08-24 PROCEDURE — 6370000000 HC RX 637 (ALT 250 FOR IP): Performed by: THORACIC SURGERY (CARDIOTHORACIC VASCULAR SURGERY)

## 2018-08-24 PROCEDURE — C1769 GUIDE WIRE: HCPCS

## 2018-08-24 PROCEDURE — 36415 COLL VENOUS BLD VENIPUNCTURE: CPT

## 2018-08-24 PROCEDURE — 1200000003 HC TELEMETRY R&B

## 2018-08-24 PROCEDURE — C1894 INTRO/SHEATH, NON-LASER: HCPCS

## 2018-08-24 PROCEDURE — 99232 SBSQ HOSP IP/OBS MODERATE 35: CPT | Performed by: INTERNAL MEDICINE

## 2018-08-24 PROCEDURE — C1725 CATH, TRANSLUMIN NON-LASER: HCPCS

## 2018-08-24 PROCEDURE — 80202 ASSAY OF VANCOMYCIN: CPT

## 2018-08-24 PROCEDURE — 2580000003 HC RX 258: Performed by: FAMILY MEDICINE

## 2018-08-24 PROCEDURE — 36902 INTRO CATH DIALYSIS CIRCUIT: CPT

## 2018-08-24 PROCEDURE — B51W1ZZ FLUOROSCOPY OF DIALYSIS SHUNT/FISTULA USING LOW OSMOLAR CONTRAST: ICD-10-PCS | Performed by: RADIOLOGY

## 2018-08-24 PROCEDURE — 82948 REAGENT STRIP/BLOOD GLUCOSE: CPT

## 2018-08-24 PROCEDURE — C1729 CATH, DRAINAGE: HCPCS

## 2018-08-24 PROCEDURE — 6370000000 HC RX 637 (ALT 250 FOR IP): Performed by: INTERNAL MEDICINE

## 2018-08-24 PROCEDURE — 6360000002 HC RX W HCPCS

## 2018-08-24 PROCEDURE — 5A1D70Z PERFORMANCE OF URINARY FILTRATION, INTERMITTENT, LESS THAN 6 HOURS PER DAY: ICD-10-PCS | Performed by: INTERNAL MEDICINE

## 2018-08-24 RX ORDER — VERAPAMIL HYDROCHLORIDE 240 MG/1
240 TABLET, FILM COATED, EXTENDED RELEASE ORAL NIGHTLY
Qty: 30 TABLET | Refills: 3 | Status: SHIPPED | OUTPATIENT
Start: 2018-08-25 | End: 2018-08-25 | Stop reason: HOSPADM

## 2018-08-24 RX ORDER — ACETAMINOPHEN 325 MG/1
650 TABLET ORAL EVERY 6 HOURS PRN
Qty: 120 TABLET | Refills: 3 | Status: SHIPPED | OUTPATIENT
Start: 2018-08-24

## 2018-08-24 RX ORDER — MIDAZOLAM HYDROCHLORIDE 1 MG/ML
0.5 INJECTION INTRAMUSCULAR; INTRAVENOUS ONCE
Status: COMPLETED | OUTPATIENT
Start: 2018-08-24 | End: 2018-08-24

## 2018-08-24 RX ORDER — HEPARIN SODIUM 1000 [USP'U]/ML
2000 INJECTION, SOLUTION INTRAVENOUS; SUBCUTANEOUS ONCE
Status: COMPLETED | OUTPATIENT
Start: 2018-08-24 | End: 2018-08-24

## 2018-08-24 RX ADMIN — PREGABALIN 50 MG: 50 CAPSULE ORAL at 20:13

## 2018-08-24 RX ADMIN — SODIUM CHLORIDE: 4.5 INJECTION, SOLUTION INTRAVENOUS at 05:18

## 2018-08-24 RX ADMIN — MIDODRINE HYDROCHLORIDE 5 MG: 5 TABLET ORAL at 10:37

## 2018-08-24 RX ADMIN — ANTACID TABLETS 2500 MG: 500 TABLET, CHEWABLE ORAL at 10:36

## 2018-08-24 RX ADMIN — PREGABALIN 50 MG: 50 CAPSULE ORAL at 10:40

## 2018-08-24 RX ADMIN — MIDODRINE HYDROCHLORIDE 5 MG: 5 TABLET ORAL at 18:20

## 2018-08-24 RX ADMIN — Medication 10 ML: at 20:17

## 2018-08-24 RX ADMIN — Medication 10 ML: at 10:36

## 2018-08-24 RX ADMIN — FLUOXETINE HYDROCHLORIDE 20 MG: 20 CAPSULE ORAL at 10:37

## 2018-08-24 RX ADMIN — HYDROMORPHONE HYDROCHLORIDE 0.5 MG: 1 INJECTION, SOLUTION INTRAMUSCULAR; INTRAVENOUS; SUBCUTANEOUS at 08:53

## 2018-08-24 RX ADMIN — ANTACID TABLETS 2500 MG: 500 TABLET, CHEWABLE ORAL at 18:19

## 2018-08-24 RX ADMIN — IOVERSOL 125 ML: 509 INJECTION INTRA-ARTERIAL; INTRAVENOUS at 09:15

## 2018-08-24 RX ADMIN — MIDAZOLAM 1 MG: 1 INJECTION INTRAMUSCULAR; INTRAVENOUS at 08:14

## 2018-08-24 RX ADMIN — CARVEDILOL 6.25 MG: 6.25 TABLET, FILM COATED ORAL at 18:20

## 2018-08-24 RX ADMIN — HEPARIN SODIUM 2000 UNITS: 1000 INJECTION INTRAVENOUS; SUBCUTANEOUS at 09:04

## 2018-08-24 RX ADMIN — CALCITRIOL 0.25 MCG: 0.25 CAPSULE ORAL at 10:37

## 2018-08-24 RX ADMIN — VERAPAMIL HYDROCHLORIDE 240 MG: 240 TABLET, FILM COATED, EXTENDED RELEASE ORAL at 20:13

## 2018-08-24 RX ADMIN — FENOFIBRATE 160 MG: 160 TABLET ORAL at 10:37

## 2018-08-24 RX ADMIN — VANCOMYCIN HYDROCHLORIDE 1000 MG: 1 INJECTION, POWDER, LYOPHILIZED, FOR SOLUTION INTRAVENOUS at 18:19

## 2018-08-24 RX ADMIN — MIDAZOLAM 0.5 MG: 1 INJECTION INTRAMUSCULAR; INTRAVENOUS at 08:53

## 2018-08-24 RX ADMIN — HYDROMORPHONE HYDROCHLORIDE 1 MG: 1 INJECTION, SOLUTION INTRAMUSCULAR; INTRAVENOUS; SUBCUTANEOUS at 08:14

## 2018-08-24 ASSESSMENT — PAIN SCALES - GENERAL
PAINLEVEL_OUTOF10: 0
PAINLEVEL_OUTOF10: 9

## 2018-08-24 ASSESSMENT — PAIN DESCRIPTION - LOCATION: LOCATION: HAND

## 2018-08-24 ASSESSMENT — PAIN DESCRIPTION - PAIN TYPE: TYPE: ACUTE PAIN

## 2018-08-24 NOTE — PLAN OF CARE
Problem: Falls - Risk of:  Goal: Will remain free from falls  Will remain free from falls    Outcome: Ongoing  Falling star program. Bed alarm on zone 2. Call light within reach. Side rails up x2. Encouraged to use call system. Pathway clear. Belongings in reach. Problem: Pain Control  Goal: Maintain pain level at or below patient's acceptable level (or 5 if patient is unable to determine acceptable level)  Outcome: Ongoing  Patient free from pain this shift. Pain rated on 0-10 pain rating scale. Will continue to reassess. Problem: Cardiovascular  Goal: Hemodynamic stability  Outcome: Ongoing  Vitals:    08/23/18 1945   BP: (!) 116/54   Pulse: 60   Resp: 18   Temp: 98.1 °F (36.7 °C)   SpO2: 94%       Problem: Respiratory  Goal: No pulmonary complications  Outcome: Ongoing  Lungs sound clear. Pt able to cough and clear secretions on own. Encouraged coughing and deep breathing    Problem: Nutrition  Goal: Optimal nutrition therapy  Outcome: Ongoing  Will consume >50% of each meal this shift to maintain optimal nutrition. Patient did not eat much supper due to complaints of nausea. Zofran was given. Problem: Discharge Planning:  Goal: Discharged to appropriate level of care  Discharged to appropriate level of care   Outcome: Ongoing  Continue to assess discharge needs as they arise. No concerns stated.

## 2018-08-24 NOTE — PROGRESS NOTES
[address]   Sedation/Analgesia History & Physical    Pt Name: Jacy Thurston  MRN: [id number]  YOB: 1960  Provider Performing Procedure: Christie Weldon MD  Primary Care Physician: Verónica Lyon MD    PRE-PROCEDURE   DNR-CCA/DNR-CC []Yes [x]No  Brief History/Pre-Procedure Diagnosis: ESRD, malfunctioning fistula          MEDICAL HISTORY  []CAD/Valve  []Liver Disease  []Lung Disease []Diabetes  []Hypertension []Renal Disease  []Additional information:       has a past medical history of Anemia; Arthritis; CAD (coronary artery disease); Charcot ankle; CHF (congestive heart failure) ([facility] Chronic ankle pain; Chronic back pain; Chronic venous hypertension w ulceration (HCC); CKD (chronic kidney disease) stage 3, GFR 30-59 ml/min; DDD (degenerative disc disease); Diabetic retinopathy ([facility] DM (dermatomyositis); DM (diabetes mellitus) ([facility] Full dentures; Gastric bleed; GERD (gastroesophageal reflux disease); H/O Bell's palsy; H/O Clostridium difficile infection; Hemodialysis patient [facility]); History of blood transfusion; History of cardiac cath; HTN (hypertension); Hyperkalemia; Hyperlipidemia; MRSA (methicillin resistant Staphylococcus aureus); Murmur, cardiac; Nephrotic syndrome; Obstructive sleep apnea; Osteomyelitis of finger of right hand ([facility] Pancreatitis due to biliary obstruction; Peripheral neuropathy; Peripheral neuropathy; Psychiatric problem; Pulmonary edema; Renal failure; S/P peripheral artery angioplasty: 10/30/2017: PTA of the left popliteal artery.; SOB (shortness of breath); Vision blurred; VRE (vancomycin resistant enterococcus) culture positive; and Wears glasses. SURGICAL HISTORY   has a past surgical history that includes fracture surgery (Right); Cholecystectomy; Colonoscopy; Endoscopy, colon, diagnostic; other surgical history; Dialysis fistula creation (Left, 02/2014);  Kidney biopsy (Bilateral, 09/2013); eye surgery (Bilateral); eye surgery (Left, (PRINIVIL;ZESTRIL) 5 MG tablet Take 1 tablet by mouth daily 7/9/18   Hayley Petty MD   FENOFIBRATE PO Take 145 mg by mouth daily     Historical Provider, MD   pantoprazole (PROTONIX) 40 MG tablet Take 40 mg by mouth daily    Historical Provider, MD   calcium carbonate (TUMS) 500 MG chewable tablet Take 5 tablets by mouth 3 times daily (with meals) And 3 tabs as needed with snacks    Historical Provider, MD   FLUoxetine (PROZAC) 20 MG capsule Take 20 mg by mouth daily    Historical Provider, MD   calcitRIOL (ROCALTROL) 0.25 MCG capsule Take 0.25 mcg by mouth daily     Historical Provider, MD   insulin glargine (LANTUS) 100 UNIT/ML injection Inject 55 Units into the skin every morning (before breakfast)     Historical Provider, MD   Multiple Vitamin (MULTI-VITAMIN PO) Take 1 tablet by mouth daily     Historical Provider, MD   insulin lispro (HUMALOG) 100 UNIT/ML injection Inject 10-15 Units into the skin 3 times daily (before meals) Use scale if chem > 200    Historical Provider, MD   pregabalin (LYRICA) 50 MG capsule Take 50 mg by mouth 2 times daily.     Historical Provider, MD     Additional information:       VITAL SIGNS   Vitals:    08/24/18 0920   BP: 135/71   Pulse: 60   Resp: 18   Temp:    SpO2: 98%       PHYSICAL:   Heart:  [x]Regular rate and rhythm  []Other:    Lungs:  [x]Clear    []Other:    Abdomen: [x]Soft    []Other:    Mental Status: [x]Alert & Oriented  []Other:      PLANNED PROCEDURE   []Biospy []Arteriogram              []Drainage   []Mediport Insertion  [x]Fistulogram []IV access       []Vertebroplasty / Augmentation  []IVC filter []Dialysis catheter []Biliary drainage  []Other: []CAPD Catheter []Nephrostomy Tube / Stent  SEDATION  Planned agent:[x]Midazolam []Meperidine []Sublimaze [x]Dilaudid []Morphine     []Diazepam  []Other:     ASA Classification:  []1 [x]2 []3 []4 []5  Class 1: A normal healthy patient  Class 2: Pt with mild to moderate systemic disease  Class 3: Severe systemic

## 2018-08-24 NOTE — PROGRESS NOTES
[facility]   [facility]   [address]   [place]   [phone]   Nephrology Hospital progress note       8/24/2018, 9:54 AM        Pt Name:    Antonia Benitez  MRN:     [id number]     YOB: 1960  Admit Date:    8/14/2018  8:17 PM  Primary Care Physician:  Maria D Arreola MD   Primary Nephrologist:          Dr. Foster So number  6K-15/015-A    ISOLATION: yes     Admitting Chief Complaint:   weakness     History of Chief Complaint:  The patient is a 60y.o. year old  obese male with a PMH that includes ESRD on HD at home 6 days weekly, DM, CAD, HTN, PAD, charcot foot, who was admitted after developing a fever at home. He was recently discharged from [facility], and had a biventricular pacemaker placed for complete heart block. He apparently had wounds on his finger for at least two weeks, that developed into blisters then burst and became black, which he was planned to see Dr. Tanesha Ojeda for as O/P. He also had a wound on his foot for which he was to follow with Dr. Johnnette Sandifer. He states he lost his appetite yesterday, had some nausea, and developed a fever of 104 at home, He presented to the ED for evaluation on 8/14/18.      His last HD was Monday night. He dialyzes 4.5 hours 6 nights per week. He denies any difficulty with his HD treatments. He states his dry weight is 108 and recently he has been 109.2 kg. He does report he has had some low BP in the 90's lately at home also.      Upon arrival he was found to have WBC 11.5, blood cultures with film array MRSA positive in both bottles. Lactic acid initially 1.4. He was admitted and started on Cefepime and Vancomycin. His WBC has risen to 14.5 and lactic today was 1.1. His BP was 85/47 and he had a 500 mL bolus today, his last BP was 99/50.      He denies light headedness, dizziness, chest pain, shortness of breath, vomiting, diarrhea. Nephrology is treating:   ESRD requiring hemodialysis     Subjective:   The patient was seen in radiology after undergoing fistulogram of the LUE dialysis access. His hand felt better. Per Dr. Lakesha Luther notes, he has no target vessels to improve the blood flow into the hand. The patient was alert and appropriate. Lab Results   Component Value Date    LABGLOM 10 08/18/2018    LABGLOM 7 08/17/2018    LABGLOM 6 08/16/2018    LABGLOM 8 08/15/2018    LABGLOM 8 08/14/2018    LABGLOM 8 07/11/2018          Baseline eGFR    Admit eGFR    Current eGFR          Objective:    Diet: renal    VITALS:  /60   Pulse 62   Temp 97.6 °F (36.4 °C) (Axillary)   Resp 19   Ht 6' 1\" (1.854 m)   Wt 261 lb 4.8 oz (118.5 kg)   SpO2 90%   BMI 34.47 kg/m²   24HR INTAKE/OUTPUT:      Intake/Output Summary (Last 24 hours) at 08/24/18 0954  Last data filed at 08/24/18 0355   Gross per 24 hour   Intake              735 ml   Output                0 ml   Net              735 ml     Admission weight: 246 lb 14.6 oz (112 kg)  Wt Readings from Last 3 Encounters:   08/24/18 261 lb 4.8 oz (118.5 kg)   08/13/18 247 lb (112 kg)   07/13/18 235 lb 3.7 oz (106.7 kg)     Body mass index is 34.47 kg/m². Physical examination    General:  Alert and appropriate. HEENT:  Head: Normocephalic, no lesions, without obvious abnormality. Neck:   no adenopathy, no carotid bruit, no JVD, supple, symmetrical, trachea midline and thyroid not enlarged, symmetric, no tenderness/mass/nodules  Lungs:  clear to auscultation bilaterally  Heart:  regular rate and rhythm, S1, S2 normal, no murmur, click, rub or gallop  Abdomen:  soft, non-tender; bowel sounds normal; no masses,  no organomegaly  Extremities:  extremities normal, atraumatic, no cyanosis or edema  Neurologic:  Mental status: Alert, oriented, thought content appropriate  Psy:                 No evidence of depression. Mood is stable. Skin:                Warm and dry. No lesions or rashes noted.   Muscles:         Hand  and leg strength are equal and strong bilaterally. Lab Data  CBC:   No results for input(s): WBC, HGB, PLT in the last 72 hours. BMP:  No results for input(s): NA, K, CL, CO2, BUN, CREATININE, GLUCOSE, CALCIUM, MG, PHOS in the last 72 hours. TSH:   No results for input(s): TSH in the last 72 hours. HgBa1c:   No results for input(s): LABA1C in the last 72 hours. Hepatic: No results for input(s): LABALBU, AST, ALT, ALB, BILITOT, ALKPHOS in the last 72 hours. Troponin: No results for input(s): TROPONINI in the last 72 hours. BNP: No results for input(s): BNP in the last 72 hours. Lipids: No results for input(s): CHOL, HDL in the last 72 hours. Invalid input(s): LDLCALCU  INR:   No results for input(s): INR in the last 72 hours. Assessment:    1. ESRD  2. Sepsis  3. Sound infection  4. DM  5. Obesity     Plan:    1. Continue dialysis         Daisha Thornton, DO  [facility].

## 2018-08-24 NOTE — PROGRESS NOTES
Progress note: Infectious diseases    Patient - Antonia Benitez,  Age - 60 y.o.    - 1960      Room Number - 6K-15/015-A   MRN -  [id number]   [facility]  Date of Admission -  2018  8:17 PM    SUBJECTIVE:   Cardiothoracic surgeon recommend transfer to [facility] for the mitral regurgitation and vegetation  Has ischemia left finger with calcificaiton of vessels, [facility] note read felt to be not a candidate for intervention  OBJECTIVE   VITALS    height is 6' 1\" (1.854 m) and weight is 241 lb 13.5 oz (109.7 kg). His temperature is 97.8 °F (36.6 °C). His blood pressure is 158/72 (abnormal) and his pulse is 73. His respiration is 18 and oxygen saturation is 92%. Wt Readings from Last 3 Encounters:   18 241 lb 13.5 oz (109.7 kg)   18 247 lb (112 kg)   18 235 lb 3.7 oz (106.7 kg)       I/O (24 Hours)    Intake/Output Summary (Last 24 hours) at 18 1908  Last data filed at 18 1715   Gross per 24 hour   Intake              900 ml   Output             3400 ml   Net            -2500 ml       General Appearance asleep  Chronically sick looking.   HEENT - normocephalic, atraumatic, slighlty pale conjunctiva,  anicteric sclera  Neck - Supple, no mass  Lungs -  Bilateral  air entry, no rhonchi, no wheeze  Cardiovascular - Heart sounds are normal. Systolic murmur at the base of the heart   Abdomen - soft, not distended, nontender,   Neurologic -oriented  Skin - No bruising or bleeding  Extremities - the left upper extremity is more swollen fistula functioning, the hand is cold to touch, ischemic digits at the tip of the fingers    MEDICATIONS:      heparin (porcine)  5,000 Units Subcutaneous 3 times per day    vancomycin  1,000 mg Intravenous Q MWF    verapamil  240 mg Oral Nightly    midodrine  5 mg Oral TID WC    sodium chloride flush  10 mL Intravenous 2 times per day    insulin glargine 20 Units Subcutaneous Once    aspirin  81 mg Oral Daily    calcitRIOL  0.25 mcg Oral Daily    calcium carbonate  5 tablet Oral TID     carvedilol  6.25 mg Oral BID     clopidogrel  75 mg Oral Daily    fenofibrate  160 mg Oral Daily    FLUoxetine  20 mg Oral Daily    insulin glargine  55 Units Subcutaneous QAM AC    insulin lispro  10 Units Subcutaneous TID AC    pantoprazole  40 mg Oral QAM AC    pregabalin  50 mg Oral BID    sodium chloride flush  10 mL Intravenous 2 times per day    vancomycin (VANCOCIN) intermittent dosing (placeholder)   Other RX Placeholder    insulin lispro  0-6 Units Subcutaneous TID       sodium chloride Stopped (08/24/18 1034)    sodium chloride 20 mL/hr at 08/22/18 0851    dextrose       ondansetron, ondansetron, loperamide, menthol, sodium chloride flush, nitroGLYCERIN, sodium chloride flush, acetaminophen, acetaminophen, glucose, dextrose, glucagon (rDNA), dextrose      LABS:     CBC:   No results for input(s): WBC, HGB, PLT in the last 72 hours. BMP:    No results for input(s): NA, K, CL, CO2, BUN, CREATININE, GLUCOSE in the last 72 hours. Calcium:  No results for input(s): CALCIUM in the last 72 hours. Ionized Calcium:No results for input(s): IONCA in the last 72 hours. Magnesium:No results for input(s): MG in the last 72 hours. Phosphorus:No results for input(s): PHOS in the last 72 hours. BNP:No results for input(s): BNP in the last 72 hours. Glucose:  Recent Labs      08/24/18   1050  08/24/18   1643  08/24/18   1810   POCGLU  71  92  81       CULTURES:   UA:   No results for input(s): SPECGRAV, PHUR, COLORU, CLARITYU, MUCUS, PROTEINU, BLOODU, RBCUA, WBCUA, BACTERIA, NITRU, GLUCOSEU, BILIRUBINUR, UROBILINOGEN, KETUA, LABCAST, LABCASTTY, AMORPHOS in the last 72 hours.     Invalid input(s): CRYSTALS  Micro:   Lab Results   Component Value Date    BC No growth-preliminary  No growth   08/18/2018         IMAGING:         Problem list of patient:     Patient

## 2018-08-24 NOTE — FLOWSHEET NOTE
08/24/18 1315 08/24/18 1715   Vital Signs   BP (!) 117/54 135/79   Temp --  97.8 °F (36.6 °C)   Weight 248 lb 7.3 oz (112.7 kg) 241 lb 13.5 oz (109.7 kg)   Weight Method Bed scale Bed scale   Percent Weight Change -4.91 -2.66   Dry Weight 238 lb 1.6 oz (108 kg) --    Post-Hemodialysis Assessment   Post-Treatment Procedures --  Blood returned; Access bleeding time < 10 minutes   Machine Disinfection Process --  Acid/Vinegar Clean;Heat Disinfect; Exterior Machine Disinfection   Total Liters Processed (l/min) --  79.2 l/min   Dialyzer Clearance --  Lightly streaked   Duration of Treatment (minutes) --  210 minutes   Hemodialysis Intake (ml) --  400 ml   Hemodialysis Output (ml) --  3400 ml   NET Removed (ml) --  3000 ml   Tolerated Treatment --  Good   3.5 hour treatment completed without complications. Pressure held to each site x10 min. Dressing clean dry and intact upon leaving the unit. Report called to primary nurse. Treatment to be scanned into EMR.

## 2018-08-24 NOTE — PROGRESS NOTES
color flow ultrasound images were obtained along with spectral waveform imaging through the left upper extremity. Peak systolic velocities were measured. FINDINGS: The proximal left subclavian artery demonstrates biphasic waveform morphology with a peak systolic velocity of 072 cm/s. No significant drop or elevation of peak systolic velocity is demonstrated to the level of the distal brachial artery to suggest significant flow-limiting stenosis. The radial artery demonstrates spectral broadening likely related to the proximity of the patient's fistula. The proximal ulna demonstrates a peak systolic velocity of 26 cm/s with monophasic waveform morphology. The distal left ulnar artery does not demonstrate vascularity. This is of indeterminate age. LEFT ARTERY (PSV cm/sec) PROX SUBCLAVIAN ------>111 PSV cm/sec MID SUBCLAVIAN --------->85 PSV cm/sec DIST SUBCLAVIAN ------->93 PSV cm/sec AXILLARY ------------------->103 PSV cm/sec PROX BRACHIAL --------->89 PSV cm/sec MID BRACHIAL ------------>108 PSV cm/sec DIST BRACHIAL ---------->70 PSV cm/sec PROX RADIAL ------------>96 PSV cm/sec DIST RADIAL -------------->98 cm/sec PROX ULNAR ------------->26 PSV cm/sec DIST ULNAR --------------->0 PSV cm/sec     1. Absent flow within the distal left ulnar artery of indeterminate age. The proximal left ulnar artery also demonstrates diminished velocities suggesting stenosis of greater than 50%. 2. There is a patent appearing radial artery distally. However, there is turbulence with structural widening of the waveform likely due to the proximity of the patient's fistula. 3. No other significant flow-limiting stenosis is suggested sonographically within the left upper extremity. **This report has been created using voice recognition software. It may contain minor errors which are inherent in voice recognition technology. ** Final report electronically signed by Dr. Marlan Siemens on 8/18/2018 7:58 AM    [facility]    Result Date: 8/14/2018  PROCEDURE: XR CHEST PORTABLE CLINICAL INFORMATION: 65-year-old male with fever for 2 days. Hemodialysis patient. COMPARISON: Chest x-ray 7/14/2018. TECHNIQUE: AP upright view of the chest. FINDINGS: A cardiac device is again seen projecting over the left hemithorax. Metallic wire sutures remain in the sternum. There are low lung volumes. Cardiac silhouette is normal in size. There is no significant pleural effusion or pneumothorax. There is pulmonary vascular congestion. The trachea is at midline. Low lung volumes with mild pulmonary vascular congestion. **This report has been created using voice recognition software. It may contain minor errors which are inherent in voice recognition technology. ** Final report electronically signed by Dr Kostas Nelson on 8/14/2018 8:53 PM    Vl Dup Upper Extremity Arteries Bilateral    Result Date: 8/21/2018  PROCEDURE: VL DUP UPPER EXTREMITY ARTERIES BILATERAL CLINICAL INFORMATION: poor circulation,  . Bilateral hand numbness and ulcers COMPARISON: 8/17/2018 FINDINGS: Evaluation of each hilum or arch was specifically requested. On the right there is no flow identified within the radial artery or the ulnar artery. Palmar arch images could not be obtained. Waveforms were obtained from the first digit and the fifth digit, likely from collateral flow. Each shows a parvus tardus waveform. On the left, there is no flow within the radial artery or the ulnar artery. Palmar arch images could not be obtained. There is turbulent flow within a segment of the left radial artery near the fistula, though flow cannot be documented above or below this. Waveforms were obtained of the first and fifth digit. First digit shows a parvus tardus waveform. Fifth digit deformity is not nearly as reduced in amplitude. Bilaterally, there is occlusion of the radial and ulnar arteries. **This report has been created using voice recognition software.  It may contain minor errors which are inherent in voice recognition technology. ** Final report electronically signed by Dr. Evelyne Sargent on 8/21/2018 10:04 AM    Ir Angiogram Extremity Bilateral Or    Result Date: 8/22/2018  PROCEDURE: Thoracic aortogram with bilateral upper extremity arteriograms. CLINICAL INFORMATION: 58-year-old male with bilateral hand pain and numbness which is worse on the left side. The patient has a history of end-stage renal disease and a fistula in the left upper extremity. PERFORMED BY:  Dr. Ashvin Tate MD APPROACH: Right common femoral artery, micropuncture technique. CATHETERS: 5 Iranian pigtail flush catheter, 5 Iranian angled glide catheter, and Rawls 2 catheter. STENTS: None. VESSELS CATHETERIZED: Thoracic aorta, left subclavian artery, left axillary artery, left brachial artery, left interosseous artery, left radial artery, brachiocephalic artery, right subclavian artery, right axillary artery, right brachial artery, right innominate artery DIAGNOSTIC PROCEDURES: Thoracic aortogram, right and left upper extremity arteriograms. INTERVENTIONS: None. FLUOROSCOPY TIME: 28 minutes and 25 seconds FLUOROSCOPIC IMAGES: 60 SEDATION: Versed 1.5mg ; Dilaudid 1.5 mg, IV; the patient was sedated for 1 hour and 30 minutes during this procedure and monitored with EKG and pulse ox monitoring devices by registered nurse. OTHER MEDICATIONS: None CONTRAST: 165 ml, Isovue-300. CLOSURE: Angio-Seal device, successful without complication. TECHNIQUE: Signed informed consent was obtained prior to performing this procedure. The patient was sedated, as indicated above. Access was obtained and a 5 Western Della vascular sheath was inserted. The procedures as above were then performed. FINDINGS: The thoracic aorta has a normal appearance with a normal takeoff of the brachiocephalic artery, left common carotid artery, and left subclavian artery. The catheter was advanced into the left upper extremity arterial system.  The left subclavian artery and axillary artery are unremarkable. The brachial artery has a normal appearance with no abnormalities. Digital subtracted angiographic images of the forearm, wrist and left hand demonstrate a radial arterial fistula which is widely patent. Upon initial imaging, there was concern for steal phenomenon within the fistula runoff vein as no contrast went to the hand through the radial artery. The wires and catheters were advanced into the distal aspect of the radial artery and numerous attempts  were made with numerous different wires and catheter to pass into the hand which were unsuccessful. The radial artery is completely occluded. Digital subtraction angiographic images also demonstrated an occluded ulnar artery in the left upper extremity. The interosseous artery maintains patency to the hand. Through it, as well as numerous collaterals, there is limited blood supply to the left hand. Attention was then given to the patient's right upper extremity. The catheter, in conjunction with the wire, eventually successfully cannulated the brachiocephalic artery and subsequently the right subclavian artery. The catheter was advanced into the right upper extremity arterial system. The right subclavian artery and axillary artery are patent and within normal limits. The brachial artery has a normal appearance with no abnormalities. Digital subtracted angiographic images of the forearm, wrist and right hand were then obtained. The right ulnar artery is completely occluded. The right radial artery maintains patency into the distal aspect of the forearm at which point it is completely  occluded as well. The interosseous artery maintains patency into the hand. 1. Complete occlusion of the radial and ulnar arteries of the left upper extremity.  Initial images suggested at a possible steal phenomenon, however once the radial artery was more thoroughly interrogated, it was evident that the distal radial artery is completely occluded. There is limited flow into the left hand via the interosseous artery as well as some collateral vasculature. There is markedly diminished blood supply to the digits. 2. Complete occlusion of the radial and ulnar arteries of the right upper extremity. The radial artery maintains patency into the distal aspect of the right forearm. The interosseous artery is patent throughout the forearm and supplies the hand. . **This report has been created using voice recognition software. It may contain minor errors which are inherent in voice recognition technology. ** Final report electronically signed by Dr Khalida Payan on 8/22/2018 5:13 PM      ASSESSMENT     1. 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 2. Ischemic ulceration of left fingers, with findings of occlusion of the bilateral ulnar and radial arteries. 3. Increased swelling and moderate pain at left upper extremity likely related to #2, fistulogram performed on 08/24  4. Proteus mirabilis in urine  5. Chronic co-morbidities  1. ESRD on hemodialysis  2. Diabetes mellitus Type 2 complicated by peripheral neuropathy  3. Coronary artery disease  4. Anemia in chronic kidney disease  5. Paroxysymal atrial fibrillation  6. Aortic valve replacement  7. GERD  8. Hyperlipidemia  9. Obstructive sleep apnea      PLAN     1. Discussed plan with both Cardiothoracic Surgery and Dr. Marsha Mohr and the patient today and the best plan at the moment is to transfer the patient to an institution where a variety of interventions can be accommodated. Per Dr. Beasley Record, a transapical TAVR may be the best option for the patient since he is a high-risk candidate for surgical intervention for mitral valve endocarditis. The best treatment for the patient's left upper extremity is still being explored.  Patient is amenable to being transferred to [facility] (1st choice) or [facility] ( 2nd choice)/  2. No need to treat Proteus mirabilis as discussed with Dr. Raven Gray as patient was asymptomatic. DVT prophylaxis: [] Lovenox                                 [] SCDs                                 [x] SQ Heparin                                 [] Encourage ambulation           [] Already on Anticoagulation     Disposition:    [x] Home       [] TCU       [] Rehab       [] Psych       [] SNF       [] [place]       [] Other-    Anticipated Discharge in : 1 day.  Plan to transfer to other tertiary institution     Code Status: Full Code    Electronically signed by aLndy Tillman MD on 8/24/2018 at 7:10 PM

## 2018-08-25 VITALS
TEMPERATURE: 97.6 F | DIASTOLIC BLOOD PRESSURE: 55 MMHG | SYSTOLIC BLOOD PRESSURE: 104 MMHG | HEART RATE: 60 BPM | HEIGHT: 73 IN | OXYGEN SATURATION: 96 % | WEIGHT: 249.5 LBS | RESPIRATION RATE: 16 BRPM | BODY MASS INDEX: 33.07 KG/M2

## 2018-08-25 LAB
ANION GAP SERPL CALCULATED.3IONS-SCNC: 20 MEQ/L (ref 8–16)
BUN BLDV-MCNC: 32 MG/DL (ref 7–22)
CALCIUM SERPL-MCNC: 9.3 MG/DL (ref 8.5–10.5)
CHLORIDE BLD-SCNC: 91 MEQ/L (ref 98–111)
CO2: 21 MEQ/L (ref 23–33)
CREAT SERPL-MCNC: 7 MG/DL (ref 0.4–1.2)
ERYTHROCYTE [DISTWIDTH] IN BLOOD BY AUTOMATED COUNT: 16.3 % (ref 11.5–14.5)
ERYTHROCYTE [DISTWIDTH] IN BLOOD BY AUTOMATED COUNT: 56 FL (ref 35–45)
GFR SERPL CREATININE-BSD FRML MDRD: 8 ML/MIN/1.73M2
GLUCOSE BLD-MCNC: 132 MG/DL (ref 70–108)
GLUCOSE BLD-MCNC: 88 MG/DL (ref 70–108)
GLUCOSE BLD-MCNC: 90 MG/DL (ref 70–108)
HCT VFR BLD CALC: 32.7 % (ref 42–52)
HEMOGLOBIN: 10.1 GM/DL (ref 14–18)
MCH RBC QN AUTO: 29.1 PG (ref 26–33)
MCHC RBC AUTO-ENTMCNC: 30.9 GM/DL (ref 32.2–35.5)
MCV RBC AUTO: 94.2 FL (ref 80–94)
PLATELET # BLD: 228 THOU/MM3 (ref 130–400)
PMV BLD AUTO: 10.1 FL (ref 9.4–12.4)
POTASSIUM SERPL-SCNC: 5.1 MEQ/L (ref 3.5–5.2)
RBC # BLD: 3.47 MILL/MM3 (ref 4.7–6.1)
SODIUM BLD-SCNC: 132 MEQ/L (ref 135–145)
WBC # BLD: 7.4 THOU/MM3 (ref 4.8–10.8)

## 2018-08-25 PROCEDURE — 6370000000 HC RX 637 (ALT 250 FOR IP): Performed by: FAMILY MEDICINE

## 2018-08-25 PROCEDURE — 82948 REAGENT STRIP/BLOOD GLUCOSE: CPT

## 2018-08-25 PROCEDURE — 99239 HOSP IP/OBS DSCHRG MGMT >30: CPT | Performed by: INTERNAL MEDICINE

## 2018-08-25 PROCEDURE — 6360000002 HC RX W HCPCS: Performed by: INTERNAL MEDICINE

## 2018-08-25 PROCEDURE — 85027 COMPLETE CBC AUTOMATED: CPT

## 2018-08-25 PROCEDURE — 2580000003 HC RX 258: Performed by: FAMILY MEDICINE

## 2018-08-25 PROCEDURE — 6370000000 HC RX 637 (ALT 250 FOR IP): Performed by: INTERNAL MEDICINE

## 2018-08-25 PROCEDURE — 80048 BASIC METABOLIC PNL TOTAL CA: CPT

## 2018-08-25 PROCEDURE — 36415 COLL VENOUS BLD VENIPUNCTURE: CPT

## 2018-08-25 RX ORDER — LISINOPRIL 5 MG/1
5 TABLET ORAL DAILY
COMMUNITY
End: 2018-01-01

## 2018-08-25 RX ORDER — LISINOPRIL 5 MG/1
5 TABLET ORAL DAILY
Status: DISCONTINUED | OUTPATIENT
Start: 2018-08-26 | End: 2018-08-25 | Stop reason: HOSPADM

## 2018-08-25 RX ORDER — HYDROCODONE BITARTRATE AND ACETAMINOPHEN 5; 325 MG/1; MG/1
2 TABLET ORAL EVERY 6 HOURS PRN
Status: DISCONTINUED | OUTPATIENT
Start: 2018-08-25 | End: 2018-08-25

## 2018-08-25 RX ADMIN — FLUOXETINE HYDROCHLORIDE 20 MG: 20 CAPSULE ORAL at 09:16

## 2018-08-25 RX ADMIN — PANTOPRAZOLE SODIUM 40 MG: 40 TABLET, DELAYED RELEASE ORAL at 05:31

## 2018-08-25 RX ADMIN — ASPIRIN 81 MG CHEWABLE TABLET 81 MG: 81 TABLET CHEWABLE at 09:20

## 2018-08-25 RX ADMIN — MIDODRINE HYDROCHLORIDE 5 MG: 5 TABLET ORAL at 09:15

## 2018-08-25 RX ADMIN — PREGABALIN 50 MG: 50 CAPSULE ORAL at 09:20

## 2018-08-25 RX ADMIN — CLOPIDOGREL BISULFATE 75 MG: 75 TABLET ORAL at 09:20

## 2018-08-25 RX ADMIN — MIDODRINE HYDROCHLORIDE 5 MG: 5 TABLET ORAL at 11:52

## 2018-08-25 RX ADMIN — HYDROMORPHONE HYDROCHLORIDE 1 MG: 1 INJECTION, SOLUTION INTRAMUSCULAR; INTRAVENOUS; SUBCUTANEOUS at 10:49

## 2018-08-25 RX ADMIN — ACETAMINOPHEN 650 MG: 325 TABLET ORAL at 05:32

## 2018-08-25 RX ADMIN — HYDROMORPHONE HYDROCHLORIDE 1 MG: 1 INJECTION, SOLUTION INTRAMUSCULAR; INTRAVENOUS; SUBCUTANEOUS at 15:06

## 2018-08-25 RX ADMIN — CALCITRIOL 0.25 MCG: 0.25 CAPSULE ORAL at 09:15

## 2018-08-25 RX ADMIN — ANTACID TABLETS 2500 MG: 500 TABLET, CHEWABLE ORAL at 09:15

## 2018-08-25 RX ADMIN — INSULIN GLARGINE 55 UNITS: 100 INJECTION, SOLUTION SUBCUTANEOUS at 11:53

## 2018-08-25 RX ADMIN — ANTACID TABLETS 2500 MG: 500 TABLET, CHEWABLE ORAL at 11:52

## 2018-08-25 RX ADMIN — Medication 10 ML: at 09:16

## 2018-08-25 RX ADMIN — FENOFIBRATE 160 MG: 160 TABLET ORAL at 09:16

## 2018-08-25 ASSESSMENT — PAIN SCALES - GENERAL
PAINLEVEL_OUTOF10: 10
PAINLEVEL_OUTOF10: 9
PAINLEVEL_OUTOF10: 0
PAINLEVEL_OUTOF10: 6
PAINLEVEL_OUTOF10: 10

## 2018-08-25 ASSESSMENT — PAIN DESCRIPTION - ORIENTATION: ORIENTATION: LEFT

## 2018-08-25 ASSESSMENT — PAIN DESCRIPTION - LOCATION: LOCATION: ARM

## 2018-08-25 ASSESSMENT — PAIN DESCRIPTION - PAIN TYPE: TYPE: ACUTE PAIN

## 2018-08-25 NOTE — DISCHARGE SUMMARY
presented to the hospital on 08/14 with symptoms of fever to 104 degrees Fahrenheit, associated with lightheadedness, nausea and a headache. The patient also mentioned a blister with intermittent drainage that developed on his left 2nd finger, 2 weeks prior to presentation. Initial leukocytosis was noted with WBC to 11, 500. This trended upwards to 14,500 on the 2nd day of admission. Lactic acid was within normal limits at 1.4. Procalcitonin was elevated to 0.61. Urinalysis showed moderate leukocyte esterase  with >100 WBCs (5 to 10 epithelial cells were present), however the patient was asymptomatic. CXR showed only mild pulmonary congestion. The source of the patient's infection was not entirely clear. He was known to have ulcers at the bilateral lower extremites as well, however these did not appear to be infected. The patient was empirically started on Cefepime (8/14 only) and vancomycin (8/14 to present). Urine culture eventually grew Proteus mirabilis, however the patient was asymptomatic, and treatment for a urinary tract infection was deferred. 2 out of 2 blood cultures from 08/14 grew MRSA and the patient was continued on vancomycin. Repeat blood cultures from 08/16 are negative to date. A JAVIER was obtained on 08/17 and was significant for an EF of 40 to 45% (previously 35 to 40% in June 2018), myxomatous degeneration of the mitral valve and a highty calcified mobile density measuring 0.9 x 0.3 cm coming from the posterior aspect of the mitral leaflet extending to the atrial aspect. There was another echogenic mobile density arising from the mid portion of the anterior leaflet on the ventricular aspect extending to the LVOT measuring 1 x 0.3 cm with moderate calcification. Severe mitral regurgitation was noted. The differential diagnosis for the calcifications also includes degenerated leaflets, however since these were new from 07/18/2017, there was a high suspicion for endocarditis. The patient denied previous or current intravenous illicit drug abuse. The patient was seen in consultation by Cardiothoracic Surgery and was deemed to be a poor candidate for surgical intervention. A transapical mitral valve replacement was to be considered. Transfer to a tertiary institution that could accommodate this procedure was recommended. The patient was accepted by Dr. Stella Villatoro on 08/25/2018 at [facility], however his acceptance was based on left upper extremity pain and swelling that may be ischemic in nature. Ischemic ulceration of left fingers, with findings of occlusion of the bilateral ulnar and radial arteries. As pictured below, distal ulceration of multiple digits of the left hand was noted on admission. X-ray of the left hand demonstrated marked arterial calcinosis wiithin the visualized vasculature of the left hand and wrist and indeterminate soft tissue calcifications along the ulnar aspects of the mid left second and third digits as well as the radial aspect of the mid left fourth finger. There was no evidence of acute fracture. Per Cardiothoracic Surgery, bilateral upper extremity arteriograms were performed on 08/22 and showed complete occlusion of the radial and ulnar arteries of the left upper extremity. There was concern for steal phenomenon, however once the radial artery was more thoroughly interrogated, it was evident that the distal radial artery was completely occluded, with significantly diminished blood supply to the digits. There was also complete occlusion of the radial and ulnar arteries on the right, however the radial artery is patent into the distal aspect of the right forearm and supplies the hand. Per Cardiothoracic Surgery,  no surgical intervention could be performed for the ischemic ulcers.      Increased swelling and moderate pain at left upper extremity s/p  fistulogram with angioplasty performed on 08/24  The known as:  PRINIVIL;ZESTRIL  What changed:  Another medication with the same name was removed. Continue taking this medication, and follow the directions you see here.         CONTINUE taking these medications    aspirin 81 MG chewable tablet     calcitRIOL 0.25 MCG capsule  Commonly known as:  ROCALTROL     calcium carbonate 500 MG chewable tablet  Commonly known as:  TUMS     carvedilol 6.25 MG tablet  Commonly known as:  COREG  Take 1 tablet by mouth 2 times daily (with meals)     clopidogrel 75 MG tablet  Commonly known as:  PLAVIX  Take 1 tablet by mouth daily     FENOFIBRATE PO     FLUoxetine 20 MG capsule  Commonly known as:  PROZAC     insulin glargine 100 UNIT/ML injection vial  Commonly known as:  LANTUS     insulin lispro 100 UNIT/ML injection vial  Commonly known as:  HUMALOG     LYRICA 50 MG capsule  Generic drug:  pregabalin     MULTI-VITAMIN PO     nitroGLYCERIN 0.4 MG SL tablet  Commonly known as:  NITROSTAT  Place 1 tablet under the tongue every 5 minutes as needed for Chest pain     pantoprazole 40 MG tablet  Commonly known as:  PROTONIX           Where to Get Your Medications      These medications were sent to [place] [id number]    Phone:  [phone]   · acetaminophen 325 MG tablet     You can get these medications from any pharmacy    Bring a paper prescription for each of these medications  · HYDROmorphone 1 MG/ML injection  · vancomycin infusion          [facility]   Home Medication Instructions [id number]    Printed on:08/25/18 1326   Medication Information                      acetaminophen (TYLENOL) 325 MG tablet  Take 2 tablets by mouth every 6 hours as needed for Pain or Fever             aspirin 81 MG chewable tablet  Take 81 mg by mouth daily             calcitRIOL (ROCALTROL) 0.25 MCG capsule  Take 0.25 mcg by mouth daily              calcium carbonate (TUMS) 500 MG chewable tablet  Take 5 tablets by mouth 3 times daily (with meals) And 3 tabs as needed with snacks             carvedilol (COREG) 6.25 MG tablet  Take 1 tablet by mouth 2 times daily (with meals)             clopidogrel (PLAVIX) 75 MG tablet  Take 1 tablet by mouth daily             FENOFIBRATE PO  Take 145 mg by mouth daily              FLUoxetine (PROZAC) 20 MG capsule  Take 20 mg by mouth daily             HYDROmorphone (DILAUDID) 1 MG/ML injection  Infuse 1 mL intravenously every 4 hours as needed for Pain for up to 7 doses. Dejah Whitley insulin glargine (LANTUS) 100 UNIT/ML injection  Inject 55 Units into the skin every morning (before breakfast)              insulin lispro (HUMALOG) 100 UNIT/ML injection  Inject 10-15 Units into the skin 3 times daily (before meals) Use scale if chem > 200             lisinopril (PRINIVIL;ZESTRIL) 5 MG tablet  Take 5 mg by mouth daily             Multiple Vitamin (MULTI-VITAMIN PO)  Take 1 tablet by mouth daily              nitroGLYCERIN (NITROSTAT) 0.4 MG SL tablet  Place 1 tablet under the tongue every 5 minutes as needed for Chest pain             pantoprazole (PROTONIX) 40 MG tablet  Take 40 mg by mouth daily             pregabalin (LYRICA) 50 MG capsule  Take 50 mg by mouth 2 times daily. vancomycin (VANCOCIN) infusion  Infuse 1,000 mg intravenously three times a week for 18 doses Compound per protocol.                      Physical Examination     Vitals:  Vitals:    08/24/18 2006 08/25/18 0357 08/25/18 0910 08/25/18 1204   BP: (!) 124/58 (!) 104/54 (!) 98/52 (!) 104/55   Pulse: 73 60  60   Resp: 16 16 16 16   Temp: 98.2 °F (36.8 °C) 98.3 °F (36.8 °C) 97.5 °F (36.4 °C) 97.6 °F (36.4 °C)   TempSrc: Oral Oral Oral Oral   SpO2: 93% 91% 92% 96%   Weight:  249 lb 8 oz (113.2 kg)     Height:           Weight: Weight: 249 lb 8 oz (113.2 kg)     24 hour intake/output:    Intake/Output Summary (Last 24 hours) at 08/25/18 1326  Last data filed at 08/25/18 2834   Gross indeterminate soft tissue calcification noted along the radial aspect of the left fourth digit as well as the ulnar aspects of the left second and third digits. No osseous erosions are identified. No acute fracture is seen. 1. Marked arterial calcinosis is noted within the visualized vasculature of the left hand and wrist. This may be related to sequela from underlying diabetes. Correlate clinically. 2. Indeterminate soft tissue calcifications are noted along the ulnar aspects of the mid left second and third digits as well as the radial aspect of the mid left fourth digit. 3. No acute osseous findings are demonstrated. **This report has been created using voice recognition software. It may contain minor errors which are inherent in voice recognition technology. ** Final report electronically signed by Dr. Monica Jenkins on 8/15/2018 9:36 AM    Ir Angiogram Arteriovenous Shunt    Result Date: 8/24/2018  DIALYSIS FISTULAGRAM WITH ANGIOPLASTY: PERFORMED BY: Dr. Giorgio Haider M.D. CLINICAL INFORMATION: Renal failure. Malfunctioning fistula. PRIMARY ACCESS: Micropuncture technique, mid forearm segment runoff cephalic vein, 6 Norwegian vascular sheath, directed distally, toward the arterial vein anastomosis. SECONDARY ACCESS: Micropuncture technique, radial artery, 6 Norwegian vascular sheath, directed distally. CATHETERS: 2 5 Norwegian Kumpe catheters, 4 mm x 2 cm Sumner balloon SITES OF STENOSIS  / ANGIOPLASTY: Arterial fistula anastomosis. CONTRAST: 125 Isovue 250. FLUOROSCOPY TIME: 21 minutes and 36 seconds FLUOROSCOPIC IMAGES: 198 SEDATION: Versed 1.5 mg ; Dilaudid 1.5 mg , IV; the patient was sedated for 60 minutes during this procedure and monitored with EKG and pulse ox monitoring devices by a registered nurse. OTHER MEDICATIONS: Heparin, 2000 units, IV. CLOSURE: Pursestring suture/slip knot technique utilized at both access sites. PROCEDURE: Signed informed consent was obtained prior to performing this procedure. DISEASES  PHARMACY TO DOSE MEDICATION  IP CONSULT TO VASCULAR SURGERY          Discharge Instructions     Patient Instructions:    Discharge lab work: Per [facility]  Activity: As tolerated  Diet: DIET RENAL;         Follow-up visits     Mirna Lama, Denys Love  [phone]               Code status at time of discharge     Full Code     Time Spent on discharge is 73 minutes in the examination, evaluation, counseling and review of medications and discharge plan. Signed: Thank you Mirna Lama MD for the opportunity to be involved in this patient's care.     Electronically signed by Jc Mosqueda MD on 8/25/2018 at 1:26 PM

## 2018-10-03 PROBLEM — Z95.0 PACEMAKER: Status: ACTIVE | Noted: 2018-01-01

## 2018-10-15 NOTE — ED PROVIDER NOTES
[facility] EMERGENCY DEPT      CHIEF COMPLAINT       Chief Complaint   Patient presents with    Wound Check    Leg Swelling     Right       Nurses Notes reviewed and I agree except as noted in the HPI. HISTORY OF PRESENT ILLNESS    Kate Nieves a 60 y.o. male who presents To the emergency department for evaluation of right great toe infection. Patient said that he went to dialysis today and during dialysis the dialysis personnel looked at  His right toe  and suggested that he come to the emergency room for evaluation because they think it's infected. Patient said that he has had these toe infection for a long time roughly over  8 weeks ago he had the toenail removed by the foot doctor. Since that time he has developed a blister that he tore up and he has been on antibiotics for MRSA endocarditis. The toe is healing well but is slightly swollen but is nontender and not discharging at this time. There is no evidence of cellulitis there is no redness that is no erythema at this time. The toenail is now growing back but is mushy and hasn't taken at this time. Patient states  that he was admitted here 8 weeks ago he was in the hospital for 2 weeks and then transferred to [facility] for 4 weeks for that time he was treated for MRSA bacteremia and sepsis complicated with mitral valve endocarditis. He has been on IV antibiotics since then 3 times a week during dialysis. He had his last IV antibiotics done today. Despite taking the antibiotics the great toe has remained basically the same. He is here for reassurance and recheck. We shall get an x-ray to exclude osteomyelitis repeat labs to exclude complications but he is already following up with a podiatrist and also following up with ID specialist.    He also has got some ischemic ulcerations on the left tip of the index finger and the third finger that is healing. He has the dialysis AV fistula on the left side and that is working normally. ALLERGIES     is allergic to vicodin [hydrocodone-acetaminophen]; darvocet a500 [propoxyphene n-acetaminophen]; and demerol hcl [meperidine]. FAMILY HISTORY     indicated that his mother is . He indicated that his father is . family history includes Cancer in his father and mother; Diabetes in his father and mother; Heart Disease in his mother; High Blood Pressure in his mother; Other in his father. SOCIAL HISTORY      reports that he has never smoked. He has never used smokeless tobacco. He reports that he drinks alcohol. He reports that he does not use drugs. PHYSICAL EXAM     INITIAL VITALS:  height is 6' 1\" (1.854 m) and weight is 250 lb (113.4 kg). His oral temperature is 98.3 °F (36.8 °C). His blood pressure is 128/50 (abnormal) and his pulse is 75. His respiration is 17 and oxygen saturation is 94%. Physical Exam   Constitutional: He is oriented to person, place, and time. He appears well-developed and well-nourished. No distress. HENT:   Head: Normocephalic and atraumatic. Right Ear: External ear normal.   Left Ear: External ear normal.   Nose: Nose normal.   Mouth/Throat: Oropharynx is clear and moist. No oropharyngeal exudate. Eyes: Pupils are equal, round, and reactive to light. Conjunctivae and EOM are normal. Right eye exhibits no discharge. Left eye exhibits no discharge. No scleral icterus. Neck: Normal range of motion. Neck supple. No JVD present. No tracheal deviation present. No thyromegaly present. Cardiovascular: Normal rate, regular rhythm, normal heart sounds and intact distal pulses. Exam reveals no gallop. No murmur heard. Pulmonary/Chest: Breath sounds normal. No respiratory distress. He has no wheezes. He has no rales. He exhibits no tenderness. Abdominal: Soft. Bowel sounds are normal. He exhibits no distension and no mass. There is no tenderness. There is no rebound and no guarding. Genitourinary: Penis normal. No penile tenderness. the following:        Result Value    RBC 3.19 (*)     Hemoglobin 9.3 (*)     Hematocrit 30.3 (*)     MCV 95.0 (*)     MCHC 30.7 (*)     RDW-CV 16.0 (*)     RDW-SD 55.5 (*)     Lymphocytes # 0.5 (*)     All other components within normal limits   COMPREHENSIVE METABOLIC PANEL W/ REFLEX TO MG FOR LOW K - Abnormal; Notable for the following:     Glucose 181 (*)     CREATININE 6.1 (*)     BUN 36 (*)     Alkaline Phosphatase 132 (*)     All other components within normal limits   PROTIME-INR - Abnormal; Notable for the following:     INR 1.86 (*)     All other components within normal limits   SEDIMENTATION RATE - Abnormal; Notable for the following:     Sed Rate 50 (*)     All other components within normal limits   C-REACTIVE PROTEIN - Abnormal; Notable for the following:     CRP 1.17 (*)     All other components within normal limits   ANION GAP - Abnormal; Notable for the following: Anion Gap 18.0 (*)     All other components within normal limits   GLOMERULAR FILTRATION RATE, ESTIMATED - Abnormal; Notable for the following:     Est, Glom Filt Rate 9 (*)     All other components within normal limits   CULTURE BLOOD #1   CULTURE BLOOD #2   LACTATE, SEPSIS   OSMOLALITY   LACTATE, SEPSIS       EMERGENCY DEPARTMENT COURSE:   Vitals:    Vitals:    10/15/18 1649 10/15/18 1751   BP: (!) 142/59 (!) 128/50   Pulse: 78 75   Resp: 16 17   Temp: 98.3 °F (36.8 °C)    TempSrc: Oral    SpO2: 94% 94%   Weight: 250 lb (113.4 kg)    Height: 6' 1\" (1.854 m)        MDM    Patient presented with a toe infection and WBC count is 5.2 patient has chronic kidney disease with elevated BUN/creatinine. ,  CRP is 1.17 x-ray shows demineralization of the distal phalanx suggestive of early infection or osteo myelitis. Patient has just finished an eight-week IV antibiotics for MRSA but despite that these osteomyelitis is still ongoing. He is not able to get an MRI because he has a pacemaker.   The best next option is to have him follow-up with

## 2018-10-16 PROBLEM — M86.9 OSTEOMYELITIS (HCC): Status: ACTIVE | Noted: 2018-01-01

## 2018-10-17 NOTE — PROGRESS NOTES
Podiatric Progress Note  Lulu Cheney  Subjective :     10.17.18  Patient seen bedside today on behalf of Dr. Jasper Landry. Patient states he is having no pain to the right toe. Patient just returned from radiology obtaining a CT scan. Patient denies any N/V/F/C/SOB or CP. Patient has no further pedal concerns at this point in time. HISTORY OF PRESENT ILLNESS:  The patient is a pleasant 40-year-old  male, who presented to my office today with a wound on his right great  toe. He has not been seen in the office since June due to being in  the hospital for multiple blood infections and a pacemaker infection. He says he developed a blood blister on the base of his right great  toe one week ago, since then it has been draining. His wife has been  applying Betadine. He went to the ER yesterday since toe was red and  swollen. X-rays were taken which showed demineralization of hallux  distal phalanx which was concerning for osteomyelitis. The ER doctor  discharged the patient on oral antibiotics and instructed him to call  to my office today for an appointment. The patient has been a  diabetic since 1978. He does have neuropathy and is on dialysis. His  wife is present with him today.       Current Medications:    Current Facility-Administered Medications: pregabalin (LYRICA) capsule 50 mg, 50 mg, Oral, BID  FLUoxetine (PROZAC) capsule 20 mg, 20 mg, Oral, Daily  calcium carbonate (TUMS) chewable tablet 2,500 mg, 5 tablet, Oral, TID WC  pantoprazole (PROTONIX) tablet 40 mg, 40 mg, Oral, Daily  carvedilol (COREG) tablet 6.25 mg, 6.25 mg, Oral, BID WC  clopidogrel (PLAVIX) tablet 75 mg, 75 mg, Oral, Daily  aspirin chewable tablet 81 mg, 81 mg, Oral, Daily  nitroGLYCERIN (NITROSTAT) SL tablet 0.4 mg, 0.4 mg, Sublingual, Q5 Min PRN  acetaminophen (TYLENOL) tablet 650 mg, 650 mg, Oral, Q6H PRN  midodrine (PROAMATINE) tablet 10 mg, 10 mg, Oral, TID  atorvastatin (LIPITOR) tablet 40 mg, 40 mg, Oral, Daily  polyethylene glycol (GLYCOLAX) packet 17 g, 17 g, Oral, Daily  0.9 % sodium chloride infusion, , Intravenous, Continuous  sodium chloride flush 0.9 % injection 10 mL, 10 mL, Intravenous, 2 times per day  sodium chloride flush 0.9 % injection 10 mL, 10 mL, Intravenous, PRN  magnesium hydroxide (MILK OF MAGNESIA) 400 MG/5ML suspension 30 mL, 30 mL, Oral, Daily PRN  ondansetron (ZOFRAN) injection 4 mg, 4 mg, Intravenous, Q6H PRN  meropenem (MERREM) 500 mg in sodium chloride 0.9 % 100 mL IVPB, 500 mg, Intravenous, Q24H    Objective     BP (!) 148/69   Pulse 67   Temp 98.1 °F (36.7 °C) (Oral)   Resp 16   Ht 6' 1\" (1.854 m)   Wt 241 lb 4.8 oz (109.5 kg)   SpO2 97%   BMI 31.84 kg/m²      I/O:  Intake/Output Summary (Last 24 hours) at 10/17/18 0943  Last data filed at 10/17/18 0442   Gross per 24 hour   Intake           721.13 ml   Output                0 ml   Net           721.13 ml              Wt Readings from Last 3 Encounters:   10/17/18 241 lb 4.8 oz (109.5 kg)   10/15/18 250 lb (113.4 kg)   08/25/18 249 lb 8 oz (113.2 kg)       LABS:    Recent Labs      10/15/18   1745  10/16/18   2140   WBC  5.2  5.9   HGB  9.3*  9.7*   HCT  30.3*  31.4*   PLT  182  183      Recent Labs      10/16/18   2140   NA  142   K  3.6   CL  100   CO2  22*   BUN  34*   CREATININE  5.7*        Recent Labs      10/15/18   1745  10/16/18   2140   PROT  7.8  7.9   INR  1.86*  1.91*      No results for input(s): CKTOTAL, CKMB, CKMBINDEX, TROPONINI in the last 72 hours. Exam:    dressing intact and well maintained. No apparent strike through noted. DERMATOLOGICAL EXAM:  Bilateral lower extremities are pink and warm. The right great toe on the dorsal aspect, the nail is absent with flocculent fibrotic tissue to the base. Full thickness wound measured 1.6  cm x 1.5 cm extends down to the level of the bone. There is erythema  and edema to the entire toe with positive malodor and serosanguinous  drainage expressed.   The erythema does extend to the  metatarsophalangeal joint level. VASCULAR EXAM:  Arterial pulses, DP and PT are absent on palpation,  but they are able to be dopplerable. Capillary refill time is less  than 3 seconds. Edema is +1 pitting bilateral lower extremities. NEUROLOGICAL EXAM:  Sensation is absent to toes, metatarsal heads,  arches, heels, and dorsal feet bilaterally when tested with the  [place] 5.07 monofilament wire. MUSCULOSKELETAL:  He has collapsed arches medially due to chronic  Charcot changes. 10.17.18          ASSESSMENT  Principle  1. Diabetes type 2 with polyneuropathy. 2.  Diabetes type 2 with peripheral arterial disease. 3.  Diabetic toe ulcer. 4.  Right great toe with necrosis of bone. 5.  Cellulitis, right great toe.     Chronic  Patient Active Problem List   Diagnosis    SOB (shortness of breath)    Anemia in chronic kidney disease (CKD)    HTN (hypertension)    Coronary artery disease involving native heart without angina pectoris    Type 2 diabetes mellitus with diabetic mononeuropathy, with long-term current use of insulin (HCC)    Chronic back pain greater than 3 months duration    Chronic pain of both ankles    Peripheral neuropathy    Secondary DM with hypertension and ESRD on dialysis ([address]    Peripheral neuropathy ([address]    Proliferative diabetic retinopathy ([address]    Iron deficiency anemia due to chronic blood loss    ESRD (end stage renal disease) on dialysis ([address]    AVM (arteriovenous malformation) of colon without hemorrhage    Metabolic acidosis    Diabetic polyneuropathy associated with type 2 diabetes mellitus ([address]    Charcot's joint of foot in type 2 diabetes mellitus (HCC)    Proliferative diabetic retinopathy without macular edema associated with type 2 diabetes mellitus ([address]    Secondary hyperparathyroidism of renal origin ([address]    Obstructive sleep apnea    Arterial hypotension    ESRD on hemodialysis ([address]    S/P AVR (aortic valve replacement)    S/P ESRD   - creatinine 5.7; nephrology consulted   - on dialysis   3. HTN   - resume home meds  4. Coronary artery disease involving native heart without angina pectoris - resume Coreg, NTG sl prn, Lipitor, Hold Coumadin, ASA, Plavix, Troponin, EKG, BNP, CXR, monitor INR   - hospitalist following   5. Type 2 diabetes mellitus with diabetic polyneuropathy, with long-term current use of insulin ([address]- Sliding scale insulin as needed   - hospitalist following     Oni Zapien D.P.M. Podiatric Surgical Resident  10/17/2018   9:43 AM     ADDENDUM 10.17.18 1817     Patient seen at bedside with Dr. Purdy Speaker. Discussed concerns over right great toe ulceration and blood flow to the right foot. Patient has had vascular workup in 2017 which demonstrated poor perfusion to the right foot. Non-invasive vascular studies done today which again demonstrated diminished flow to the right foot. Discussed treatment options with patient and family. Patient would like to proceed with right hallux amputation due to bone infection. Patient understands the potential risks of non-healing due to decreased blood flow. Patient not wanting to undergo further vascular workup at this time, but may consider vascular intervention if need after the hallux amputation. Plan for hallux amputation under local anesthesia in the next 1-2 days pending OR availability. Electronically signed by Steve Melgoza DPM on 10/17/2018 at 6:22 PM     Patient was seen with Dr Caron Wood. I agree with the above mentioned documentation. Had long discussion with patient and family. He is in need of a right great toe amputation due to bone infection. Explained the risks due to his decreased circulation and multiple comorbidities. Will do surgery under straight local. Patient requesting Valium 5mg one hour prior. Explained the risks, benefits and possible complications of surgery to include but not limited to delayed healing, non-healing and further amputation of the foot/leg. He related understanding and is ready to proceed with surgery. Discussed case with Dr Mariela Ferguson and he agrees with the plan.     Electronically signed by Muna Parra DPM on 10/17/2018 at 8:49 PM

## 2018-10-17 NOTE — H&P
surgery, finger amputation, multiple eye  surgeries, appendectomy. He has had multiple cardiac surgeries where  stents were placed, colonoscopy. He has had dialysis fistula surgery,  hysterectomy, vascular surgery. He had CABG harvest of the left leg  with left arm fistula. FAMILY HISTORY:  His mother and father are both . SOCIAL HISTORY:  He denies any tobacco use. He does say he drinks one  can of beer every other day. He denies any drug use. He does live at  home with his wife. ALLERGIES:  He is allergic to VICODIN which causes nausea and DARVOCET  which causes nausea as well as DEMEROL which causes nausea and  vomiting. CURRENT MEDICATIONS:  Includes the following:  Tylenol, aspirin,  Lipitor, Calcitrol, Coreg, Plavix, Coumadin, Prozac, milk of magnesia,  Protonix, GlycoLax, Lyrica. PHYSICAL EXAMINATION:  GENERAL:  The patient is awake, alert, and oriented in no acute  distress. LOWER EXTREMITY EXAMINATION:  DERMATOLOGICAL EXAM:  Bilateral lower extremities are pink and warm. The right great toe on the dorsal aspect, the nail is absent with flocculent fibrotic tissue to the base. The wound was excisionally  debrided in the office setting today which showed a wound measured 1.6  cm x 1.5 cm extends down to the level of the bone. There is erythema  and edema to the entire toe with positive malodor and serosanguinous  drainage expressed. The erythema does extend to the  metatarsophalangeal joint level. VASCULAR EXAM:  Arterial pulses, DP and PT are absent on palpation,  but they are able to be dopplerable. Capillary refill time is less  than 3 seconds. Edema is +1 pitting bilateral lower extremities. NEUROLOGICAL EXAM:  Sensation is absent to toes, metatarsal heads,  arches, heels, and dorsal feet bilaterally when tested with the  [place] 5.07 monofilament wire. MUSCULOSKELETAL:  He has collapsed arches medially due to chronic  Charcot changes.     IMAGING:  Reviewed x-rays taken from [address] [place] [address]. [facility] yesterday which showed  demineralization of the distal phalanx of the great toe. IMPRESSION:  1. Diabetes type 2 with polyneuropathy. 2.  Diabetes type 2 with peripheral arterial disease. 3.  Diabetic toe ulcer. 4.  Right great toe with necrosis of bone. 5.  Cellulitis, right great toe. PLAN:  The patient was seen in the office today and was directly  admitted to [address] [place] [address] for IV antibiotics, further imaging and likely  surgical intervention. Dr. Alicia Zee will be consulted upon admission  for IV antibiotics. Hospitalist will be consulted for medical  management of the patient and clearance for surgery. A CT will be  ordered upon exam to further assess the extent of the wound infection. The patient is unable to have an MRI due to pacemaker. I have had a  long discussion with the patient and wife. Given multiple  comorbidities he is a very high risk patient. Explained that if we  are going to proceed with just a toe amputation, recommend doing it  under straight local due to his multiple comorbidities. Also  explained that due to his decreased circulation, diabetes and  dialysis, a toe amputation may not heel and return to further  amputation of the foot or leg. Vascular has already done everything  they can do to optimize his vessel in 2017. We will repeat an  arterial ultrasound just to assess to make sure no further damage has  been done. Once testing is complete, we will make a definitive plan.         Rivera Adams D.P.M.    D: 10/16/2018 21:24:57       T: 10/17/2018 1:14:19     [email or web address]  Job#: [id number]     Doc#: [id number]    CC:

## 2018-10-17 NOTE — CONSULTS
Aspirin use and would benefit from delaying surgery for 2 days If                                 Possible              DVT/ PE prophylaxis per podiatry                                    Thank you Dr Howard Silva for allowing me to participate in this patient's care.     DVT prophylaxis: [] Lovenox                                 [] SCDs                                 [] SQ Heparin                                 [] Encourage ambulation, low risk for DVT, no chemical or mechanical prophylaxis necessary              [] Already on Anticoagulation                Anticipated Disposition upon discharge: [] Home                                                                         [] Home with Home Health                                                                         [] [facility]                                                                         [] [address] [place] [address],[address]          Electronically signed by Malathi Monae DO on 10/16/2018 at 9:17 PM

## 2018-10-17 NOTE — CONSULTS
Omer Katz in [place] History of blood transfusion     History of cardiac cath 07/07/2018    Stent to Prox LAD     HTN (hypertension) DX 1979    Hyperkalemia     Hyperlipidemia     Medtronic micra single pacemaker  10/3/2018    MRSA (methicillin resistant Staphylococcus aureus) 1/24/2014    blood hx from another facility    Murmur, cardiac     Nephrotic syndrome 8/21/2013    Obstructive sleep apnea     has been told he has but has never been tested    Osteomyelitis of finger of right hand ([address] 9/28/2017    Pancreatitis due to biliary obstruction     Peripheral neuropathy     of the feet from the diabetes    Peripheral neuropathy     Psychiatric problem     depression    Pulmonary edema     Renal failure     KIDNEY FUNCTION 12%    S/P peripheral artery angioplasty: 10/30/2017: PTA of the left popliteal artery. 10/30/2017    10/30/2017: PTA of the left popliteal artery.  Dr. Imani Anton    SOB (shortness of breath)     Vision blurred     RIGHT    VRE (vancomycin resistant enterococcus) culture positive 03/2017    Wears glasses        Past Surgical History:  Past Surgical History:   Procedure Laterality Date    ABLATION OF DYSRHYTHMIC FOCUS  07/21/2017    Atrial Flutter Ablation    APPENDECTOMY      CARDIAC SURGERY  2/19/2014    2 stents placed   [address]  2006    stents x 2    CARDIAC SURGERY  2003    stent x 1    CHOLECYSTECTOMY      COLONOSCOPY      DIALYSIS FISTULA CREATION Left 02/2014    ARM    ENDOSCOPY, COLON, DIAGNOSTIC      ENTEROSCOPY  9/8/2017    ENTEROSCOPY PUSH BIOPSY performed by Adelso Pickard MD at [facility]    EYE SURGERY Bilateral     CATARACT EXTRACTION WITH IOL    EYE SURGERY Left 05/17/2013    VITRECTOMY    EYE SURGERY Bilateral     MULTIPLE LASER PROCEDURES    FINGER AMPUTATION Right 09/28/2017    FRACTURE SURGERY Right     ankle, surgery x 7    HAND SURGERY Right 8/18/2017    I & D RIGHT INDEX FINGER performed by Mercy Calzada MD at [address] [place] [address] Intracardiac thrombus   Left ventricle size is normal.   Normal left ventricle wall thickness.   There was moderate global hypokinesis of the left ventricle.   Systolic function was moderately reduced.   Ejection fraction is visually estimated in the range of 40% to 45%.  Myxomatotic degeneration of mitral valve.   Severe annular calcification.   Highly calcified mobile density noted 0.9 x0.3 cm arising from posterior   mitral leaflet extend to atrial aspect- suggestive for heavily calcified   degenerated leaflet.   There is echogenic mobile density arising from mid part of the anterior   leaflet on the ventricular aspect extending to LVOT sized 1 x0.3 cm with   moderate calcification. This could be suggestive for Degenerated leaflets   or Vegetation.   Both echogenic densities are new and not present on the JAVIER done   07/18/2018  [facility] mitral regurgitation with centrally directed jet with pulmonary   venous flow reversal   Left atrium moderate to severly dilated   Clinical correlation    24HR INTAKE/OUTPUT:    Intake/Output Summary (Last 24 hours) at 10/17/18 1650  Last data filed at 10/17/18 1307   Gross per 24 hour   Intake          1765.13 ml   Output              100 ml   Net          1665.13 ml     I/O last 3 completed shifts: In: 1765.1 [P.O.:890; I.V.:875.1]  Out: 100 [Urine:100]  No intake/output data recorded. Admission weight: 241 lb 1.6 oz (109.4 kg)  Wt Readings from Last 3 Encounters:   10/17/18 241 lb 4.8 oz (109.5 kg)   10/15/18 250 lb (113.4 kg)   08/25/18 249 lb 8 oz (113.2 kg)     Body mass index is 31.84 kg/m².     Physical Examination:  VITALS:  BP (!) 140/64   Pulse 63   Temp 99.4 °F (37.4 °C) (Oral)   Resp 17   Ht 6' 1\" (1.854 m)   Wt 241 lb 4.8 oz (109.5 kg)   SpO2 94%   BMI 31.84 kg/m²   Weight:   Wt Readings from Last 3 Encounters:   10/17/18 241 lb 4.8 oz (109.5 kg)   10/15/18 250 lb (113.4 kg)   08/25/18 249 lb 8 oz (113.2 kg)     Constitutional: alert and cooperative with exam, appears comfortable, no distress  Head: NC/AT   Eyes: no icteric sclera, no pallor conjunctiva, no discharge seen from either eye  Oral: moist oral mucus membranes  Ears: normal external appearance of left and right ear, both ear lobules nontender to palpation  Nose: no active drainage  Neck: No jugular venous distention, appears symmetric, good ROM  Lungs: Air entry B/L, no crackles or rales  Heart: regular rate and rhythm, S1, S2, systolic murmur noted at LUSB   Extremities: Bilateral chronic appearing 2+ pitting LE edema to lower legs with chronic appearing skin changes. Dressing to right great toe. Left lower arm AVF + bruit/thrill  GI: soft, non-tender, no guarding  : Dialysis pt  Skin: no rash seen on exposed extremities  Musculo: moves all extremities  Neuro: no slurred speech, no facial drooping, symmetric strength  Psychiatric: Awake, alert, Oriented    Impression and Plan:    1. ESRD on HD, last HD on home hemo 10/15/18 without difficulty  - Will need hemodialysis tomorrow am, call Dr. Wendelyn Skiff for orders.   - Fluid, acid/base and lytes fairly stable. - Wt only 1.5 kg above dry. - BMP in am.     2. Anemia of ESRD, Hg 9.7 today, iron studies and CBC in am, will likely need LD. 3. Right great toe osteomyelitis with pathologic fracture on CT, on Meropenem and s/p one dose Vanco, followed by podiatry  4. CHF, fluid status appears stable with EF 40-45%. 5. CAD, s/p AVR, s/p CABG/cath, with stents in place, on Plavix, ASA, Warfarin  6. Chronic hypotension, on Midodrine, -156, stable. 7. PMH endocarditis of mitral valve followed by [facility]    Discussed with Dr. Wendelyn Skiff. All labs, radiology and medications were reviewed and discussed with the patient and RN     Thank you for the consult. Please feel free to call me if you have any questions.      Sean Braga, APRN, CNP  10/17/2018  4:50 PM  [facility]

## 2018-10-17 NOTE — CARE COORDINATION
10/17/18, 7:49 AM      [address]       Admitted from: direct admit 10/16/2018/ [address] [facility] [address] day: 1   Location: [facility]/011- Reason for admit: Osteomyelitis [facility]) [M86.9] Status: IP   Admit order signed?: yes  PMH:  has a past medical history of Anemia; Arthritis; CAD (coronary artery disease); Charcot ankle; CHF (congestive heart failure) ([facility] Chronic ankle pain; Chronic back pain; Chronic venous hypertension w ulceration ([facility]); CKD (chronic kidney disease) stage 3, GFR 30-59 ml/min ([facility]); DDD (degenerative disc disease); Diabetic retinopathy ([facility] DM (dermatomyositis); DM (diabetes mellitus) ([facility] Full dentures; Gastric bleed; GERD (gastroesophageal reflux disease); H/O Bell's palsy; H/O Clostridium difficile infection; Hemodialysis patient [facility]); History of blood transfusion; History of cardiac cath; HTN (hypertension); Hyperkalemia; Hyperlipidemia; Medtronic micra single pacemaker ; MRSA (methicillin resistant Staphylococcus aureus); Murmur, cardiac; Nephrotic syndrome; Obstructive sleep apnea; Osteomyelitis of finger of right hand ([facility] Pancreatitis due to biliary obstruction; Peripheral neuropathy; Peripheral neuropathy; Psychiatric problem; Pulmonary edema; Renal failure; S/P peripheral artery angioplasty: 10/30/2017: PTA of the left popliteal artery.; SOB (shortness of breath); Vision blurred; VRE (vancomycin resistant enterococcus) culture positive; and Wears glasses.     Medications:  Scheduled Meds:   pregabalin  50 mg Oral BID    FLUoxetine  20 mg Oral Daily    calcitRIOL  0.25 mcg Oral Daily    calcium carbonate  5 tablet Oral TID WC    pantoprazole  40 mg Oral Daily    carvedilol  6.25 mg Oral BID WC    clopidogrel  75 mg Oral Daily    aspirin  81 mg Oral Daily    midodrine  10 mg Oral TID    atorvastatin  40 mg Oral Daily    polyethylene glycol  17 g Oral Daily    sodium chloride flush  10 mL Intravenous 2 times per day    enoxaparin  40 mg Subcutaneous Daily    meropenem  500

## 2018-10-17 NOTE — PROGRESS NOTES
Pharmacy Renal Adjustment    Estevan Benson is a 60 y.o. male. Pharmacy renally adjust the following medications per P&T approved policy: Meropenem. Recent Labs      10/15/18   1745   BUN  36*       Recent Labs      10/15/18   1745   CREATININE  6.1*       Estimated Creatinine Clearance: 17 mL/min (A) (based on SCr of 6.1 mg/dL [facility])). Calculated CrCl:    Height:   Ht Readings from Last 1 Encounters:   10/16/18 6' 1\" (1.854 m)     Weight:  Wt Readings from Last 1 Encounters:   10/16/18 241 lb 1.6 oz (109.4 kg)       CKD stage: ESRD        Baseline SCr:     Plan: Adjustments based on renal function:  Decrease Meropenem to 500 mg q24h IVPB.   Howard Ferro, [address] 10/16/2018 8:32 PM

## 2018-10-18 NOTE — CONSULTS
artery was done. PAST SURGICAL HISTORY:  Includes vascular surgery, vitreous surgery,  eye surgery, cholecystectomy, cardiac surgeries, appendectomy, and  Micra placement. FAMILY HISTORY:  Positive for heart disease, hypertension, and  diabetes. SOCIAL HISTORY:  Never smoked. Occasional alcohol consumption. No  drug use. ALLERGIES:  Allergic to VICODIN, DARVOCET, and DEMEROL. MEDICATIONS PRIOR TO ADMISSION:  Include the following:   Acetaminophen, aspirin, Lipitor, vitamin B, Tums, Coreg, Plavix,  Humalog, Prozac, midodrine, Nitrostat, Protonix, Lyrica, and Coumadin. PHYSICAL EXAMINATION:  GENERAL:  Looks sick, in no form of distress. VITAL SIGNS:  Blood pressure 140/64, pulse rate 63, respiratory rate  17, and temperature 99.4. HEENT:  Basically, slightly pale conjunctivae. Anicteric sclerae. Pupils equal, reactive bilaterally to light. NECK:  No lymphadenopathy. No goiter. No JVD. CHEST:  Clear to auscultation and resonant to percussion. CARDIOVASCULAR:  Arteries are felt; carotid, femoral, and pedal.  No  bruits. S1, S2 well heard. No murmur or gallop rhythm. ABDOMEN:  Soft, nontender, nondistended. Bowel sounds are positive. GENITOURINARY:  No CVA, flank or suprapubic tenderness. LOCOMOTOR:  No cyanosis, clubbing, or muscle or joint tenderness, but  the right toe is dressed. SKIN:  No rashes, ulcers, or nodules. CNS:  Alert, awake, and oriented to time, person, and place. Cranial  nerves are intact. Sensation intact to light touch and pain. Motor:   Normal muscle strength and tone. Appropriate mood and affect. WORKUP:  White blood cells 5.9, hemoglobin 9.7, and platelets 690. Blood chemistry:  Sodium 142, potassium 3.6, BUN 34, and creatinine  5.4. Troponin elevated at 0.08, 0.07, 0.08, so apparently that is  probably related to renal problem and pretty sure, he has chronic  troponin elevation, that is not new.     He has a JAVIER done in 08/2018, ejection fraction of 40%, but that is  suggestive for new echogenic density noted. Severe mitral  regurgitation has been noted and also, echogenic density has been  noted on the mitral valve. Severe mitral valve calcification has been  noted, but was treated for endocarditis at that time based on that. EKG showed at this level left bundle-branch block with left posterior  hemiblock, bifascicular block, and the patient has sinus rhythm. He  has Micra and the Micra interrogation very recently, two weeks ago,  has been done and basically revealed normal function, pacing only 0.2%  of the time, right ventricular pacing. Cardiac catheterization  07/08/2018, had ended up having a stent in the LAD. Otherwise, RCA  100% occluded and no mention of the graft has been done on this  cardiac catheterization, but there was no mention of the graft done  into the RCA, the single graft, but the LAD was 95% blocked that was  addressed and Marco drug-eluting stent has been placed. Previously, he has aortic valve replacement on 03/13/2017 by Dr. Carmen Caldwell,  and while that, he has a vein graft bypass to RCA and the pacer  interrogation was acceptable. ASSESSMENT:  1. Preoperative evaluation for osteomyelitis surgery. 2.  Chronic osteomyelitis of the right hallux, right big toe  basically. 3.  Coronary artery disease, status post previous stent on 07/08/2018  and single-vessel bypass to the RCA on 03/13/2017 by Dr. Carmen Caldwell. 4.  History of aortic valve replacement, bioprosthetic. 5.  History of mitral valve endocarditis, completed antibiotic  treatment, but needs followup JAVIER. 6.  Hyperlipidemia. 7.  End-stage renal disease, on hemodialysis. 8.  Peripheral artery disease, status post angioplasty on the left  popliteal.    PLAN AND RECOMMENDATION:  1. At this level, continue the dual-antiplatelet therapy.   The  patient needs to continue with dual-antiplatelet therapy, aspirin,  Plavix preoperatively and during the surgery as well, so the patient  should not stop these medications. 2.  Other than that, the patient has basically no chest pain, no  angina or angina equivalent, and no new EKG abnormality. Troponin  remained to be mildly elevated, it is a chronic troponin elevation. So, at this level, I recommended to proceed with moderate to high  risk, he is certainly of a significant risk. So, he was moderate to  high risk for major cardiovascular event perioperatively. So, at this  level, the patient is optimized with his medical therapy. He is on  beta blocker, statins, aspirin, and Plavix. I discussed with the  patient the plan of care and the patient verbalized understanding and  agreed with the plan of care. Thank you for allowing me to participate in the care of this patient. I will follow up with you. Mayra Pinto.  Deena Gastelum M.D.    D: 10/17/2018 18:19:28       T: 10/17/2018 21:21:37     EMILIO_CONCEPCION  Job#: [id number]     Doc#: [id number]    CC:

## 2018-10-19 NOTE — CARE COORDINATION
10/19/18, 2:00 PM    Home with wife. HD at [facility]. Denies needs. Discharge plan discussed by  and . Discharge plan reviewed with patient/ family. Patient/ family verbalize understanding of discharge plan and are in agreement with plan. Understanding was demonstrated using the teach back method.        IMM Letter  IMM Letter given to Patient/Family/Significant other/Guardian/POA/by[de-identified] Rubi Duval CM   IMM Letter date given[de-identified] 10/19/18  IMM Letter time given[de-identified] 1400

## 2018-10-19 NOTE — PROGRESS NOTES
5. 7*  7.5*   CALCIUM  8.6  9.0     Recent Labs      10/16/18   2140   AST  24   ALT  12   BILITOT  0.5   ALKPHOS  134*     Recent Labs      10/16/18   2140   INR  1.91*     No results for input(s): Merryl Passe in the last 72 hours. Urinalysis:    Lab Results   Component Value Date    NITRU NEGATIVE 10/16/2018    WBCUA > 200 10/16/2018    BACTERIA NONE 10/16/2018    RBCUA > 200 10/16/2018    BLOODU LARGE 10/16/2018    GLUCOSEU 250 10/16/2018       Radiology:  CT FOOT RIGHT WO CONTRAST   Final Result   Severe multifocal arthritis. Fracture of the first distal phalanx possibly relating to osteomyelitis. Final report electronically signed by Dr. Cortney Toledo on 10/17/2018 11:50 AM      VL DUP LOWER EXTREMITY ARTERIES BILATERAL   Final Result   1. Suggested stenosis of greater than 50% at the level of the posterior tibial arteries bilaterally as well as the left profunda femoris artery. 2. Noncompressible vessels resulting in unreliable ankle-brachial indices. This is likely related to underlying arterial calcinosis. However, toe brachial indices, which are a more reliable indicator of peripheral vascular disease in the presence of    arterial calcinosis, appear significantly diminished bilaterally measuring 0.24 for the right and 0.41 on the left. Recommend further evaluation with aortofemoral angiography. **This report has been created using voice recognition software. It may contain minor errors which are inherent in voice recognition technology. **      Final report electronically signed by Dr. Daniel Rangel on 10/17/2018 9:44 AM      XR CHEST 1 VW   Final Result      Mild cardiomegaly with pulmonary venous congestion without significant pulmonary edema. Mild right basilar atelectasis. No pneumonia. **This report has been created using voice recognition software. It may contain minor errors which are inherent in voice recognition technology. **      Final report electronically ranging .   6. Resume home BP medications. Monitor BP daily   7.  Anemia in chronic kidney disease (CKD)- monitor CBC             Electronically signed by Kel Ramos MD on 10/19/2018 at 12:19 PM

## 2018-10-19 NOTE — PROGRESS NOTES
of colon without hemorrhage D95.36    Metabolic acidosis B22.5    Diabetic polyneuropathy associated with type 2 diabetes mellitus ([facility]) E11.42    Charcot's joint of foot in type 2 diabetes mellitus ([facility] E11.610    Proliferative diabetic retinopathy without macular edema associated with type 2 diabetes mellitus ([facility] E11.3599    Secondary hyperparathyroidism of renal origin ([facility] N25.81    Obstructive sleep apnea G47.33    Arterial hypotension I95.9    ESRD on hemodialysis ([facility]) N18.6, Z99.2    S/P AVR (aortic valve replacement) Z95.2    S/P CABG x 1 Z95.1    Hyperphosphatemia E83.39    Coronary artery disease involving coronary bypass graft of native heart without angina pectoris I25.810    Osteomyelitis of finger of right hand ([facility]) M86.9    S/P peripheral artery angioplasty: 10/30/2017: PTA of the left popliteal artery.  Z98.62    Peripheral vascular disease ([facility]) I73.9    Atheroembolism of both lower extremities ([facility]) I75.023    Chest pain R07.9    Chronic systolic congestive heart failure ([facility]) I50.22    Unstable angina ([facility]) I20.0    Symptomatic bradycardia R00.1    Heart block AV third degree ([facility]) I44.2    S/P cardiac catheterization Z98.890    Encounter for hemodialysis for ESRD ([facility] N18.6, Z99.2    Anemia due to stage 5 chronic kidney disease ([facility]) N18.5, D63.1    Displacement of atrial pacemaker leads T82.120A    Adjustment and management of cardiac pacemaker Z45.018    Ischemia of finger I99.8    Septicemia ([facility]) A41.9    History of aortic valve replacement Z95.2    Sepsis due to methicillin resistant Staphylococcus aureus (MRSA) ([facility]) A41.02    Diabetic peripheral neuropathy associated with type 2 diabetes mellitus ([facility]) E11.42    Paroxysmal atrial fibrillation ([facility]) I48.0    Open wound of left index finger without damage to nail S61.201A    Ischemic ulcer of finger, limited to breakdown of skin ([facility] L98.491    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

## 2018-10-19 NOTE — CARE COORDINATION
10/19/18, 10:39 AM      Jessica Cord day: 3  Location: [facility]A Reason for admit: Osteomyelitis [facility]) [M86.9]   Procedure: 10/19 RIGHT GREAT TOE AMPUTATION  Treatment Plan of Care: IVF, IV ATB. Post-op care. ID following. MWF HD. Per podiatry, possible discharge Saturday pending appearance of amputation site. PCP: Wendy Eisenberg MD  Readmission Risk Score: 37%  Discharge Plan: Home with wife. Current MWF HD at [facility].

## 2018-10-19 NOTE — PLAN OF CARE
Problem: Falls - Risk of:  Goal: Will remain free from falls  Will remain free from falls   Outcome: Ongoing  Pt remains free from falls at this time. Bed in lowest position and bed/chair alarm on. Nonskid sock on left foot, boot on right from removal of right great toe. Call light within reach. Problem: Pain:  Goal: Pain level will decrease  Pain level will decrease   Outcome: Ongoing  Pt denies pain at this time, will continue to assess. Pt can have tylenol or norco PRN. Using 0-10 pain scale. Problem: Discharge Planning:  Goal: Discharged to appropriate level of care  Discharged to appropriate level of care   Outcome: Ongoing  Pt plans to return home upon discharge, continue to assess if placement needed for rehab. Problem: Cardiac:  Goal: Ability to maintain vital signs within normal range will improve  Ability to maintain vital signs within normal range will improve   Outcome: Ongoing  Vital signs remain stable at this time, continue to assess. Refer to flowsheet. Problem: Skin Integrity:  Goal: Will show no infection signs and symptoms  Will show no infection signs and symptoms   Outcome: Ongoing  No signs of increased infection at this time, pt getting IV merrem. Will continue to assess for signs of infection. Goal: Absence of new skin breakdown  Absence of new skin breakdown   Outcome: Ongoing  No new skin breakdown at this time, continue to encourage pt to turn and ambulate as tolerated. Comments: Care plan reviewed with patient. Patient verbalize understanding of the plan of care and contribute to goal setting.

## 2018-10-19 NOTE — PROGRESS NOTES
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 is a 60 y.o. male   Patient was examined and evaluated  1. Ulcer right great toe   - labs reviewed; WBC 9.9    - patient on meropenem; ID managing   - s/p right hallux amputation   - Dressing left in place, will change tomorrow morning     2. ESRD   - creatinine 7.5; nephrology consulted   - on hemodialysis   3. HTN   - resume home meds   - Hospitalist consulted    4. Coronary artery disease involving native heart without angina pectoris - resume Coreg, NTG sl prn, Lipitor, Hold Coumadin, ASA, Plavix, Troponin, EKG, BNP, CXR, monitor INR   - hospitalist following    - Cardiology consulted per IM    5. Type 2 diabetes mellitus with diabetic polyneuropathy, with long-term current use of insulin ([address]- Sliding scale insulin as needed   - hospitalist following     DISPO: Possible discharge home over the weekend pending appearance of amputation site. Celeste Bal D.P.M. Podiatric Surgical Resident  10/19/2018   9:02 AM     I agree with the above mentioned documentation. Patient was seen this evening at bedside. He denied any pain. Lifted gauze and was able to see incision site which was pink with good CFT.  Will do complete dressing change in the

## 2018-10-20 NOTE — PROGRESS NOTES
 Peripheral vascular disease (HCC)    Atheroembolism of both lower extremities (HCC)    Chest pain    Chronic systolic congestive heart failure (HCC)    Unstable angina (HCC)    Symptomatic bradycardia    Heart block AV third degree (HCC)    S/P cardiac catheterization    [facility] for [facility]D [facility]    Anemia due to stage 5 chronic kidney disease ([facility]    Displacement of atrial pacemaker leads    Adjustment and management of cardiac pacemaker    Ischemia of finger    Septicemia ([facility]    History of aortic valve replacement    Sepsis due to methicillin resistant Staphylococcus aureus (MRSA) ([facility]    Diabetic peripheral neuropathy associated with type 2 diabetes mellitus (HCC)    Paroxysmal atrial fibrillation (HCC)    Open wound of left index finger without damage to nail    Ischemic ulcer of finger, limited to breakdown of skin ([facility]    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 is a 60 y.o. male   Patient was examined and evaluated    1. Ulcer right great toe   - labs reviewed; WBC 8.6. Creatinine 5.0 at this time. - patient on meropenem; ID managing   - s/p right hallux amputation   - Dressing changed consisting of betadine, adaptic, gauze, kerlix and ace wrap. 2. ESRD   - creatinine 5.0; nephrology following   - on hemodialysis     3. HTN   - resume home meds   - Hospitalist consulted    4.    Coronary artery disease involving native heart without angina pectoris    - resume Coreg, NTG sl prn, Lipitor, Coumadin resumed 10/19/18, ASA, and Plavix per IM   - hospitalist following    - Cardiology consulted per IM    5. Type 2 diabetes mellitus with diabetic polyneuropathy, with long-term current use of insulin (HCC)   - Sliding scale insulin as needed. Diabetic diet. BS ranging .    - hospitalist following     DISPO: planning discharge in next 1-2 days    Electronically signed by Bina Muro DPM on 10/20/2018 at 9:37 AM     Patient was seen with Dr Gabriela Foster. I agree with the above mentioned documentation. Incision site is well coapted. Skin edges viable. Will plan discharge tomorrow if ok with other services.      Electronically signed by Kt Jasso DPM on 10/20/2018 at 7:14 PM

## 2018-10-20 NOTE — PROGRESS NOTES
BUN  54*  37*   CREATININE  7.5*  5.0*   CALCIUM  9.0  9.1     No results for input(s): AST, ALT, BILIDIR, BILITOT, ALKPHOS in the last 72 hours. Recent Labs      10/19/18   1922  10/20/18   0539   INR  1.74*  1.67*     No results for input(s): Lazaro Commodore in the last 72 hours. Urinalysis:      Lab Results   Component Value Date    NITRU NEGATIVE 10/16/2018    WBCUA > 200 10/16/2018    BACTERIA NONE 10/16/2018    RBCUA > 200 10/16/2018    BLOODU LARGE 10/16/2018    GLUCOSEU 250 10/16/2018       Radiology:  CT FOOT RIGHT WO CONTRAST   Final Result   Severe multifocal arthritis. Fracture of the first distal phalanx possibly relating to osteomyelitis. Final report electronically signed by Dr. Chase Velazco on 10/17/2018 11:50 AM      VL DUP LOWER EXTREMITY ARTERIES BILATERAL   Final Result   1. Suggested stenosis of greater than 50% at the level of the posterior tibial arteries bilaterally as well as the left profunda femoris artery. 2. Noncompressible vessels resulting in unreliable ankle-brachial indices. This is likely related to underlying arterial calcinosis. However, toe brachial indices, which are a more reliable indicator of peripheral vascular disease in the presence of    arterial calcinosis, appear significantly diminished bilaterally measuring 0.24 for the right and 0.41 on the left. Recommend further evaluation with aortofemoral angiography. **This report has been created using voice recognition software. It may contain minor errors which are inherent in voice recognition technology. **      Final report electronically signed by Dr. Davian Nugent on 10/17/2018 9:44 AM      XR CHEST 1 VW   Final Result      Mild cardiomegaly with pulmonary venous congestion without significant pulmonary edema. Mild right basilar atelectasis. No pneumonia. **This report has been created using voice recognition software.  It may contain minor errors which are inherent in voice

## 2018-10-20 NOTE — PROGRESS NOTES
KIDNEY FUNCTION 12%    S/P peripheral artery angioplasty: 10/30/2017: PTA of the left popliteal artery. 10/30/2017    10/30/2017: PTA of the left popliteal artery. Dr. Dee Bonner    SOB (shortness of breath)     Vision blurred     RIGHT    VRE (vancomycin resistant enterococcus) culture positive 03/2017    Wears glasses                 Recent Labs      10/19/18   1922   INR  1.74*     Recent Labs      10/16/18   2140  10/18/18   0544   HGB  9.7*  9.9*   HCT  31.4*  32.1*   PLT  183  197       Current warfarin drug-drug interactions: aspirin, Plavix      Date INR Warfarin Dose   10/19/2018  1.74 7.5mg                   Daily PT/INR until stable within therapeutic range. Thank you for the consult. Suraj Edwards, Pharm. D., BCPS 10/19/2018 8:35 PM

## 2018-10-21 NOTE — PROGRESS NOTES
peripheral artery angioplasty: 10/30/2017: PTA of the left popliteal artery.  Peripheral vascular disease (HCC)    [facility]    Chest pain    Chronic systolic congestive heart failure (HCC)    Unstable angina ([facility]    Symptomatic bradycardia    Heart block AV third degree ([facility]    S/P cardiac catheterization    [facility] for [facility] for ESRD [facility]    Anemia due to stage 5 chronic kidney disease ([facility]    Displacement of atrial pacemaker leads    Adjustment and management of cardiac pacemaker    Ischemia of finger    Septicemia ([facility]    History of aortic valve replacement    Sepsis due to methicillin resistant Staphylococcus aureus (MRSA) ([facility]    Diabetic peripheral neuropathy associated with type 2 diabetes mellitus ([facility]    Paroxysmal atrial fibrillation ([facility]    Open wound of left index finger without damage to nail    Ischemic ulcer of finger, limited to breakdown of skin ([facility]    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 is a 60 y.o. male   Patient was examined and evaluated    1. Ulcer right great toe   - labs reviewed; WBC 8.6. Creatinine 5.0 from 10/20/18. - patient on meropenem; ID managing. Will stop IV antibiotics at discharge. - s/p right hallux amputation   - Dressing changed consisting of betadine, adaptic, gauze, kerlix and ace wrap. - Dressing to be left intact until his appointment in the office of Wednesday. 2. ESRD   - creatinine 5.0; nephrology following   - on hemodialysis     3. HTN   - resume home meds   - Hospitalist consulted    4.    Coronary

## 2018-10-21 NOTE — DISCHARGE SUMMARY
Physician Discharge Summary     Patient ID:  Cedrick Ireland  [id number]    Physician: Pool Fernandes DPM     Admition date: 10/16/2018    Discharge date: 10.21.18    Date of Surgery: 10/18/18    Admission Diagnoses: Osteomyelitis [facility]) [M86.9]    Discharge Diagnoses: S/P Right Hallux Amputation at the [facility]    Procedures: Right Hallux Amputation at the [facility]    History, Physical Exam:     HISTORY OF PRESENT ILLNESS:  The patient is a pleasant 65-year-old  male, who presented to my office today with a wound on his right great  Downs Speaker has not been seen in the office since June due to being in  the hospital for multiple blood infections and a pacemaker infection. He says he developed a blood blister on the base of his right great  toe one week ago, since then it has been draining.  His wife has been  applying Vesta Prows went to the ER yesterday since toe was red and  swollen.  X-rays were taken which showed demineralization of hallux  distal phalanx which was concerning for osteomyelitis.  The ER doctor  discharged the patient on oral antibiotics and instructed him to call  to my office today for an appointment.  The patient has been a  diabetic since [facility] does have neuropathy and is on dialysis.  His  wife is present with him today. Exam:    dressing intact and well maintained. Dressing left intact today.     DERMATOLOGICAL EXAM: Incision appears well coapted with viable skin edges. All sutures are intact. No drainage, malodor or signs of infection present at this time.     VASCULAR EXAM:  CFT brisk to amputation site. Pulses diminished, present upon doppler examination. Edema present to lower extremities.     NEUROLOGICAL EXAM:  Light touch grossly diminished to the distal toes of the right foot.     MUSCULOSKELETAL:  He has collapsed arches medially due to chronic  Charcot changes.     Discharge Physician: Dr. Howard Silva, [facility] Course:   Patient was admitted to the hospital on 10/16/18 from

## 2018-10-21 NOTE — PROGRESS NOTES
input(s): AST, ALT, BILIDIR, BILITOT, ALKPHOS in the last 72 hours. Recent Labs      10/19/18   1922  10/20/18   0539  10/21/18   0619   INR  1.74*  1.67*  1.61*     No results for input(s): Verlyn Burn in the last 72 hours. Urinalysis:      Lab Results   Component Value Date    NITRU NEGATIVE 10/16/2018    WBCUA > 200 10/16/2018    BACTERIA NONE 10/16/2018    RBCUA > 200 10/16/2018    BLOODU LARGE 10/16/2018    GLUCOSEU 250 10/16/2018       Radiology:  CT FOOT RIGHT WO CONTRAST   Final Result   Severe multifocal arthritis. Fracture of the first distal phalanx possibly relating to osteomyelitis. Final report electronically signed by Dr. Rony Morales on 10/17/2018 11:50 AM      VL DUP LOWER EXTREMITY ARTERIES BILATERAL   Final Result   1. Suggested stenosis of greater than 50% at the level of the posterior tibial arteries bilaterally as well as the left profunda femoris artery. 2. Noncompressible vessels resulting in unreliable ankle-brachial indices. This is likely related to underlying arterial calcinosis. However, toe brachial indices, which are a more reliable indicator of peripheral vascular disease in the presence of    arterial calcinosis, appear significantly diminished bilaterally measuring 0.24 for the right and 0.41 on the left. Recommend further evaluation with aortofemoral angiography. **This report has been created using voice recognition software. It may contain minor errors which are inherent in voice recognition technology. **      Final report electronically signed by Dr. Balbina Chaudhry on 10/17/2018 9:44 AM      XR CHEST 1 VW   Final Result      Mild cardiomegaly with pulmonary venous congestion without significant pulmonary edema. Mild right basilar atelectasis. No pneumonia. **This report has been created using voice recognition software. It may contain minor errors which are inherent in voice recognition technology. **      Final report electronically

## 2018-10-21 NOTE — PROGRESS NOTES
[facility]         Renal Inpatient Follow-Up note         10/21/2018 12:26 PM    Pt Name:   Zackary Travis  YOB: 1960  Attending:   Madhu Fonseca DPM    Chief Complaint : Zackary Travis is a 60 y.o. male being followed by nephrology for ESRD on home hemodialysis     Interval History :   Patient seen and examined by me. No distress  -175  Last HD 10/19 with 4000 mL UF achieved. Denies SOB, NVD  Going home today. Scheduled Medications :    warfarin (COUMADIN) daily dosing (placeholder)   Other RX Placeholder    ferrous sulfate  325 mg Oral BID WC    diazepam  5 mg Oral 60 Min Pre-Op    sodium chloride flush  10 mL Intravenous 2 times per day    pregabalin  50 mg Oral BID    FLUoxetine  20 mg Oral Daily    calcium carbonate  5 tablet Oral TID WC    pantoprazole  40 mg Oral Daily    carvedilol  6.25 mg Oral BID WC    clopidogrel  75 mg Oral Daily    aspirin  81 mg Oral Daily    midodrine  10 mg Oral TID    atorvastatin  40 mg Oral Daily    polyethylene glycol  17 g Oral Daily    meropenem  500 mg Intravenous Q24H         Vitals :  BP (!) 127/55   Pulse 63   Temp 98.2 °F (36.8 °C) (Oral)   Resp 18   Ht 6' 1\" (1.854 m)   Wt 253 lb 6.4 oz (114.9 kg)   SpO2 96%   BMI 33.43 kg/m²     24HR INTAKE/OUTPUT:      Intake/Output Summary (Last 24 hours) at 10/21/18 1226  Last data filed at 10/21/18 0404   Gross per 24 hour   Intake           1881.4 ml   Output                0 ml   Net           1881.4 ml       Last 3 Weights  Wt Readings from Last 3 Encounters:   10/21/18 253 lb 6.4 oz (114.9 kg)   10/15/18 250 lb (113.4 kg)   08/25/18 249 lb 8 oz (113.2 kg)           Physical Exam :  Constitutional: alert and cooperative with exam, appears comfortable, no distress  Oral: moist oral mucus membranes  Neck: No JVD  Lungs: Clear  Heart: regular rate and rhythm, S1, S2   Extremities: No LE edema  GI: soft, non-tender, no guarding  Skin:  Warm dry intact.  Right

## 2018-11-08 PROBLEM — D64.9 ACUTE ANEMIA: Status: ACTIVE | Noted: 2018-01-01

## 2018-11-08 NOTE — CONSULTS
with snacks      FLUoxetine (PROZAC) 20 MG capsule Take 20 mg by mouth daily      pregabalin (LYRICA) 50 MG capsule Take 50 mg by mouth 2 times daily.       insulin lispro (HUMALOG) 100 UNIT/ML injection Inject into the skin 3 times daily (before meals) Use scale if chem > 200         ALLERGIES  Allergies   Allergen Reactions    Vicodin [Hydrocodone-Acetaminophen] Nausea Only    Darvocet A500 [Propoxyphene N-Acetaminophen] Nausea And Vomiting    Demerol Hcl [Meperidine] Nausea And Vomiting       PAST SURGICAL HISTORY  Past Surgical History:   Procedure Laterality Date    ABLATION OF DYSRHYTHMIC FOCUS  07/21/2017    Atrial Flutter Ablation    APPENDECTOMY      CARDIAC SURGERY  2/19/2014    2 stents placed   [address]  2006    stents x 2    CARDIAC SURGERY  2003    stent x 1    CHOLECYSTECTOMY      COLONOSCOPY      DIALYSIS FISTULA CREATION Left 02/2014    ARM    ENDOSCOPY, COLON, DIAGNOSTIC      ENTEROSCOPY  9/8/2017    ENTEROSCOPY PUSH BIOPSY performed by John Bundy MD at [address] Bilateral     CATARACT EXTRACTION WITH IOL    EYE SURGERY Left 05/17/2013    VITRECTOMY    EYE SURGERY Bilateral     MULTIPLE LASER PROCEDURES    FINGER AMPUTATION Right 09/28/2017    FRACTURE SURGERY Right     ankle, surgery x 7    HAND SURGERY Right 8/18/2017    I & D RIGHT INDEX FINGER performed [facility] Muna [facility]lett [facility] at [facility] Bilateral 09/2013    SUBSEQUENT HEMORRHAGE    OTHER SURGICAL HISTORY      DIALYSIS CHEST PORT INSERTED AND REMOVED    PACEMAKER PLACEMENT  08/2018    changed to a micro pacer in [place]    [place] AMPUTATE METACARPAL+FINGER Right 9/28/2017    RIGHT INDEX FINGER AMPUTATION performed by Muna Bartlett MD at [id number] [place] [address] NOT  [facility] Left 9/8/2017    COLONOSCOPY performed by John Bundy MD at [address] [facility]    [place] EGD TRANSORAL BIOPSY SINGLE/MULTIPLE N/A 8/17/2017    EGD BIOPSY performed by John Bundy MD at [address] [facility] [facility]    [place] OFFICE/OUTPT VISIT,PROCEDURE ONLY N/A 8/17/2018    TRANSESOPHAGEAL ECHOCARDIOGRAM performed by Kojo Dukes MD at [facility] OFFICE/OUTPT VISIT,PROCEDURE ONLY Right 10/18/2018    RIGHT GREAT TOE AMPUTATION performed by Sona Garcia DPM at [address] Right 10/2018    right big toe    VASCULAR SURGERY      cabbage harvest left leg, left arm fistula    VITRECTOMY Right 3/27/14       FAMILY HISTORY  family history includes Cancer in his father and mother; Diabetes in his father and mother; Heart Disease in his mother; High Blood Pressure in his mother; Other in his father. SOCIAL HISTORY  Social History   Substance Use Topics    Smoking status: Never Smoker    Smokeless tobacco: Never Used    Alcohol use Yes      Comment: 1 can of beer every other day           Physical Examination  VS: /61   Pulse 69   Temp 97.6 °F (36.4 °C) (Oral)   Resp 17   Ht 6' 1\" (1.854 m)   Wt 237 lb 3.2 oz (107.6 kg)   SpO2 96%   BMI 31.29 kg/m²   General appearance: healthy, alert, no distress      Lower Extremity  Vascular: Dorsalis pedis and posterior tibial pulses are non-palpable bilaterally. Skin temperature is warm to warm from proximal tibial tuberosity to distal digits. Edema is +1 pittting b/l legs. Dermatologic: Incision site right hallux amp is well coapted with no dehiscence. Sutures are intact. No drainage, no erythema, no signs of infection. Skin flaps are viable. Partial thickness wound left great toe measuring approximately 1.3cm by 1cm. Base is granular and starting to scab over with no drainage. No erythema, no signs of infection. Neurovascular: Light touch sensation absent bilaterally. Musculoskeletal: Right hallux amputation. Wound 08/15/18 Foot Right open blister (Active)   Wound Type Wound 10/19/2018  5:00 PM   Wound Other 10/19/2018  9:26 PM   Dressing Status Clean;Dry; Intact 10/21/2018 11:00 AM   Dressing/Treatment Open to air 10/19/2018  5:00 PM   Number of days: 85       Wound 08/15/18  Finger (Comment which one) Left black (Active)   Wound Type Wound 10/21/2018 11:00 AM   Dressing/Treatment Open to air 10/21/2018 11:00 AM   Number of days: 85       Wound 08/15/18 Finger (Comment which one) Left black (Active)   Number of days: 85       Wound 08/15/18 Finger (Comment which one) Left black (Active)   Number of days: 85       Wound 08/15/18 Toe (Comment  which one) Right small fluid filled (Active)   Number of days: 85       Wound 08/15/18 Toe (Comment  which one) Right; Outer  (Active)   Number of days: 85       Wound 08/15/18 Toe (Comment  which one) Right  (Active)   Wound Type Wound 10/21/2018 11:00 AM   Dressing Status Clean;Dry; Intact 10/21/2018 11:00 AM   Dressing/Treatment Dry dressing 10/21/2018 11:00 AM   Number of days: 85       Incision 10/18/18 Foot Right (Active)   Wound Assessment Clean;Dry; Intact 10/21/2018 11:00 AM   Drainage Amount CALIXTO 10/21/2018 11:00 AM   Drainage Description [place]TO 10/21/2018 11:00 AM   Dressing Status Clean;Dry; Intact 10/21/2018 11:00 AM   Number of days: 20     Pain Level: 0    Data:   CBC:   Recent Labs      11/08/18   1102   WBC  8.9   HGB  6.1*   HCT  20.1*   PLT  183       BMP:    Recent Labs      11/08/18   1102   NA  136   K  3.7   CL  95*   CO2  25   BUN  47*   CREATININE  4.3*   GLUCOSE  156*       Hepatic:   Recent Labs      11/08/18   1102   AST  17   ALT  9*   BILITOT  0.4   ALKPHOS  125     Prealbumin    Micro:   Lab Results   Component Value Date    BC No growth-preliminary  No growth   10/16/2018             Assessment:  Osteomyelitis right great toe s/p amputation: healing well  DM toe ulcer  Ulcer left great toe limited to breakdown of skin        Patient Active Problem List:     SOB (shortness of breath)     Anemia in chronic kidney disease (CKD)     Essential hypertension     Coronary artery disease involving native heart without angina pectoris     Diabetes mellitus type 2, controlled

## 2018-11-08 NOTE — CONSULTS
TRANSORAL BIOPSY SINGLE/MULTIPLE N/A 8/17/2017    EGD BIOPSY performed by Deirdre Novak MD at [facility] OFFICE/OUTPT VISIT,PROCEDURE ONLY N/A 8/17/2018    TRANSESOPHAGEAL ECHOCARDIOGRAM performed by Daquan Tovar MD at [facility]    [place] OFFICE/OUTPT VISIT,PROCEDURE ONLY Right 10/18/2018    RIGHT GREAT TOE AMPUTATION performed by Oneida Grider DPM at [address] Right 10/2018    right big toe    VASCULAR SURGERY      cabbage harvest left leg, left arm fistula    VITRECTOMY Right 3/27/14     Current Facility-Administered Medications   Medication Dose Route Frequency Provider Last Rate Last Dose    0.9 % sodium chloride bolus  250 mL Intravenous Once Casper Brannon MD        acetaminophen (TYLENOL) tablet 650 mg  650 mg Oral Q6H PRN Casper Brannon MD        atorvastatin (LIPITOR) tablet 40 mg  40 mg Oral Nightly Casper Brannon MD        folbee plus tablet 1 tablet  1 tablet Oral Daily Casper Brannon MD        calcitRIOL (ROCALTROL) capsule 0.25 mcg  0.25 mcg Oral Daily Casper Brannon MD        calcium carbonate (TUMS) chewable tablet 2,500 mg  5 tablet Oral TID  Casper Brannon MD        [START ON 11/9/2018] carvedilol (COREG) tablet 6.25 mg  6.25 mg Oral BID  Casper Brannon MD        FLUoxetine (PROZAC) capsule 20 mg  20 mg Oral Daily Casper Brannon MD        insulin glargine (LANTUS) injection vial 5 Units  5 Units Subcutaneous Daily Casper Brannon MD        midodrine (PROAMATINE) tablet 10 mg  10 mg Oral TID  Casper Brannon MD        pregabalin (LYRICA) capsule 50 mg  50 mg Oral BID Casper Brannon MD        insulin lispro (HUMALOG) injection vial 0-6 Units  0-6 Units Subcutaneous TID  Benita Gleason MD        insulin lispro (HUMALOG) injection vial 0-3 Units  0-3 Units Subcutaneous Nightly Benita Man MD        glucose (GLUTOSE) 40 % oral gel 15 g  15 g Oral PRN Casper Brannon MD        dextrose 50 % solution

## 2018-11-08 NOTE — PROGRESS NOTES
Pharmacy Medication History Note      List of current medications patient is taking is complete. Source of information: Patient    Changes made to medication list:  Medications removed (include reason, ex. therapy complete or physician discontinued):  · NONE    Medications added/doses adjusted:  · NONE    Other notes (ex. Recent course of antibiotics, Coumadin dosing):  · Patient was very knowledgeable about his medications. · Lantus he was not taken in a while due to cost.  · Verified that he was on midodrine prn for BP <100. This is correct. He does not take often. Sure script indicates he had a 28 day supply filled in May. · Denies use of other OTC or herbal medications.       Allergies reviewed      Electronically signed by Li Oswald on 11/8/2018 at 12:19 PM

## 2018-11-08 NOTE — CONSULTS
redness: none. CARDIOVASCULAR  Chest pain: none, Arm pain: none, Palpitations: none, Orthopnea: none, Claudication: none,  Leg swelling: none, PND: none. RESPIRATORY  Cough: none, Hemoptysis: none, Sputum production: none, Shortness of breath: none, Wheezing: none    GASTROINTESTINAL  Heartburn: none, Nausea: none, Vomiting: none, Abdominal pain: none, Diarrhea: none,  Constipation: none, Blood in the stool: none, Melena: none, Rebound: none, Rovsing's: none. GENITOURINARY  Dysuria: none, Pyuria: none, Gross hematuria: none, Urgency: none, Flank pain: none, STD: none. MUSCULOSKELETAL  Myalgia: none, Neck pain: none, Thoracic pain: none, Lumbar pain: none, Joint pain: none, Falls: none    ENDOCRINE/HEMATOLOGY/ALLERGIC  Easy bruising: none, Easy bleeding: none, Environmental allergies: none, Polydipsia: none. NEUROLOGICAL  Dizziness: none, Tingling: none, Numbness: none, Tremor: none, Sensory change: none, Speech change: none, Focal weakness: none, Seizures: none, Loss of consciousness: none,    PSYCHIATRIC  Depression: none, Suicidal ideation: none, Heroin abuse: none, Cocaine abuse: none, Marijuana abuse: none, Hallucinations: none, Anxiety: none, Memory loss: none       Physical Examination:  BP (!) 123/53   Pulse 66   Temp 96.7 °F (35.9 °C) (Oral)   Resp 18   Ht 6' 1\" (1.854 m)   Wt 237 lb 3.2 oz (107.6 kg)   SpO2 96%   BMI 31.29 kg/m²       General appearance: alert and cooperative with exam  HEENT: Head: Normocephalic, no lesions, without obvious abnormality.   Neck: no adenopathy, no carotid bruit, no JVD, supple, symmetrical, trachea midline and thyroid not enlarged, symmetric, no tenderness/mass/nodules  Lungs: clear to auscultation bilaterally  Heart: regular rate and rhythm, S1, S2 normal, no murmur, click, rub or gallop  Abdomen: soft, non-tender; bowel sounds normal; no masses,  no organomegaly  Extremities: extremities normal, atraumatic, no cyanosis or edema  Neurologic: Mental

## 2018-11-08 NOTE — H&P
+BS  Musculoskeletal: S/p right great toe amputation. RLE ACE wrap. Neuro: No focal deficit. Moves extremity spontaneously. Psychiatry: Appropriate affect. Not agitated. Skin: Warm, dry with normal turgor. No rash  Capillary refill: Brisk,< 3 seconds   Peripheral Pulses: +2 palpable, equal bilaterally      Labs/imaging/EKG:  CBC:   Recent Labs      11/08/18   1102   WBC  8.9   HGB  6.1*   PLT  183     BMP:    Recent Labs      11/08/18   1102   NA  136   K  3.7   CL  95*   CO2  25   BUN  47*   CREATININE  4.3*   GLUCOSE  156*     Hepatic:   Recent Labs      11/08/18   1102   AST  17   ALT  9*   BILITOT  0.4   ALKPHOS  125       CXR from transfer facility with bibasilar opacities worse on left; 2-view CXR ordered and pending. EKG: Sinus rhythm 71 BPM, 1 AVB, RBBB, PVCs. Nonspecific ST/T changes. I reviewed EKG. Discussed with ER provider.       Thank you Ramos Mcbride MD for the opportunity to be involved in this patient's care.    -----------------------------  María Roa MD  [facility] hospitalist

## 2018-11-09 NOTE — PLAN OF CARE
Problem: Safety:  Goal: Free from accidental physical injury  Free from accidental physical injury  Outcome: Ongoing  Patient free of accidental injury. Patient alert and oriented x4. Bed alarmed armed. Bed wheels locked. Bedside table in reach. Patient up with assistance when ambulating. Patient verbalizes and demonstrates the use of the call light. Hourly rounding being completed. Problem: Daily Care:  Goal: Daily care needs are met  Daily care needs are met  Outcome: Ongoing  Patient ambulates one assist to the bathroom. Patient requires minimal assistance with ADLs. Will continue to monitor. Problem: Pain:  Goal: Patient's pain/discomfort is manageable  Patient's pain/discomfort is manageable  Outcome: Ongoing  Patient denies any pain so far this shift. Current pain level is 0/10, goal is no pain. Patient currently has PRN tylenol, percocet, and morphine per [facility] for pain at this time. Will continue to assess with hourly rounding. Problem: Skin Integrity:  Goal: Skin integrity will stabilize  Skin integrity will stabilize  Outcome: Ongoing  Patient turns self independently. Patient taught to change position frequently to prevent breakdown. Patient has scattered abrasions, bruising throughout. Patient also has some healing MASD on his buttocks with blanchable redness. No new redness noted on pressure points such as elbows, back of head, heels, shoulder blades, or coccyx at this time. Will continue to monitor. Problem: Discharge Planning:  Goal: Patients continuum of care needs are met  Patients continuum of care needs are met  Outcome: Ongoing  Patient plans to return home to private residence with his spouse when medically stable. Will continue to assess for additional needs. Comments: Care plan reviewed with patient. Patient verbalizes understanding of the plan of care and contributes to goal setting.

## 2018-11-09 NOTE — PROCEDURES
to induce and sustain conscious sedation. The  GIF-190 gastroscope was inserted into the oropharynx and the esophagus  was intubated. Under direction, the scope was advanced down through the  stomach into second portion of duodenum. On careful inspection and on  withdrawal of the scope, the duodenum looks normal as shown in picture  #A3. The antrum of the stomach looks normal.  In the body of the  stomach, the patient had multiple vascular ectasias as shown in picture  #A5, #A6, #A7. All these vascular ectasias treated with APC with good  hemostasis. On retroflex, fundus looks normal as in picture #A9. Distal esophagus grade 2 esophagitis noted as shown in picture #A2. Proximal esophagus looks normal as shown in picture #A1. Air was  withdrawn as the scope was removed. The patient tolerated the procedure  well without any immediate complications. Vitals including blood  pressure, heart rate, and pulse ox were stable during the procedure and  after the procedure. The patient was transferred to the recovery room  in stable condition. IMPRESSION:  Esophagogastroduodenoscopy to second portion of the  duodenum, multiple gastric vascular ectasias treated with APC  circumferentially firing probe APC with good hemostasis. RECOMMENDATIONS:  Antireflux instructions. Diet low-residue soft diet. Aspiration precautions. Please call me if any further GI problems. Good Painting M.D.    D: 11/09/2018 7:20:48       T: 11/09/2018 8:56:08     [email or web address]  Job#: [id number]     Doc#: [id number]    CC:  MD NATHAN Guerra M.D. Tamala Chandler, M.D.

## 2018-11-09 NOTE — PROGRESS NOTES
skin 3 times daily (before meals) Use scale if chem > 200    Current Medications:     Scheduled Meds:    darbepoetin aaliyah-polysorbate  60 mcg Subcutaneous Once    atorvastatin  40 mg Oral Nightly    folbee plus  1 tablet Oral Daily    calcitRIOL  0.25 mcg Oral Daily    calcium carbonate  5 tablet Oral TID     carvedilol  6.25 mg Oral BID WC    FLUoxetine  20 mg Oral Daily    insulin glargine  5 Units Subcutaneous Daily    midodrine  10 mg Oral TID WC    pregabalin  50 mg Oral BID    insulin lispro  0-6 Units Subcutaneous TID WC    insulin lispro  0-3 Units Subcutaneous Nightly    sodium chloride flush  10 mL Intravenous 2 times per day    pantoprazole  40 mg Intravenous BID     Continuous Infusions:    dextrose       PRN Meds:  acetaminophen, glucose, dextrose, glucagon (rDNA), dextrose, morphine, oxyCODONE-acetaminophen, sodium chloride flush, magnesium hydroxide, ondansetron, nitroGLYCERIN    Input/Output:       I/O last 3 completed shifts: In: 1370 [P.O.:1060; Blood:310]  Out: 0 . Patient Vitals for the past 96 hrs (Last 3 readings):   Weight   18 1050 237 lb 3.4 oz (107.6 kg)   18 0333 240 lb 9.6 oz (109.1 kg)   18 0955 237 lb 3.2 oz (107.6 kg)       Vital Signs:   Temperature:  Temp: 97.7 °F (36.5 °C)  TMax:   Temp (24hrs), Av.3 °F (36.3 °C), Min:96.2 °F (35.7 °C), Max:98.1 °F (36.7 °C)    Respirations:  Resp: 16  Pulse:   Pulse: 69  BP:    BP: (!) 96/49  BP Range: Systolic (58ORM), TRAVIS:387 , Min:96 , OWW:771       Diastolic (10JUL), HIU:55, Min:48, Max:67      Physical Examination:     General:  Awake, alert, no acute distress  HEENT: NC/AT/ MMM  Chest:               Clear B/L no W/R/R  Cardiac:  S1 S2   Abdomen: Soft, non-tender,  Neuro:  No facial droop, No Asterixis  SKIN:  No rashes, good skin turgor.   Extremities:  No edema, no cyanosis  +LUE AVF with thrill and bruit    Labs:       Recent Labs      18   1102  18   1922  18   0118  18

## 2018-11-09 NOTE — CONSULTS
disease; diabetic retinopathy;  dermatomyositis; diabetes mellitus, insulin requiring; GI bleed;  gastroesophageal reflux disease; history of Bell's palsy; history of  Clostridium difficile colitis; history of blood transfusion;  hypertension; Medtronic pacemaker placement; obstructive sleep apnea;  peripheral neuropathy; pulmonary edema; and renal failure. PAST SURGICAL HISTORY:  Ablation of dysrhythmic focus for atrial flutter  and fibrillation, appendectomy, cardiac stent placement,  cholecystectomy, colonoscopy by me in 2017, dialysis fistula creation in  the left arm, enteroscopy by me on 09/08/2017, eye surgery with cataract  extraction with intraocular lens implantation, vitrectomy, multiple  laser procedures for the diabetic retinopathy, finger amputation, ankle  fracture repair, incision and drainage of the right index finger, kidney  biopsy, pacemaker placement, amputation of the metacarpals, toe  amputation, right toe vascular surgery, and coronary artery bypass graft  with revascularization. ADMITTING MEDICATIONS:  Insulin glargine, ferrous sulfate, calcitriol,  B-complex with vitamin C, zinc, midodrine, atorvastatin, warfarin  sodium, aspirin, carvedilol, clopidogrel, pantoprazole, calcium  carbonate, fluoxetine, pregabalin, acetaminophen, nitroglycerin p.r.n.,  and insulin lispro. ALLERGIES:  Laura [place], and DEMEROL. SOCIAL HISTORY:  The patient denied any tobacco.  Social drinking. Denied any illicit drug use. FAMILY HISTORY:  Mother had skin cancer, mother had diabetes mellitus,  mother had heart disease, mother had high blood pressure. Father had  COPD, father had prostate cancer, father had diabetes mellitus. REVIEW OF SYSTEMS:  14-point review of systems was reviewed. Pertinent  positives were mentioned in HPI and past medical history. Otherwise  noncontributory.     PHYSICAL EXAMINATION:  VITAL SIGNS:  Blood pressure 90/44, pulse 73, temperature 97.4, and  respiratory MD Annel Collins M.D.

## 2018-11-10 NOTE — PROGRESS NOTES
12.1*   --    --    RBC  2.19*   --   2.46*  2.49*   --    --    HGB  6.1*   < >  7.0*  7.0*  6.6*  7.1*   HCT  20.1*   < >  22.6*  23.8*  22.3*  22.9*   MCV  91.8   --   91.9  95.6*   --    --    MCH  27.9   --   28.5  28.1   --    --    MCHC  30.3*   --   31.0*  29.4*   --    --    PLT  183   --   229  224   --    --    MPV  9.9   --   10.2  10.2   --    --     < > = values in this interval not displayed.       BMP:   Recent Labs      11/08/18   1102   NA  136   K  3.7   CL  95*   CO2  25   BUN  47*   CREATININE  4.3*   GLUCOSE  156*   CALCIUM  9.2      Phosphorus:     Recent Labs      11/08/18   1102   PHOS  4.2     Magnesium:    Recent Labs      11/08/18   1102   MG  1.9     Albumin:    Recent Labs      11/08/18   1102   LABALBU  3.7     BNP:    No results found for: BNP  STAN:    No results found for: STAN  SPEP:  Lab Results   Component Value Date    PROT 7.3 11/08/2018     UPEP:   No results found for: LABPE  C3:   No results found for: C3  C4:   No results found for: C4  MPO ANCA:   No results found for: MPO  PR3 ANCA:   No results found for: PR3  Anti-GBM:   No results found for: GBMABIGG  Hep BsAg:         Lab Results   Component Value Date    HEPBSAG Negative 08/16/2018     Hep C AB:        No results found for: HEPCAB    Urinalysis/Chemistries:      Lab Results   Component Value Date    NITRU NEGATIVE 10/16/2018    COLORU DK YELLOW 10/16/2018    PHUR 5.5 10/16/2018    WBCUA > 200 10/16/2018    RBCUA > 200 10/16/2018    YEAST NONE SEEN 10/16/2018    BACTERIA NONE 10/16/2018    LEUKOCYTESUR MODERATE 10/16/2018    UROBILINOGEN 0.2 10/16/2018    BILIRUBINUR SMALL 10/16/2018    BLOODU LARGE 10/16/2018    GLUCOSEU 250 10/16/2018    KETUA TRACE 10/16/2018     Urine Sodium:   No results found for: CONNER  Urine Potassium:  No results found for: KUR  Urine Chloride:  No results found for: CLUR  Urine Osmolarity: No results found for: OSMOU  Urine Protein:   No components found for: TOTALPROTEIN, URINE   Urine

## 2018-11-10 NOTE — PROGRESS NOTES
Hospitalist Progress Note    Patient:  Erasto Patricio      Unit/Bed:4K-13/013-A    YOB: 1960    MRN: [id number]       Acct: [de-identified]     PCP: Cory Nixon MD    Date of Admission: 11/8/2018    Assessment/Plan:    Acute Blood Loss Anemia: Found to have AVM treated with APC. hgb steady at 7. Repeat pending.   - Gi Consulted  - type and screen, s/p 1 u PRBC  - transfuses as needed, goal hgb >7.0    Chest pain, Recent MRSA endocarditis: Has bioprosthetic AV, Hx CABG x1 RCA,  recent MV endocarditis. Completed prolonged course abx, planned for possible MVR in future at [address] [place] [address] with Dr. Jere Trammell. Had PCI 4 months ago. DAPT currently held for bleed. ESRD: Nephrology consulted. Will do HD here. Does at home dialysis otherwise. Had Recent fistulogram demonstrating stenosis s/p angioplasty. Hx of Bilateral radial/ulnar artery occlusions: Was placed on warfarin in September. Currently held due to bleeding. HTN: Continue home meds    Chronic Systolic CHF: ICM Continue Coreg. Volume removal with dialysis. Recent Great toe amputation:    Expected discharge date:  2-3 days    Disposition:    [x] Home       [] TCU       [] Rehab       [] Psych       [] SNF       [] [place]       [] Other-    Chief Complaint: [facility]    Hospital Course: Presented with CP at OSH ED. Found to have hgb of 5.8. Transferred here and given 1 unit PRBC. EGD done with APC to AVM. Chest pain resolved. Hgb so far stable. Subjective (past 24 hours): Feels improved. Discussed overall plan of care with patient and wife. He is concerned about resuming AC. He thought he was on [facility] [place] by [facility] for heart valve when it actually seems it was for arterial thromboses.       Medications:  Reviewed    Infusion Medications    dextrose       Scheduled Medications    darbepoetin aaliyah-polysorbate  60 mcg Subcutaneous Once    atorvastatin  40 mg Oral Nightly    folbee plus  1 tablet Oral Daily    calcitRIOL  0.25 mcg Oral Obstructive sleep apnea [G47.33]     Diabetes mellitus type 2, controlled ([facility] [E11.9]     Peripheral neuropathy ([facility] [G62.9]        Electronically signed by Gurinder Jenkins MD on 11/9/2018 at 7:53 PM

## 2018-11-10 NOTE — PROGRESS NOTES
absent bilaterally.      Musculoskeletal: Right hallux amputation. ASSESSMENT  Principle  Osteomyelitis right great toe s/p amputation: healing well  DM toe ulcer  Ulcer left great toe limited to breakdown of skin    Chronic  Patient Active Problem List   Diagnosis    SOB (shortness of breath)    Anemia in chronic kidney disease (CKD)    Essential hypertension    Coronary artery disease involving native heart without angina pectoris    Diabetes mellitus type 2, controlled (HCC)    Chronic back pain greater than 3 months duration    Chronic pain of both ankles    Peripheral neuropathy    Secondary DM with hypertension and ESRD on dialysis ([address]    Peripheral neuropathy ([address]    Proliferative diabetic retinopathy ([address]    Iron deficiency anemia due to chronic blood loss    Chest pain    ESRD (end stage renal disease) on dialysis ([address]    AVM (arteriovenous malformation) of colon without hemorrhage    Metabolic acidosis    Diabetic polyneuropathy associated with type 2 diabetes mellitus ([address]    Charcot's joint of foot in type 2 diabetes mellitus ([address]    Proliferative diabetic retinopathy without macular edema associated with type 2 diabetes mellitus ([address]    Secondary hyperparathyroidism of renal origin ([address]    Obstructive sleep apnea    Arterial hypotension    ESRD on hemodialysis (HCC)    S/P AVR (aortic valve replacement)    S/P CABG x 1    Hyperphosphatemia    Coronary artery disease involving coronary bypass graft of native heart without angina pectoris    Osteomyelitis of finger of right hand ([address]    S/P peripheral artery angioplasty: 10/30/2017: PTA of the left popliteal artery.     Peripheral vascular disease (HCC)    Atheroembolism of both lower extremities (HCC)    Chest pain    Chronic systolic heart failure (HCC)    Unstable angina (HCC)    Symptomatic bradycardia    Heart block AV third degree ([facility]    S/P cardiac catheterization    Encounter for hemodialysis for ESRD ([address]    Anemia due to stage 5 chronic kidney disease ([address]    Displacement of atrial pacemaker leads    Adjustment and management of cardiac pacemaker    Ischemia of finger    Septicemia ([address]    History of aortic valve replacement    Sepsis due to methicillin resistant Staphylococcus aureus (MRSA) ([address]    Diabetic peripheral neuropathy associated with type 2 diabetes mellitus (HCC)    Paroxysmal atrial fibrillation (HCC)    Open wound of left index finger without damage to nail    Ischemic ulcer of finger, limited to breakdown of skin ([address]    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 Patient examined and evaluated. 2. His amputation site is healing well with no signs of infection. Continue dressing changes every 2-3 days with betadine and gauze. Wrap ace wrap from base of toes to below knee. Minimally WB in surgical shoe. Elevate. 3. The left great toe wound is superficial and healing well. Apply betadine every 2-3 days and leave to air. 4. Follow-up in the office next Thursday    Junior Cortes D.P.M.   Podiatric Surgical Resident  11/10/2018   10:09 AM

## 2018-11-10 NOTE — PROGRESS NOTES
Hospitalist Progress Note    Patient:  Lorrie Eisenberg      Unit/Bed:4K-13/013-A    YOB: 1960    MRN: [id number]       Acct: [de-identified]     PCP: Denis Robles MD    Date of Admission: 11/8/2018    Assessment/Plan:    Acute Blood Loss Anemia: Found to have AVM treated with APC. hgb steady at 7. Repeat pending.   - Gi Consulted  - type and screen, s/p 1 u PRBC. Repeat hgb 6.6, Received 1 unit and recheck was 7.1. Gave additional unit. Hgb this AM down to 7.3 from 7.9. Suspect active oozing. Notified GI. Made NPO. Total PRBC's 4  - transfuses as needed, goal hgb >7.0    Gastric mass: Found on EGD, biopsied. Pending. Chest pain, Recent MRSA endocarditis: Has bioprosthetic AV, Hx CABG x1 RCA,  recent MV endocarditis. Completed prolonged course abx, planned for possible MVR in future at [address] [place] [address] with Dr. Clement Jimenez. Had PCI 4 months ago. DAPT currently held for bleed. ESRD: Nephrology consulted. Will do HD here. Does at home dialysis otherwise. Had Recent fistulogram demonstrating stenosis s/p angioplasty. Hx of Bilateral radial/ulnar artery occlusions: Was placed on warfarin in September. Currently held due to bleeding. HTN: Continue home meds    Chronic Systolic CHF: ICM Continue Coreg. Volume removal with dialysis. Recent Great toe amputation:    Expected discharge date:  2-3 days    Disposition:    [x] Home       [] TCU       [] Rehab       [] Psych       [] SNF       [] [place]       [] Other-    Chief Complaint: [facility]    Hospital Course: Presented with CP at OSH ED. Found to have hgb of 5.8. Transferred here and given 1 unit PRBC. EGD done with APC to AVM. Chest pain resolved. Hgb so far stable. Subjective (past 24 hours): Accidental duplicate deleted previous note. He had some drops in hgb this day and was given blood. Elected to monitor at the time.        Medications:  Reviewed    Infusion Medications    dextrose       Scheduled Medications    sodium

## 2018-11-10 NOTE — PROGRESS NOTES
dialysis, 3/26/2014, Mon Wed Fri at [facility] in [place] History of blood transfusion     History of cardiac cath 07/07/2018    Stent to Prox LAD     HTN (hypertension) DX 1979    Hyperkalemia     Hyperlipidemia     Medtronic micra single pacemaker  10/3/2018    MRSA (methicillin resistant Staphylococcus aureus) 1/24/2014    blood hx from another facility    Murmur, cardiac     Nephrotic syndrome 8/21/2013    Obstructive sleep apnea     has been told he has but has never been tested    Osteomyelitis of finger of right hand ([address] 9/28/2017    Pancreatitis due to biliary obstruction     Peripheral neuropathy     of the feet from the diabetes    Peripheral neuropathy     Psychiatric problem     depression    Pulmonary edema     Renal failure     KIDNEY FUNCTION 12%    S/P peripheral artery angioplasty: 10/30/2017: PTA of the left popliteal artery. 10/30/2017    10/30/2017: PTA of the left popliteal artery.  Dr. Clement Lawrence    SOB (shortness of breath)     Vision blurred     RIGHT    VRE (vancomycin resistant enterococcus) culture positive 03/2017    Wears glasses      Past Surgical History:   Procedure Laterality Date    ABLATION OF DYSRHYTHMIC FOCUS  07/21/2017    Atrial Flutter Ablation    APPENDECTOMY      CARDIAC SURGERY  2/19/2014    2 stents placed   [address]  2006    stents x 2    CARDIAC SURGERY  2003    stent x 1    CHOLECYSTECTOMY      COLONOSCOPY      DIALYSIS FISTULA CREATION Left 02/2014    ARM    ENDOSCOPY, COLON, DIAGNOSTIC      ENTEROSCOPY  9/8/2017    ENTEROSCOPY PUSH BIOPSY performed [facility]h [facility]    EYE SURGERY Bilateral     CATARACT EXTRACTION WITH IOL    EYE SURGERY Left 05/17/2013    VITRECTOMY    EYE SURGERY Bilateral     MULTIPLE LASER PROCEDURES    FINGER AMPUTATION Right 09/28/2017    FRACTURE SURGERY Right     ankle, surgery x 7    HAND SURGERY Right 8/18/2017    I & D RIGHT INDEX FINGER performed by Andi Jean MD at [facility] [place] [facility]    KIDNEY BIOPSY Bilateral 09/2013    SUBSEQUENT HEMORRHAGE    OTHER SURGICAL HISTORY      DIALYSIS CHEST PORT INSERTED AND REMOVED    PACEMAKER PLACEMENT  08/2018    changed to a micro pacer in [place]    UT AMPUTATE METACARPAL+FINGER Right 9/28/2017    RIGHT INDEX FINGER AMPUTATION performed by Kayleigh Yeung MD at [address] [facility] NOT  [address] IND Left 9/8/2017    COLONOSCOPY performed by Rexine Severs, MD at [facility] [place] [facility]    UT EGD TRANSORAL BIOPSY SINGLE/MULTIPLE N/A 8/17/2017    EGD BIOPSY performed by Rexine Severs, MD at [facility] OFFICE/OUTPT VISIT,PROCEDURE ONLY N/A 8/17/2018    TRANSESOPHAGEAL ECHOCARDIOGRAM performed by Richardson Zhou MD at [facility] OFFICE/OUTPT [facility] Right 10/18/2018    RIGHT GREAT TOE AMPUTATION performed by Tom Osborn DPM at [address] Right 10/2018    right big toe    UPPER GASTROINTESTINAL ENDOSCOPY N/A 11/9/2018    EGD CONTROL HEMORRHAGE performed by Rexine Severs, MD at [facility]    VASCULAR SURGERY      cabbage harvest left leg, left arm fistula    VITRECTOMY Right 3/27/14           Subjective  Chart Reviewed: Yes (completed medical chart review )  Patient assessed for rehabilitation services?: Yes    Subjective: Pt sitting up in chair stating he feels well.      General:       Vision: Within Functional Limits    Hearing: Within functional limits         Pain:  Pain Assessment  Patient Currently in Pain: Denies       Social/Functional History:  Lives With: Spouse  Type of Home: House  Home Layout: One level  Home Access: Stairs to enter with rails  Home Equipment: Cane     Bathroom Shower/Tub: Tub/Shower unit, Shower chair with back (Pt reporting he hands his right LE over edge of tub when showering )  Bathroom Toilet: Standard  Bathroom Accessibility: Accessible       ADL Assistance: Independent  Homemaking Assistance: Independent       Ambulation Assistance: Independent  Transfer

## 2018-11-11 NOTE — PROGRESS NOTES
[facility] [facility] [address]  PHYSICAL THERAPY MISSED TREATMENT NOTE  ACUTE CARE    Date: 2018  Patient Name: Erasto Patricio        MRN: [id number]   : 1960  (60 y.o.)  Gender: male   Referring Practitioner: Dr. KURT Henley   Diagnosis: acute anemia          REASON FOR MISSED TREATMENT:  Patient at testing and/or off unit.

## 2018-11-11 NOTE — OP NOTE
[facility] Endoscopy    CONSCIOUS SEDATION      Patient: Erasto Patricio  : 1960  Acct#: [de-identified]    PLANNED PROCEDURE   []EGD  [x]Colonoscopy []Flex Sigmoid  []Other:  Indication: Pain control for GI procedure    Consent: I have discussed with the patient and/or the patient representative the indication, alternatives, and the possible risks and/or complications of the planned procedure and the anesthesia methods. The patient and/or patient representative appear to understand and agree to proceed. Pre-Sedation Documentation and Exam: I have personally completed a history, physical exam & review of systems for this patient (see notes). Airway Assessment: Mallampati Class II - (soft palate, fauces & uvula are visible)    Prior History of Anesthesia Complications: none    ASA Classification: Class 4 - A patient with an incapacitating systemic disease that is a constant threat to life    Sedation/ Anesthesia Plan: intravenous sedation      SEDATION  Planned agent:[x]Midazolam []Meperidine [x]Sublimaze []Morphine    Monitoring and Safety: The patient will be placed on a cardiac monitor and vital signs, pulse oximetry and level of consciousness will be continuously evaluated throughout the procedure. The patient will be closely monitored until recovery from the medications is complete and the patient has returned to baseline status. Respiratory therapy will be on standby during the procedure. I have reviewed with the patient and/or family the risks, benefits, and alternatives to the procedure.     Claudia Orellana MD, CNSP  2018, 11:49 AM    Post-Sedation Vital Signs: Vital signs were reviewed and were stable after the procedure (see flow sheet for vitals)            Post-Sedation Exam: Lungs: clear           Complications: none     Claudia Orellana MD, CNSP  2018,

## 2018-11-11 NOTE — PROGRESS NOTES
Report given to [facility] RN, Victor Manuel Byrnes. Pt vitals stable.

## 2018-11-11 NOTE — PROGRESS NOTES
(PROZAC) 20 MG capsule, Take 20 mg by mouth daily  pregabalin (LYRICA) 50 MG capsule, Take 50 mg by mouth 2 times daily. insulin lispro (HUMALOG) 100 UNIT/ML injection, Inject into the skin 3 times daily (before meals) Use scale if chem > 200    Current Medications:     Scheduled Meds:    atorvastatin  40 mg Oral Nightly    folbee plus  1 tablet Oral Daily    calcitRIOL  0.25 mcg Oral Daily    calcium carbonate  5 tablet Oral TID WC    carvedilol  6.25 mg Oral BID WC    FLUoxetine  20 mg Oral Daily    insulin glargine  5 Units Subcutaneous Daily    midodrine  10 mg Oral TID WC    pregabalin  50 mg Oral BID    insulin lispro  0-6 Units Subcutaneous TID WC    insulin lispro  0-3 Units Subcutaneous Nightly    sodium chloride flush  10 mL Intravenous 2 times per day    pantoprazole  40 mg Intravenous BID     Continuous Infusions:    dextrose       PRN Meds:  acetaminophen, glucose, dextrose, glucagon (rDNA), dextrose, morphine, oxyCODONE-acetaminophen, sodium chloride flush, magnesium hydroxide, ondansetron, nitroGLYCERIN    Input/Output:       I/O last 3 completed shifts: In: 2301.5 [P.O.:1640; I.V.:351.5; Blood:310]  Out: - .      Patient Vitals for the past 96 hrs (Last 3 readings):   Weight   18 0445 246 lb 1.6 oz (111.6 kg)   11/10/18 0326 242 lb 1.6 oz (109.8 kg)   18 1445 235 lb 7.2 oz (106.8 kg)       Vital Signs:   Temperature:  Temp: 97.7 °F (36.5 °C)  TMax:   Temp (24hrs), Av.1 °F (36.7 °C), Min:97.4 °F (36.3 °C), Max:98.6 °F (37 °C)    Respirations:  Resp: 16  Pulse:   Pulse: 73  BP:    BP: (!) 110/51  BP Range: Systolic (09TBR), FXB:552 , Min:90 , QVT:163       Diastolic (74XKA), ZKJ:89, Min:41, Max:64      Physical Examination:     General:  Awake, alert, no acute distress  HEENT: NC/AT/ MMM  Chest:               Clear B/L no W/R/R  Cardiac:  S1 S2   Abdomen: Soft, non-tender,  Neuro:  No facial droop, No Asterixis  SKIN:  No rashes, good skin turgor.   Extremities:  No

## 2018-11-12 NOTE — PROGRESS NOTES
 PACEMAKER PLACEMENT  08/2018    changed to a micro pacer in [place]    ID AMPUTATE METACARPAL+FINGER Right 9/28/2017    RIGHT INDEX FINGER AMPUTATION performed by Maryann Argueta MD at [address] [facility] NOT  [address] IND Left 9/8/2017    COLONOSCOPY performed by Celina Julien MD at [facility]    ID EGD TRANSORAL BIOPSY SINGLE/MULTIPLE N/A 8/17/2017    EGD BIOPSY performed by Celina Julien MD at [address] W/BIOPSY N/A 11/11/2018    ENTEROSCOPY PUSH BIOPSY performed by Celina Julien MD at [facility] OFFICE/OUTPT VISIT,PROCEDURE ONLY N/A 8/17/2018    TRANSESOPHAGEAL ECHOCARDIOGRAM performed by Debra Villareal MD at [facility] OFFICE/OUTPT [facility] Right 10/18/2018    RIGHT GREAT TOE AMPUTATION performed by Michel Andrews DPM at [address] Right 10/2018    right big toe    UPPER GASTROINTESTINAL ENDOSCOPY N/A 11/9/2018    EGD CONTROL HEMORRHAGE performed by Celina Julien MD at [facility]    VASCULAR SURGERY      cabbage harvest left leg, left arm fistula    VITRECTOMY Right 3/27/14       Restrictions/Precautions:  Fall Risk         Right Lower Extremity Brace:  (surgical shoe (which pt does not have here) )    Subjective:  Chart Reviewed: Yes  Patient assessed for rehabilitation services?: Yes  Family / Caregiver Present: No  Subjective: RN approved session, pt is up in the chair and agreeable. Pt denies need for new w/c, states he has 2 at home. General:  Overall Orientation Status: Within Functional Limits  Follows Commands: Within Functional Limits    Vision: Within Functional Limits    Hearing: Within functional limits       Pain:  Denies.           Social/Functional History:    Lives With: Spouse  Type of Home: House  Home Layout: One level  Home Access: Stairs to enter with rails  Home Equipment: [place], [address], 4 wheeled walker, Rolling walker, Crutches (2 wheelchairs)

## 2018-11-12 NOTE — PROGRESS NOTES
non-focal.  Psychiatric: Alert and oriented, thought content appropriate, normal insight  Capillary Refill: Brisk,< 3 seconds   Peripheral Pulses: +2 palpable, equal bilaterally       Labs:   Recent Labs      11/10/18   1716   11/11/18   0817  11/11/18   1534  11/12/18   0015  11/12/18   0502   WBC  15.2*   --   13.3*   --    --   12.9*   HGB  7.9*   < >  7.4*  8.1*  7.4*  7.5*   HCT  23.5*   < >  23.8*  24.6*  24.3*  24.7*   PLT  233   --   209   --    --   211    < > = values in this interval not displayed. Recent Labs      11/10/18   1716  11/11/18   0817  11/12/18   0502   NA  132*  136  133*   K  5.2  4.7  5.1   CL  92*  93*  92*   CO2  24  27  23   BUN  53*  63*  83*   CREATININE  5.4*  6.8*  8.0*   CALCIUM  9.0  9.6  8.9     No results for input(s): AST, ALT, BILIDIR, BILITOT, ALKPHOS in the last 72 hours. No results for input(s): INR in the last 72 hours. No results for input(s): [place] in the last 72 hours. Microbiology:      Urinalysis:      Lab Results   Component Value Date    NITRU NEGATIVE 10/16/2018    WBCUA > 200 10/16/2018    BACTERIA NONE 10/16/2018    RBCUA > 200 10/16/2018    BLOODU LARGE 10/16/2018    GLUCOSEU 250 10/16/2018       Radiology:  XR CHEST STANDARD (2 VW)   Final Result   1. No acute intrathoracic process. 2. Mild stable cardiomegaly. **This report has been created using voice recognition software. It may contain minor errors which are inherent in voice recognition technology. **      Final report electronically signed by Dr. Rom Bell on 11/8/2018 4:18 PM          DVT prophylaxis: [] Lovenox                                 [x] SCDs                                 [] SQ Heparin                                 [] Encourage ambulation           [] Already on Anticoagulation     Code Status: Full Code    PT/OT Eval Status: Pend    Tele:   [x] yes             [] no    Active Hospital Problems    Diagnosis Date Noted    H/O: GI bleed [Z87.19]

## 2018-11-12 NOTE — CARE COORDINATION
11/12/18, 7:30 AM      Milton Opitz day: 4  Location: [facility]13/013- Reason for admit: Acute anemia [D64.9]  Acute anemia [D64.9]   Procedure: Hemodialysis-home schedule: [facility] Wed., Fri.  11/11/08 ENTEROSCOPY PUSH BIOPSY  11/11/18c Colonoscopy  Treatment Plan of Care: Nephrology, Cardiology, GI, Podiatry, PT/OT, DM management, daily labs, telemetry, up with assistance. PCP: Katarina Angeles MD  Readmission Risk Score: 41%  Discharge Plan: Marisa Torres is from home with his wife. He has cane, walker, WC, lift chair, [employer] home oxygen 3L as needed but had not been using it), [facility] [place] for Hem[facility]ialysis, has RLE surgical shoe and minimal WB status RLE per notes.  He requests a wheelchair at discharge-PT to address.    
lispro  0-6 Units Subcutaneous TID WC    insulin lispro  0-3 Units Subcutaneous Nightly    sodium chloride flush  10 mL Intravenous 2 times per day    pantoprazole  40 mg Intravenous BID     Continuous Infusions:   dextrose        Pertinent Info/Orders/Treatment Plan:  Plan of care: elevated BNP/Troponin/Creatinine, H 6.1; monitor. Plans ECHO today. Blood Transfusion orders on chart. Cardiology)NSTEMI), GI(Anemia), Podiatry(Recent Left Great Toe Amputation), Nephrology (ESRD on HD). PT/OT following; await therapy input  Diet: DIET CLEAR LIQUID;  Diet NPO, After Midnight   Smoking status:  reports that he has never smoked. He has never used smokeless tobacco.   PCP: Jabier Hernandez MD  Readmission: yes  Patient has been readmitted within 19 days. Patient went to f/u appointment? yes  If yes, was it within 7 days? yes  Patient was able to fill prescriptions? yes  Patient is taking medications as prescribed? yes  Cause for readmission?  Anemia    Readmission Risk Score: 25%    Discharge Planning  Current Residence:  Private Residence  Living Arrangements:  Spouse/Significant Other, Children, Family Members   Support Systems:  Children, Spouse/Significant Other, Family Members  Current Services PTA:     Potential Assistance Needed:  N/A  Potential Assistance Purchasing Medications:  No  Does patient want to participate in local refill/ meds to beds program?  No  Type of Home Care Services:  None  Patient expects to be discharged to:  home  Expected Discharge date:  11/12/18  Follow Up Appointment: Best Day/ Time:      Discharge Plan: met with client; plans home with spouse and current with Home HD ([facility] in [place]) x6 weekly and wants new script for [facility] (will ask physician), PT/OT following; await therapy input, has cane, walker, WC, lift chair, [employer] home oxygen 3L as needed but had not been using it), [facility] with [facility], has RLE surgical shoe and minimal WB status RLE per notes    Social

## 2018-11-12 NOTE — PROGRESS NOTES
mean gradient 22mmHg, trivial   insufficiency.  YOAV by VTI 0.81cm2 consistent with likely mild-moderate   stenosis. (Cardiac output is 4l/min, SV 62ml)  Right ventricular systolic pressure is 39UAUH which is severely elevated. Left Heart Cath:   FINDINGS:  1. The right coronary artery is heavily calcified and diffuse severe  disease proximally and 100% occluded distally. 2.  The left main vessel is short and deformed due to the aortic valve  replacement surgery. 3.  The left anterior descending artery is of large caliber and has a 95%  focal stenosis in its mid portion. 4.  The circumflex vessel is also of large caliber and heavily calcified  with no significant focal narrowing. 5.  The right femoral arteriography shows mild calcification. Suitable for  Angio-Seal.    -- SP LAD PCI     Eugenia Wall MD   D: 07/08/2018    Lab Data:    Cardiac Enzymes:  No results for input(s): CKTOTAL, CKMB, CKMBINDEX, TROPONINI in the last 72 hours.     CBC:   Lab Results   Component Value Date    WBC 12.9 11/12/2018    RBC 2.56 11/12/2018    HGB 7.5 11/12/2018    HCT 24.7 11/12/2018     11/12/2018       CMP:  Lab Results   Component Value Date     11/12/2018    K 5.1 11/12/2018    K 3.6 10/15/2018    CL 92 11/12/2018    CO2 23 11/12/2018    BUN 83 11/12/2018    CREATININE 8.0 11/12/2018    LABGLOM 7 11/12/2018    GLUCOSE 150 11/12/2018    CALCIUM 8.9 11/12/2018       Hepatic Function Panel:  Lab Results   Component Value Date    ALKPHOS 125 11/08/2018    ALT 9 11/08/2018    AST 17 11/08/2018    PROT 7.3 11/08/2018    BILITOT 0.4 11/08/2018    BILIDIR <0.2 09/06/2017    LABALBU 3.7 11/08/2018       Magnesium:    Lab Results   Component Value Date    MG 1.9 11/08/2018       PT/INR:    Lab Results   Component Value Date    PROTIME 13.6 02/25/2016    INR 1.92 11/09/2018       HgBA1c:    Lab Results   Component Value Date    LABA1C 6.1 11/08/2018       FLP:  Lab Results   Component Value Date    TRIG 157 11/09/2018    HDL 19 11/09/2018    LDLCALC 19 11/09/2018       TSH:    Lab Results   Component Value Date    TSH 2.760 08/14/2018         Assessment:  Severe anemia / +FOB / GIB   S\p PRBC    Chest pain - secondary to the above  Elevated trop: 0.11 - flat    Hx MRSA endocarditis of mitral Valve- re-eval and follow as OP @ [facility]    Bio AVR  Hx LAD PCI 7/2018  CABG to RCA 2017  ICDMP EF 60% now    EGD 11/09/2018:  Esophagogastroduodenoscopy to second portion of the  duodenum, multiple gastric vascular ectasias treated with APC  circumferentially firing probe APC with good hemostasis.     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

## 2018-11-13 NOTE — PROGRESS NOTES
33.26 kg/m²     General appearance: No apparent distress, appears stated age and cooperative. HEENT: Pupils equal, round, and reactive to light. Conjunctivae/corneas clear. Neck: Supple, with full range of motion. No jugular venous distention. Trachea midline. Respiratory:  Normal respiratory effort. Clear to auscultation, bilaterally without Rales/Wheezes/Rhonchi. Cardiovascular: Regular rate and rhythm with normal S1/S2 with 3/6 MAUREEN  Abdomen: Soft, non-tender, non-distended with normal bowel sounds. Musculoskeletal: passive and active ROM x 4 extremities. S/p right great toe amputation  Skin: Skin color, texture, turgor normal.  Healed incision of right great toe  Noted ulceration of UE digits. Also palpable thrill in LUE AVF graft. Neurologic:  Neurovascularly intact without any focal sensory/motor deficits. Cranial nerves:  grossly non-focal.  Psychiatric: Alert and oriented, thought content appropriate, normal insight  Capillary Refill: Brisk,< 3 seconds   Peripheral Pulses: +2 palpable, equal bilaterally       Labs:   Recent Labs      11/11/18   0817   11/12/18   0502  11/12/18   1410  11/13/18   0458   WBC  13.3*   --   12.9*   --   10.1   HGB  7.4*   < >  7.5*  7.4*  6.8*   HCT  23.8*   < >  24.7*  24.1*  22.5*   PLT  209   --   211   --   184    < > = values in this interval not displayed. Recent Labs      11/10/18   1716  11/11/18   0817  11/12/18   0502   NA  132*  136  133*   K  5.2  4.7  5.1   CL  92*  93*  92*   CO2  24  27  23   BUN  53*  63*  83*   CREATININE  5.4*  6.8*  8.0*   CALCIUM  9.0  9.6  8.9     No results for input(s): AST, ALT, BILIDIR, BILITOT, ALKPHOS in the last 72 hours. No results for input(s): INR in the last 72 hours. No results for input(s): Aloma Philippe in the last 72 hours.     Microbiology:      Urinalysis:    Lab Results   Component Value Date    NITRU NEGATIVE 10/16/2018    WBCUA > 200 10/16/2018    BACTERIA NONE 10/16/2018    RBCUA > 200 10/16/2018 BLOODU LARGE 10/16/2018    GLUCOSEU 250 10/16/2018       Radiology:  XR CHEST STANDARD (2 VW)   Final Result   1. No acute intrathoracic process. 2. Mild stable cardiomegaly. **This report has been created using voice recognition software. It may contain minor errors which are inherent in voice recognition technology. **      Final report electronically signed by Dr. Mare Barrera on 11/8/2018 4:18 PM          DVT prophylaxis: [] Lovenox                                  [x] SCDs                                 [] SQ Heparin                                 [] Encourage ambulation           [] Already on Anticoagulation     Code Status: Full Code    PT/OT Eval Status: n/a    Tele:   [x] yes             [] no      Electronically signed by Gilmar Adams DO on 11/13/2018 at 7:26 AM

## 2018-11-13 NOTE — PROGRESS NOTES
1425 Patient received in IR for jugular picc insertion  1436 Right neck prepped and draped  1427 This procedure has been fully reviewed with the patient and written informed consent has been obtained. [address] Procedure started with Dr Neeraj Ashton  [phone] [id number] Procedure completed; patient tolerated well. Dr. Neeraj Ashton sutured the Picc line and biopatch placed. Opsite to the right neck to also secure picc line; no bleeding noted. 1450 Patient on bed; comfort ensured.    1453 Patient taken to 35 [phone] [id number] via bed

## 2018-11-13 NOTE — PROGRESS NOTES
Podiatric Progress Note  Oscar Castaneda  Subjective :     11.13.18  Patient seen at bedside this morning on behalf of Dr. Lizeth Campos. Patient states he does not know when he will be discharged. Patient states his hemoglobin is below 7 and will receive 1 unit of blood. Patient has no other pedal complaints at this time. 11.10.18   Patient seen at bedside this morning on behalf of Dr. Lizeth Campos. Patient is 3 weeks s/p right hallux amputation. Patient states he is having no pain to the incision site and his wife is changing the dressing every 2-3 days with betadine and gauze. Patient uses surgical shoe to ambulate. Patient denies any f/c/n/v/sob/cp. Patient has no other complaints at this time.       Current Medications:    Current Facility-Administered Medications: 0.9 % sodium chloride bolus, 250 mL, Intravenous, Once  epoetin aaliyah (EPOGEN;PROCRIT) injection 10,000 Units, 10,000 Units, Subcutaneous, Once per day on Mon Wed Fri  pantoprazole (PROTONIX) tablet 40 mg, 40 mg, Oral, BID AC  aspirin chewable tablet 81 mg, 81 mg, Oral, Daily  clopidogrel (PLAVIX) tablet 75 mg, 75 mg, Oral, Daily  senna (SENOKOT) tablet 8.6 mg, 1 tablet, Oral, Nightly  docusate sodium (COLACE) capsule 100 mg, 100 mg, Oral, BID  hydrocortisone 1 % cream, , Topical, BID  acetaminophen (TYLENOL) tablet 650 mg, 650 mg, Oral, Q6H PRN  atorvastatin (LIPITOR) tablet 40 mg, 40 mg, Oral, Nightly  folbee plus tablet 1 tablet, 1 tablet, Oral, Daily  calcitRIOL (ROCALTROL) capsule 0.25 mcg, 0.25 mcg, Oral, Daily  carvedilol (COREG) tablet 6.25 mg, 6.25 mg, Oral, BID WC  FLUoxetine (PROZAC) capsule 20 mg, 20 mg, Oral, Daily  insulin glargine (LANTUS) injection vial 5 Units, 5 Units, Subcutaneous, Daily  midodrine (PROAMATINE) tablet 10 mg, 10 mg, Oral, TID WC  pregabalin (LYRICA) capsule 50 mg, 50 mg, Oral, BID  insulin lispro (HUMALOG) injection vial 0-6 Units, 0-6 Units, Subcutaneous, TID WC  insulin lispro (HUMALOG) injection vial 0-3 Units, 0-3 Units, Subcutaneous, Nightly  glucose (GLUTOSE) 40 % oral gel 15 g, 15 g, Oral, PRN  dextrose 50 % solution 12.5 g, 12.5 g, Intravenous, PRN  glucagon (rDNA) injection 1 mg, 1 mg, Intramuscular, PRN  dextrose 5 % solution, 100 mL/hr, Intravenous, PRN  morphine injection 2 mg, 2 mg, Intravenous, Q3H PRN  oxyCODONE-acetaminophen (PERCOCET) 5-325 MG per tablet 1 tablet, 1 tablet, Oral, Q4H PRN  sodium chloride flush 0.9 % injection 10 mL, 10 mL, Intravenous, 2 times per day  sodium chloride flush 0.9 % injection 10 mL, 10 mL, Intravenous, PRN  magnesium hydroxide (MILK OF MAGNESIA) 400 MG/5ML suspension 30 mL, 30 mL, Oral, Daily PRN  ondansetron (ZOFRAN) injection 4 mg, 4 mg, Intravenous, Q6H PRN  nitroGLYCERIN (NITROSTAT) SL tablet 0.4 mg, 0.4 mg, Sublingual, Q5 Min PRN    Objective     BP (!) 122/58   Pulse 71   Temp 98.1 °F (36.7 °C) (Oral)   Resp 18   Ht 6' 1\" (1.854 m)   Wt 252 lb 1.6 oz (114.4 kg)   SpO2 95%   BMI 33.26 kg/m²      I/O:    Intake/Output Summary (Last 24 hours) at 11/13/18 0756  Last data filed at 11/13/18 0350   Gross per 24 hour   Intake             1770 ml   Output             1900 ml   Net             -130 ml              Wt Readings from Last 3 Encounters:   11/13/18 252 lb 1.6 oz (114.4 kg)   10/21/18 253 lb 6.4 oz (114.9 kg)   10/15/18 250 lb (113.4 kg)       LABS:    Recent Labs      11/12/18   0502  11/12/18   1410  11/13/18   0458   WBC  12.9*   --   10.1   HGB  7.5*  7.4*  6.8*   HCT  24.7*  24.1*  22.5*   PLT  211   --   184        Recent Labs      11/12/18   0502   NA  133*   K  5.1   CL  92*   CO2  23   BUN  83*   CREATININE  8.0*        No results for input(s): PROT, INR, APTT in the last 72 hours. No results for input(s): CKTOTAL, CKMB, CKMBINDEX, TROPONINI in the last 72 hours. Exam:    dressing intact and well maintained. No apparent strike through noted. Vascular: Dorsalis pedis and posterior tibial pulses are non-palpable bilaterally.  Skin temperature is warm to warm

## 2018-11-14 NOTE — DISCHARGE SUMMARY
Hospital Medicine Discharge Summary      Patient Identification:   Elray Buerger   : 1960  MRN: [id number]   Account: [de-identified]      Patient's PCP: Buddy Gallegos MD    Admit Date: 2018     Discharge Date: 2018      Admitting Physician: Wilbert Hobbs DO     Discharge Physician: Jasper Samayoa DO     Discharge Diagnoses: Active Hospital Problems    Diagnosis Date Noted    Elevated troponin [R74.8]      Priority: High    H/O: GI bleed [Z87.19]      Priority: High    ESRD (end stage renal disease) on dialysis ([facility] [N18.6, Z99.2]      Priority: High    Chest pain [R07.9] 2016     Priority: High     Class: Acute    Iron deficiency anemia due to chronic blood loss [D50.0] 2016     Priority: High     Class: Acute    History of amputation of great toe ([facility] [Z89.419]     Endocarditis of mitral valve [I05.8]     Acute GI bleeding [K92.2]     Acute anemia [D64.9] 2018    Severe mitral regurgitation [I34.0]     Chronic systolic heart failure ([address] [I50.22]     Obstructive sleep apnea [G47.33]     Diabetes mellitus type 2, controlled ([address] [E11.9]     Peripheral neuropathy ([address] [G62.9]        The patient was seen and examined on day of discharge and this discharge summary is in conjunction with any daily progress note from day of discharge. Hospital Course:   Elray Buerger is a 60 y.o. male admitted to [facility] on 2018 for GIB. He has a history of MRSA mitral endocarditis, aortic valve prosthetic valve, CAD s/p CABG, recent JC stenting mid LAD 2018,  DM2, PAD, hx osteomyelitis with recent amputation of great right toe, ESRD, CHF, anemia, and bilateral radial/ulnar artery occlusions. History is significant for recent MRSA endocarditis / pacemaker infection (affecting the mitral valve) during 2018 hospitalizaiton at [facility]. Complicated by embolic occlusion of bilateral radial/ulnar arteries per angiogram 18.   CTS evaluation given gastric avm to rule out other AVM. He was transferred to [address] [place] [address] for specialized GI procedure and also since he had received recent care there regarding his endocarditis. Of note, his anticoagulation and antiplatelets were held. His DAPT should be restarted as soon as feasible since he did have recent JC placed to the mid LAD 7/7/2018. His overall bleeding risk and indication for [address] [place] [address] Road and DAPT needs to be revisited closely in the setting of his high risk for bleeding, especially given his ESRD status.      At the time of transfer, a second unit of hemoglobin was being transfused and repeat Hgb had not been performed yet - prior to transfusion Hgb was 6.8 11/13 am.  Including the two units 11/13, he received a total of 6 units throughout his stay. Hgb ranged from 6.1 on arrival to 8.1 after his 4th unit on 11/11. He will need repeat Hgb upon arrival to [address] [place] [address]. Exam:     Vitals:  Vitals:    11/13/18 1609 11/13/18 1827 11/13/18 1847 11/13/18 2114   BP: 127/60 (!) 121/55 122/63 130/60   Pulse: 79 73 68 65   Resp: 18 18 18 16   Temp: 97.9 °F (36.6 °C) 98.4 °F (36.9 °C) 98.3 °F (36.8 °C) 98.2 °F (36.8 °C)   TempSrc: Oral      SpO2: 93% 94%     Weight:       Height:         Weight: Weight: 252 lb 1.6 oz (114.4 kg)     24 hour intake/output:  Intake/Output Summary (Last 24 hours) at 11/14/18 0534  Last data filed at 11/13/18 1347   Gross per 24 hour   Intake             1695 ml   Output                0 ml   Net             1695 ml     General appearance: No apparent distress, appears stated age and cooperative. HEENT: Pupils equal, round, and reactive to light. Conjunctivae/corneas clear. Neck: Supple, with full range of motion. No jugular venous distention. Trachea midline. Respiratory:  Normal respiratory effort. Clear to auscultation, bilaterally without Rales/Wheezes/Rhonchi.   Cardiovascular: Regular rate and rhythm with normal S1/S2 with 3/6 MAUREEN  Abdomen: Soft, non-tender, non-distended

## 2018-12-06 NOTE — PROGRESS NOTES
4 mg by mouth daily       acetaminophen (TYLENOL) 325 MG tablet Take 2 tablets by mouth every 6 hours as needed for Pain or Fever 120 tablet 3    nitroGLYCERIN (NITROSTAT) 0.4 MG SL tablet Place 1 tablet under the tongue every 5 minutes as needed for Chest pain 25 tablet 3    aspirin 81 MG chewable tablet Take 81 mg by mouth daily      clopidogrel (PLAVIX) 75 MG tablet Take 1 tablet by mouth daily 30 tablet 5    pantoprazole (PROTONIX) 40 MG tablet Take 40 mg by mouth 2 times daily       calcium carbonate (TUMS) 500 MG chewable tablet Take 5 tablets by mouth 3 times daily (with meals) And 3 tabs as needed with snacks      FLUoxetine (PROZAC) 20 MG capsule Take 20 mg by mouth daily      insulin lispro (HUMALOG) 100 UNIT/ML injection Inject into the skin 3 times daily (before meals) Use scale if chem > 200      pregabalin (LYRICA) 50 MG capsule Take 50 mg by mouth 2 times daily. No current facility-administered medications for this visit.       Allergies   Allergen Reactions    Vicodin [Hydrocodone-Acetaminophen] Nausea Only    Darvocet A500 [Propoxyphene N-Acetaminophen] Nausea And Vomiting    Demerol Hcl [Meperidine] Nausea And Vomiting     Health Maintenance   Topic Date Due    Hepatitis C screen  1960    Diabetic retinal exam  06/09/1969    HIV screen  06/09/1974    DTaP/Tdap/Td vaccine (1 - Tdap) 06/09/1978    Pneumococcal highest risk (1 of 3 - PCV13) 06/09/1978    Shingles Vaccine (1 of 2 - 2 Dose Series) 06/09/2009    Diabetic foot exam  11/08/2019    A1C test (Diabetic or Prediabetic)  11/08/2019    Lipid screen  11/09/2019    Potassium monitoring  11/12/2019    Creatinine monitoring  11/12/2019    Colon cancer screen colonoscopy  09/08/2027    Flu vaccine  Completed       Subjective:  Review of Systems  General:   No fever, no chills, some fatigue or weight loss  Pulmonary:    some chronic dyspnea, no wheezing  Cardiac:    Denies recent chest pain,   GI:     No nausea or vomiting, no abdominal pain  Neuro:     No dizziness or light headedness,   Musculoskeletal:  No recent active issues  Extremities:   No edema, good peripheral pulses      Objective:  Physical Exam  /74   Pulse 80   Ht 6' 1\" (1.854 m)   Wt 245 lb (111.1 kg)   BMI 32.32 kg/m²   General:   Well developed, well nourished  Lungs:    Clear to auscultation  Heart:    Normal S1 S2, Slight murmur. no rubs, no gallops  Abdomen:   Soft, non tender, no organomegalies, positive bowel sounds  Extremities:   No edema, no cyanosis, good peripheral pulses  Neurological:   Awake, alert, oriented. No obvious focal deficits  Musculoskelatal:  No obvious deformities    Assessment:      Diagnosis Orders   1. Pacemaker     2. Essential hypertension     3. S/P AVR     4. Stage 5 chronic kidney disease on chronic dialysis ([facility]     5. Coronary artery disease involving native coronary artery of native heart without angina pectoris     6. Familial hypercholesterolemia     very complicated patient  Still following at [facility]   ?/ need for MVR     Plan:  No Follow-up on file. Continue to follow at [address] [place] [address] risk factor modification and medical management. Thank you for allowing me to participate in the care of your patient. Please don't hesitate to contact me regarding any further issues related to the patient care      Orders Placed:  No orders of the defined types were placed in this encounter. Medications Prescribed:  No orders of the defined types were placed in this encounter. Discussed use, benefit, and side effects of prescribed medications. All patient questions answered. Pt voicedunderstanding. Instructed to continue current medications, diet and exercise. Continue risk factor modification and medical management. Patient agreed with treatment plan. Follow up as directed.     Electronically signedby Genaro Kerr MD on 12/6/2018 at 10:16 AM

## 2019-01-01 ENCOUNTER — PROCEDURE VISIT (OUTPATIENT)
Dept: CARDIOLOGY CLINIC | Age: 59
End: 2019-01-01
Payer: MEDICARE

## 2019-01-01 ENCOUNTER — APPOINTMENT (OUTPATIENT)
Dept: GENERAL RADIOLOGY | Age: 59
DRG: 291 | End: 2019-01-01
Payer: MEDICARE

## 2019-01-01 ENCOUNTER — TELEPHONE (OUTPATIENT)
Dept: CARDIOLOGY CLINIC | Age: 59
End: 2019-01-01

## 2019-01-01 ENCOUNTER — TELEPHONE (OUTPATIENT)
Dept: UROLOGY | Age: 59
End: 2019-01-01

## 2019-01-01 ENCOUNTER — NURSE ONLY (OUTPATIENT)
Dept: CARDIOLOGY CLINIC | Age: 59
End: 2019-01-01
Payer: MEDICARE

## 2019-01-01 ENCOUNTER — HOSPITAL ENCOUNTER (INPATIENT)
Age: 59
LOS: 2 days | Discharge: HOME | DRG: 291 | End: 2019-05-16
Attending: INTERNAL MEDICINE | Admitting: INTERNAL MEDICINE
Payer: MEDICARE

## 2019-01-01 ENCOUNTER — APPOINTMENT (OUTPATIENT)
Dept: CT IMAGING | Age: 59
DRG: 291 | End: 2019-01-01
Payer: MEDICARE

## 2019-01-01 ENCOUNTER — OFFICE VISIT (OUTPATIENT)
Dept: CARDIOLOGY CLINIC | Age: 59
End: 2019-01-01
Payer: MEDICARE

## 2019-01-01 VITALS
OXYGEN SATURATION: 96 % | HEIGHT: 73 IN | WEIGHT: 242 LBS | DIASTOLIC BLOOD PRESSURE: 55 MMHG | BODY MASS INDEX: 32.07 KG/M2 | SYSTOLIC BLOOD PRESSURE: 114 MMHG | HEART RATE: 74 BPM | RESPIRATION RATE: 18 BRPM | TEMPERATURE: 97.6 F

## 2019-01-01 VITALS
BODY MASS INDEX: 32.47 KG/M2 | WEIGHT: 245 LBS | SYSTOLIC BLOOD PRESSURE: 116 MMHG | DIASTOLIC BLOOD PRESSURE: 62 MMHG | HEART RATE: 99 BPM | HEIGHT: 73 IN

## 2019-01-01 DIAGNOSIS — Z95.2 S/P AVR: Primary | ICD-10-CM

## 2019-01-01 DIAGNOSIS — Z95.0 PACEMAKER: Primary | ICD-10-CM

## 2019-01-01 DIAGNOSIS — I10 ESSENTIAL HYPERTENSION: ICD-10-CM

## 2019-01-01 DIAGNOSIS — I50.9 CONGESTIVE HEART FAILURE, UNSPECIFIED HF CHRONICITY, UNSPECIFIED HEART FAILURE TYPE (HCC): Primary | ICD-10-CM

## 2019-01-01 DIAGNOSIS — R31.0 GROSS HEMATURIA: Primary | ICD-10-CM

## 2019-01-01 DIAGNOSIS — N19 RENAL FAILURE, UNSPECIFIED CHRONICITY: ICD-10-CM

## 2019-01-01 LAB
ALBUMIN SERPL-MCNC: 3.7 G/DL (ref 3.5–5.1)
ALLEN TEST: POSITIVE
ALP BLD-CCNC: 136 U/L (ref 38–126)
ALT SERPL-CCNC: 9 U/L (ref 11–66)
ANION GAP SERPL CALCULATED.3IONS-SCNC: 17 MEQ/L (ref 8–16)
ANISOCYTOSIS: PRESENT
AST SERPL-CCNC: 19 U/L (ref 5–40)
BACTERIA: ABNORMAL /HPF
BASE EXCESS (CALCULATED): 2.9 MMOL/L (ref -2.5–2.5)
BASOPHILS # BLD: 0.4 %
BASOPHILS # BLD: 0.5 %
BASOPHILS ABSOLUTE: 0 THOU/MM3 (ref 0–0.1)
BASOPHILS ABSOLUTE: 0 THOU/MM3 (ref 0–0.1)
BILIRUB SERPL-MCNC: 0.5 MG/DL (ref 0.3–1.2)
BILIRUBIN DIRECT: < 0.2 MG/DL (ref 0–0.3)
BILIRUBIN URINE: ABNORMAL
BLOOD CULTURE, ROUTINE: NORMAL
BLOOD CULTURE, ROUTINE: NORMAL
BLOOD, URINE: ABNORMAL
BUN BLDV-MCNC: 30 MG/DL (ref 7–22)
BUN BLDV-MCNC: 31 MG/DL (ref 7–22)
BUN BLDV-MCNC: 38 MG/DL (ref 7–22)
CALCIUM SERPL-MCNC: 9.2 MG/DL (ref 8.5–10.5)
CALCIUM SERPL-MCNC: 9.5 MG/DL (ref 8.5–10.5)
CALCIUM SERPL-MCNC: 9.6 MG/DL (ref 8.5–10.5)
CASTS 2: ABNORMAL /LPF
CASTS UA: ABNORMAL /LPF
CHARACTER, URINE: ABNORMAL
CHLORIDE BLD-SCNC: 100 MEQ/L (ref 98–111)
CHLORIDE BLD-SCNC: 98 MEQ/L (ref 98–111)
CHLORIDE BLD-SCNC: 98 MEQ/L (ref 98–111)
CO2: 22 MEQ/L (ref 23–33)
CO2: 23 MEQ/L (ref 23–33)
CO2: 25 MEQ/L (ref 23–33)
COLLECTED BY:: ABNORMAL
COLOR: ABNORMAL
CREAT SERPL-MCNC: 5.5 MG/DL (ref 0.4–1.2)
CREAT SERPL-MCNC: 6.1 MG/DL (ref 0.4–1.2)
CREAT SERPL-MCNC: 7.2 MG/DL (ref 0.4–1.2)
CRYSTALS, UA: ABNORMAL
DEVICE: ABNORMAL
EKG ATRIAL RATE: 48 BPM
EKG Q-T INTERVAL: 442 MS
EKG QRS DURATION: 172 MS
EKG QTC CALCULATION (BAZETT): 513 MS
EKG R AXIS: -175 DEGREES
EKG T AXIS: -30 DEGREES
EKG VENTRICULAR RATE: 81 BPM
EOSINOPHIL # BLD: 2.3 %
EOSINOPHIL # BLD: 2.5 %
EOSINOPHILS ABSOLUTE: 0.1 THOU/MM3 (ref 0–0.4)
EOSINOPHILS ABSOLUTE: 0.1 THOU/MM3 (ref 0–0.4)
EPITHELIAL CELLS, UA: ABNORMAL /HPF
ERYTHROCYTE [DISTWIDTH] IN BLOOD BY AUTOMATED COUNT: 19.1 % (ref 11.5–14.5)
ERYTHROCYTE [DISTWIDTH] IN BLOOD BY AUTOMATED COUNT: 19.4 % (ref 11.5–14.5)
ERYTHROCYTE [DISTWIDTH] IN BLOOD BY AUTOMATED COUNT: 63.7 FL (ref 35–45)
ERYTHROCYTE [DISTWIDTH] IN BLOOD BY AUTOMATED COUNT: 65.5 FL (ref 35–45)
FERRITIN: 347 NG/ML (ref 22–322)
FLU A ANTIGEN: NEGATIVE
FLU B ANTIGEN: NEGATIVE
GFR SERPL CREATININE-BSD FRML MDRD: 11 ML/MIN/1.73M2
GFR SERPL CREATININE-BSD FRML MDRD: 8 ML/MIN/1.73M2
GFR SERPL CREATININE-BSD FRML MDRD: 9 ML/MIN/1.73M2
GLUCOSE BLD-MCNC: 110 MG/DL (ref 70–108)
GLUCOSE BLD-MCNC: 116 MG/DL (ref 70–108)
GLUCOSE BLD-MCNC: 117 MG/DL (ref 70–108)
GLUCOSE BLD-MCNC: 136 MG/DL (ref 70–108)
GLUCOSE BLD-MCNC: 149 MG/DL (ref 70–108)
GLUCOSE BLD-MCNC: 151 MG/DL (ref 70–108)
GLUCOSE BLD-MCNC: 158 MG/DL (ref 70–108)
GLUCOSE BLD-MCNC: 173 MG/DL (ref 70–108)
GLUCOSE BLD-MCNC: 175 MG/DL (ref 70–108)
GLUCOSE BLD-MCNC: 184 MG/DL (ref 70–108)
GLUCOSE BLD-MCNC: 218 MG/DL (ref 70–108)
GLUCOSE BLD-MCNC: 232 MG/DL (ref 70–108)
GLUCOSE URINE: 100 MG/DL
HCO3: 26 MMOL/L (ref 23–28)
HCT VFR BLD CALC: 33.8 % (ref 42–52)
HCT VFR BLD CALC: 33.8 % (ref 42–52)
HEMOCCULT STL QL: NEGATIVE
HEMOGLOBIN: 10.2 GM/DL (ref 14–18)
HEMOGLOBIN: 10.3 GM/DL (ref 14–18)
ICTOTEST: NEGATIVE
IMMATURE GRANS (ABS): 0.03 THOU/MM3 (ref 0–0.07)
IMMATURE GRANS (ABS): 0.04 THOU/MM3 (ref 0–0.07)
IMMATURE GRANULOCYTES: 0.5 %
IMMATURE GRANULOCYTES: 0.6 %
INR BLD: 3.1 (ref 0.85–1.13)
INR BLD: 3.87 (ref 0.85–1.13)
INR BLD: 4.16 (ref 0.85–1.13)
IRON SATURATION: 17 % (ref 20–50)
IRON: 36 UG/DL (ref 65–195)
KETONES, URINE: ABNORMAL
LEUKOCYTE ESTERASE, URINE: ABNORMAL
LIPASE: 38.3 U/L (ref 5.6–51.3)
LYMPHOCYTES # BLD: 10.1 %
LYMPHOCYTES # BLD: 9.9 %
LYMPHOCYTES ABSOLUTE: 0.6 THOU/MM3 (ref 1–4.8)
LYMPHOCYTES ABSOLUTE: 0.6 THOU/MM3 (ref 1–4.8)
MCH RBC QN AUTO: 27.8 PG (ref 26–33)
MCH RBC QN AUTO: 28.1 PG (ref 26–33)
MCHC RBC AUTO-ENTMCNC: 30.2 GM/DL (ref 32.2–35.5)
MCHC RBC AUTO-ENTMCNC: 30.5 GM/DL (ref 32.2–35.5)
MCV RBC AUTO: 92.1 FL (ref 80–94)
MCV RBC AUTO: 92.1 FL (ref 80–94)
MISCELLANEOUS 2: ABNORMAL
MISCELLANEOUS LAB TEST RESULT: ABNORMAL
MONOCYTES # BLD: 12.2 %
MONOCYTES # BLD: 9.9 %
MONOCYTES ABSOLUTE: 0.6 THOU/MM3 (ref 0.4–1.3)
MONOCYTES ABSOLUTE: 0.8 THOU/MM3 (ref 0.4–1.3)
NITRITE, URINE: POSITIVE
NUCLEATED RED BLOOD CELLS: 0 /100 WBC
NUCLEATED RED BLOOD CELLS: 0 /100 WBC
O2 SATURATION: 100 %
ORGANISM: ABNORMAL
OSMOLALITY CALCULATION: 288.5 MOSMOL/KG (ref 275–300)
PCO2: 35 MMHG (ref 35–45)
PH BLOOD GAS: 7.49 (ref 7.35–7.45)
PH UA: 5.5 (ref 5–9)
PLATELET # BLD: 144 THOU/MM3 (ref 130–400)
PLATELET # BLD: 145 THOU/MM3 (ref 130–400)
PMV BLD AUTO: 10.6 FL (ref 9.4–12.4)
PMV BLD AUTO: 11 FL (ref 9.4–12.4)
PO2: 187 MMHG (ref 71–104)
POTASSIUM REFLEX MAGNESIUM: 3.6 MEQ/L (ref 3.5–5.2)
POTASSIUM SERPL-SCNC: 3.3 MEQ/L (ref 3.5–5.2)
POTASSIUM SERPL-SCNC: 4 MEQ/L (ref 3.5–5.2)
PRO-BNP: ABNORMAL PG/ML (ref 0–900)
PROTEIN UA: 300
RBC # BLD: 3.67 MILL/MM3 (ref 4.7–6.1)
RBC # BLD: 3.67 MILL/MM3 (ref 4.7–6.1)
RBC URINE: ABNORMAL /HPF
RENAL EPITHELIAL, UA: ABNORMAL
SEG NEUTROPHILS: 74.3 %
SEG NEUTROPHILS: 76.8 %
SEGMENTED NEUTROPHILS ABSOLUTE COUNT: 4.4 THOU/MM3 (ref 1.8–7.7)
SEGMENTED NEUTROPHILS ABSOLUTE COUNT: 4.6 THOU/MM3 (ref 1.8–7.7)
SODIUM BLD-SCNC: 137 MEQ/L (ref 135–145)
SODIUM BLD-SCNC: 140 MEQ/L (ref 135–145)
SODIUM BLD-SCNC: 140 MEQ/L (ref 135–145)
SOURCE, BLOOD GAS: ABNORMAL
SPECIFIC GRAVITY, URINE: 1.02 (ref 1–1.03)
TOTAL IRON BINDING CAPACITY: 212 UG/DL (ref 171–450)
TOTAL PROTEIN: 8.2 G/DL (ref 6.1–8)
TROPONIN T: 0.07 NG/ML
TROPONIN T: 0.09 NG/ML
URINE CULTURE REFLEX: ABNORMAL
UROBILINOGEN, URINE: 1 EU/DL (ref 0–1)
WBC # BLD: 5.7 THOU/MM3 (ref 4.8–10.8)
WBC # BLD: 6.2 THOU/MM3 (ref 4.8–10.8)
WBC UA: ABNORMAL /HPF
YEAST: ABNORMAL

## 2019-01-01 PROCEDURE — 87804 INFLUENZA ASSAY W/OPTIC: CPT

## 2019-01-01 PROCEDURE — 6370000000 HC RX 637 (ALT 250 FOR IP): Performed by: INTERNAL MEDICINE

## 2019-01-01 PROCEDURE — 83550 IRON BINDING TEST: CPT

## 2019-01-01 PROCEDURE — 82728 ASSAY OF FERRITIN: CPT

## 2019-01-01 PROCEDURE — 80048 BASIC METABOLIC PNL TOTAL CA: CPT

## 2019-01-01 PROCEDURE — G8598 ASA/ANTIPLAT THER USED: HCPCS | Performed by: NUCLEAR MEDICINE

## 2019-01-01 PROCEDURE — 80076 HEPATIC FUNCTION PANEL: CPT

## 2019-01-01 PROCEDURE — 93005 ELECTROCARDIOGRAM TRACING: CPT | Performed by: INTERNAL MEDICINE

## 2019-01-01 PROCEDURE — 6370000000 HC RX 637 (ALT 250 FOR IP): Performed by: NURSE PRACTITIONER

## 2019-01-01 PROCEDURE — 99232 SBSQ HOSP IP/OBS MODERATE 35: CPT | Performed by: INTERNAL MEDICINE

## 2019-01-01 PROCEDURE — 85610 PROTHROMBIN TIME: CPT

## 2019-01-01 PROCEDURE — 99285 EMERGENCY DEPT VISIT HI MDM: CPT

## 2019-01-01 PROCEDURE — 93296 REM INTERROG EVL PM/IDS: CPT | Performed by: INTERNAL MEDICINE

## 2019-01-01 PROCEDURE — 93010 ELECTROCARDIOGRAM REPORT: CPT | Performed by: INTERNAL MEDICINE

## 2019-01-01 PROCEDURE — 83540 ASSAY OF IRON: CPT

## 2019-01-01 PROCEDURE — 36415 COLL VENOUS BLD VENIPUNCTURE: CPT

## 2019-01-01 PROCEDURE — 1200000003 HC TELEMETRY R&B

## 2019-01-01 PROCEDURE — 83690 ASSAY OF LIPASE: CPT

## 2019-01-01 PROCEDURE — 36600 WITHDRAWAL OF ARTERIAL BLOOD: CPT

## 2019-01-01 PROCEDURE — 99221 1ST HOSP IP/OBS SF/LOW 40: CPT | Performed by: INTERNAL MEDICINE

## 2019-01-01 PROCEDURE — 2580000003 HC RX 258: Performed by: NURSE PRACTITIONER

## 2019-01-01 PROCEDURE — 87186 SC STD MICRODIL/AGAR DIL: CPT

## 2019-01-01 PROCEDURE — G8417 CALC BMI ABV UP PARAM F/U: HCPCS | Performed by: NUCLEAR MEDICINE

## 2019-01-01 PROCEDURE — 90935 HEMODIALYSIS ONE EVALUATION: CPT

## 2019-01-01 PROCEDURE — 82803 BLOOD GASES ANY COMBINATION: CPT

## 2019-01-01 PROCEDURE — 5A1D80Z PERFORMANCE OF URINARY FILTRATION, PROLONGED INTERMITTENT, 6-18 HOURS PER DAY: ICD-10-PCS | Performed by: INTERNAL MEDICINE

## 2019-01-01 PROCEDURE — 93294 REM INTERROG EVL PM/LDLS PM: CPT | Performed by: INTERNAL MEDICINE

## 2019-01-01 PROCEDURE — 90935 HEMODIALYSIS ONE EVALUATION: CPT | Performed by: NURSE PRACTITIONER

## 2019-01-01 PROCEDURE — 82948 REAGENT STRIP/BLOOD GLUCOSE: CPT

## 2019-01-01 PROCEDURE — 71045 X-RAY EXAM CHEST 1 VIEW: CPT

## 2019-01-01 PROCEDURE — 94640 AIRWAY INHALATION TREATMENT: CPT

## 2019-01-01 PROCEDURE — 2500000003 HC RX 250 WO HCPCS: Performed by: INTERNAL MEDICINE

## 2019-01-01 PROCEDURE — 93288 INTERROG EVL PM/LDLS PM IP: CPT | Performed by: INTERNAL MEDICINE

## 2019-01-01 PROCEDURE — 84484 ASSAY OF TROPONIN QUANT: CPT

## 2019-01-01 PROCEDURE — G8427 DOCREV CUR MEDS BY ELIG CLIN: HCPCS | Performed by: NUCLEAR MEDICINE

## 2019-01-01 PROCEDURE — 87040 BLOOD CULTURE FOR BACTERIA: CPT

## 2019-01-01 PROCEDURE — 99213 OFFICE O/P EST LOW 20 MIN: CPT | Performed by: NUCLEAR MEDICINE

## 2019-01-01 PROCEDURE — 85025 COMPLETE CBC W/AUTO DIFF WBC: CPT

## 2019-01-01 PROCEDURE — 83880 ASSAY OF NATRIURETIC PEPTIDE: CPT

## 2019-01-01 PROCEDURE — 99238 HOSP IP/OBS DSCHRG MGMT 30/<: CPT | Performed by: INTERNAL MEDICINE

## 2019-01-01 PROCEDURE — 82272 OCCULT BLD FECES 1-3 TESTS: CPT

## 2019-01-01 PROCEDURE — 74176 CT ABD & PELVIS W/O CONTRAST: CPT

## 2019-01-01 PROCEDURE — 99223 1ST HOSP IP/OBS HIGH 75: CPT | Performed by: NURSE PRACTITIONER

## 2019-01-01 PROCEDURE — 81001 URINALYSIS AUTO W/SCOPE: CPT

## 2019-01-01 PROCEDURE — 3017F COLORECTAL CA SCREEN DOC REV: CPT | Performed by: NUCLEAR MEDICINE

## 2019-01-01 PROCEDURE — 1036F TOBACCO NON-USER: CPT | Performed by: NUCLEAR MEDICINE

## 2019-01-01 PROCEDURE — 87086 URINE CULTURE/COLONY COUNT: CPT

## 2019-01-01 PROCEDURE — 87077 CULTURE AEROBIC IDENTIFY: CPT

## 2019-01-01 RX ORDER — CALCIUM CARBONATE 200(500)MG
5 TABLET,CHEWABLE ORAL
Status: DISCONTINUED | OUTPATIENT
Start: 2019-01-01 | End: 2019-01-01 | Stop reason: HOSPADM

## 2019-01-01 RX ORDER — FLUOXETINE HYDROCHLORIDE 20 MG/1
20 CAPSULE ORAL DAILY
Status: DISCONTINUED | OUTPATIENT
Start: 2019-01-01 | End: 2019-01-01 | Stop reason: HOSPADM

## 2019-01-01 RX ORDER — MIDODRINE HYDROCHLORIDE 10 MG/1
10 TABLET ORAL ONCE
Status: COMPLETED | OUTPATIENT
Start: 2019-01-01 | End: 2019-01-01

## 2019-01-01 RX ORDER — BUMETANIDE 0.25 MG/ML
1 INJECTION, SOLUTION INTRAMUSCULAR; INTRAVENOUS ONCE
Status: COMPLETED | OUTPATIENT
Start: 2019-01-01 | End: 2019-01-01

## 2019-01-01 RX ORDER — POTASSIUM CHLORIDE 20 MEQ/1
40 TABLET, EXTENDED RELEASE ORAL ONCE
Status: COMPLETED | OUTPATIENT
Start: 2019-01-01 | End: 2019-01-01

## 2019-01-01 RX ORDER — ONDANSETRON 2 MG/ML
4 INJECTION INTRAMUSCULAR; INTRAVENOUS EVERY 6 HOURS PRN
Status: DISCONTINUED | OUTPATIENT
Start: 2019-01-01 | End: 2019-01-01 | Stop reason: HOSPADM

## 2019-01-01 RX ORDER — PREGABALIN 50 MG/1
50 CAPSULE ORAL 2 TIMES DAILY
Status: DISCONTINUED | OUTPATIENT
Start: 2019-01-01 | End: 2019-01-01 | Stop reason: HOSPADM

## 2019-01-01 RX ORDER — DEXTROSE MONOHYDRATE 25 G/50ML
12.5 INJECTION, SOLUTION INTRAVENOUS PRN
Status: DISCONTINUED | OUTPATIENT
Start: 2019-01-01 | End: 2019-01-01 | Stop reason: HOSPADM

## 2019-01-01 RX ORDER — INSULIN GLARGINE 100 [IU]/ML
5 INJECTION, SOLUTION SUBCUTANEOUS DAILY
Status: DISCONTINUED | OUTPATIENT
Start: 2019-01-01 | End: 2019-01-01 | Stop reason: HOSPADM

## 2019-01-01 RX ORDER — ATORVASTATIN CALCIUM 40 MG/1
40 TABLET, FILM COATED ORAL NIGHTLY
Status: DISCONTINUED | OUTPATIENT
Start: 2019-01-01 | End: 2019-01-01 | Stop reason: HOSPADM

## 2019-01-01 RX ORDER — PANTOPRAZOLE SODIUM 40 MG/1
40 TABLET, DELAYED RELEASE ORAL 2 TIMES DAILY
Status: DISCONTINUED | OUTPATIENT
Start: 2019-01-01 | End: 2019-01-01 | Stop reason: HOSPADM

## 2019-01-01 RX ORDER — MIDODRINE HYDROCHLORIDE 10 MG/1
10 TABLET ORAL 3 TIMES DAILY
Status: DISCONTINUED | OUTPATIENT
Start: 2019-01-01 | End: 2019-01-01 | Stop reason: HOSPADM

## 2019-01-01 RX ORDER — IPRATROPIUM BROMIDE AND ALBUTEROL SULFATE 2.5; .5 MG/3ML; MG/3ML
1 SOLUTION RESPIRATORY (INHALATION) ONCE
Status: COMPLETED | OUTPATIENT
Start: 2019-01-01 | End: 2019-01-01

## 2019-01-01 RX ORDER — CLOPIDOGREL BISULFATE 75 MG/1
TABLET ORAL
Qty: 30 TABLET | Refills: 5 | Status: SHIPPED | OUTPATIENT
Start: 2019-01-01

## 2019-01-01 RX ORDER — NICOTINE POLACRILEX 4 MG
15 LOZENGE BUCCAL PRN
Status: DISCONTINUED | OUTPATIENT
Start: 2019-01-01 | End: 2019-01-01 | Stop reason: HOSPADM

## 2019-01-01 RX ORDER — DEXTROSE MONOHYDRATE 50 MG/ML
100 INJECTION, SOLUTION INTRAVENOUS PRN
Status: DISCONTINUED | OUTPATIENT
Start: 2019-01-01 | End: 2019-01-01 | Stop reason: HOSPADM

## 2019-01-01 RX ORDER — CLOPIDOGREL BISULFATE 75 MG/1
TABLET ORAL
Qty: 30 TABLET | Refills: 1 | Status: SHIPPED | OUTPATIENT
Start: 2019-01-01 | End: 2019-01-01 | Stop reason: SDUPTHER

## 2019-01-01 RX ORDER — CLOPIDOGREL BISULFATE 75 MG/1
75 TABLET ORAL DAILY
Status: DISCONTINUED | OUTPATIENT
Start: 2019-01-01 | End: 2019-01-01 | Stop reason: HOSPADM

## 2019-01-01 RX ORDER — SODIUM CHLORIDE 0.9 % (FLUSH) 0.9 %
10 SYRINGE (ML) INJECTION PRN
Status: DISCONTINUED | OUTPATIENT
Start: 2019-01-01 | End: 2019-01-01 | Stop reason: HOSPADM

## 2019-01-01 RX ORDER — SODIUM CHLORIDE 0.9 % (FLUSH) 0.9 %
10 SYRINGE (ML) INJECTION EVERY 12 HOURS SCHEDULED
Status: DISCONTINUED | OUTPATIENT
Start: 2019-01-01 | End: 2019-01-01 | Stop reason: HOSPADM

## 2019-01-01 RX ORDER — CALCITRIOL 0.25 UG/1
0.25 CAPSULE, LIQUID FILLED ORAL DAILY
Status: DISCONTINUED | OUTPATIENT
Start: 2019-01-01 | End: 2019-01-01 | Stop reason: HOSPADM

## 2019-01-01 RX ORDER — ASPIRIN 81 MG/1
81 TABLET, CHEWABLE ORAL DAILY
Status: DISCONTINUED | OUTPATIENT
Start: 2019-01-01 | End: 2019-01-01 | Stop reason: HOSPADM

## 2019-01-01 RX ORDER — FERROUS SULFATE 325(65) MG
325 TABLET ORAL 2 TIMES DAILY WITH MEALS
Status: DISCONTINUED | OUTPATIENT
Start: 2019-01-01 | End: 2019-01-01 | Stop reason: HOSPADM

## 2019-01-01 RX ADMIN — PREGABALIN 50 MG: 50 CAPSULE ORAL at 07:36

## 2019-01-01 RX ADMIN — MIDODRINE HYDROCHLORIDE 10 MG: 10 TABLET ORAL at 13:19

## 2019-01-01 RX ADMIN — CALCITRIOL 0.25 MCG: 0.25 CAPSULE ORAL at 07:36

## 2019-01-01 RX ADMIN — ANTACID TABLETS 2500 MG: 500 TABLET, CHEWABLE ORAL at 07:36

## 2019-01-01 RX ADMIN — ASPIRIN 81 MG 81 MG: 81 TABLET ORAL at 13:19

## 2019-01-01 RX ADMIN — INSULIN GLARGINE 5 UNITS: 100 INJECTION, SOLUTION SUBCUTANEOUS at 07:33

## 2019-01-01 RX ADMIN — ASPIRIN 81 MG 81 MG: 81 TABLET ORAL at 07:36

## 2019-01-01 RX ADMIN — FLUOXETINE HYDROCHLORIDE 20 MG: 20 CAPSULE ORAL at 07:36

## 2019-01-01 RX ADMIN — Medication 10 ML: at 22:03

## 2019-01-01 RX ADMIN — INSULIN LISPRO 1 UNITS: 100 INJECTION, SOLUTION INTRAVENOUS; SUBCUTANEOUS at 16:56

## 2019-01-01 RX ADMIN — INSULIN LISPRO 1 UNITS: 100 INJECTION, SOLUTION INTRAVENOUS; SUBCUTANEOUS at 22:02

## 2019-01-01 RX ADMIN — CLOPIDOGREL BISULFATE 75 MG: 75 TABLET ORAL at 08:12

## 2019-01-01 RX ADMIN — MIDODRINE HYDROCHLORIDE 10 MG: 10 TABLET ORAL at 20:48

## 2019-01-01 RX ADMIN — Medication 10 ML: at 08:12

## 2019-01-01 RX ADMIN — BUMETANIDE 1 MG: 0.25 INJECTION INTRAMUSCULAR; INTRAVENOUS at 18:39

## 2019-01-01 RX ADMIN — PANTOPRAZOLE SODIUM 40 MG: 40 TABLET, DELAYED RELEASE ORAL at 22:02

## 2019-01-01 RX ADMIN — ATORVASTATIN CALCIUM 40 MG: 40 TABLET, FILM COATED ORAL at 20:48

## 2019-01-01 RX ADMIN — FERROUS SULFATE TAB 325 MG (65 MG ELEMENTAL FE) 325 MG: 325 (65 FE) TAB at 08:12

## 2019-01-01 RX ADMIN — ATORVASTATIN CALCIUM 40 MG: 40 TABLET, FILM COATED ORAL at 22:02

## 2019-01-01 RX ADMIN — Medication 10 ML: at 20:49

## 2019-01-01 RX ADMIN — IPRATROPIUM BROMIDE AND ALBUTEROL SULFATE 1 AMPULE: .5; 3 SOLUTION RESPIRATORY (INHALATION) at 14:46

## 2019-01-01 RX ADMIN — INSULIN LISPRO 1 UNITS: 100 INJECTION, SOLUTION INTRAVENOUS; SUBCUTANEOUS at 08:03

## 2019-01-01 RX ADMIN — CALCITRIOL 0.25 MCG: 0.25 CAPSULE ORAL at 08:12

## 2019-01-01 RX ADMIN — PANTOPRAZOLE SODIUM 40 MG: 40 TABLET, DELAYED RELEASE ORAL at 20:49

## 2019-01-01 RX ADMIN — INSULIN GLARGINE 5 UNITS: 100 INJECTION, SOLUTION SUBCUTANEOUS at 13:19

## 2019-01-01 RX ADMIN — ANTACID TABLETS 2500 MG: 500 TABLET, CHEWABLE ORAL at 18:39

## 2019-01-01 RX ADMIN — PANTOPRAZOLE SODIUM 40 MG: 40 TABLET, DELAYED RELEASE ORAL at 07:36

## 2019-01-01 RX ADMIN — FLUOXETINE HYDROCHLORIDE 20 MG: 20 CAPSULE ORAL at 08:11

## 2019-01-01 RX ADMIN — POTASSIUM CHLORIDE 40 MEQ: 20 TABLET, EXTENDED RELEASE ORAL at 18:39

## 2019-01-01 RX ADMIN — FERROUS SULFATE TAB 325 MG (65 MG ELEMENTAL FE) 325 MG: 325 (65 FE) TAB at 16:20

## 2019-01-01 RX ADMIN — ANTACID TABLETS 2500 MG: 500 TABLET, CHEWABLE ORAL at 08:11

## 2019-01-01 RX ADMIN — PREGABALIN 50 MG: 50 CAPSULE ORAL at 20:49

## 2019-01-01 RX ADMIN — ANTACID TABLETS 2500 MG: 500 TABLET, CHEWABLE ORAL at 13:20

## 2019-01-01 RX ADMIN — PANTOPRAZOLE SODIUM 40 MG: 40 TABLET, DELAYED RELEASE ORAL at 13:19

## 2019-01-01 RX ADMIN — FERROUS SULFATE TAB 325 MG (65 MG ELEMENTAL FE) 325 MG: 325 (65 FE) TAB at 15:57

## 2019-01-01 RX ADMIN — FERROUS SULFATE TAB 325 MG (65 MG ELEMENTAL FE) 325 MG: 325 (65 FE) TAB at 07:36

## 2019-01-01 RX ADMIN — ANTACID TABLETS 2500 MG: 500 TABLET, CHEWABLE ORAL at 15:57

## 2019-01-01 RX ADMIN — Medication 10 ML: at 07:36

## 2019-01-01 RX ADMIN — INSULIN LISPRO 1 UNITS: 100 INJECTION, SOLUTION INTRAVENOUS; SUBCUTANEOUS at 11:36

## 2019-01-01 RX ADMIN — MIDODRINE HYDROCHLORIDE 10 MG: 10 TABLET ORAL at 14:48

## 2019-01-01 RX ADMIN — CLOPIDOGREL BISULFATE 75 MG: 75 TABLET ORAL at 07:36

## 2019-01-01 RX ADMIN — PREGABALIN 50 MG: 50 CAPSULE ORAL at 13:19

## 2019-01-01 RX ADMIN — Medication 0.5 MG: at 16:20

## 2019-01-01 RX ADMIN — PREGABALIN 50 MG: 50 CAPSULE ORAL at 22:02

## 2019-01-01 RX ADMIN — MIDODRINE HYDROCHLORIDE 10 MG: 10 TABLET ORAL at 15:56

## 2019-01-01 ASSESSMENT — ENCOUNTER SYMPTOMS
BLOOD IN STOOL: 1
VOMITING: 0
ABDOMINAL PAIN: 0
COUGH: 0
DIARRHEA: 0
NAUSEA: 0
EYE REDNESS: 0
SORE THROAT: 0
BACK PAIN: 0
EYE DISCHARGE: 0
WHEEZING: 0
RHINORRHEA: 0
SHORTNESS OF BREATH: 1

## 2019-01-01 ASSESSMENT — PAIN SCALES - GENERAL
PAINLEVEL_OUTOF10: 0

## 2019-04-11 NOTE — PROGRESS NOTES
[address] [facility] CARDIOLOGY  [address]  [address] [place] [address] Road [address]  Dept: [phone]  Dept Fax: [phone]  Loc: [phone]    Visit Date: 4/11/2019    Gilbert Guzman is a 60 y.o. male who presents todayfor:  Chief Complaint   Patient presents with    Follow-up     4 mo fu appt    Coronary Artery Disease    Hypertension    Cardiac Valve Problem     valvular disease  Followed at [address] [address] AVR  Had infected pacer  Has lead less pacer  On dialysis   ? ?  Looking for transplant  Chronic symptoms  No chest pain   Chronic dyspnea  Bp is stable   Does have a fib   Does have CAd and LAD stent as well     HPI:  HPI  Past Medical History:   Diagnosis Date    Anemia     Arthritis     CAD (coronary artery disease) LAST ONE 02/19/2014    ANGIOPLASTY X 2-TOTAL 4 STENTS     Charcot ankle     LEFT, SCHEDULED FOR SURGERY 04/03/2014    CH[facility]congestive heart failure) [facility]     Chronic ankle pain     Chronic back pain     Chronic venous hypertension w ulceration ([facility]     CKD (chronic kidney disease) stage 3, GFR 30-59 ml/min ([facility])     ON DIALYSIS    DDD (degenerative disc disease)     Diabetic retinopathy ([facility]     DM (dermatomyositis)     DM (diabetes mellitus) ([facility] DX 1978    ON INSULIN    Endocarditis 08/2018    Full dentures     Gastric bleed 2016    baki     GERD (gastroesophageal reflux disease)     H/O Bell's palsy     H/O Clostridium difficile infection febuary 2017    Hemodialysis patient ([facility]     last dialysis, 3/26/2014, Mon Wed Fri at [facility] in [place] History of blood transfusion     History of cardiac cath 07/07/2018    Stent to Prox LAD     HTN (hypertension) DX 1979    Hyperkalemia     Hyperlipidemia     Medtronic micra single pacemaker  10/3/2018    MRSA (methicillin resistant Staphylococcus aureus) 1/24/2014    blood hx from another facility    Murmur, cardiac     Nephrotic syndrome 8/21/2013    Obstructive sleep apnea     has been told he has but has never been tested    Osteomyelitis of finger of right hand ([address] 9/28/2017    Pancreatitis due to biliary obstruction     Peripheral neuropathy     of the feet from the diabetes    Peripheral neuropathy     Psychiatric problem     depression    Pulmonary edema     Renal failure     KIDNEY FUNCTION 12%    S/P peripheral artery angioplasty: 10/30/2017: PTA of the left popliteal artery. 10/30/2017    10/30/2017: PTA of the left popliteal artery.  Dr. Belgica Granados    SOB (shortness of breath)     Vision blurred     RIGHT    VRE (vancomycin resistant enterococcus) culture positive 03/2017    Wears glasses       Past Surgical History:   Procedure Laterality Date    ABLATION OF DYSRHYTHMIC FOCUS  07/21/2017    Atrial Flutter Ablation    APPENDECTOMY      CARDIAC SURGERY  2/19/2014    2 stents placed   [address]  2006    stents x 2    CARDIAC SURGERY  2003    stent x 1    CHOLECYSTECTOMY      COLONOSCOPY      DIALYSIS FISTULA CREATION Left 02/2014    ARM    ENDOSCOPY, COLON, DIAGNOSTIC      ENTEROSCOPY  9/8/2017    ENTEROSCOPY PUSH BIOPSY performed by Rickey Rick MD at [address] Bilateral     CATARACT EXTRACTION WITH IOL    EYE SURGERY Left 05/17/2013    VITRECTOMY    EYE SURGERY Bilateral     MULTIPLE LASER PROCEDURES    FINGER AMPUTATION Right 09/28/2017    FRACTURE SURGERY Right     ankle, surgery x 7    HAND SURGERY Right 8/18/2017    I & D RIGHT INDEX FINGER performed by Emmy Lenz MD at [facility] Bilateral 09/2013    SUBSEQUENT HEMORRHAGE    OTHER SURGICAL HISTORY      DIALYSIS CHEST PORT INSERTED AND REMOVED    PACEMAKER PLACEMENT  08/2018    changed to a micro pacer in [place]    MS AMPUTATE METACARPAL+FINGER Right 9/28/2017    RIGHT INDEX FINGER AMPUTATION performed by Emmy Lenz MD at [address] [facility] NOT  [address] [facility] Left 9/8/2017    COLONOSCOPY performed by Rickey Rick MD at [facility] Endoscopy    mouth daily       acetaminophen (TYLENOL) 325 MG tablet Take 2 tablets by mouth every 6 hours as needed for Pain or Fever 120 tablet 3    nitroGLYCERIN (NITROSTAT) 0.4 MG SL tablet Place 1 tablet under the tongue every 5 minutes as needed for Chest pain 25 tablet 3    aspirin 81 MG chewable tablet Take 81 mg by mouth daily      pantoprazole (PROTONIX) 40 MG tablet Take 40 mg by mouth 2 times daily       calcium carbonate (TUMS) 500 MG chewable tablet Take 5 tablets by mouth 3 times daily (with meals) And 3 tabs as needed with snacks      FLUoxetine (PROZAC) 20 MG capsule Take 20 mg by mouth daily      insulin lispro (HUMALOG) 100 UNIT/ML injection Inject into the skin 3 times daily (before meals) Use scale if chem > 200      pregabalin (LYRICA) 50 MG capsule Take 50 mg by mouth 2 times daily. No current facility-administered medications for this visit.       Allergies   Allergen Reactions    Vicodin [Hydrocodone-Acetaminophen] Nausea Only    Darvocet A500 [Propoxyphene N-Acetaminophen] Nausea And Vomiting    Demerol Hcl [Meperidine] Nausea And Vomiting     Health Maintenance   Topic Date Due    Hepatitis C screen  1960    Pneumococcal 0-64 years Vaccine (1 of 3 - PCV13) 06/09/1965    Diabetic retinal exam  06/09/1969    HIV screen  06/09/1974    Hepatitis B Vaccine (1 of 3 - Risk Recombivax 3-dose series) 06/09/1978    DTaP/Tdap/Td vaccine (1 - Tdap) 06/09/1978    Shingles Vaccine (1 of 2) 06/09/2009    Diabetic foot exam  11/08/2019    A1C test (Diabetic or Prediabetic)  11/08/2019    Lipid screen  11/09/2019    Potassium monitoring  11/12/2019    Creatinine monitoring  11/12/2019    Colon cancer screen colonoscopy  09/08/2027    Flu vaccine  Completed    HPV vaccine  Aged Out       Subjective:  Review of Systems  General:   No fever, no chills, No fatigue or weight loss  Pulmonary:    some chronic dyspnea, no wheezing  Cardiac:    Denies recent chest pain,   GI:     No nausea or vomiting, no abdominal pain  Neuro:    No dizziness or light headedness,   Musculoskeletal:  No recent active issues  Extremities:   No edema, good peripheral pulses      Objective:  Physical Exam  /62   Pulse 99   Ht 6' 1\" (1.854 m)   Wt 245 lb (111.1 kg)   BMI 32.32 kg/m²   General:   Well developed, well nourished  Lungs:   Clear to auscultation  Heart:    Normal S1 S2, Slight murmur. no rubs, no gallops  Abdomen:   Soft, non tender, no organomegalies, positive bowel sounds  Extremities:   No edema, no cyanosis, good peripheral pulses  Neurological:   Awake, alert, oriented. No obvious focal deficits  Musculoskelatal:  No obvious deformities    Assessment:      Diagnosis Orders   1. S/P AVR     2. Renal failure, unspecified chronicity     3. Essential hypertension     cardiac fair for now       Plan:  No follow-ups on file. As above  Continue risk factor modification and medical management  Thank you for allowing me to participate in the care of your patient. Please don't hesitate to contact me regarding any further issues related to the patient care    Orders Placed:  No orders of the defined types were placed in this encounter. Medications Prescribed:  No orders of the defined types were placed in this encounter. Discussed use, benefit, and side effects of prescribed medications. All patient questions answered. Pt voicedunderstanding. Instructed to continue current medications, diet and exercise. Continue risk factor modification and medical management. Patient agreed with treatment plan. Follow up as directed.     Electronically signedby Judith Mejia MD on 4/11/2019 at 11:34 AM

## 2019-04-30 NOTE — PROGRESS NOTES
DR Nya Otriz PT   BATTERY >7->9 YRS REMAINING  [employer] MICRA  VENT IMPEDENCE 430  RV WAVES 9.9  V PACED <0.1%  VVI 40  NO EPISODES

## 2019-05-14 PROBLEM — E87.70 VOLUME OVERLOAD: Status: ACTIVE | Noted: 2019-01-01

## 2019-05-14 NOTE — H&P
History & Physical        Patient:  Elie Pereyra  YOB: 1960    MRN: [id number]     Acct: [de-identified]    PCP: Dorene Shabazz MD    Date of Admission: 5/14/2019    Date of Service: Pt seen/examined on 05/14/19  and Admitted to Inpatient with expected LOS greater than two midnights due to medical therapy. Chief Complaint:  SOB      History Of Present Illness:      60 y.o. male who presented to [address] [place] [address] with SOB. HX ESRD on HD for the last 7 years. Does home hemodialysis Monday, Wednesday, Thursday, Saturday and Sunday. States in the last few days he has been about 1 kg above his dry weight. Attempting to follow his diet. Yesterday when walking he had an abrupt onset of shortness of breath. Felt like he was going to pass out. Has been short of breath since. Again today he felt like he was going to pass so he came to the ED for evaluation. CXR notes cardiomegaly with mild pulmonary congestion. In ED he was given Bumex x 1. He does produce some urine. Unrelated patient reports hematuria that has been present for 3-4 weeks. HX of kidney stones.      Past Medical History:          Diagnosis Date    Anemia     Arthritis     CAD (coronary artery disease) LAST ONE 02/19/2014    ANGIOPLASTY X 2-TOTAL 4 STENTS     Charcot ankle     LEFT, SCHEDULED FOR SURGERY 04/03/2014    CH[facility]congestive heart failure) [facility])     Chronic ankle pain     Chronic back pain     Chronic venous hypertension w ulceration (HCC)     CKD (chronic kidney disease) stage 3, GFR 30-59 ml/min ([facility])     ON DIALYSIS    DDD (degenerative disc disease)     Diabetic retinopathy ([facility])     DM (dermatomyositis)     DM (diabetes mellitus) ([facility] DX 1978    ON INSULIN    Endocarditis 08/2018    Full dentures     Gastric bleed 2016    baki     GERD (gastroesophageal reflux disease)     H/O Bell's palsy     H/O Clostridium difficile infection febuary 2017    Hemodialysis patient ([facility]     last dialysis, 3/26/2014, Mon Wed Fri at [place] [facility]. of blood transfusion     History of cardiac cath 07/07/2018    Stent to Prox LAD     HTN (hypertension) DX 1979    Hyperkalemia     Hyperlipidemia     Medtronic micra single pacemaker  10/3/2018    MRSA (methicillin resistant Staphylococcus aureus) 1/24/2014    blood hx from another facility    Murmur, cardiac     Nephrotic syndrome 8/21/2013    Obstructive sleep apnea     has been told he has but has never been tested    Osteomyelitis of finger of right hand ([address] 9/28/2017    Pancreatitis due to biliary obstruction     Peripheral neuropathy     of the feet from the diabetes    Peripheral neuropathy     Psychiatric problem     depression    Pulmonary edema     Renal failure     KIDNEY FUNCTION 12%    S/P peripheral artery angioplasty: 10/30/2017: PTA of the left popliteal artery. 10/30/2017    10/30/2017: PTA of the left popliteal artery.  Dr. Fior Cuenca    SOB (shortness of breath)     Vision blurred     RIGHT    VRE (vancomycin resistant enterococcus) culture positive 03/2017    Wears glasses        Past Surgical History:          Procedure Laterality Date    ABLATION OF DYSRHYTHMIC FOCUS  07/21/2017    Atrial Flutter Ablation    APPENDECTOMY      CARDIAC SURGERY  2/19/2014    2 stents placed   [address]  2006    stents x 2    CARDIAC SURGERY  2003    stent x 1    CHOLECYSTECTOMY      COLONOSCOPY      DIALYSIS FISTULA CREATION Left 02/2014    ARM    ENDOSCOPY, COLON, DIAGNOSTIC      ENTEROSCOPY  9/8/2017    ENTEROSCOPY PUSH BIOPSY performed by Jazmine Villarreal MD at [facility]    EYE SURGERY Bilateral     CATARACT EXTRACTION WITH IOL    EYE SURGERY Left 05/17/2013    VITRECTOMY    EYE SURGERY Bilateral     MULTIPLE LASER PROCEDURES    FINGER AMPUTATION Right 09/28/2017    FRACTURE SURGERY Right     ankle, surgery x 7    HAND SURGERY Right 8/18/2017    I & D RIGHT INDEX FINGER performed by Tomas Camara MD at [facility]  KIDNEY BIOPSY Bilateral 09/2013    SUBSEQUENT HEMORRHAGE    OTHER SURGICAL HISTORY      DIALYSIS CHEST PORT INSERTED AND REMOVED    PACEMAKER PLACEMENT  08/2018    changed to a micro pacer in [place]    UT AMPUTATE METACARPAL+FINGER Right 9/28/2017    RIGHT INDEX FINGER AMPUTATION performed by Shyla Stahl MD at [address] [facility] NOT  [address] IND Left 9/8/2017    COLONOSCOPY performed by Ellis Garcia MD [facility]    UT EGD TRANSORAL BIOPSY SINGLE/MULTIPLE N/A 8/17/2017    EGD BIOPSY performed by Ellis Garcia MD at [address] W/BIOPSY N/A 11/11/2018    ENTEROSCOPY PUSH BIOPSY performed by Ellis Garcia MD at [facility] OFFICE/OUTPT VISIT,PROCEDURE ONLY N/A 8/17/2018    TRANSESOPHAGEAL ECHOCARDIOGRAM performed by Sonam Caro MD at [facility] OFFICE/OUTPT [facility] Right 10/18/2018    RIGHT GREAT TOE AMPUTATION performed by Katarina Crooks DPM at [address] Right 10/2018    right big toe    UPPER GASTROINTESTINAL ENDOSCOPY N/A 11/9/2018    EGD CONTROL HEMORRHAGE performed by Ellis Garcia MD at [facility]    VASCULAR SURGERY      cabbage harvest left leg, left arm fistula    VITRECTOMY Right 3/27/14       Medications Prior to Admission:      Prior to Admission medications    Medication Sig Start Date End Date Taking?  Authorizing Provider   clopidogrel (PLAVIX) 75 MG tablet TAKE ONE TABLET BY MOUTH DAILY 3/1/19   Christina Madden MD   NONFORMULARY Protein bars    Historical Provider, MD   insulin glargine (LANTUS) 100 UNIT/ML injection vial Inject 5 Units into the skin daily    Historical Provider, MD   ferrous sulfate 325 (65 Fe) MG tablet Take 1 tablet by mouth 2 times daily (with meals) 10/21/18   Reta Reagan DPM   calcitRIOL (ROCALTROL) 0.25 MCG capsule Take 1 capsule by mouth daily 10/21/18   BRAEDEN Lutz - CNP   midodrine (PROAMATINE) 10 MG tablet Take 10 mg by mouth 3 times daily    Historical Provider, MD   atorvastatin (LIPITOR) 40 MG tablet Take 40 mg by mouth nightly     Historical Provider, MD   Warfarin Sodium (COUMADIN PO) Take 4 mg by mouth daily     Historical Provider, MD   acetaminophen (TYLENOL) 325 MG tablet Take 2 tablets by mouth every 6 hours as needed for Pain or Fever 8/24/18   Stevie Reagan MD   nitroGLYCERIN (NITROSTAT) 0.4 MG SL tablet Place 1 tablet under the tongue every 5 minutes as needed for Chest pain 8/6/18   Travon Alberts PA-C   aspirin 81 MG chewable tablet Take 81 mg by mouth daily    Historical Provider, MD   pantoprazole (PROTONIX) 40 MG tablet Take 40 mg by mouth 2 times daily     Historical Provider, MD   calcium carbonate (TUMS) 500 MG chewable tablet Take 5 tablets by mouth 3 times daily (with meals) And 3 tabs as needed with snacks    Historical Provider, MD   FLUoxetine (PROZAC) 20 MG capsule Take 20 mg by mouth daily    Historical Provider, MD   insulin lispro (HUMALOG) 100 UNIT/ML injection Inject into the skin 3 times daily (before meals) Use scale if chem > 200    Historical Provider, MD   pregabalin (LYRICA) 50 MG capsule Take 50 mg by mouth 2 times daily. Historical Provider, MD       Allergies:  Vicodin [hydrocodone-acetaminophen]; Darvocet a500 [propoxyphene n-acetaminophen]; and Demerol hcl [meperidine]    Social History:      The patient currently lives at home. TOBACCO:   reports that he has never smoked. He has never used smokeless tobacco.  ETOH:   reports that he drinks alcohol. Family History:      Positive as follows:        Problem Relation Age of Onset    Cancer Mother         SKIN    Diabetes Mother         IDDM    Heart Disease Mother     High Blood Pressure Mother     Other Father         COPD, CHRONIC BRONCHITIS    Cancer Father         PROSTATE    Diabetes Father         NIDDM       Diet:  Renal, low carb, low sodium.        REVIEW OF SYSTEMS:   Pertinent positives as noted in the Final Result   Cardiomegaly with mild pulmonary vascular congestion. **This report has been created using voice recognition software. It may contain minor errors which are inherent in voice recognition technology. **      Final report electronically signed by Dr Mulugeta Cobian on 5/14/2019 2:49 PM           DVT prophylaxis: On coumadin. Code Status: Full code      Disposition:Home    ASSESSMENT/PLAN:    1. Shortness of breath secondary to volume overload. Acute on chronic diastolic heart failure. EF preserved on last echo 2018. ProBNP >10914 (at baseline for patient). ESRD Up 1 kg from dry weight. CXR with mild pulmonary vascular congestion. Given Bumex x 1 in ED. Consult to nephrology for fluid management and HD. 2. Near syncope. Troponin chronically elevated. Trend and obtain EKG to rule out ACS. 3. Hematuria. Check UA. HX of stones. Obtain CT of the abdomen and pelvis without contrast. Consider urology consult. Has outpatient appointment secured. 4. Hypercoagulable state secondary to chronic Coumadin use. INR 3.87. Daily INR. Pharmacy to dose Coumadin. 5. S/P Mechanical aortic valve. 6. Chronic atrial fibrillation. S/p PPM.  7. CAD status post CABG and stents. 8. Hypokalemia, replace. 9. Type 2 diabetes, uncontrolled. Resume home basal insulin. Add Humalog scale. Carb control meals. Thank you Miguel Hoffman MD for the opportunity to be involved in this patient's care.     Electronically signed by BRAEDEN Strickland CNP on 5/14/2019 at 4:30 PM

## 2019-05-14 NOTE — ED NOTES
Patient transported to [place] [address]  in stable condition. Patient monitored on telemetry.          Danielle Desai LPN  69/59/07 [id number]

## 2019-05-14 NOTE — CONSULTS
Nephrology Consult Note  Patient's Name: Sagar Linares  7:34 PM  5/14/2019    Nephrologist: Mohsen Adhikari    Reason for Consult:  End stage kidney disease on hemodialysis at home  Requesting Physician:  [facility],   PCP: Shameka Nichols MD    Chief Complaint: Shortness of breath  Assessment  1. End stage kidney disease on home hemodialysis  2. Hypokalemia  3. Elevated troponin level significant uncertain  4. Normocytic anemia of chronic disease  5. Hypertension primary  6. Diabetes mellitus type 2    Plan    1. I discussed my thoughts at length with the patient and family members in the room. 2. They understood. 3. I addressed their questions  4. Labs reviewed  5. Checks x-ray report and images reviewed  6. Medications reviewed  7. Potassium replacement therapy  8. No urgent need for hemodialysis with ultrafiltration tonight  9. Hemodialysis tomorrow morning  10. Discussed with staff  11. Will follow      History Obtained From:  Patient, family, staff and medical record  History of Present Ilness:    Sagar Linares is a 60 y.o. male with history of end-stage kidney disease on home hemodialysis admitted through the emergency department after the patient presented there with shortness of breath. Chest x-ray was done which was not very remarkable. Room air pulse oximetry is 95%. Admitted for evaluation and management. He appears  comfortable without any shortness of breath now. No chest pain. He is followed by my partner Dr. Neelam Estevez.     Past Medical History:   Diagnosis Date    Anemia     Arthritis     CAD (coronary artery disease) LAST ONE 02/19/2014    ANGIOPLASTY X 2-TOTAL 4 STENTS     Charcot ankle     LEFT, SCHEDULED FOR SURGERY 04/03/2014    [facility]     Chronic ankle pain     Chronic back pain     Chronic venous hypertension w ulceration ([facility]     CKD (chronic kidney disease) stage 3, GFR 30-59 ml/min ([facility])     ON DIALYSIS    DDD (degenerative disc disease)  Diabetic retinopathy ([facility]     DM (dermatomyositis)     DM (diabetes mellitus) ([facility] DX 1978    ON INSULIN    Endocarditis 08/2018    Full dentures     Gastric bleed 2016    baki     GERD (gastroesophageal reflux disease)     H/O Bell's palsy     H/O Clostridium difficile infection febuary 2017    Hemodialysis patient [facility])     last dialysis, 3/26/2014, Mon Wed Fri at [facility] in [place] History of blood transfusion     History of cardiac cath 07/07/2018    Stent to Prox LAD     HTN (hypertension) DX 1979    Hyperkalemia     Hyperlipidemia     Medtronic micra single pacemaker  10/3/2018    MRSA (methicillin resistant Staphylococcus aureus) 1/24/2014    blood hx from another facility    Murmur, cardiac     Nephrotic syndrome 8/21/2013    Obstructive sleep apnea     has been told he has but has never been tested    Osteomyelitis of finger of right hand ([facility] 9/28/2017    Pancreatitis due to biliary obstruction     Peripheral neuropathy     of the feet from the diabetes    Peripheral neuropathy     Psychiatric problem     depression    Pulmonary edema     Renal failure     KIDNEY FUNCTION 12%    S/P peripheral artery angioplasty: 10/30/2017: PTA of the left popliteal artery. 10/30/2017    10/30/2017: PTA of the left popliteal artery.  Dr. Jose Whyte    SOB (shortness of breath)     Vision blurred     RIGHT    VRE (vancomycin resistant enterococcus) culture positive 03/2017    Wears glasses        Past Surgical History:   Procedure Laterality Date    ABLATION OF DYSRHYTHMIC FOCUS  07/21/2017    Atrial Flutter Ablation    APPENDECTOMY      CARDIAC SURGERY  2/19/2014    2 stents placed   [address]  2006    stents x 2    CARDIAC SURGERY  2003    stent x 1    CHOLECYSTECTOMY      COLONOSCOPY      DIALYSIS FISTULA CREATION Left 02/2014    ARM    ENDOSCOPY, COLON, DIAGNOSTIC      ENTEROSCOPY  9/8/2017    ENTEROSCOPY PUSH BIOPSY performed by Romy Aguilar MD at [facility] has never used smokeless tobacco. He reports that he drinks alcohol. He reports that he does not use drugs. Allergies:  Hydrocodone-acetaminophen; Vicodin [hydrocodone-acetaminophen]; Darvocet a500 [propoxyphene n-acetaminophen]; Demerol hcl [meperidine]; and Propoxyphene    Current Medications:      aspirin chewable tablet 81 mg Daily   atorvastatin (LIPITOR) tablet 40 mg Nightly   calcitRIOL (ROCALTROL) capsule 0.25 mcg Daily   calcium carbonate (TUMS) chewable tablet 2,500 mg TID WC   clopidogrel (PLAVIX) tablet 75 mg Daily   ferrous sulfate tablet 325 mg BID WC   FLUoxetine (PROZAC) capsule 20 mg Daily   insulin glargine (LANTUS) injection vial 5 Units Daily   midodrine (PROAMATINE) tablet 10 mg TID   pantoprazole (PROTONIX) tablet 40 mg BID   pregabalin (LYRICA) capsule 50 mg BID   sodium chloride flush 0.9 % injection 10 mL 2 times per day   sodium chloride flush 0.9 % injection 10 mL PRN   magnesium hydroxide (MILK OF MAGNESIA) 400 MG/5ML suspension 30 mL Daily PRN   ondansetron (ZOFRAN) injection 4 mg Q6H PRN   potassium replacement protocol RX Placeholder   insulin lispro (HUMALOG) injection vial 0-6 Units TID WC   insulin lispro (HUMALOG) injection vial 0-3 Units Nightly   warfarin (COUMADIN) daily dosing (placeholder) RX Placeholder       Review of Systems:   Pertinent positives stated above in HPI. All other systems were reviewed and were negative. ROS:Constitutional: negative  Eyes: negative  Ears, nose, mouth, throat, and face: negative  Respiratory: positive for dyspnea on exertion  Cardiovascular: negative  Gastrointestinal: negative  Genitourinary:negative  Integument/breast: negative  Hematologic/lymphatic: negative  Musculoskeletal:positive for arthralgias and stiff joints  Neurological: negative  Behavioral/Psych: negative  Endocrine: negative  Allergic/Immunologic: negative    Physical exam:   Constitutional: Well-developed middle-aged gentleman in no distress.    Vitals:   Vitals: 05/14/19 1644   BP: (!) 121/59   Pulse: 81   Resp: 16   Temp: 98.3 °F (36.8 °C)   SpO2: 92%       Skin: no rash, turgor is normal.  Heent: Pupils are reactive to light and accommodation. Throat is clear. Oral mucosa is moist.  Neck: no bruits or jvd noted  Cardiovascular:  S1, S2 without murmur. Respiratory: Clear to auscultation without any wheezes, rhonchi or rales. Abdomen:  Soft. Good bowel sounds. Nontender. No palpable mass. No abdominal bruit. Ext: No lower extremity edema. Left upper extremity brachiocephalic fistula is noted. He has good bruit and thrill. Abscence of the left big toe is noted. Abscence of the right middle finger is also noted. Psychiatric: mood and affect appropriate  Musculoskeletal:  Rom, muscular strength intact  CNS: Very awake and very alert. Well oriented. Motor strength. Normal speech. No focal deficit.     Data:   Labs:  Lab Results   Component Value Date     05/14/2019     (L) 11/12/2018     11/11/2018    K 3.3 (L) 05/14/2019    K 5.1 11/12/2018    K 4.7 11/11/2018    CL 98 05/14/2019    CO2 25 05/14/2019    CO2 23 11/12/2018    CO2 27 11/11/2018    CREATININE 5.5 (HH) 05/14/2019    CREATININE 8.0 (HH) 11/12/2018    CREATININE 6.8 (HH) 11/11/2018    BUN 30 (H) 05/14/2019    BUN 83 (H) 11/12/2018    BUN 63 (H) 11/11/2018    GLUCOSE 151 (H) 05/14/2019    GLUCOSE 150 (H) 11/12/2018    GLUCOSE 159 (H) 11/11/2018    PHOS 4.2 11/08/2018    PHOS 10.3 (H) 07/09/2018    PHOS 8.4 (H) 11/29/2017    WBC 5.7 05/14/2019    WBC 6.9 12/31/2018    WBC 10.1 11/13/2018    HGB 10.3 (L) 05/14/2019    HGB 8.4 (L) 12/31/2018    HGB 6.8 (LL) 11/13/2018    HCT 33.8 (L) 05/14/2019    HCT 28.0 (L) 12/31/2018    HCT 22.5 (L) 11/13/2018    MCV 92.1 05/14/2019     05/14/2019     {Labs reviewed    Imaging:  CXR results: Report and images reviewed        Thank you Dr. Lawyer Rey MD for allowing us to participate in care of Demar Su       Electronically signed by Con Cruz MD on 5/14/2019 at 7:34 PM

## 2019-05-14 NOTE — ED NOTES
Ambulated to restroom. Denies any change in sob or dizziness. Upon arrival back to room pt oxygen sat 96%.      Martin Salas LPN  93/47/58 [id number]

## 2019-05-14 NOTE — PROGRESS NOTES
Pt admitted to  6K21 from ED. Complaints: Shortness of breath. IV site free of s/s of infection or infiltration. Vital signs obtained. Assessment and data collection initiated. Two nurse skin assessment performed by Stephanie Cole RN and [facility]. Oriented to room. Policies and procedures for 6K explained. All questions answered with no further questions at this time. Fall prevention and safety brochure discussed with patient. Bed alarm on. Call light in reach.

## 2019-05-14 NOTE — ED PROVIDER NOTES
[facility]  eMERGENCY dEPARTMENT eNCOUnter          CHIEF COMPLAINT       Chief Complaint   Patient presents with    Shortness of Breath       Nurses Notes reviewed and I agree except as noted in the HPI. HISTORY OF PRESENT ILLNESS    Tariq Gonzalez is a 60 y.o. male who presents to the Emergency Department for the evaluation of shortness of breath onset yesterday. He reports orthostatic dizziness, lightheadedness, hematuria and blood in stool. He denies cough, fever, chills, diaphoresis, palpitations or chest pain. Patient is a dialysis patient had states he gets it for 4.5 hours each time. He states he looses about 1-3 kilograms each time. He states he lost about 2+ kilograms yesterday. The patient follows Dr. Lor Suarez as his nephrologist. Patient is on Plavix and Coumadin. The patient has a past medical history of anemia, arthritis, CAD, CHF, CKD, DM, GERD and mechanical aortic valve. No further complaints at the time of the initial encounter. The HPI was provided by the patient. REVIEW OF SYSTEMS     Review of Systems   Constitutional: Negative for appetite change, chills, fatigue and fever. HENT: Negative for congestion, ear pain, rhinorrhea and sore throat. Eyes: Negative for discharge, redness and visual disturbance. Respiratory: Positive for shortness of breath. Negative for cough and wheezing. Cardiovascular: Negative for chest pain, palpitations and leg swelling. Gastrointestinal: Positive for blood in stool. Negative for abdominal pain, diarrhea, nausea and vomiting. Genitourinary: Positive for hematuria. Negative for decreased urine volume, difficulty urinating and dysuria. Musculoskeletal: Negative for arthralgias, back pain, joint swelling and neck pain. Skin: Negative for pallor and rash. Allergic/Immunologic: Negative for environmental allergies. Neurological: Positive for dizziness (orthostatic) and light-headedness.  Negative for syncope, weakness, single/multiple (N/A, 8/17/2017); Hand surgery (Right, 8/18/2017); pr colon ca scrn not hi rsk ind (Left, 9/8/2017); Enteroscopy (9/8/2017); pr amputate metacarpal+finger (Right, 9/28/2017); Finger amputation (Right, 09/28/2017); pr office/outpt visit,procedure only (N/A, 8/17/2018); pr office/outpt visit,procedure only (Right, 10/18/2018); Toe amputation (Right, 10/2018); pacemaker placement (08/2018); Upper gastrointestinal endoscopy (N/A, 11/9/2018); and pr endoscopy upper small intestine w/biopsy (N/A, 11/11/2018). CURRENT MEDICATIONS       Current Discharge Medication List      CONTINUE these medications which have NOT CHANGED    Details   clopidogrel (PLAVIX) 75 MG tablet TAKE ONE TABLET BY MOUTH DAILY  Qty: 30 tablet, Refills: 1      insulin glargine (LANTUS) 100 UNIT/ML injection vial Inject 55 Units into the skin daily       ferrous sulfate 325 (65 Fe) MG tablet Take 1 tablet by mouth 2 times daily (with meals)  Qty: 30 tablet, Refills: 3      calcitRIOL (ROCALTROL) 0.25 MCG capsule Take 1 capsule by mouth daily  Qty: 30 capsule, Refills: 3      atorvastatin (LIPITOR) 40 MG tablet Take 40 mg by mouth nightly       Warfarin Sodium (COUMADIN PO) Take 4 mg by mouth daily Indications: Sun, Fri, Sat 6mg coumadin.  All the other days 4mg.   Dosed by [facility]       acetaminophen (TYLENOL) 325 MG tablet Take 2 tablets by mouth every 6 hours as needed for Pain or Fever  Qty: 120 tablet, Refills: 3      nitroGLYCERIN (NITROSTAT) 0.4 MG SL tablet Place 1 tablet under the tongue every 5 minutes as needed for Chest pain  Qty: 25 tablet, Refills: 3      aspirin 81 MG chewable tablet Take 81 mg by mouth daily      pantoprazole (PROTONIX) 40 MG tablet Take 40 mg by mouth 2 times daily       calcium carbonate (TUMS) 500 MG chewable tablet Take 5 tablets by mouth 3 times daily (with meals) And 3 tabs as needed with snacks      FLUoxetine (PROZAC) 20 MG capsule Take 20 mg by mouth daily      insulin lispro pulses. Exam reveals no gallop and no friction rub. No murmur heard. Pulmonary/Chest: Effort normal and breath sounds normal. No respiratory distress. He has no decreased breath sounds. He has no wheezes. He has no rhonchi. He has no rales. Abdominal: Soft. Bowel sounds are normal. He exhibits no distension. There is no tenderness. There is no rebound, no guarding and no CVA tenderness. Musculoskeletal: Normal range of motion. He exhibits no edema. Neurological: He is alert and oriented to person, place, and time. He exhibits normal muscle tone. Coordination normal.   Skin: Skin is warm and dry. No rash noted. He is not diaphoretic. The fistula on left forearm has good bruit. Nursing note and vitals reviewed. DIAGNOSTIC RESULTS     EKG: All EKG's are interpreted by the Emergency Department Physician who either signs or Co-signs this chart in the absence of a cardiologist.  EKG interpreted by Francisca Gary MD:    Vent. Rate: 81 bpm  IA interval: * ms  QRS duration: 172 ms  QTc: 513 ms  P-R-T axes: *, 185, -30  Wide QRS rhythm   Right bundle branch block   T wave abnormality, consider inferolateral ischemia   No STEMI. Compared to old EKG on 08-NOV-2018    RADIOLOGY: non-plain film images(s) such as CT, Ultrasound and MRI are read by the radiologist.    CT ABDOMEN PELVIS WO CONTRAST Additional Contrast? Radiologist Recommendation   Final Result   1. Mild ascites very similar to the prior exam but I do not identify a peritoneal dialysis catheter. Stable retroperitoneal lymphadenopathy likely reactive. 3. Hepatosplenomegaly. 4. Possible cystitis. **This report has been created using voice recognition software. It may contain minor errors which are inherent in voice recognition technology. **      Final report electronically signed by Dr. Andrey Tran on 5/15/2019 9:19 AM      XR CHEST PORTABLE   Final Result   Cardiomegaly with mild pulmonary vascular congestion.          **This report has been created using voice recognition software. It may contain minor errors which are inherent in voice recognition technology. **      Final report electronically signed by Dr Anabelle Gonzalez on 5/14/2019 2:49 PM          LABS:   Labs Reviewed   CBC WITH AUTO DIFFERENTIAL - Abnormal; Notable for the following components:       Result Value    RBC 3.67 (*)     Hemoglobin 10.3 (*)     Hematocrit 33.8 (*)     MCHC 30.5 (*)     RDW-CV 19.1 (*)     RDW-SD 63.7 (*)     Lymphocytes # 0.6 (*)     All other components within normal limits   PROTIME-INR - Abnormal; Notable for the following components:    INR 3.87 (*)     All other components within normal limits   BRAIN NATRIURETIC PEPTIDE - Abnormal; Notable for the following components:    Pro-BNP > 80790.0 (*)     All other components within normal limits   BASIC METABOLIC PANEL - Abnormal; Notable for the following components:    Potassium 3.3 (*)     Glucose 151 (*)     BUN 30 (*)     CREATININE 5.5 (*)     All other components within normal limits   HEPATIC FUNCTION PANEL - Abnormal; Notable for the following components:    Alkaline Phosphatase 136 (*)     ALT 9 (*)     Total Protein 8.2 (*)     All other components within normal limits   TROPONIN - Abnormal; Notable for the following components:    Troponin T 0.089 (*)     All other components within normal limits   BLOOD GAS, ARTERIAL - Abnormal; Notable for the following components:    pH, Blood Gas 7.49 (*)     PO2 187 (*)     Base Excess (Calculated) 2.9 (*)     All other components within normal limits   IRON - Abnormal; Notable for the following components:    Iron 36 (*)     All other components within normal limits   IRON SATURATION - Abnormal; Notable for the following components:    Iron Saturation 17 (*)     All other components within normal limits   FERRITIN - Abnormal; Notable for the following components:    Ferritin 347 (*)     All other components within normal limits   ANION GAP - Abnormal; Notable for the following components:    Anion Gap 17.0 (*)     All other components within normal limits   GLOMERULAR FILTRATION RATE, ESTIMATED - Abnormal; Notable for the following components:    Est, Glom Filt Rate 11 (*)     All other components within normal limits   TROPONIN - Abnormal; Notable for the following components:    Troponin T 0.074 (*)     All other components within normal limits   BASIC METABOLIC PANEL W/ REFLEX TO MG FOR LOW K - Abnormal; Notable for the following components:    Glucose 173 (*)     BUN 38 (*)     CREATININE 7.2 (*)     All other components within normal limits   CBC WITH AUTO DIFFERENTIAL - Abnormal; Notable for the following components:    RBC 3.67 (*)     Hemoglobin 10.2 (*)     Hematocrit 33.8 (*)     MCHC 30.2 (*)     RDW-CV 19.4 (*)     RDW-SD 65.5 (*)     Lymphocytes # 0.6 (*)     All other components within normal limits   PROTIME-INR - Abnormal; Notable for the following components:    INR 4.16 (*)     All other components within normal limits   ANION GAP - Abnormal; Notable for the following components:    Anion Gap 17.0 (*)     All other components within normal limits   GLOMERULAR FILTRATION RATE, ESTIMATED - Abnormal; Notable for the following components:    Est, Glom Filt Rate 8 (*)     All other components within normal limits   POCT GLUCOSE - Abnormal; Notable for the following components:    POC Glucose 218 (*)     All other components within normal limits   POCT GLUCOSE - Abnormal; Notable for the following components:    POC Glucose 232 (*)     All other components within normal limits   POCT GLUCOSE - Abnormal; Notable for the following components:    POC Glucose 175 (*)     All other components within normal limits   POCT GLUCOSE - Abnormal; Notable for the following components:    POC Glucose 110 (*)     All other components within normal limits   RAPID INFLUENZA A/B ANTIGENS   CULTURE BLOOD #2    Narrative:     Source: blood-Adult-suboptimal <5.5oz./set volume the documentation, reviewed and edited the documentation which was dictated to the scribe in my presence, and it accurately records my words and actions.     Dora Gudino MD 5/14/19 2:00 PM        Dora Gudino MD  05/15/19 0545

## 2019-05-15 NOTE — PLAN OF CARE
Problem: Pain:  Goal: Patient's pain/discomfort is manageable  Description  Patient's pain/discomfort is manageable  Outcome: Ongoing  Note:   Patient denies pain at this time     Problem: Skin Integrity:  Goal: Skin integrity will stabilize  Description  Skin integrity will stabilize  Outcome: Ongoing  Note:   No new signs or symptoms of skin breakdown noted this shift, encouraging patient to turn and reposition self in bed q2h       Problem: Discharge Planning:  Goal: Patients continuum of care needs are met  Description  Patients continuum of care needs are met  Outcome: Ongoing  Note:   Patient plans to discharge home with spouse     Problem: Physical Regulation:  Goal: Prevent transmision of infection  Description  Prevent transmision of infection  Outcome: Ongoing       Care plan reviewed with patient. Patient verbalize understanding of the plan of care and contribute to goal setting.

## 2019-05-15 NOTE — PROGRESS NOTES
Nephrology Progress Note    Patient - Sandra Higgins   MRN -  [id number]   Acct # - [de-identified]      - 1960    60 y.o. Admit Date: 2019  Hospital Day: 1  Location: --A  Date of evaluation -  5/15/2019    Subjective:   CC: shortness of breath  Pt seen during hemodialysis, Hemodynamically stable in low 100s/, 3 K bath, goal Ultrafiltration 3000 ml  Denies sob. Room air   BP Range: Systolic (03OYY), CXH:111 , Min:109 , BZR:984      Diastolic (04IKP), LBK:65, Min:49, Max:72    Objective:   VITALS:  BP (!) 114/57   Pulse 72   Temp 97.7 °F (36.5 °C)   Resp 18   Ht 6' 1\" (1.854 m)   Wt 245 lb 9.5 oz (111.4 kg)   SpO2 95%   BMI 32.40 kg/m²    Patient Vitals for the past 24 hrs:   BP Temp Temp src Pulse Resp SpO2 Height Weight   05/15/19 0828 (!) 114/57 97.7 °F (36.5 °C) -- 72 -- -- -- 245 lb 9.5 oz (111.4 kg)   05/15/19 0815 (!) 122/58 98 °F (36.7 °C) Oral 75 18 95 % -- --   05/15/19 0425 (!) 149/71 97.8 °F (36.6 °C) Oral 80 18 96 % -- 245 lb 11.2 oz (111.4 kg)   19 2302 118/72 98.9 °F (37.2 °C) Oral 82 18 95 % -- --   19 (!) 144/67 98.4 °F (36.9 °C) Oral 85 17 94 % -- --   19 1644 (!) 121/59 98.3 °F (36.8 °C) Oral 81 16 92 % 6' 1\" (1.854 m) 249 lb 6.4 oz (113.1 kg)   19 1611 (!) 109/49 -- -- 67 16 92 % -- --   19 1422 (!) 132/53 98.1 °F (36.7 °C) Oral 77 16 98 % -- --          Intake/Output Summary (Last 24 hours) at 5/15/2019 1102  Last data filed at 5/15/2019 0425  Gross per 24 hour   Intake 775 ml   Output --   Net 775 ml       Admission weight: 249 lb 6.4 oz (113.1 kg)  Patient Vitals for the past 96 hrs (Last 3 readings):   Weight   05/15/19 0828 245 lb 9.5 oz (111.4 kg)   05/15/19 0425 245 lb 11.2 oz (111.4 kg)   19 1644 249 lb 6.4 oz (113.1 kg)     Wt Readings from Last 3 Encounters:   05/15/19 245 lb 9.5 oz (111.4 kg)   19 245 lb (111.1 kg)   18 260 lb (117.9 kg)     Body mass index is 32.4 kg/m².     EXAM:  CONSTITUTIONAL:  No acute distress. Pleasant  HEENT:  Head is normocephalic, Extraocular movement intact. Neck is supple. Voice is clear. CARDIOVASCULAR:  S1, S2  regular rate and rhythm. RESPIRATORY: Clear to ausculation bilaterally. Equal breath sounds. No wheezes. No shortness of breath noted at rest.  ABDOMEN: soft, non tender  NEUROLOGICAL: Patient is alert and oriented to person, place, and time. Recent and remote memory intact. Thought is coherant. SKIN: no rash, No significant bruises on exposed surfaces  MUSCULOSKELETAL: Movement is coordinated. Moves all extremities   EXTREMITIES: Distal lower extremity temp is warm, No lower extremity edema. Upper extremity AV Fistula   PSYCHIATRIC: mood and affect appropriate. Medications:   Med reviewed  Scheduled Meds:   aspirin  81 mg Oral Daily    atorvastatin  40 mg Oral Nightly    calcitRIOL  0.25 mcg Oral Daily    calcium carbonate  5 tablet Oral TID WC    clopidogrel  75 mg Oral Daily    ferrous sulfate  325 mg Oral BID WC    FLUoxetine  20 mg Oral Daily    insulin glargine  5 Units Subcutaneous Daily    midodrine  10 mg Oral TID    pantoprazole  40 mg Oral BID    pregabalin  50 mg Oral BID    sodium chloride flush  10 mL Intravenous 2 times per day    potassium replacement protocol   Other RX Placeholder    insulin lispro  0-6 Units Subcutaneous TID     insulin lispro  0-3 Units Subcutaneous Nightly    warfarin (COUMADIN) daily dosing (placeholder)   Other RX Placeholder     Labs:   Labs reviewed  Recent Labs     05/14/19  1447 05/15/19  0553    140   K 3.3* 3.6   CL 98 100   CO2 25 23   BUN 30* 38*   CREATININE 5.5* 7.2*   LABGLOM 11* 8*   GLUCOSE 151* 173*   CALCIUM 9.5 9.2     Recent Labs     05/14/19  1447 05/15/19  0553   WBC 5.7 6.2   RBC 3.67* 3.67*   HGB 10.3* 10.2*   HCT 33.8* 33.8*    144      Lab Results   Component Value Date    INR 4.16 (H) 05/15/2019    INR 3.87 (H) 05/14/2019    INR 2.00 (H) 12/31/2018       ASSESSMENT  1.  End Stage Renal Disease on Hemodialysis, AV fistula, 3 K bath  2. Acute on chronic diastolic HF with preserved EF, goal Ultrafiltration 3000 ml  3. Normocytic, normochromic anemia, likely 2nd to chronic disease, Hgb at goal   4. Chronic hypotension on Midodrine  5. Essential Hypertension with nephrosclerosis  6. 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 S/P Mechanical Aortic valve, Coumadin  8. Coronary artery disease s/p CABG, PCI    Active Problems:    ESRD (end stage renal disease) on dialysis (HCC)    Anemia in chronic kidney disease (CKD)    Secondary hyperparathyroidism of renal origin ([facility] [address]    Hyperphosphatemia    Volume overload    Hypokalemia  Resolved Problems:    * No resolved hospital problems.  BRAEDEN Brown - CNP 11:02 AM 5/15/2019

## 2019-05-15 NOTE — FLOWSHEET NOTE
05/15/19 0828 05/15/19 1238   Vital Signs   BP (!) 114/57 (!) 106/51   Temp 97.7 °F (36.5 °C) 97.6 °F (36.4 °C)   Pulse 72 71   Weight 245 lb 9.5 oz (111.4 kg) 239 lb 6.7 oz (108.6 kg)   Weight Method Standing scale Bed scale   Post-Hemodialysis Assessment   Post-Treatment Procedures  --  Blood returned; Access bleeding time < 10 minutes   Machine Disinfection Process  --  Acid/Vinegar Clean;Heat Disinfect; Exterior Machine Disinfection   Total Liters Processed (l/min)  --  70.7 l/min   Dialyzer Clearance  --  Lightly streaked   Duration of Treatment (minutes)  --  210 minutes   Heparin amount administered during treatment (units)  --  0 units   Hemodialysis Intake (ml)  --  400 ml   Hemodialysis Output (ml)  --  3214 ml   NET Removed (ml)  --  2814 ml   Tolerated Treatment  --  Good   Stable 3.5 hr tx. Removed 2814 ml of fluid. pt c/o muscle cramping during last 13 min of tx. uf turned off. pt tolerated well no further c/o voiced. Pressure held to each needle site for 10 min. Drsg dry and intact upon leaving dialysis unit. Report called to primary RN. TX record printed for scanning into EMR.

## 2019-05-15 NOTE — PROGRESS NOTES
Clinical Pharmacy Note    Warfarin consult follow-up    Recent Labs     05/15/19  0553   INR 4.16*     Recent Labs     05/14/19  1447 05/15/19  0553   HGB 10.3* 10.2*   HCT 33.8* 33.8*    144       Significant Drug-Drug Interactions:  New warfarin drug-drug interactions: none  Discontinued drug-drug interactions: none  Current warfarin drug-drug interactions: aspirin (home), clopidogrel (home)        Date INR Warfarin Dose   5/14/2019 3.87 4 mg prior to arrival   5/15/2019  4.16  None                                                Notes:                     Daily PT/INR until stable within therapeutic range.

## 2019-05-15 NOTE — PROGRESS NOTES
Hospitalist Progress Note    Patient:  Sandra Higgins      Unit/Bed:6K-21/021-A    YOB: 1960    MRN: [id number]       Acct: [de-identified]     PCP: Council Aschoff, MD    Date of Admission: 5/14/2019    Reason for admission: sudden sob, lightheadedness    Expected discharge date:  1-2 d     Disposition: Home    Hospital Course: Just arrived. pt with esrd, dm, cabg, avr, presents with above. Also noted blood in stool and urine. Felt to be in fluid overload and admitted. Considered to have acute on chronic diastolic heart failure.     In the Last 24 hours:  Getting dialysis    Medications:  Reviewed    Infusion Medications    dextrose       Scheduled Medications    aspirin  81 mg Oral Daily    atorvastatin  40 mg Oral Nightly    calcitRIOL  0.25 mcg Oral Daily    calcium carbonate  5 tablet Oral TID WC    clopidogrel  75 mg Oral Daily    ferrous sulfate  325 mg Oral BID WC    FLUoxetine  20 mg Oral Daily    insulin glargine  5 Units Subcutaneous Daily    midodrine  10 mg Oral TID    pantoprazole  40 mg Oral BID    pregabalin  50 mg Oral BID    sodium chloride flush  10 mL Intravenous 2 times per day    potassium replacement protocol   Other RX Placeholder    insulin lispro  0-6 Units Subcutaneous TID WC    insulin lispro  0-3 Units Subcutaneous Nightly    warfarin (COUMADIN) daily dosing (placeholder)   Other RX Placeholder     PRN Meds: sodium chloride flush, magnesium hydroxide, ondansetron, glucose, dextrose, glucagon (rDNA), dextrose      Intake/Output Summary (Last 24 hours) at 5/15/2019 1118  Last data filed at 5/15/2019 0425  Gross per 24 hour   Intake 775 ml   Output --   Net 775 ml       Diet:  DIET RENAL; Carb Control: 4 carb choices (60 gms)/meal; Low Sodium (2 GM)    Exam:  BP (!) 114/57   Pulse 72   Temp 97.7 °F (36.5 °C)   Resp 18   Ht 6' 1\" (1.854 m)   Wt 245 lb 9.5 oz (111.4 kg)   SpO2 95%   BMI 32.40 kg/m²     Physical Examination: General appearance - chronically ill appearing  Mental status - alert, oriented to person, place, and time  Neck - supple, no significant adenopathy, no JVD, or carotid bruits  Chest - clear to auscultation, no wheezes, rales or rhonchi, symmetric air entry  Heart - normal rate and regular rhythm, systolic murmur 2/6 at 2nd right intercostal space and at lower left sternal border  Abdomen - soft, nontender, nondistended, no masses or organomegaly  Neurological - alert, oriented, normal speech, no focal findings or movement disorder noted  Musculoskeletal - no muscular tenderness noted  Extremities - no pedal edema noted  Skin - normal coloration and turgor, no rashes, no suspicious skin lesions noted    Labs:   Recent Labs     05/14/19  1447 05/15/19  0553   WBC 5.7 6.2   HGB 10.3* 10.2*   HCT 33.8* 33.8*    144     Recent Labs     05/14/19  1447 05/15/19  0553    140   K 3.3* 3.6   CL 98 100   CO2 25 23   BUN 30* 38*   CREATININE 5.5* 7.2*   CALCIUM 9.5 9.2     Recent Labs     05/14/19  1447   AST 19   ALT 9*   BILIDIR <0.2   BILITOT 0.5   ALKPHOS 136*     Recent Labs     05/14/19  1447 05/15/19  0553   INR 3.87* 4.16*     No results for input(s): CKTOTAL, TROPONINI in the last 72 hours. Microbiology:      Urinalysis:      Lab Results   Component Value Date    NITRU NEGATIVE 10/16/2018    WBCUA > 200 10/16/2018    BACTERIA NONE 10/16/2018    RBCUA > 200 10/16/2018    BLOODU LARGE 10/16/2018    GLUCOSEU 250 10/16/2018       Radiology:  Ct Abdomen Pelvis Wo Contrast Additional Contrast? Radiologist Recommendation    Result Date: 5/15/2019  PROCEDURE: CT ABDOMEN PELVIS WO CONTRAST CLINICAL INFORMATION: hematuria hx stones . COMPARISON: 12/5/2016 TECHNIQUE: 2-D multiplanar noncontrast CT abdomen pelvis All CT scans at this facility use dose modulation, iterative reconstruction, and/or weight-based dosing when appropriate to reduce radiation dose to as low as reasonably achievable.  FINDINGS: Lung bases No

## 2019-05-16 NOTE — DISCHARGE SUMMARY
Hospital Medicine Discharge Summary      Patient Identification:   Demar Su   : 1960  MRN: [id number]   Account: [de-identified]      Patient's PCP: Lawyer Rey MD    Admit Date: 2019     Discharge Date:   2019      Admitting Physician: Maxime Taylor DO     Discharge Physician: Kirsten Alvarez MD     Discharge Diagnoses: Active Hospital Problems    Diagnosis Date Noted    ESRD (end stage renal disease) on dialysis ([facility] [N18.6, Z99.2]      Priority: High    Volume overload [E87.70] 2019    Hypokalemia [E87.6]     Coronary artery disease involving coronary bypass graft of native heart without angina pectoris [I25.810] 2017    Hyperphosphatemia [E83.39] 2017    Secondary hyperparathyroidism of renal origin ([facility] [N25.81]     AVM (arteriovenous malformation) of colon without hemorrhage [Q27.33]     Diabetic polyneuropathy associated with type 2 diabetes mellitus ([facility] [E11.42]     Anemia in chronic kidney disease (CKD) [N18.9, D63.1]        The patient was seen and examined on day of discharge and this discharge summary is in conjunction with any daily progress note from day of discharge. Hospital Course:   Demar Su is a 60 y.o. male admitted to [address] [place] [address] on 2019 for shortness of breath. The pt has ESRD, is on home hemodialysis 5 days a week. He suddenly became sob the day of admission and came to ER where he was thought to be fluid overloaded from a combination  Of acute on chronic diastolic heart failure and perhaps inadequate dialysis and was admitted. He underwent urgent dialysis and felt better. Nephrology felt he was stable for discharge and he will follow up with them. His lungs were clear at discharge. His trops were mildly elevated but then they always are; his ekg was unchanged. He also commented re dizziness felt to be on an orthostatic basis.   Standing pressures should be checked periodically. Exam:     Vitals:  Vitals:    05/15/19 2030 05/15/19 2259 05/16/19 0316 05/16/19 0730   BP: 130/62 (!) 142/70 (!) 124/59 (!) 153/70   Pulse: 73 77 75 78   Resp: 17 16 18 18   Temp: 98.4 °F (36.9 °C) 97.3 °F (36.3 °C) 98 °F (36.7 °C) 98.9 °F (37.2 °C)   TempSrc: Oral Oral Oral Oral   SpO2: 92% 93% 96% 96%   Weight:   242 lb (109.8 kg)    Height:         Weight: Weight: 242 lb (109.8 kg)     24 hour intake/output:    Intake/Output Summary (Last 24 hours) at 5/16/2019 1036  Last data filed at 5/16/2019 0316  Gross per 24 hour   Intake 1030 ml   Output 3339 ml   Net -2309 ml         General appearance:  No apparent distress, appears stated age and cooperative. HEENT:  Normal cephalic, atraumatic without obvious deformity. Pupils equal, round, and reactive to light. Extra ocular muscles intact. Conjunctivae/corneas clear. Neck: Supple, with full range of motion. No jugular venous distention. Trachea midline. Respiratory:  Normal respiratory effort. Clear to auscultation, bilaterally without Rales/Wheezes/Rhonchi. Cardiovascular:  Regular rate and rhythm with normal S1/S2 without murmurs, rubs or gallops. Abdomen: Soft, non-tender, non-distended with normal bowel sounds. Musculoskeletal:  No clubbing, cyanosis or edema bilaterally. Full range of motion without deformity. Skin: Skin color, texture, turgor normal.  No rashes or lesions. Neurologic:  Neurovascularly intact without any focal sensory/motor deficits. Cranial nerves: II-XII intact, grossly non-focal.  Psychiatric:  Alert and oriented, thought content appropriate, normal insight      Labs:  For convenience and continuity at follow-up the following most recent labs are provided:      CBC:    Lab Results   Component Value Date    WBC 6.2 05/15/2019    HGB 10.2 05/15/2019    HCT 33.8 05/15/2019     05/15/2019       Renal:    Lab Results   Component Value Date     05/16/2019    K 4.0 05/16/2019    K 3.6 05/15/2019 CL 98 05/16/2019    CO2 22 05/16/2019    BUN 31 05/16/2019    CREATININE 6.1 05/16/2019    CALCIUM 9.6 05/16/2019    PHOS 4.2 11/08/2018         Significant Diagnostic Studies    Radiology:   CT ABDOMEN PELVIS WO CONTRAST Additional Contrast? Radiologist Recommendation   Final Result   1. Mild ascites very similar to the prior exam but I do not identify a peritoneal dialysis catheter. Stable retroperitoneal lymphadenopathy likely reactive. 3. Hepatosplenomegaly. 4. Possible cystitis. **This report has been created using voice recognition software. It may contain minor errors which are inherent in voice recognition technology. **      Final report electronically signed by Dr. Andrey Tran on 5/15/2019 9:19 AM      XR CHEST PORTABLE   Final Result   Cardiomegaly with mild pulmonary vascular congestion. **This report has been created using voice recognition software. It may contain minor errors which are inherent in voice recognition technology. **      Final report electronically signed by Dr Kiera Jackson on 5/14/2019 2:49 PM             Consults:     IP CONSULT TO NEPHROLOGY  IP CONSULT TO PHARMACY    Disposition: Home  Condition at Discharge: Stable    Code Status:  Full Code     Patient Instructions:    Discharge lab work:    Activity: activity as tolerated  Diet: DIET RENAL; Carb Control: 4 carb choices (60 gms)/meal; Low Sodium (2 GM)      Follow-up visits:   Karoline Sauceda, [address]  [phone]               Discharge Medications:      Mirta Lev   Home Medication Instructions ZER:[id number]    Printed on:05/16/19 1036   Medication Information                      acetaminophen (TYLENOL) 325 MG tablet  Take 2 tablets by mouth every 6 hours as needed for Pain or Fever             aspirin 81 MG chewable tablet  Take 81 mg by mouth daily             atorvastatin (LIPITOR) 40 MG tablet  Take 40 mg by mouth nightly              calcitRIOL (ROCALTROL) 0.25 MCG capsule  Take 1 capsule by mouth daily             calcium carbonate (TUMS) 500 MG chewable tablet  Take 5 tablets by mouth 3 times daily (with meals) And 3 tabs as needed with snacks             clopidogrel (PLAVIX) 75 MG tablet  TAKE ONE TABLET BY MOUTH DAILY             ferrous sulfate 325 (65 Fe) MG tablet  Take 1 tablet by mouth 2 times daily (with meals)             FLUoxetine (PROZAC) 20 MG capsule  Take 20 mg by mouth daily             insulin glargine (LANTUS) 100 UNIT/ML injection vial  Inject 55 Units into the skin daily              insulin lispro (HUMALOG) 100 UNIT/ML injection  Inject into the skin 3 times daily (before meals) Use scale if chem > 200             midodrine (PROAMATINE) 10 MG tablet  Take 10 mg by mouth 3 times daily as needed              nitroGLYCERIN (NITROSTAT) 0.4 MG SL tablet  Place 1 tablet under the tongue every 5 minutes as needed for Chest pain             NONFORMULARY  Protein bars             pantoprazole (PROTONIX) 40 MG tablet  Take 40 mg by mouth 2 times daily              pregabalin (LYRICA) 50 MG capsule  Take 50 mg by mouth 2 times daily. Warfarin Sodium (COUMADIN PO)  Take 4 mg by mouth daily Indications: Sun, Fri, Sat 6mg coumadin.  All the other days 4mg. Dosed by [facility]                  Time Spent on discharge is more than 20 minutes in the examination, evaluation, counseling and review of medications and discharge plan. Signed: Thank you Venessa Dotson MD for the opportunity to be involved in this patient's care.     Electronically signed by Valentino Jesus, MD on 5/16/2019 at 10:36 AM

## 2019-05-16 NOTE — PROGRESS NOTES
Renal Progress Note    Assessment and Plan:    1. End stage kidney disease on hemodialysis. Coleman Gu 2  Hypertension. 3.  Metabolic acidosis from end-stage kidney disease. 4.  Diabetes mellitus type 2.\  5. Normocytic anemia of chronic disease. PLAN:   Medications reviewed. No changes. Okay to discharge when the  by the primary service  Otherwise hemodialysis tomorrow. Discussed with the patient. Patient Active Problem List:     SOB (shortness of breath)     Anemia in chronic kidney disease (CKD)     Essential hypertension     Coronary artery disease involving native heart without angina pectoris     Diabetes mellitus type 2, controlled (HCC)     Chronic back pain greater than 3 months duration     Chronic pain of both ankles     Peripheral neuropathy     Secondary DM with hypertension and ESRD on dialysis ([address]     Peripheral neuropathy ([address]     Proliferative diabetic retinopathy ([address]     Iron deficiency anemia due to chronic blood loss     Chest pain     ESRD (end stage renal disease) on dialysis ([address]     AVM (arteriovenous malformation) of colon without hemorrhage     Metabolic acidosis     Diabetic polyneuropathy associated with type 2 diabetes mellitus ([address]     Charcot's joint of foot in type 2 diabetes mellitus ([address]     Proliferative diabetic retinopathy without macular edema associated with type 2 diabetes mellitus ([address]     Secondary hyperparathyroidism of renal origin ([address]     Obstructive sleep apnea     Arterial hypotension     ESRD on hemodialysis ([facility])     S/P AVR (aortic valve replacement)     S/P CABG x 1     Hyperphosphatemia     Coronary artery disease involving coronary bypass graft of native heart without angina pectoris     Osteomyelitis of finger of right hand ([facility])     S/P peripheral artery angioplasty: 10/30/2017: PTA of the left popliteal artery.      Peripheral vascular disease (HCC)     Atheroembolism of both lower extremities (HCC)     Chest pain     Chronic systolic heart edema  Musculoskeletal:Intact  Neuro:Alert and oriented with no deficit      Electronically signed by Iliana Dodd MD on 5/16/2019 at 4:35 PM

## 2019-05-16 NOTE — PROGRESS NOTES
Clinical Pharmacy Note    Warfarin consult follow-up    Recent Labs     05/16/19  0618   INR 3.10*     Recent Labs     05/14/19  1447 05/15/19  0553   HGB 10.3* 10.2*   HCT 33.8* 33.8*    144       Significant Drug-Drug Interactions:  New warfarin drug-drug interactions: none  Discontinued drug-drug interactions: none  Current warfarin drug-drug interactions: aspirin (home), clopidogrel (home)        Date INR Warfarin Dose   5/14/2019 3.87 4 mg prior to arrival   5/15/2019  4.16  None    5/16/2019  3.1  0.5 mg                                        Notes:                     Daily PT/INR until stable within therapeutic range.

## 2019-05-16 NOTE — CARE COORDINATION
5/16/19, 12:04 PM    Home with wife. Resume home HD. Denies needs. Discharge plan discussed by  and . Discharge plan reviewed with patient/ family. Patient/ family verbalize understanding of discharge plan and are in agreement with plan. Understanding was demonstrated using the teach back method.        IMM Letter  IMM Letter given to Patient/Family/Significant other/Guardian/POA/by[de-identified] staff  IMM Letter date given[de-identified] 05/14/19  IMM Letter time given[de-identified] 4049
Placeholder    insulin lispro  0-6 Units Subcutaneous TID WC    insulin lispro  0-3 Units Subcutaneous Nightly    warfarin (COUMADIN) daily dosing (placeholder)   Other RX Placeholder     Continuous Infusions:   dextrose        Pertinent Info/Orders/Treatment Plan:  Creat 7.2, trop elev chronically, BNP >26079. Nephrology managing HD. Diet: DIET RENAL; Carb Control: 4 carb choices (60 gms)/meal; Low Sodium (2 GM)   Smoking status:  reports that he has never smoked. He has never used smokeless tobacco.   PCP: Vignesh Obregon MD  Readmission: no  Readmission Risk Score: 32%    Discharge Planning  Current Residence:  Private Residence  Living Arrangements:  Spouse/Significant Other   Support Systems:  Children, Spouse/Significant Other, Family Members  Current Services PTA:     Potential Assistance Needed:  N/A  Potential Assistance Purchasing Medications:  No  Does patient want to participate in local refill/ meds to beds program?  No  Type of Home Care Services:  None  Patient expects to be discharged to:  home w/ wife  Expected Discharge date:  05/16/19  Follow Up Appointment: Best Day/ Time: Tuesday AM    Discharge Plan: Lesly Marks is off the unit, spoke with his wife. She states they plan return home together at discharge. Lesly Marks is a patient of [facility], and does home HD. She declines HH, denies discharge needs.       Evaluation: no

## 2019-05-16 NOTE — PLAN OF CARE
Problem: Pain:  Goal: Patient's pain/discomfort is manageable  Description  Patient's pain/discomfort is manageable  5/16/2019 0017 by Colt Nixon RN  Outcome: Ongoing  Note:   Patient at risk for increase in pain level d/t chronic back pain. Patients current pain goal is 2/10. Current interventions in place to manage acute and/or chronic pain level include repositioning and pain medication. Patient accepting pain interventions without difficulty. Problem: Skin Integrity:  Goal: Skin integrity will stabilize  Description  Skin integrity will stabilize  5/16/2019 0017 by Colt Nixon RN  Outcome: Ongoing  Note:   Patient at risk for decreased skin integrity due to decreased circulation. No new skin breakdown noted this shift. Problem: Physical Regulation:  Goal: Prevent transmision of infection  Description  Prevent transmision of infection  5/16/2019 0017 by Colt Nixon RN  Outcome: Ongoing  Note:   Patient in contact isolation for history of ESBL, VRE and MRSA. Patient educated on handwashing and staff gowning up. Contact isolation poster on the door. Problem: Discharge Planning:  Goal: Patients continuum of care needs are met  Description  Patients continuum of care needs are met  5/16/2019 0017 by Colt Nixon RN  Outcome: Ongoing  Note:   Discharge plan reviewed with patient. Patient continues to actively participate in current discharge plan. Case management/ following patient. Care plan reviewed with patient. Patient verbalize understanding in current plan of care and participate in goal setting.

## 2019-05-17 NOTE — TELEPHONE ENCOUNTER
Please agree with verbal orders     See note below from earlier in the day. I started out with 8 years on device   Rv imped 470  R waves 10.8  Threshold 3.88 at 0.24 -known higher thresholds  He's programmed @ 4.75 @ 0.24  Improves to 2.38 @ 1ms but I cannot use adaptive at 1ms and it brings longevity down to 9months!!  Threshold at implant was 2.5 @ 0.4  0% paced, situation discussed with [facility], V/o to increase lower rate to 60      Per [facility], if he continues to have issues he needs to go to ED. I offered to take him today, but he is hoping having his base rate increased might help him feel better. Again, stressed if he feels bad to go to ED.  Verbalized understanding

## 2019-05-17 NOTE — TELEPHONE ENCOUNTER
No warnings today, but has in the past had high thresholds. Last alert was January but because he is having symptoms, call to pt, will check in office just to be sure. Pt is aware to come in, has been scheduled an appt. Aware he should not drive, will call office if he cannot get here.       Lisa pt

## 2019-05-17 NOTE — PROGRESS NOTES
Patient had Dorrene Maffucci implanted at [facility]. I am not sure why the patient is feeling poorly.   I would perform an ECHO to make sure his EF is still normal.

## 2019-05-17 NOTE — PROGRESS NOTES
See note below from earlier in the day. I started out with 8 years on device   Rv imped 470  R waves 10.8  Threshold 3.88 at 0.24 -known higher thresholds  He's programmed @ 4.75 @ 0.24  Improves to 2.38 @ 1ms but I cannot use adaptive at 1ms and it brings longevity down to 9months!!  Threshold at implant was 2.5 @ 0.4  0% paced, situation discussed with [facility], V/o to increase lower rate to 60      Per [facility], if he continues to have issues he needs to go to ED. I offered to take him today, but he is hoping having his base rate increased might help him feel better. Again, stressed if he feels bad to go to ED. Verbalized understanding    Was given number to [employer] to troubleshoot his carelink monitor. Me           5/17/19 8:49 AM   Note      Recent hospitalization for CHF and hypotension. Does home CAPD, states his weight never increased, he had increased SOB. Dry weight is 109.5 and he is 110.9 today. He states \"they\" had him too dry on discharge so they told him he did not have to do a cycle last night, he will restart tonight. No diuretics, states he urinates very little. 12% kidney function     States he is is feeling like he did prior to his pacemaker. SOB, feels like he is going to pass our. Dizzy. BP today 129/49     Will do a home download. He has a micra VVI pacemaker                        5/17/19 9:21 AM      You contacted Jami Nielsen        Me           5/17/19 9:24 AM   Note      No warnings today, but has in the past had high thresholds. Last alert was January but because he is having symptoms, call to pt, will check in office just to be sure. Pt is aware to come in, has been scheduled an appt.   Aware he should not drive, will call office if he cannot get here.

## 2019-05-17 NOTE — TELEPHONE ENCOUNTER
Recent hospitalization for CHF and hypotension. Does home CAPD, states his weight never increased, he had increased SOB. Dry weight is 109.5 and he is 110.9 today. He states \"they\" had him too dry on discharge so they told him he did not have to do a cycle last night, he will restart tonight. No diuretics, states he urinates very little. 12% kidney function    States he is is feeling like he did prior to his pacemaker. SOB, feels like he is going to pass our. Dizzy. BP today 129/49    Will do a home download.   He has a micra VVI pacemaker

## 2019-05-20 NOTE — TELEPHONE ENCOUNTER
----- Message from Tanya Alvares MD sent at 5/17/2019  3:52 PM EDT -----      ----- Message -----  From: Roberta Morejon RN  Sent: 5/17/2019   1:04 PM  To:  Tanya Alvares MD

## 2019-05-20 NOTE — TELEPHONE ENCOUNTER
DR Ja Cao SEE DR Kamala Pacheco RECOMMENDATIONS BELOW        Randi Felipe RN         [employer] micra single pacemaker        Dx        Progress Notes   Heavenly Munguia MD (Physician) Debi Cavazos Cardiac Electrophysiology      Patient had Myles Rajan implanted at [facility]. I am not sure why the patient is feeling poorly. I would perform an ECHO to make sure his EF is still normal.          Progress Notes   Randi Felipe RN (Registered Nurse)      See note below from earlier in the day. I started out with 8 years on device   Rv imped 470  R waves 10.8  Threshold 3.88 at 0.24 -known higher thresholds  He's programmed @ 4.75 @ 0.24  Improves to 2.38 @ 1ms but I cannot use adaptive at 1ms and it brings longevity down to 9months!!  Threshold at implant was 2.5 @ 0.4  0% paced, situation discussed with [facility] V/o to increase lower rate to 60        Per [facility], if he continues to have issues he needs to go to ED. I offered to take him today, but he is hoping having his base rate increased might help him feel better. Again, stressed if he feels bad to go to ED. Verbalized understanding     Was given number to [employer] to troubleshoot his carelink monitor.

## 2019-05-30 NOTE — TELEPHONE ENCOUNTER
[facility] would like to schedule pt for the closure of Percutaneous Left Atrial Appendage with Watchman device. Okay for [facility] to do? Form in Dr. Mike Jack.

## 2019-08-01 NOTE — LETTER
.. [address] [place] [address]  Phone: [phone]  Fax: [phone]        August 9, 2019    1 Gm Castrejon:    2nd notice     . Raegan Parish Our office did not receive your Carelink transmission which was scheduled for July 31, 2019      Please check the connections to your carelink monitor and send a manual download ASAP. If you need help sending a download, please call [employer] at [phone]. Our office is not responsible for missed transmissions. Please call our office at [phone] if you have questions for the pacemaker/ICD clinic     Thank You for your assistance!     [facility]'s Pacemaker/ICD clinic

## 2019-08-28 NOTE — TELEPHONE ENCOUNTER
LM to send download, we've done this before and he has never sent. High RV thresholds, it would be good to recheck him.   I scheduled him in October same day he sees Mirian De Jesus to re-assess threshold

## 2019-10-14 ENCOUNTER — TELEPHONE (OUTPATIENT)
Dept: CARDIOLOGY CLINIC | Age: 59
End: 2019-10-14

## 2019-10-30 ENCOUNTER — TELEPHONE (OUTPATIENT)
Dept: CARDIOLOGY CLINIC | Age: 59
End: 2019-10-30

## 2020-03-25 PROBLEM — G62.9 PERIPHERAL NEUROPATHY: Status: RESOLVED | Noted: 2020-03-25 | Resolved: 2020-03-24

## 2023-03-08 NOTE — PROGRESS NOTES
nitroGLYCERIN      Intake/Output Summary (Last 24 hours) at 11/12/18 1259  Last data filed at 11/12/18 1215   Gross per 24 hour   Intake             1400 ml   Output             1900 ml   Net             -500 ml       Diet:  DIET RENAL; Low Potassium    Exam:  BP (P) 118/61   Pulse (P) 62   Temp (P) 98.1 °F (36.7 °C)   Resp 18   Ht 6' 1\" (1.854 m)   Wt (P) 236 lb 1.8 oz (107.1 kg)   SpO2 94%   BMI (P) 31.15 kg/m²     General appearance: No apparent distress, appears stated age and cooperative. HEENT: Pupils equal, round, and reactive to light. Conjunctivae/corneas clear. Neck: Supple, with full range of motion. No jugular venous distention. Trachea midline. Respiratory:  Normal respiratory effort. Clear to auscultation, bilaterally without Rales/Wheezes/Rhonchi. Cardiovascular: Regular rate and rhythm with normal S1/S2 without murmurs, rubs or gallops. Abdomen: Soft, non-tender, non-distended with normal bowel sounds. Musculoskeletal: passive and active ROM x 4 extremities. Skin: Skin color, texture, turgor normal.  Ulcerations on left hand. Neurologic:  Neurovascularly intact without any focal sensory/motor deficits. Cranial nerves: II-XII intact, grossly non-focal.  Psychiatric: Alert and oriented, thought content appropriate, normal insight  Capillary Refill: Brisk,< 3 seconds   Peripheral Pulses: +2 palpable, equal bilaterally       Labs:   Recent Labs      11/10/18   1716   11/11/18   0817  11/11/18   1534  11/12/18   0015  11/12/18   0502   WBC  15.2*   --   13.3*   --    --   12.9*   HGB  7.9*   < >  7.4*  8.1*  7.4*  7.5*   HCT  23.5*   < >  23.8*  24.6*  24.3*  24.7*   PLT  233   --   209   --    --   211    < > = values in this interval not displayed.      Recent Labs      11/10/18   1716  11/11/18   0817  11/12/18   0502   NA  132*  136  133*   K  5.2  4.7  5.1   CL  92*  93*  92*   CO2  24  27  23   BUN  53*  63*  83*   CREATININE  5.4*  6.8*  8.0*   CALCIUM  9.0  9.6  8.9     No results None

## 2024-06-05 NOTE — FLOWSHEET NOTE
11/08/18 1043   Provider Notification   Reason for Communication Patient request   Provider Name Aj Louie   Provider Notification Physician   Method of Communication Secure Message   Response Waiting for response   Notification Time 46   Dr. Aj Louie texted for consult, awaiting call back
11/08/18 1153   Provider Notification   Reason for Communication Evaluate   Provider Name Ada Wallace   Provider Notification Physician   Method of Communication Call   Response Waiting for response   Notification Time [phone]   Dr. Ada Wallace called regarding consult for anemia, information for consult given to office personnel to forward to him.
11/08/18 2000   Provider Notification   Reason for Communication Evaluate   Provider Name Dr. Camila Hernandez   Provider Notification Physician   Method of Communication Secure Message   Response Waiting for response     Secure message to Dr. Camila Hernandez: Patient's hemoglobin is now 7.0, up from 6.1 at 1100 today and one unit of blood. Patient did have a positive fecal occult blood. Please advise. CBC q4 hours, consult GI. GI already on.  See orders
11/13/18 1530   Encounter Summary   Services provided to: Patient and family together   Referral/Consult From: [address]; Children   Place of [employer] none   Continue Visiting Yes  (11/13/18 continue support)   Complexity of Encounter Moderate   Length of Encounter 15 minutes   Spiritual/Nondenominational   Type Spiritual support     Tracey Oro is a 60year old white male. He is admitted for chest pain. He has also gone through a major remover of his right toe. Wilfred Argueta shared with me that he is heading to [facility] in [place] at his doctor's request for more advanced testing to find out what is really going on. Wilfred Argueta said he hope they can find out what is wrong so he can get the treatment he need to get on with the right treatment. He is missing his hunting habit. He has no [employer] home. H  O: Wilfred Argueta is engaged in conversation. He is approachable and calm. He is also hopeful. This  provided words of encouragement and nurtured hope. Prayer was offered for safe travel for the family. A: Wilfred Argueta appeared a bit anxious that the medical staff cannot pin point at what is wrong with him at this time. He appeared to be hopeful. P: [place] will continue will support with support to Wilfred Argueta and his family and pray for safe travel and a good outcome for the patinet.
oral

## (undated) DEVICE — SUTURE VCRL SZ 3-0 L27IN ABSRB UD FS-2 L19MM 1/2 CIR J423H

## (undated) DEVICE — GAUZE,SPONGE,8"X4",12PLY,XRAY,STRL,LF: Brand: MEDLINE

## (undated) DEVICE — SET LNR RED GRN W/ BASE CLEANASCOPE

## (undated) DEVICE — GLOVE ORANGE PI 7 1/2   MSG9075

## (undated) DEVICE — Device: Brand: DEFENDO VALVE AND CONNECTOR KIT

## (undated) DEVICE — FORCEP RAD JAW W/NEEDLE 160CM

## (undated) DEVICE — DRESSING,GAUZE,XEROFORM,CURAD,5"X9",ST: Brand: CURAD

## (undated) DEVICE — DRAPE,U/SHT,SPLIT,FILM,60X84,STERILE: Brand: MEDLINE

## (undated) DEVICE — SOLUTION SURG PREP POV IOD 7.5% 4 OZ

## (undated) DEVICE — VELCLOSE LATEX FREE VELCRO ELASTIC BANDAGE 2INX5YD: Brand: VELCLOSE

## (undated) DEVICE — NON-DEHP Y-TYPE CATHETER EXTENSION SET, MALE LUER LOCK ADAPTER

## (undated) DEVICE — Z DISCONTINUED BY MEDLINE USE 2711682 TRAY SKIN PREP DRY W/ PREM GLV

## (undated) DEVICE — YANKAUER,BULB TIP,W/O VENT,RIGID,STERILE: Brand: MEDLINE

## (undated) DEVICE — TUBING IV STOPCOCK 48 CM 3 W

## (undated) DEVICE — CONMED SCOPE SAVER BITE BLOCK, 20X27 MM: Brand: SCOPE SAVER

## (undated) DEVICE — ENDO KIT: Brand: MEDLINE INDUSTRIES, INC.

## (undated) DEVICE — SOLUTION IV 1000ML 0.9% SOD CHL PH 5 INJ USP VIAFLX PLAS

## (undated) DEVICE — BASIC SINGLE BASIN 1-LF: Brand: MEDLINE INDUSTRIES, INC.

## (undated) DEVICE — TRAP POLYP ETRAP

## (undated) DEVICE — PAD,ABDOMINAL,5"X9",ST,LF,25/BX: Brand: MEDLINE INDUSTRIES, INC.

## (undated) DEVICE — Device: Brand: LIGHT GUARD™ FLEXIBLE LIGHT HANDLE COVER (2 EACH/PKG)

## (undated) DEVICE — EVACUATOR SURG 400CC PVC 3 SPR BLB FOR WND DRNGE

## (undated) DEVICE — ROYAL SILK SURGICAL GOWN, XXL: Brand: CONVERTORS

## (undated) DEVICE — LUER-LOK 360°: Brand: CONNECTA, LUER-LOK

## (undated) DEVICE — GAUZE,SPONGE,4"X4",12PLY,STERILE,LF,2'S: Brand: MEDLINE

## (undated) DEVICE — SUTURE PROL SZ 4-0 L18IN NONABSORBABLE BLU L19MM PS-2 3/8 8682G

## (undated) DEVICE — GLOVE SURG SZ 65 THK91MIL LTX FREE SYN POLYISOPRENE

## (undated) DEVICE — PACK PROCEDURE SURG SET UP SRMC

## (undated) DEVICE — PADDING UNDERCAST W4INXL12FT RAYON POLY SYN NONADHESIVE

## (undated) DEVICE — GLOVE ORANGE PI 8 1/2   MSG9085

## (undated) DEVICE — SOLUTION IV IRRIG POUR BRL 0.9% SODIUM CHL 2F7124

## (undated) DEVICE — DRAPE,EXTREMITY,89X128,STERILE: Brand: MEDLINE

## (undated) DEVICE — 2000CC GUARDIAN II: Brand: GUARDIAN

## (undated) DEVICE — FIAPC® PROBE W/ FILTER 3000 A OD 2.3MM/6.9FR; L 3M/9.8FT: Brand: ERBE

## (undated) DEVICE — SUREFIT, DUAL DISPERSIVE ELECTRODE, CONTACT QUALITY MONITOR: Brand: SUREFIT

## (undated) DEVICE — CHLORAPREP 26ML ORANGE

## (undated) DEVICE — SET ADMIN 25ML L117IN PMP MOD CK VLV RLER CLMP 2 SMRTSITE

## (undated) DEVICE — TOURNIQUET PHLEB L EXSANGUINATING FOR AD DGT TOURNI-COT

## (undated) DEVICE — 1840 FOAM BLOCK NEEDLE COUNTER: Brand: DEVON

## (undated) DEVICE — CONTAINER,SPECIMEN,PNEU TUBE,4OZ,OR STRL: Brand: MEDLINE

## (undated) DEVICE — BANDAGE COMPR E SGL LAYERED CLSR BGE W/ CLP W4INXL15FT

## (undated) DEVICE — MEDI-VAC NON-CONDUCTIVE SUCTION TUBING 6MM X 6.1M (20 FT.) L: Brand: CARDINAL HEALTH

## (undated) DEVICE — SPONGE GZ W4XL4IN COT 12 PLY TYP VII WVN C FLD DSGN

## (undated) DEVICE — SYRINGE BULB 50/CS 48/PLT: Brand: MEDEGEN MEDICAL PRODUCTS, LLC

## (undated) DEVICE — GOWN,SIRUS,NON REINFRCD,LARGE,SET IN SL: Brand: MEDLINE

## (undated) DEVICE — IMPREGNATED GAUZE DRESSING: Brand: CUTICERIN 7.5X7.5CM CTN 50

## (undated) DEVICE — INTENDED FOR TISSUE SEPARATION, AND OTHER PROCEDURES THAT REQUIRE A SHARP SURGICAL BLADE TO PUNCTURE OR CUT.: Brand: BARD-PARKER ® CARBON RIB-BACK BLADES

## (undated) DEVICE — GOWN,SIRUS,NONRNF,SETINSLV,XL,20/CS: Brand: MEDLINE

## (undated) DEVICE — CONVERTED USE 338908 SPONGES LAP 18X18 ST

## (undated) DEVICE — PEN: MARKING STD 100/CS: Brand: MEDICAL ACTION INDUSTRIES

## (undated) DEVICE — LINER SUCT CANSTR 1500CC SEMI RIG W/ POR HYDROPHOBIC SHUT

## (undated) DEVICE — PRECISION (9.0 X 0.51 X 31.0MM)

## (undated) DEVICE — Device

## (undated) DEVICE — THERMOGARD PLUS ABC DUAL DISPERSIVE ELECTRODE: Brand: THERMOGARD

## (undated) DEVICE — BASIC SINGLE BASIN BTC-LF: Brand: MEDLINE INDUSTRIES, INC.

## (undated) DEVICE — GLOVE ORANGE PI 8   MSG9080

## (undated) DEVICE — CURITY STRETCH BANDAGE: Brand: CURITY

## (undated) DEVICE — DUP USE 304928 DRESSING GZ 12 PLY 4X4 STRL

## (undated) DEVICE — DEFENDO AIR WATER SUCTION AND BIOPSY VALVE KIT FOR  OLYMPUS: Brand: DEFENDO AIR/WATER/SUCTION AND BIOPSY VALVE

## (undated) DEVICE — SOLUTION IV 1000ML 0.45% SOD CHL PH 5 INJ USP VIAFLX PLAS

## (undated) DEVICE — 3M™ MICROFOAM™ TAPE 1528-4: Brand: 3M™ MICROFOAM™

## (undated) DEVICE — BANDAGE,GAUZE,4.5"X4.1YD,STERILE,LF: Brand: MEDLINE

## (undated) DEVICE — SNARE POLYP SM W13MMXL240CM SHTH DIA2.4MM OVL FLX DISP

## (undated) DEVICE — FAN SPRAY KIT: Brand: PULSAVAC®

## (undated) DEVICE — PREP SOL PVP IODINE 4%  4 OZ/BTL

## (undated) DEVICE — PATIENT RETURN ELECTRODE, SINGLE-USE, CONTACT QUALITY MONITORING, ADULT, WITH 9FT CORD, FOR PATIENTS WEIGING OVER 33LBS. (15KG): Brand: MEGADYNE

## (undated) DEVICE — BANDAGE COMPR W4INXL55YD 1 LAYR COT CLSR DLX E HVY DUTY W

## (undated) DEVICE — GLOVE ORANGE PI 7   MSG9070

## (undated) DEVICE — COVER ARMBRD W13XL28.5IN IMPERV BLU FOR OP RM

## (undated) DEVICE — SOLUTION IV IRRIG WATER 1000ML POUR BRL 2F7114

## (undated) DEVICE — 4-PORT MANIFOLD: Brand: NEPTUNE 2

## (undated) DEVICE — SET IRRIG L94IN ID0.281IN W/ 4.5IN DST FLX CONN 2 LD ON OFF

## (undated) DEVICE — BANDAGE GZ W45INXL4 1 10YD FLUF RL 6 PLY DERMACEA

## (undated) DEVICE — SET DRN PVC DBL RND W/ TRCR 1/8 IN MID PERF 12 H PAT